# Patient Record
Sex: FEMALE | Race: WHITE | NOT HISPANIC OR LATINO | Employment: OTHER | URBAN - METROPOLITAN AREA
[De-identification: names, ages, dates, MRNs, and addresses within clinical notes are randomized per-mention and may not be internally consistent; named-entity substitution may affect disease eponyms.]

---

## 2017-02-15 ENCOUNTER — OFFICE VISIT (OUTPATIENT)
Dept: FAMILY MEDICINE | Facility: CLINIC | Age: 62
End: 2017-02-15
Payer: MEDICARE

## 2017-02-15 ENCOUNTER — DOCUMENTATION ONLY (OUTPATIENT)
Dept: FAMILY MEDICINE | Facility: CLINIC | Age: 62
End: 2017-02-15

## 2017-02-15 VITALS
HEIGHT: 68 IN | OXYGEN SATURATION: 98 % | SYSTOLIC BLOOD PRESSURE: 122 MMHG | HEART RATE: 89 BPM | RESPIRATION RATE: 16 BRPM | BODY MASS INDEX: 33.88 KG/M2 | WEIGHT: 223.56 LBS | DIASTOLIC BLOOD PRESSURE: 83 MMHG | TEMPERATURE: 98 F

## 2017-02-15 DIAGNOSIS — E78.5 HYPERLIPIDEMIA, UNSPECIFIED HYPERLIPIDEMIA TYPE: ICD-10-CM

## 2017-02-15 DIAGNOSIS — I63.9 CEREBROVASCULAR ACCIDENT (CVA), UNSPECIFIED MECHANISM: ICD-10-CM

## 2017-02-15 DIAGNOSIS — Z72.0 TOBACCO ABUSE: ICD-10-CM

## 2017-02-15 DIAGNOSIS — I10 ESSENTIAL HYPERTENSION: Primary | ICD-10-CM

## 2017-02-15 PROCEDURE — 3074F SYST BP LT 130 MM HG: CPT | Mod: S$GLB,,, | Performed by: INTERNAL MEDICINE

## 2017-02-15 PROCEDURE — 3079F DIAST BP 80-89 MM HG: CPT | Mod: S$GLB,,, | Performed by: INTERNAL MEDICINE

## 2017-02-15 PROCEDURE — 99203 OFFICE O/P NEW LOW 30 MIN: CPT | Mod: S$GLB,,, | Performed by: INTERNAL MEDICINE

## 2017-02-15 RX ORDER — ATORVASTATIN CALCIUM 40 MG/1
40 TABLET, FILM COATED ORAL DAILY
Qty: 90 TABLET | Refills: 3 | Status: SHIPPED | OUTPATIENT
Start: 2017-02-15 | End: 2017-02-17 | Stop reason: SDUPTHER

## 2017-02-15 RX ORDER — ATORVASTATIN CALCIUM 20 MG/1
20 TABLET, FILM COATED ORAL DAILY
COMMUNITY
End: 2017-02-15

## 2017-02-15 RX ORDER — LISINOPRIL 20 MG/1
20 TABLET ORAL DAILY
COMMUNITY
End: 2017-02-17 | Stop reason: DRUGHIGH

## 2017-02-15 RX ORDER — ASPIRIN 325 MG
325 TABLET ORAL DAILY
Status: ON HOLD | COMMUNITY
Start: 2017-02-15 | End: 2020-09-08 | Stop reason: HOSPADM

## 2017-02-15 RX ORDER — BACLOFEN 20 MG/1
20 TABLET ORAL 2 TIMES DAILY
COMMUNITY
End: 2017-04-07 | Stop reason: SDUPTHER

## 2017-02-15 NOTE — MR AVS SNAPSHOT
[facility]  [address] [address] [address]  Phone: [phone]  Fax: [phone]                  Yadi Mccall   2/15/2017 9:20 AM   Office Visit    Description:  Female : 1955   Provider:  Chetan Sweeney MD   Department:  [facility]           Reason for Visit     Establish Care           Diagnoses this Visit        Comments    Essential hypertension    -  Primary     Hyperlipidemia, unspecified hyperlipidemia type         Tobacco abuse         Cerebrovascular accident (CVA), unspecified mechanism                To Do List           Future Appointments        Provider Department Dept Phone    3/21/2017 8:20 AM LAB, [facility] [phone]    3/28/2017 4:00 PM Chetan Sweeney MD [facility] [phone]      Goals (5 Years of Data)     None      Follow-Up and Disposition     Return in about 6 weeks (around 3/29/2017).    Follow-up and Disposition History       These Medications        Disp Refills Start End    atorvastatin (LIPITOR) 40 MG tablet 90 tablet 3 2/15/2017 2/15/2018    Take 1 tablet (40 mg total) by mouth once daily. - Oral    Pharmacy: [facility] - [address] Ph #: [phone]         [facility] On Call     [facility] On Call Nurse Care Line -  Assistance  Registered nurses in the [facility] On Call Center provide clinical advisement, health education, appointment booking, and other advisory services.  Call for this free service at [phone].             Medications           Message regarding Medications     Verify the changes and/or additions to your medication regime listed below are the same as discussed with your clinician today.  If any of these changes or additions are incorrect, please notify your healthcare provider.        START taking these NEW medications        Refills    atorvastatin (LIPITOR) 40 MG tablet 3    Sig: Take 1  "tablet (40 mg total) by mouth once daily.    Class: Normal    Route: Oral           Verify that the below list of medications is an accurate representation of the medications you are currently taking.  If none reported, the list may be blank. If incorrect, please contact your healthcare provider. Carry this list with you in case of emergency.           Current Medications     aspirin 325 MG tablet Take 325 mg by mouth once daily.    baclofen (LIORESAL) 20 MG tablet Take 20 mg by mouth 2 (two) times daily.    lisinopril (PRINIVIL,ZESTRIL) 20 MG tablet Take 20 mg by mouth once daily.    TRAZODONE HCL (TRAZODONE ORAL) Take by mouth nightly.    atorvastatin (LIPITOR) 40 MG tablet Take 1 tablet (40 mg total) by mouth once daily.           Clinical Reference Information           Your Vitals Were     BP Pulse Temp Resp Height Weight    122/83 (BP Location: Left arm, Patient Position: Sitting, BP Method: Automatic) 89 98.3 °F (36.8 °C) (Oral) 16 5' 8" (1.727 m) 101.4 kg (223 lb 8.7 oz)    SpO2 BMI             98% 33.99 kg/m2         Blood Pressure          Most Recent Value    BP  122/83      Allergies as of 2/15/2017     Chlorthalidone    Codeine    Dyazide [Triamterene-hydrochlorothiazid]    Penicillins      Immunizations Administered on Date of Encounter - 2/15/2017     None      Orders Placed During Today's Visit     Future Labs/Procedures Expected by Expires    Comprehensive metabolic panel  2/15/2017 2/16/2018    Lipid panel  2/15/2017 2/16/2018      Instructions    Cut back on your smoking    Low-Salt Diet  This diet removes foods that are high in salt. It also limits the amount of salt you use when cooking. It is most often used for people with high blood pressure, edema (fluid retention), and kidney, liver, or heart disease.  Table salt contains the mineral sodium. Your body needs sodium to work normally. But too much sodium can make your health problems worse. Your healthcare provider is recommending a low-salt " (also called low-sodium) diet for you. Your total daily allowance of salt is 1,500 to 2,300 milligrams (mg). It is less than 1 teaspoon of table salt. This means you can have only about 500 to 700 mg of sodium at each meal. People with certain health problems should limit salt intake to the lower end of the recommended range.    When you cook, dont add much salt. If you can cook without using salt, even better. Dont add salt to your food at the table.  When shopping, read food labels. Salt is often called sodium on the label. Choose foods that are salt-free, low salt, or very low salt. Note that foods with reduced salt may not lower your salt intake enough.    Beans, potatoes, and pasta  Ok: Dry beans, split peas, lentils, potatoes, rice, macaroni, pasta, spaghetti without added salt  Avoid: Potato chips, tortilla chips, and similar products  Breads and cereals  Ok: Low-sodium breads, rolls, cereals, and cakes; low-salt crackers, matzo crackers  Avoid: Salted crackers, pretzels, popcorn, [place] toast, pancakes, muffins  Dairy  Ok: Milk, chocolate milk, hot chocolate mix, low-salt cheeses, and yogurt  Avoid: Processed cheese and cheese spreads; Roquefort, Camembert, and cottage cheese; buttermilk, instant breakfast drink  Desserts  Ok: Ice cream, frozen yogurt, juice bars, gelatin, cookies and pies, sugar, honey, jelly, hard candy  Avoid: Most pies, cakes and cookies prepared or processed with salt; instant pudding  Drinks  Ok: Tea, coffee, fizzy (carbonated) drinks, juices  Avoid: Flavored coffees, electrolyte replacement drinks, sports drinks  Meats  Ok: All fresh meat, fish, poultry, low-salt tuna, eggs, egg substitute  Avoid: Smoked, pickled, brine-cured, or salted meats and fish. This includes hughes, chipped beef, corned beef, hot dogs, deli meats, ham, kosher meats, salt pork, sausage, canned tuna, salted codfish, smoked salmon, herring, sardines, or anchovies.  Seasonings and spices  Ok: Most seasonings are  okay. Good substitutes for salt include: fresh herb blends, hot sauce, lemon, garlic, ayala, vinegar, dry mustard, parsley, cilantro, horseradish, tomato paste, regular margarine, mayonnaise, unsalted butter, cream cheese, vegetable oil, cream, low-salt salad dressing and gravy.  Avoid: Regular ketchup, relishes, pickles, soy sauce, teriyaki sauce, Worcestershire sauce, BBQ sauce, tartar sauce, meat tenderizer, chili sauce, regular gravy, regular salad dressing, salted butter  Soups  Ok: Low-salt soups and broths made with allowed foods  Avoid: Bouillon cubes, soups with smoked or salted meats, regular soup and broth  Vegetables  Ok: Most vegetables are okay; also low-salt tomato and vegetable juices  Avoid: Sauerkraut and other brine-soaked vegetables; pickles and other pickled vegetables; tomato juice, olives  © 8461-7632 [employer]. [address] [place] [address]. All rights reserved. This information is not intended as a substitute for professional medical care. Always follow your healthcare professional's instructions.       Smoking Cessation     If you would like to quit smoking:   You may be eligible for free services if you are a [place] resident and started smoking cigarettes before September 1, 1988.  Call the Smoking Cessation Trust ([facility]) toll free at (424) 455-1017 or (738) 468-7829.   Call 1-800-QUIT-NOW if you do not meet the above criteria.            Language Assistance Services     ATTENTION: Language assistance services are available, free of charge. Please call [phone].      ATENCIÓN: Si habla español, tiene a cooper disposición servicios gratuitos de asistencia lingüística. Llame al [phone].     CHÚ Ý: N?u b?n nói Ti?ng Vi?t, có các d?ch v? h? tr? ngôn ng? mi?n phí dành cho b?n. G?i s? [phone].         [facility] complies with applicable Federal civil rights laws and does not discriminate on the basis of race, color, national  origin, age, disability, or sex.

## 2017-02-15 NOTE — PROGRESS NOTES
Subjective:       Patient ID: Yadi Mccall is a 61 y.o. female.    Chief Complaint: Establish Care (refills)    HPI         CHIEF COMPLAINT: Hypertension  HPI:     ONSET:      QUALITY/COURSE:   Controlled:  yes     INTENSITY/SEVERITY:  Average blood pressure is unknown .     MODIFIERS/TREATMENTS:  Taking medications: yes. .High sodium intake: no. alcohol: no      The following symptoms are positive only if BOLDED, otherwise are negative.      SYMPTOMS/RELATED: Possible medication side effects include:   Depression..  . Cough. . Constipation.    REVIEW OF SYMPTOMS: . Weight_loss . Weight_gain . Leg_cramps ..    TARGET ORGAN DAMAGE:: angina/ prior myocardial infarction, chronic kidney disease, heart failure, left ventricular hypertrophy, peripheral artery disease, prior coronary revascularization, retinopathy, stroke. transient ischemic attack.        CHIEF COMPLAINT: Hyperlipidemia. cholesterol screening: no.   HPI:     ONSET:    MODIFIERS/TREATMENTS: . Taking medications: yes. . Non-compliance with following diet: no. .     SYMPTOMS/RELATED:Possible medication side effects include:   Myalgia: no.  .     REVIEW OF SYMPTOMS: past weights:   Wt Readings from Last 1 Encounters:   02/15/17 0958 101.4 kg (223 lb 8.7 oz)                                                     Last lipids: total No results found for: CHOL                                                                  HDL No results found for: HDL                                                                  LDL No results found for: LDLCALC                                                                  TRIG No results found for: TRIG                                                                      Review of Systems   Constitutional: Positive for unexpected weight change. Negative for fatigue and fever.   HENT: Positive for tinnitus. Negative for dental problem, hearing loss, nosebleeds, rhinorrhea, trouble swallowing and voice change.    Eyes:  "Negative for itching and visual disturbance.   Respiratory: Negative for cough, shortness of breath and wheezing.    Cardiovascular: Negative for chest pain and palpitations.   Gastrointestinal: Negative for abdominal pain, blood in stool, constipation, diarrhea, nausea and vomiting.   Endocrine: Negative for cold intolerance, heat intolerance, polydipsia and polyphagia.   Genitourinary: Negative for difficulty urinating and dysuria.   Musculoskeletal: Negative for arthralgias.   Allergic/Immunologic: Negative for environmental allergies and immunocompromised state.   Neurological: Positive for weakness. Negative for dizziness, seizures, numbness and headaches.   Hematological: Does not bruise/bleed easily.   Psychiatric/Behavioral: Positive for sleep disturbance. Negative for agitation, dysphoric mood and suicidal ideas. The patient is not nervous/anxious.        Objective:      Vitals:    02/15/17 0958   BP: 122/83   Pulse: 89   Resp: 16   Temp: 98.3 °F (36.8 °C)   TempSrc: Oral   SpO2: 98%   Weight: 101.4 kg (223 lb 8.7 oz)   Height: 5' 8" (1.727 m)   PainSc: 0-No pain     Physical Exam   Constitutional: She is oriented to person, place, and time. She appears well-nourished. No distress.   HENT:   Head: Normocephalic and atraumatic.   Right Ear: External ear normal.   Left Ear: External ear normal.   Mouth/Throat: Oropharynx is clear and moist. No oropharyngeal exudate.   Eyes: Conjunctivae and EOM are normal. Pupils are equal, round, and reactive to light. No scleral icterus.   Neck: Normal range of motion. Neck supple. No thyromegaly present.   Cardiovascular: Normal rate, regular rhythm, normal heart sounds and intact distal pulses.  Exam reveals no gallop and no friction rub.    No murmur heard.  Pulmonary/Chest: Effort normal and breath sounds normal. No respiratory distress. She has no wheezes. She has no rales. She exhibits no tenderness.   Abdominal: Soft. Bowel sounds are normal. She exhibits no " distension. There is no tenderness.   Musculoskeletal: Normal range of motion. She exhibits no edema or tenderness.   Weakness and foot drop of left leg   Lymphadenopathy:     She has no cervical adenopathy.   Neurological: She is alert and oriented to person, place, and time. She displays normal reflexes. No cranial nerve deficit. She exhibits normal muscle tone. Coordination normal.   Skin: Skin is warm and dry. No rash noted.   Psychiatric: She has a normal mood and affect. Her behavior is normal. Judgment and thought content normal.   Nursing note and vitals reviewed.        Assessment:       1. Essential hypertension    2. Hyperlipidemia, unspecified hyperlipidemia type    3. Tobacco abuse    4. Cerebrovascular accident (CVA), unspecified mechanism          Plan:   The patient was very devoted to her smoking so will only ask her to cut back.  Essential hypertension  -     Comprehensive metabolic panel; Future; Expected date: 2/15/17  -     Lipid panel; Future; Expected date: 2/15/17    Hyperlipidemia, unspecified hyperlipidemia type  -     atorvastatin (LIPITOR) 40 MG tablet; Take 1 tablet (40 mg total) by mouth once daily.  Dispense: 90 tablet; Refill: 3    Tobacco abuse    Cerebrovascular accident (CVA), unspecified mechanism      Return in about 6 weeks (around 3/29/2017).

## 2017-02-15 NOTE — PATIENT INSTRUCTIONS
Cut back on your smoking    Low-Salt Diet  This diet removes foods that are high in salt. It also limits the amount of salt you use when cooking. It is most often used for people with high blood pressure, edema (fluid retention), and kidney, liver, or heart disease.  Table salt contains the mineral sodium. Your body needs sodium to work normally. But too much sodium can make your health problems worse. Your healthcare provider is recommending a low-salt (also called low-sodium) diet for you. Your total daily allowance of salt is 1,500 to 2,300 milligrams (mg). It is less than 1 teaspoon of table salt. This means you can have only about 500 to 700 mg of sodium at each meal. People with certain health problems should limit salt intake to the lower end of the recommended range.    When you cook, dont add much salt. If you can cook without using salt, even better. Dont add salt to your food at the table.  When shopping, read food labels. Salt is often called sodium on the label. Choose foods that are salt-free, low salt, or very low salt. Note that foods with reduced salt may not lower your salt intake enough.    Beans, potatoes, and pasta  Ok: Dry beans, split peas, lentils, potatoes, rice, macaroni, pasta, spaghetti without added salt  Avoid: Potato chips, tortilla chips, and similar products  Breads and cereals  Ok: Low-sodium breads, rolls, cereals, and cakes; low-salt crackers, matzo crackers  Avoid: Salted crackers, pretzels, popcorn, [place]ian toast, pancakes, muffins  Dairy  Ok: Milk, chocolate milk, hot chocolate mix, low-salt cheeses, and yogurt  Avoid: Processed cheese and cheese spreads; Roquefort, Camembert, and cottage cheese; buttermilk, instant breakfast drink  Desserts  Ok: Ice cream, frozen yogurt, juice bars, gelatin, cookies and pies, sugar, honey, jelly, hard candy  Avoid: Most pies, cakes and cookies prepared or processed with salt; instant pudding  Drinks  Ok: Tea, coffee, fizzy (carbonated)  drinks, juices  Avoid: Flavored coffees, electrolyte replacement drinks, sports drinks  Meats  Ok: All fresh meat, fish, poultry, low-salt tuna, eggs, egg substitute  Avoid: Smoked, pickled, brine-cured, or salted meats and fish. This includes hughes, chipped beef, corned beef, hot dogs, deli meats, ham, kosher meats, salt pork, sausage, canned tuna, salted codfish, smoked salmon, herring, sardines, or anchovies.  Seasonings and spices  Ok: Most seasonings are okay. Good substitutes for salt include: fresh herb blends, hot sauce, lemon, garlic, ayala, vinegar, dry mustard, parsley, cilantro, horseradish, tomato paste, regular margarine, mayonnaise, unsalted butter, cream cheese, vegetable oil, cream, low-salt salad dressing and gravy.  Avoid: Regular ketchup, relishes, pickles, soy sauce, teriyaki sauce, Worcestershire sauce, BBQ sauce, tartar sauce, meat tenderizer, chili sauce, regular gravy, regular salad dressing, salted butter  Soups  Ok: Low-salt soups and broths made with allowed foods  Avoid: Bouillon cubes, soups with smoked or salted meats, regular soup and broth  Vegetables  Ok: Most vegetables are okay; also low-salt tomato and vegetable juices  Avoid: Sauerkraut and other brine-soaked vegetables; pickles and other pickled vegetables; tomato juice, olives  © 7153-9355 [employer]. [address] [place] [address]. All rights reserved. This information is not intended as a substitute for professional medical care. Always follow your healthcare professional's instructions.

## 2017-02-15 NOTE — PROGRESS NOTES
Health Maintenance Due   Topic Date Due    Hepatitis C Screening  1955    Lipid Panel  1955    TETANUS VACCINE  07/26/1975    Pap Smear  07/26/1978    Mammogram  07/26/1997    Colonoscopy  07/26/2007    Influenza Vaccine  08/01/2016

## 2017-02-17 ENCOUNTER — TELEPHONE (OUTPATIENT)
Dept: FAMILY MEDICINE | Facility: CLINIC | Age: 62
End: 2017-02-17

## 2017-02-17 DIAGNOSIS — Z11.59 NEED FOR HEPATITIS C SCREENING TEST: ICD-10-CM

## 2017-02-17 DIAGNOSIS — Z12.31 OTHER SCREENING MAMMOGRAM: ICD-10-CM

## 2017-02-17 RX ORDER — ATORVASTATIN CALCIUM 80 MG/1
80 TABLET, FILM COATED ORAL DAILY
COMMUNITY
End: 2017-10-02

## 2017-02-17 RX ORDER — TRAZODONE HYDROCHLORIDE 50 MG/1
50 TABLET ORAL NIGHTLY
COMMUNITY
End: 2017-04-07 | Stop reason: SDUPTHER

## 2017-02-17 RX ORDER — LISINOPRIL 40 MG/1
40 TABLET ORAL DAILY
COMMUNITY
End: 2017-04-07 | Stop reason: SDUPTHER

## 2017-02-17 NOTE — TELEPHONE ENCOUNTER
----- Message from Rhonda Wei sent at 2/16/2017  2:52 PM CST -----  Contact: self: [phone]  Patient wanted to tell office the mg of her medication (sleeping pills). It is 50 mg. Atorvastatin-80 mg, Baclofen - 20 mg and Synaprill - 40 mg. Patient states that office asked her to call back with this information.  
Thank the patient for the information  
6

## 2017-03-07 ENCOUNTER — TELEPHONE (OUTPATIENT)
Dept: FAMILY MEDICINE | Facility: CLINIC | Age: 62
End: 2017-03-07

## 2017-03-07 NOTE — TELEPHONE ENCOUNTER
----- Message from Gunjan Rasheed sent at 3/7/2017 10:57 AM CST -----  Contact: self  Patient needs to reschedule lab appointment to either 03/13/17 or 03/20/17 due to [employer] can not bring her on 03/21/17.  Patient also needs to know if the appointment with Dr Sweeney will need to be rescheduled. Please call patient at [phone]. Thanks!

## 2017-03-20 ENCOUNTER — CLINICAL SUPPORT (OUTPATIENT)
Dept: FAMILY MEDICINE | Facility: CLINIC | Age: 62
End: 2017-03-20
Payer: MEDICARE

## 2017-03-20 DIAGNOSIS — I10 ESSENTIAL HYPERTENSION: ICD-10-CM

## 2017-03-20 LAB
ALBUMIN SERPL BCP-MCNC: 3.8 G/DL
ALP SERPL-CCNC: 112 U/L
ALT SERPL W/O P-5'-P-CCNC: 27 U/L
ANION GAP SERPL CALC-SCNC: 12 MMOL/L
AST SERPL-CCNC: 19 U/L
BILIRUB SERPL-MCNC: 0.4 MG/DL
BUN SERPL-MCNC: 12 MG/DL
CALCIUM SERPL-MCNC: 9.4 MG/DL
CHLORIDE SERPL-SCNC: 103 MMOL/L
CHOLEST/HDLC SERPL: 4.3 {RATIO}
CO2 SERPL-SCNC: 25 MMOL/L
CREAT SERPL-MCNC: 0.9 MG/DL
EST. GFR  (AFRICAN AMERICAN): >60 ML/MIN/1.73 M^2
EST. GFR  (NON AFRICAN AMERICAN): >60 ML/MIN/1.73 M^2
GLUCOSE SERPL-MCNC: 115 MG/DL
HDL/CHOLESTEROL RATIO: 23.3 %
HDLC SERPL-MCNC: 150 MG/DL
HDLC SERPL-MCNC: 35 MG/DL
LDLC SERPL CALC-MCNC: 70.4 MG/DL
NONHDLC SERPL-MCNC: 115 MG/DL
POTASSIUM SERPL-SCNC: 4.6 MMOL/L
PROT SERPL-MCNC: 7.1 G/DL
SODIUM SERPL-SCNC: 140 MMOL/L
TRIGL SERPL-MCNC: 223 MG/DL

## 2017-03-20 PROCEDURE — 80061 LIPID PANEL: CPT

## 2017-03-20 PROCEDURE — 80053 COMPREHEN METABOLIC PANEL: CPT

## 2017-03-27 ENCOUNTER — DOCUMENTATION ONLY (OUTPATIENT)
Dept: FAMILY MEDICINE | Facility: CLINIC | Age: 62
End: 2017-03-27

## 2017-03-27 NOTE — PROGRESS NOTES
Health Maintenance Due   Topic Date Due    Hepatitis C Screening  1955    Pneumococcal PPSV23 (Medium Risk) (1) 07/26/1975    Pap Smear  07/26/1978    Mammogram  07/26/1997    Colonoscopy  07/26/2007    Influenza Vaccine  08/01/2016

## 2017-04-07 ENCOUNTER — DOCUMENTATION ONLY (OUTPATIENT)
Dept: FAMILY MEDICINE | Facility: CLINIC | Age: 62
End: 2017-04-07

## 2017-04-07 ENCOUNTER — OFFICE VISIT (OUTPATIENT)
Dept: FAMILY MEDICINE | Facility: CLINIC | Age: 62
End: 2017-04-07
Payer: MEDICARE

## 2017-04-07 VITALS
OXYGEN SATURATION: 96 % | SYSTOLIC BLOOD PRESSURE: 128 MMHG | DIASTOLIC BLOOD PRESSURE: 80 MMHG | WEIGHT: 227.06 LBS | BODY MASS INDEX: 34.41 KG/M2 | HEART RATE: 100 BPM | HEIGHT: 68 IN | TEMPERATURE: 98 F

## 2017-04-07 DIAGNOSIS — J31.0 RHINITIS MEDICAMENTOSA: ICD-10-CM

## 2017-04-07 DIAGNOSIS — F51.01 PRIMARY INSOMNIA: ICD-10-CM

## 2017-04-07 DIAGNOSIS — R25.2 MUSCLE CRAMPS: ICD-10-CM

## 2017-04-07 DIAGNOSIS — T48.5X5A RHINITIS MEDICAMENTOSA: ICD-10-CM

## 2017-04-07 DIAGNOSIS — I10 ESSENTIAL HYPERTENSION: Primary | ICD-10-CM

## 2017-04-07 DIAGNOSIS — E78.5 HYPERLIPIDEMIA, UNSPECIFIED HYPERLIPIDEMIA TYPE: ICD-10-CM

## 2017-04-07 PROCEDURE — 3074F SYST BP LT 130 MM HG: CPT | Mod: S$GLB,,, | Performed by: INTERNAL MEDICINE

## 2017-04-07 PROCEDURE — 99213 OFFICE O/P EST LOW 20 MIN: CPT | Mod: S$GLB,,, | Performed by: INTERNAL MEDICINE

## 2017-04-07 PROCEDURE — 1160F RVW MEDS BY RX/DR IN RCRD: CPT | Mod: S$GLB,,, | Performed by: INTERNAL MEDICINE

## 2017-04-07 PROCEDURE — 3079F DIAST BP 80-89 MM HG: CPT | Mod: S$GLB,,, | Performed by: INTERNAL MEDICINE

## 2017-04-07 RX ORDER — BACLOFEN 20 MG/1
20 TABLET ORAL 2 TIMES DAILY
Qty: 180 TABLET | Refills: 3 | Status: SHIPPED | OUTPATIENT
Start: 2017-04-07 | End: 2017-07-19 | Stop reason: SDUPTHER

## 2017-04-07 RX ORDER — LISINOPRIL 40 MG/1
40 TABLET ORAL DAILY
Qty: 90 TABLET | Refills: 3 | Status: SHIPPED | OUTPATIENT
Start: 2017-04-07 | End: 2017-10-10

## 2017-04-07 RX ORDER — TRAZODONE HYDROCHLORIDE 50 MG/1
50 TABLET ORAL NIGHTLY
Qty: 90 TABLET | Refills: 3 | Status: SHIPPED | OUTPATIENT
Start: 2017-04-07 | End: 2018-01-09

## 2017-04-07 RX ORDER — PREDNISONE 20 MG/1
20 TABLET ORAL DAILY
Qty: 7 TABLET | Refills: 0 | Status: SHIPPED | OUTPATIENT
Start: 2017-04-07 | End: 2017-04-17

## 2017-04-07 NOTE — PATIENT INSTRUCTIONS
No more over-the-counter nasal spray such as Afrin or decongestants.  Use saline nasal sprays often as you need to special in the first 2 weeks.  Prednisone will help you get off of it for the first week..    Lose 5 pounds.  Suggest [place] diet    Low-Salt Diet  This diet removes foods that are high in salt. It also limits the amount of salt you use when cooking. It is most often used for people with high blood pressure, edema (fluid retention), and kidney, liver, or heart disease.  Table salt contains the mineral sodium. Your body needs sodium to work normally. But too much sodium can make your health problems worse. Your healthcare provider is recommending a low-salt (also called low-sodium) diet for you. Your total daily allowance of salt is 1,500 to 2,300 milligrams (mg). It is less than 1 teaspoon of table salt. This means you can have only about 500 to 700 mg of sodium at each meal. People with certain health problems should limit salt intake to the lower end of the recommended range.    When you cook, dont add much salt. If you can cook without using salt, even better. Dont add salt to your food at the table.  When shopping, read food labels. Salt is often called sodium on the label. Choose foods that are salt-free, low salt, or very low salt. Note that foods with reduced salt may not lower your salt intake enough.    Beans, potatoes, and pasta  Ok: Dry beans, split peas, lentils, potatoes, rice, macaroni, pasta, spaghetti without added salt  Avoid: Potato chips, tortilla chips, and similar products  Breads and cereals  Ok: Low-sodium breads, rolls, cereals, and cakes; low-salt crackers, matzo crackers  Avoid: Salted crackers, pretzels, popcorn, [place]er toast, pancakes, muffins  Dairy  Ok: Milk, chocolate milk, hot chocolate mix, low-salt cheeses, and yogurt  Avoid: Processed cheese and cheese spreads; Roquefort, Camembert, and cottage cheese; buttermilk, instant breakfast drink  Desserts  Ok: Ice  cream, frozen yogurt, juice bars, gelatin, cookies and pies, sugar, honey, jelly, hard candy  Avoid: Most pies, cakes and cookies prepared or processed with salt; instant pudding  Drinks  Ok: Tea, coffee, fizzy (carbonated) drinks, juices  Avoid: Flavored coffees, electrolyte replacement drinks, sports drinks  Meats  Ok: All fresh meat, fish, poultry, low-salt tuna, eggs, egg substitute  Avoid: Smoked, pickled, brine-cured, or salted meats and fish. This includes hughes, chipped beef, corned beef, hot dogs, deli meats, ham, kosher meats, salt pork, sausage, canned tuna, salted codfish, smoked salmon, herring, sardines, or anchovies.  Seasonings and spices  Ok: Most seasonings are okay. Good substitutes for salt include: fresh herb blends, hot sauce, lemon, garlic, ayala, vinegar, dry mustard, parsley, cilantro, horseradish, tomato paste, regular margarine, mayonnaise, unsalted butter, cream cheese, vegetable oil, cream, low-salt salad dressing and gravy.  Avoid: Regular ketchup, relishes, pickles, soy sauce, teriyaki sauce, Worcestershire sauce, BBQ sauce, tartar sauce, meat tenderizer, chili sauce, regular gravy, regular salad dressing, salted butter  Soups  Ok: Low-salt soups and broths made with allowed foods  Avoid: Bouillon cubes, soups with smoked or salted meats, regular soup and broth  Vegetables  Ok: Most vegetables are okay; also low-salt tomato and vegetable juices  Avoid: Sauerkraut and other brine-soaked vegetables; pickles and other pickled vegetables; tomato juice, olives  © 2897-9899 [employer]. [address] [place] [address], [place] [address]. All rights reserved. This information is not intended as a substitute for professional medical care. Always follow your healthcare professional's instructions.

## 2017-04-07 NOTE — PROGRESS NOTES
Pre-Visit Chart Review  For Appointment Scheduled on 4/7/17    Health Maintenance Due   Topic Date Due    Hepatitis C Screening  1955    Pneumococcal PPSV23 (Medium Risk) (1) 07/26/1975    Pap Smear  07/26/1978    Mammogram  07/26/1997    Colonoscopy  07/26/2007    Influenza Vaccine  08/01/2016

## 2017-04-07 NOTE — MR AVS SNAPSHOT
[facility]  [address] [address] [address]  Phone: [phone]  Fax: [phone]                  Yadi Mccall   2017 4:00 PM   Office Visit    Description:  Female : 1955   Provider:  Chetan Sweeney MD   Department:  [facility]           Reason for Visit     Follow-up           Diagnoses this Visit        Comments    Essential hypertension    -  Primary     Hyperlipidemia, unspecified hyperlipidemia type         Rhinitis medicamentosa         Primary insomnia         Muscle cramps                To Do List           Future Appointments        Provider Department Dept Phone    2017 8:40 AM LAB, [facility] [phone]    2017 4:00 PM Chetan Sweeney MD [facility] [phone]      Goals (5 Years of Data)     None      Follow-Up and Disposition     Return in about 3 months (around 2017) for if you are not better return in one month.    Follow-up and Disposition History       These Medications        Disp Refills Start End    baclofen (LIORESAL) 20 MG tablet 180 tablet 3 2017     Take 1 tablet (20 mg total) by mouth 2 (two) times daily. - Oral    Pharmacy: [facility] - [address] Ph #: [phone]       lisinopril (PRINIVIL,ZESTRIL) 40 MG tablet 90 tablet 3 2017     Take 1 tablet (40 mg total) by mouth once daily. - Oral    Pharmacy: [facility] - [facility] [address] [facility] Ph #: [phone]       trazodone (DESYREL) 50 MG tablet 90 tablet 3 2017     Take 1 tablet (50 mg total) by mouth every evening. - Oral    Pharmacy: [facility] - [facility] [address] [facility] Ph #: [phone]       predniSONE (DELTASONE) 20 MG tablet 7 tablet 0 2017    Take 1 tablet (20 mg total) by mouth once daily. - Oral    Pharmacy: [facility] - [place], [place]  - [address] [facility] #: [phone]         [facility]     [facility] On Call Nurse Care Line - 24/7 Assistance  Unless otherwise directed by your provider, please contact [facility], our nurse care line that is available for 24/7 assistance.     Registered nurses in the [facility] On Call Center provide: appointment scheduling, clinical advisement, health education, and other advisory services.  Call: [phone] (toll free)               Medications           Message regarding Medications     Verify the changes and/or additions to your medication regime listed below are the same as discussed with your clinician today.  If any of these changes or additions are incorrect, please notify your healthcare provider.        START taking these NEW medications        Refills    predniSONE (DELTASONE) 20 MG tablet 0    Sig: Take 1 tablet (20 mg total) by mouth once daily.    Class: Normal    Route: Oral      CHANGE how you are taking these medications     Start Taking Instead of    baclofen (LIORESAL) 20 MG tablet baclofen (LIORESAL) 20 MG tablet    Dosage:  Take 1 tablet (20 mg total) by mouth 2 (two) times daily. Dosage:  Take 20 mg by mouth 2 (two) times daily.    Reason for Change:  Reorder     lisinopril (PRINIVIL,ZESTRIL) 40 MG tablet lisinopril (PRINIVIL,ZESTRIL) 40 MG tablet    Dosage:  Take 1 tablet (40 mg total) by mouth once daily. Dosage:  Take 40 mg by mouth once daily.    Reason for Change:  Reorder     trazodone (DESYREL) 50 MG tablet trazodone (DESYREL) 50 MG tablet    Dosage:  Take 1 tablet (50 mg total) by mouth every evening. Dosage:  Take 50 mg by mouth every evening.    Reason for Change:  Reorder            Verify that the below list of medications is an accurate representation of the medications you are currently taking.  If none reported, the list may be blank. If incorrect, please contact your healthcare provider. Carry this list with you in case of emergency.           Current Medications   "   aspirin 325 MG tablet Take 325 mg by mouth once daily.    atorvastatin (LIPITOR) 80 MG tablet Take 80 mg by mouth once daily.    baclofen (LIORESAL) 20 MG tablet Take 1 tablet (20 mg total) by mouth 2 (two) times daily.    lisinopril (PRINIVIL,ZESTRIL) 40 MG tablet Take 1 tablet (40 mg total) by mouth once daily.    trazodone (DESYREL) 50 MG tablet Take 1 tablet (50 mg total) by mouth every evening.    predniSONE (DELTASONE) 20 MG tablet Take 1 tablet (20 mg total) by mouth once daily.           Clinical Reference Information           Your Vitals Were     BP Pulse Temp Height Weight SpO2    128/80 (BP Location: Left arm, Patient Position: Sitting, BP Method: Automatic) 100 97.7 °F (36.5 °C) (Oral) 5' 8" (1.727 m) 103 kg (227 lb 1.2 oz) 96%    BMI                34.53 kg/m2          Blood Pressure          Most Recent Value    BP  128/80      Allergies as of 4/7/2017     Chlorthalidone    Codeine    Dyazide [Triamterene-hydrochlorothiazid]    Penicillins      Immunizations Administered on Date of Encounter - 4/7/2017     None      Orders Placed During Today's Visit     Future Labs/Procedures Expected by Expires    Comprehensive metabolic panel  4/7/2017 4/8/2018    Lipid panel  4/7/2017 4/8/2018      Instructions    No more over-the-counter nasal spray such as Afrin or decongestants.  Use saline nasal sprays often as you need to special in the first 2 weeks.  Prednisone will help you get off of it for the first week..    Lose 5 pounds.  Suggest [facility]    Low-Salt Diet  This diet removes foods that are high in salt. It also limits the amount of salt you use when cooking. It is most often used for people with high blood pressure, edema (fluid retention), and kidney, liver, or heart disease.  Table salt contains the mineral sodium. Your body needs sodium to work normally. But too much sodium can make your health problems worse. Your healthcare provider is recommending a low-salt (also called low-sodium) " diet for you. Your total daily allowance of salt is 1,500 to 2,300 milligrams (mg). It is less than 1 teaspoon of table salt. This means you can have only about 500 to 700 mg of sodium at each meal. People with certain health problems should limit salt intake to the lower end of the recommended range.    When you cook, dont add much salt. If you can cook without using salt, even better. Dont add salt to your food at the table.  When shopping, read food labels. Salt is often called sodium on the label. Choose foods that are salt-free, low salt, or very low salt. Note that foods with reduced salt may not lower your salt intake enough.    Beans, potatoes, and pasta  Ok: Dry beans, split peas, lentils, potatoes, rice, macaroni, pasta, spaghetti without added salt  Avoid: Potato chips, tortilla chips, and similar products  Breads and cereals  Ok: Low-sodium breads, rolls, cereals, and cakes; low-salt crackers, matzo crackers  Avoid: Salted crackers, pretzels, popcorn, Divehi toast, pancakes, muffins  Dairy  Ok: Milk, chocolate milk, hot chocolate mix, low-salt cheeses, and yogurt  Avoid: Processed cheese and cheese spreads; Roquefort, Camembert, and cottage cheese; buttermilk, instant breakfast drink  Desserts  Ok: Ice cream, frozen yogurt, juice bars, gelatin, cookies and pies, sugar, honey, jelly, hard candy  Avoid: Most pies, cakes and cookies prepared or processed with salt; instant pudding  Drinks  Ok: Tea, coffee, fizzy (carbonated) drinks, juices  Avoid: Flavored coffees, electrolyte replacement drinks, sports drinks  Meats  Ok: All fresh meat, fish, poultry, low-salt tuna, eggs, egg substitute  Avoid: Smoked, pickled, brine-cured, or salted meats and fish. This includes hughes, chipped beef, corned beef, hot dogs, deli meats, ham, kosher meats, salt pork, sausage, canned tuna, salted codfish, smoked salmon, herring, sardines, or anchovies.  Seasonings and spices  Ok: Most seasonings are okay. Good substitutes  for salt include: fresh herb blends, hot sauce, lemon, garlic, ayala, vinegar, dry mustard, parsley, cilantro, horseradish, tomato paste, regular margarine, mayonnaise, unsalted butter, cream cheese, vegetable oil, cream, low-salt salad dressing and gravy.  Avoid: Regular ketchup, relishes, pickles, soy sauce, teriyaki sauce, Worcestershire sauce, BBQ sauce, tartar sauce, meat tenderizer, chili sauce, regular gravy, regular salad dressing, salted butter  Soups  Ok: Low-salt soups and broths made with allowed foods  Avoid: Bouillon cubes, soups with smoked or salted meats, regular soup and broth  Vegetables  Ok: Most vegetables are okay; also low-salt tomato and vegetable juices  Avoid: Sauerkraut and other brine-soaked vegetables; pickles and other pickled vegetables; tomato juice, olives  © 9442-5591 [employer]. [address] [place] [address]. All rights reserved. This information is not intended as a substitute for professional medical care. Always follow your healthcare professional's instructions.       Smoking Cessation     If you would like to quit smoking:   You may be eligible for free services if you are a [place] resident and started smoking cigarettes before September 1, 1988.  Call the Smoking Cessation [employer] ([facility]) toll free at [phone]) [phone] or ([phone].   Call 1-800-QUIT-NOW if you do not meet the above criteria.   Contact us via email: [email or web address]   View our website for more information: [email or web address]        Language Assistance Services     ATTENTION: Language assistance services are available, free of charge. Please call [phone].      ATENCIÓN: Si christoferla gabriella, tiene a cooper disposición servicios gratuitos de asistencia lingüística. Llame al [phone].     CHÚ Ý: N?u b?n nói Ti?ng Vi?t, có các d?ch v? h? tr? ngôn ng? mi?n phí dành cho b?n. G?i s? [phone].         [facility] complies with applicable  Federal civil rights laws and does not discriminate on the basis of race, color, national origin, age, disability, or sex.

## 2017-04-07 NOTE — PROGRESS NOTES
Subjective:       Patient ID: Yadi Mccall is a 61 y.o. female.    Chief Complaint: Follow-up    .  HPI         CHIEF COMPLAINT: Hyperlipidemia. cholesterol screening: no.   HPI:     ONSET:    MODIFIERS/TREATMENTS: . Taking medications: yes. . Non-compliance with following diet: no. .     SYMPTOMS/RELATED:Possible medication side effects include:   Myalgia: no.  .     REVIEW OF SYMPTOMS: past weights:   Wt Readings from Last 1 Encounters:   04/07/17 1611 103 kg (227 lb 1.2 oz)                                                     Last lipids: total   Lab Results   Component Value Date    CHOL 150 03/20/2017                                                                     HDL   Lab Results   Component Value Date    HDL 35 (L) 03/20/2017                                                                     LDL   Lab Results   Component Value Date    LDLCALC 70.4 03/20/2017                                                                     TRIG   Lab Results   Component Value Date    TRIG 223 (H) 03/20/2017                                                                             CHIEF COMPLAINT: Hypertension  HPI:     ONSET:      QUALITY/COURSE:   Controlled:  yes     INTENSITY/SEVERITY:  Average blood pressure is 120/80 .     MODIFIERS/TREATMENTS:  Taking medications: yes. .High sodium intake: no. alcohol: no      The following symptoms are positive only if BOLDED, otherwise are negative.      SYMPTOMS/RELATED: Possible medication side effects include:   Depression..  . Cough. . Constipation.    REVIEW OF SYMPTOMS: . Weight_loss . Weight_gain . Leg_cramps ..    TARGET ORGAN DAMAGE:: angina/ prior myocardial infarction, chronic kidney disease, heart failure, left ventricular hypertrophy, peripheral artery disease, prior coronary revascularization, retinopathy, stroke. transient ischemic attack.    The patient has severe nasal congestion for which she's been taking Afrin for 2 years.  She is having to take it 2 or 3  "times a day.    Review of Systems   Constitutional: Negative for diaphoresis.   Eyes: Negative for visual disturbance.   Respiratory: Negative for chest tightness and shortness of breath.    Cardiovascular: Negative for chest pain.   Neurological: Negative for dizziness, syncope, weakness and headaches.   Psychiatric/Behavioral: Negative for dysphoric mood. The patient is not nervous/anxious.        Objective:      Vitals:    04/07/17 1611   BP: 128/80   Pulse: 100   Temp: 97.7 °F (36.5 °C)   TempSrc: Oral   SpO2: 96%   Weight: 103 kg (227 lb 1.2 oz)   Height: 5' 8" (1.727 m)   PainSc:   5   PainLoc: Knee     Physical Exam   Constitutional: She appears well-developed and well-nourished.   Eyes: Pupils are equal, round, and reactive to light.   Cardiovascular: Normal rate, regular rhythm and normal heart sounds.    Pulmonary/Chest: Effort normal and breath sounds normal.   Abdominal: Soft. There is no tenderness.   Neurological: She is alert.   Psychiatric: She has a normal mood and affect. Her behavior is normal. Thought content normal.   Nursing note and vitals reviewed.        Assessment:        1. Essential hypertension    2. Hyperlipidemia, unspecified hyperlipidemia type    3. Rhinitis medicamentosa    4. Primary insomnia    5. Muscle cramps          Plan:     Essential hypertension  -     lisinopril (PRINIVIL,ZESTRIL) 40 MG tablet; Take 1 tablet (40 mg total) by mouth once daily.  Dispense: 90 tablet; Refill: 3  -     Comprehensive metabolic panel; Future; Expected date: 4/7/17    Hyperlipidemia, unspecified hyperlipidemia type  -     Lipid panel; Future; Expected date: 4/7/17    Rhinitis medicamentosa  -     predniSONE (DELTASONE) 20 MG tablet; Take 1 tablet (20 mg total) by mouth once daily.  Dispense: 7 tablet; Refill: 0    Primary insomnia  -     trazodone (DESYREL) 50 MG tablet; Take 1 tablet (50 mg total) by mouth every evening.  Dispense: 90 tablet; Refill: 3    Muscle cramps  -     baclofen (LIORESAL) " 20 MG tablet; Take 1 tablet (20 mg total) by mouth 2 (two) times daily.  Dispense: 180 tablet; Refill: 3    Return in about 3 months (around 7/7/2017) for if you are not better return in one month.

## 2017-07-01 ENCOUNTER — LAB VISIT (OUTPATIENT)
Dept: LAB | Facility: HOSPITAL | Age: 62
End: 2017-07-01
Attending: INTERNAL MEDICINE
Payer: MEDICARE

## 2017-07-01 DIAGNOSIS — E78.5 HYPERLIPIDEMIA, UNSPECIFIED HYPERLIPIDEMIA TYPE: ICD-10-CM

## 2017-07-01 DIAGNOSIS — I10 ESSENTIAL HYPERTENSION: ICD-10-CM

## 2017-07-01 LAB
ALBUMIN SERPL BCP-MCNC: 3.7 G/DL
ALP SERPL-CCNC: 137 U/L
ALT SERPL W/O P-5'-P-CCNC: 23 U/L
ANION GAP SERPL CALC-SCNC: 10 MMOL/L
AST SERPL-CCNC: 17 U/L
BILIRUB SERPL-MCNC: 0.4 MG/DL
BUN SERPL-MCNC: 14 MG/DL
CALCIUM SERPL-MCNC: 9.3 MG/DL
CHLORIDE SERPL-SCNC: 105 MMOL/L
CHOLEST/HDLC SERPL: 4 {RATIO}
CO2 SERPL-SCNC: 24 MMOL/L
CREAT SERPL-MCNC: 0.9 MG/DL
EST. GFR  (AFRICAN AMERICAN): >60 ML/MIN/1.73 M^2
EST. GFR  (NON AFRICAN AMERICAN): >60 ML/MIN/1.73 M^2
GLUCOSE SERPL-MCNC: 105 MG/DL
HDL/CHOLESTEROL RATIO: 25 %
HDLC SERPL-MCNC: 136 MG/DL
HDLC SERPL-MCNC: 34 MG/DL
LDLC SERPL CALC-MCNC: 66.4 MG/DL
NONHDLC SERPL-MCNC: 102 MG/DL
POTASSIUM SERPL-SCNC: 4.5 MMOL/L
PROT SERPL-MCNC: 6.8 G/DL
SODIUM SERPL-SCNC: 139 MMOL/L
TRIGL SERPL-MCNC: 178 MG/DL

## 2017-07-01 PROCEDURE — 80053 COMPREHEN METABOLIC PANEL: CPT

## 2017-07-01 PROCEDURE — 80061 LIPID PANEL: CPT

## 2017-07-01 PROCEDURE — 36415 COLL VENOUS BLD VENIPUNCTURE: CPT | Mod: PO

## 2017-07-10 ENCOUNTER — OFFICE VISIT (OUTPATIENT)
Dept: FAMILY MEDICINE | Facility: CLINIC | Age: 62
End: 2017-07-10
Payer: MEDICARE

## 2017-07-10 VITALS
DIASTOLIC BLOOD PRESSURE: 78 MMHG | OXYGEN SATURATION: 98 % | WEIGHT: 230.63 LBS | HEART RATE: 95 BPM | RESPIRATION RATE: 16 BRPM | BODY MASS INDEX: 34.95 KG/M2 | SYSTOLIC BLOOD PRESSURE: 127 MMHG | HEIGHT: 68 IN | TEMPERATURE: 98 F

## 2017-07-10 DIAGNOSIS — R26.9 ABNORMALITY OF GAIT AS LATE EFFECT OF CEREBROVASCULAR ACCIDENT (CVA): Primary | ICD-10-CM

## 2017-07-10 DIAGNOSIS — H69.91 EUSTACHIAN TUBE DYSFUNCTION, RIGHT: ICD-10-CM

## 2017-07-10 DIAGNOSIS — D22.9 NEVUS: ICD-10-CM

## 2017-07-10 DIAGNOSIS — M25.551 RIGHT HIP PAIN: ICD-10-CM

## 2017-07-10 DIAGNOSIS — I69.398 ABNORMALITY OF GAIT AS LATE EFFECT OF CEREBROVASCULAR ACCIDENT (CVA): Primary | ICD-10-CM

## 2017-07-10 PROCEDURE — 99214 OFFICE O/P EST MOD 30 MIN: CPT | Mod: S$GLB,,, | Performed by: INTERNAL MEDICINE

## 2017-07-10 RX ORDER — ATORVASTATIN CALCIUM 40 MG/1
1 TABLET, FILM COATED ORAL DAILY
COMMUNITY
Start: 2017-05-03 | End: 2018-01-09 | Stop reason: SDUPTHER

## 2017-07-10 RX ORDER — LANOLIN ALCOHOL/MO/W.PET/CERES
400 CREAM (GRAM) TOPICAL DAILY
Refills: 0 | COMMUNITY
Start: 2017-07-10 | End: 2017-10-02

## 2017-07-10 NOTE — PROGRESS NOTES
"Subjective:       Patient ID: Yadi Mccall is a 61 y.o. female.    Chief Complaint: Hyperlipidemia (labs,discuss med zyrtec); ear clogged; growth on scalp; and Hip Pain    HPI       CHIEF COMPLAINT: Growths on scalp  HPI: The gross on her scalp got enlarged and inflamed.  She popped it and it bled and now there is smaller.    ONSET/TIMING: Onset      10 years     ago. Sudden: no.. Work related: no. Similar_problems_in_the_past: no.    DURATION:  Continuous..    QUALITY/COURSE:   unchanged  .     LOCATION:   Vertex of scalp  .     INTENSITY/SEVERITY:  Severity is #   4   (10 point scale).    CONTEXT/WHEN: .--Similar problems: no . .  Past treatments: none  . Exposure_to_others_with_similar_symptoms: no . . Exposure_to_poison _ivy: no. .   New exposures (soaps, lotions, laundry detergents, foods, medications, plants, insects or animals).    SYMPTOMS/RELATED: .--Possible medication side effect:    The following symptoms are positive if BOLD, negative otherwise.     REVIEW OF SYMPTOMS:  Itching.  Pain. Sharp_pain. Dull_pain. Burning_pain.  Erythema-Skin. Hypopigmentation.  hyperpigmentation . Inflammation. Herald_Patch.. fixed . evanescent.  Blisters. Purulence. Fever. Fatigue. Tick_Bites.     If the patient walks quarter-mile she gets pain in the right hip.  She is status post a stroke affecting the left side.  She is using a cane on the left hand.    Patient complains of the right ear feeling clogged up.  Feels like she is under water.    The patient reports that at night her stroked leg will begin to cramping and tremoring.  She's taken 20 mg of baclofen without relief some nights.          Review of Systems    Objective:      Vitals:    07/10/17 1553   BP: 127/78   Pulse: 95   Resp: 16   Temp: 98.1 °F (36.7 °C)   TempSrc: Oral   SpO2: 98%   Weight: 104.6 kg (230 lb 9.6 oz)   Height: 5' 8" (1.727 m)   PainSc: 10-Worst pain ever   PainLoc: Hip     Physical Exam   Constitutional: She appears well-developed and " well-nourished.   HENT:   Ear canals are clean and the tympanic membrane's are normal   Eyes: Pupils are equal, round, and reactive to light.   Cardiovascular: Normal rate, regular rhythm and normal heart sounds.    Pulmonary/Chest: Effort normal and breath sounds normal.   Abdominal: Soft. There is no tenderness.   Musculoskeletal:   Full range of motion of the right hip including internal and external rotation.  Minimal tenderness over the greater trochanter.   Neurological: She is alert.   Psychiatric: She has a normal mood and affect. Her behavior is normal. Thought content normal.   Nursing note and vitals reviewed.   patient is using the cane with the handle way too high and with her stroked out left hand.  She's also not moving it forward readily with steps.      Assessment:       1. 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 Right hip pain    3. Nevus    4. Eustachian tube dysfunction, right          Plan:   Showed the patient how to properly use the cane and adjusted.  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

## 2017-07-10 NOTE — PATIENT INSTRUCTIONS
Use the cane with your right hand.  He need to bring it forward with each step of your left foot.    The spasticity in her legs gets bad it anesthesiologist and get baclofen into your spine.  The meantime have someone move your foot if you can't through the range of motion.    Wash her nose out with saline.  Then use Afrin nasal spray for maximum 3 days.    See if the hip pain goes away if you use the cane properly.  If not let us know.

## 2017-07-18 ENCOUNTER — OFFICE VISIT (OUTPATIENT)
Dept: FAMILY MEDICINE | Facility: CLINIC | Age: 62
End: 2017-07-18
Payer: MEDICARE

## 2017-07-18 ENCOUNTER — DOCUMENTATION ONLY (OUTPATIENT)
Dept: FAMILY MEDICINE | Facility: CLINIC | Age: 62
End: 2017-07-18

## 2017-07-18 VITALS
BODY MASS INDEX: 34.89 KG/M2 | RESPIRATION RATE: 16 BRPM | DIASTOLIC BLOOD PRESSURE: 72 MMHG | HEART RATE: 97 BPM | SYSTOLIC BLOOD PRESSURE: 107 MMHG | WEIGHT: 230.19 LBS | TEMPERATURE: 98 F | OXYGEN SATURATION: 97 % | HEIGHT: 68 IN

## 2017-07-18 DIAGNOSIS — D22.9 NEVUS: Primary | ICD-10-CM

## 2017-07-18 DIAGNOSIS — D49.2 NEOPLASM OF UNSPECIFIED BEHAVIOR OF BONE, SOFT TISSUE, AND SKIN: ICD-10-CM

## 2017-07-18 DIAGNOSIS — I10 ESSENTIAL HYPERTENSION: ICD-10-CM

## 2017-07-18 PROCEDURE — 11426 EXC H-F-NK-SP B9+MARG >4 CM: CPT | Mod: S$GLB,,, | Performed by: INTERNAL MEDICINE

## 2017-07-18 PROCEDURE — 99499 UNLISTED E&M SERVICE: CPT | Mod: S$GLB,,, | Performed by: INTERNAL MEDICINE

## 2017-07-18 PROCEDURE — 88305 TISSUE EXAM BY PATHOLOGIST: CPT | Performed by: PATHOLOGY

## 2017-07-18 PROCEDURE — 99212 OFFICE O/P EST SF 10 MIN: CPT | Mod: 25,S$GLB,, | Performed by: INTERNAL MEDICINE

## 2017-07-18 NOTE — PROGRESS NOTES
"Subjective:       Patient ID: Yadi Mccall is a 61 y.o. female.    Chief Complaint: growth on head    HPI   The patient has had this several black growths on her anterior scalp for 15 years.  They get bigger and smaller but never cause her pain.  She doesn't have any bleeding from them.  Right now she says they are smaller than usual.    Patient reports that her blood pressures up in excellent and she's had no dizziness.  Said she has had no cough or shortness of breath.  Review of Systems    Objective:      Vitals:    07/18/17 1449   BP: 107/72   Pulse: 97   Resp: 16   Temp: 97.9 °F (36.6 °C)   TempSrc: Oral   SpO2: 97%   Weight: 104.4 kg (230 lb 2.6 oz)   Height: 5' 8" (1.727 m)   PainSc: 0-No pain     Physical Exam   Constitutional: She appears well-developed and well-nourished.   Eyes: Pupils are equal, round, and reactive to light.   Cardiovascular: Normal rate, regular rhythm and normal heart sounds.    Pulmonary/Chest: Effort normal and breath sounds normal.   Abdominal: Soft. There is no tenderness.   Neurological: She is alert.   Skin:   4 nevi by on the anterior midline scalp.  When they removed it felt very hard and gritty.   Psychiatric: She has a normal mood and affect. Her behavior is normal. Thought content normal.   Nursing note and vitals reviewed.        Assessment:       1. Nevus          Plan:     Nevus  -     Excision of Benign Lesion      No Follow-up on file.      "

## 2017-07-18 NOTE — PROCEDURES
"Excision of Benign Lesion  Date/Time: 7/18/2017 3:41 PM  Performed by: ANGIE COLIN.  Authorized by: ANGIE COILN     Consent Done?:  Yes (Written)  Time out: Immediately prior to procedure a "time out" was called to verify the correct patient, procedure, equipment, support staff and site/side marked as required.    LOCATION:  Body area:  Scalpbilateral    PREP:Position:  Supine    ANESTHESIA:  Anesthesia:  Local infiltration  Local anesthetic:  Lidocaine 1% with epinephrine    PROCEDURE DETAILS:  Excision type:  Skin  Malignancy:  Benign  Excision size (cm):  6  Incision type:  Elliptical  Specimens?: Yes    Specimens submitted to pathology.  Hemostasis was obtained.  Estimated blood loss (cc):  0  Wound closure:  Simple  Sutures: 3-0 nylon.  Sterile dressings:  Gauze  Patient tolerated the procedure well with no immediate complications.  Post-operative instructions were provided for the patient.  Patient was discharged and will follow up for wound check and pathology results.      "

## 2017-07-18 NOTE — PROGRESS NOTES
Patient, Yadi Mccall (MRN #[id number]), presented with a recorded BMI of 35 kg/m^2 and a documented comorbidity(s):  - Hypertension  to which the severe obesity is a contributing factor. This is consistent with the definition of severe obesity (BMI 35.0-35.9) with comorbidity (ICD-10 E66.01, Z68.35). The patient's severe obesity was monitored, evaluated, addressed and/or treated. This addendum to the medical record is made on 07/18/2017.

## 2017-07-18 NOTE — PROGRESS NOTES
Health Maintenance Due   Topic Date Due    Hepatitis C Screening  1955    Pneumococcal PPSV23 (Medium Risk) (1) 07/26/1975    Mammogram  07/26/1997    Colonoscopy  07/26/2007

## 2017-07-19 DIAGNOSIS — R25.2 MUSCLE CRAMPS: ICD-10-CM

## 2017-07-19 RX ORDER — BACLOFEN 20 MG/1
20 TABLET ORAL 2 TIMES DAILY
Qty: 180 TABLET | Refills: 3 | Status: SHIPPED | OUTPATIENT
Start: 2017-07-19 | End: 2017-10-02

## 2017-07-19 NOTE — TELEPHONE ENCOUNTER
----- Message from Kayla Peace sent at 7/19/2017 12:24 PM CDT -----  Contact: call  //[phone]    Calling   For a  Refill on baclofen  20  mg // please call   [facility] - [place], [place] - [address] [facility]  [address] [facility] [address]  Phone: [phone] Fax: [phone]

## 2017-07-21 ENCOUNTER — TELEPHONE (OUTPATIENT)
Dept: FAMILY MEDICINE | Facility: CLINIC | Age: 62
End: 2017-07-21

## 2017-07-21 DIAGNOSIS — D23.9: Primary | ICD-10-CM

## 2017-07-25 ENCOUNTER — DOCUMENTATION ONLY (OUTPATIENT)
Dept: FAMILY MEDICINE | Facility: CLINIC | Age: 62
End: 2017-07-25

## 2017-07-25 ENCOUNTER — OFFICE VISIT (OUTPATIENT)
Dept: FAMILY MEDICINE | Facility: CLINIC | Age: 62
End: 2017-07-25
Payer: MEDICARE

## 2017-07-25 VITALS
SYSTOLIC BLOOD PRESSURE: 125 MMHG | BODY MASS INDEX: 34.95 KG/M2 | HEIGHT: 68 IN | TEMPERATURE: 98 F | RESPIRATION RATE: 16 BRPM | HEART RATE: 93 BPM | OXYGEN SATURATION: 97 % | DIASTOLIC BLOOD PRESSURE: 80 MMHG | WEIGHT: 230.63 LBS

## 2017-07-25 DIAGNOSIS — D23.4: Primary | ICD-10-CM

## 2017-07-25 PROCEDURE — 99024 POSTOP FOLLOW-UP VISIT: CPT | Mod: S$GLB,,, | Performed by: INTERNAL MEDICINE

## 2017-07-25 NOTE — PROGRESS NOTES
"Subjective:       Patient ID: Yadi Mccall is a 61 y.o. female.    Chief Complaint: Suture / Staple Removal    HPI   One week ago the patient had a punch biopsy of one of several black nodules on her scalp.  She is here for suture removal  Review of Systems    Objective:      Vitals:    07/25/17 1548   BP: 125/80   Pulse: 93   Resp: 16   Temp: 97.9 °F (36.6 °C)   TempSrc: Oral   SpO2: 97%   Weight: 104.6 kg (230 lb 9.6 oz)   Height: 5' 8" (1.727 m)   PainSc: 0-No pain     Physical Exam   Constitutional: She appears well-developed and well-nourished.   Eyes: Pupils are equal, round, and reactive to light.   Cardiovascular: Normal rate, regular rhythm and normal heart sounds.    Pulmonary/Chest: Effort normal and breath sounds normal.   Abdominal: Soft. There is no tenderness.   Neurological: She is alert.   Skin:   Black 3-4 mm nodules, 4 on the anterior vertex of her scalp.   Psychiatric: She has a normal mood and affect. Her behavior is normal. Thought content normal.   Nursing note and vitals reviewed.       Procedure: Verbal consent given.  Area cleaned with alcohol.  One stitch removed without dehiscence.    path shows a tubular apocrine adenoma  Assessment:       1. 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      "

## 2017-08-24 DIAGNOSIS — Z12.11 COLON CANCER SCREENING: ICD-10-CM

## 2017-10-02 ENCOUNTER — INITIAL CONSULT (OUTPATIENT)
Dept: DERMATOLOGY | Facility: CLINIC | Age: 62
End: 2017-10-02
Payer: MEDICARE

## 2017-10-02 VITALS — WEIGHT: 230 LBS | BODY MASS INDEX: 34.86 KG/M2 | HEIGHT: 68 IN

## 2017-10-02 DIAGNOSIS — D36.9 TUBULAR ADENOMA: Primary | ICD-10-CM

## 2017-10-02 PROCEDURE — 99201 PR OFFICE/OUTPT VISIT,NEW,LEVL I: CPT | Mod: S$GLB,,, | Performed by: DERMATOLOGY

## 2017-10-02 PROCEDURE — 99999 PR PBB SHADOW E&M-EST. PATIENT-LVL III: CPT | Mod: PBBFAC,,, | Performed by: DERMATOLOGY

## 2017-10-02 NOTE — PROGRESS NOTES
"  Subjective:       Patient ID:  Yadi Mccall is a 62 y.o. female who presents for   Chief Complaint   Patient presents with    Lesion     scalp (3)     Initial visit  No h/o skin cancer  Dr Sweeney says "I have rocks in my head"  FINAL PATHOLOGIC DIAGNOSIS  1. Skin, middle scalp, punch biopsy:  - TUBULAR APOCRINE ADENOMA, INCOMPLETELY EXCISED.  MICROSCOPIC DESCRIPTION: SECTIONS SHOW FAIRLY WELL CIRCUMSCRIBED LOBULES OF WELL  DIFFERENTIATED TUBULAR STRUCTURES WITHIN THE DERMIS. THE TUBULES HAVE APOCRINE  FEATURES WITH AN INNER LAYER OF CYLINDRICAL CELLS, SOME SHOWING DECAPITATION  SECRETION, AND AN OUTER LAYER OF FLATTENED CUBOIDAL CELLS. THE SURROUNDING STROMA  CONSISTS OF FIBROUS TISSUE WITH RARE INFLAMMATORY CELLS. THERE IS NO SIGNIFICANT  CYTOLOGIC ATYPIA.  Diagnosed by: Patricia Starkey M.D.  (Electronically Signed: 2017-07-21 15:53:26)      Lesion  - Initial  Affected locations: scalp  Duration: 20 years  Signs / symptoms: pain and itching  Severity: mild to moderate  Timing: constant  Aggravated by: pressure and scratching  Relieving factors/Treatments tried: nothing        Review of Systems   Constitutional: Negative for fever, chills, weight loss, weight gain and night sweats.   Skin: Positive for itching (scalp lesions). Negative for daily sunscreen use, activity-related sunscreen use and wears hat.   Hematologic/Lymphatic: Does not bruise/bleed easily.        Objective:    Physical Exam   Constitutional: She appears well-developed and well-nourished. No distress.   Neurological: She is alert and oriented to person, place, and time. She is not disoriented.   Psychiatric: She has a normal mood and affect.   Skin:   Areas Examined (abnormalities noted in diagram):   Scalp / Hair Palpated and Inspected              Diagram Legend     Erythematous scaling macule/papule c/w actinic keratosis       Vascular papule c/w angioma      Pigmented verrucoid papule/plaque c/w seborrheic keratosis      Yellow umbilicated " papule c/w sebaceous hyperplasia      Irregularly shaped tan macule c/w lentigo     1-2 mm smooth white papules consistent with Milia      Movable subcutaneous cyst with punctum c/w epidermal inclusion cyst      Subcutaneous movable cyst c/w pilar cyst      Firm pink to brown papule c/w dermatofibroma      Pedunculated fleshy papule(s) c/w skin tag(s)      Evenly pigmented macule c/w junctional nevus     Mildly variegated pigmented, slightly irregular-bordered macule c/w mildly atypical nevus      Flesh colored to evenly pigmented papule c/w intradermal nevus       Pink pearly papule/plaque c/w basal cell carcinoma      Erythematous hyperkeratotic cursted plaque c/w SCC      Surgical scar with no sign of skin cancer recurrence      Open and closed comedones      Inflammatory papules and pustules      Verrucoid papule consistent consistent with wart     Erythematous eczematous patches and plaques     Dystrophic onycholytic nail with subungual debris c/w onychomycosis     Umbilicated papule    Erythematous-base heme-crusted tan verrucoid plaque consistent with inflamed seborrheic keratosis     Erythematous Silvery Scaling Plaque c/w Psoriasis     See annotation          Assessment / Plan:        Tubular adenoma    bothersome to patient, desires full excision, tender with trauma comb  Referral to Dr Duvall for E&S         No Follow-up on file.

## 2017-10-02 NOTE — LETTER
October 5, 2017      Chetan Sweeney MD  [address]  [place] [address]           [place] [facility]  [address]  [place] [address]  Phone: [phone]          Patient: Yadi Mccall   MR Number: [id number]   YOB: 1955   Date of Visit: 10/2/2017       Dear Dr. Chetan Sweeney:    Thank you for referring Yadi Mccall to me for evaluation. Attached you will find relevant portions of my assessment and plan of care.    If you have questions, please do not hesitate to call me. I look forward to following Yadi Mccall along with you.    Sincerely,    Adele Mccormick MD    Enclosure  CC:  No Recipients    If you would like to receive this communication electronically, please contact [email or web address] or [phone] to request more information on [employer] Link access.    For providers and/or their staff who would like to refer a patient to [facility], please contact us through our one-stop-shop provider referral line, [facility] , at [phone].    If you feel you have received this communication in error or would no longer like to receive these types of communications, please e-mail [email or web address]

## 2017-10-10 ENCOUNTER — DOCUMENTATION ONLY (OUTPATIENT)
Dept: FAMILY MEDICINE | Facility: CLINIC | Age: 62
End: 2017-10-10

## 2017-10-10 ENCOUNTER — OFFICE VISIT (OUTPATIENT)
Dept: FAMILY MEDICINE | Facility: CLINIC | Age: 62
End: 2017-10-10
Payer: MEDICARE

## 2017-10-10 VITALS
BODY MASS INDEX: 34.68 KG/M2 | OXYGEN SATURATION: 97 % | SYSTOLIC BLOOD PRESSURE: 128 MMHG | RESPIRATION RATE: 16 BRPM | TEMPERATURE: 98 F | WEIGHT: 228.81 LBS | DIASTOLIC BLOOD PRESSURE: 84 MMHG | HEART RATE: 88 BPM | HEIGHT: 68 IN

## 2017-10-10 DIAGNOSIS — J31.0 RHINITIS MEDICAMENTOSA: ICD-10-CM

## 2017-10-10 DIAGNOSIS — I63.9 CEREBROVASCULAR ACCIDENT (CVA), UNSPECIFIED MECHANISM: ICD-10-CM

## 2017-10-10 DIAGNOSIS — T46.4X5A COUGH DUE TO ACE INHIBITOR: ICD-10-CM

## 2017-10-10 DIAGNOSIS — R05.8 COUGH DUE TO ACE INHIBITOR: ICD-10-CM

## 2017-10-10 DIAGNOSIS — T48.5X5A RHINITIS MEDICAMENTOSA: ICD-10-CM

## 2017-10-10 DIAGNOSIS — I10 ESSENTIAL HYPERTENSION: Primary | ICD-10-CM

## 2017-10-10 DIAGNOSIS — E78.5 HYPERLIPIDEMIA, UNSPECIFIED HYPERLIPIDEMIA TYPE: ICD-10-CM

## 2017-10-10 PROCEDURE — 99213 OFFICE O/P EST LOW 20 MIN: CPT | Mod: S$GLB,,, | Performed by: INTERNAL MEDICINE

## 2017-10-10 PROCEDURE — 99499 UNLISTED E&M SERVICE: CPT | Mod: S$GLB,,, | Performed by: INTERNAL MEDICINE

## 2017-10-10 RX ORDER — LOSARTAN POTASSIUM 100 MG/1
100 TABLET ORAL DAILY
Qty: 90 TABLET | Refills: 3 | Status: SHIPPED | OUTPATIENT
Start: 2017-10-10 | End: 2018-01-09

## 2017-10-10 NOTE — PATIENT INSTRUCTIONS
Lose 5 pounds.  Suggest [facility]    Low-Salt Diet  This diet removes foods that are high in salt. It also limits the amount of salt you use when cooking. It is most often used for people with high blood pressure, edema (fluid retention), and kidney, liver, or heart disease.  Table salt contains the mineral sodium. Your body needs sodium to work normally. But too much sodium can make your health problems worse. Your healthcare provider is recommending a low-salt (also called low-sodium) diet for you. Your total daily allowance of salt is 1,500 to 2,300 milligrams (mg). It is less than 1 teaspoon of table salt. This means you can have only about 500 to 700 mg of sodium at each meal. People with certain health problems should limit salt intake to the lower end of the recommended range.    When you cook, dont add much salt. If you can cook without using salt, even better. Dont add salt to your food at the table.  When shopping, read food labels. Salt is often called sodium on the label. Choose foods that are salt-free, low salt, or very low salt. Note that foods with reduced salt may not lower your salt intake enough.    Beans, potatoes, and pasta  Ok: Dry beans, split peas, lentils, potatoes, rice, macaroni, pasta, spaghetti without added salt  Avoid: Potato chips, tortilla chips, and similar products  Breads and cereals  Ok: Low-sodium breads, rolls, cereals, and cakes; low-salt crackers, matzo crackers  Avoid: Salted crackers, pretzels, popcorn, [place] toast, pancakes, muffins  Dairy  Ok: Milk, chocolate milk, hot chocolate mix, low-salt cheeses, and yogurt  Avoid: Processed cheese and cheese spreads; Roquefort, Camembert, and cottage cheese; buttermilk, instant breakfast drink  Desserts  Ok: Ice cream, frozen yogurt, juice bars, gelatin, cookies and pies, sugar, honey, jelly, hard candy  Avoid: Most pies, cakes and cookies prepared or processed with salt; instant pudding  Drinks  Ok: Tea, coffee, fizzy  (carbonated) drinks, juices  Avoid: Flavored coffees, electrolyte replacement drinks, sports drinks  Meats  Ok: All fresh meat, fish, poultry, low-salt tuna, eggs, egg substitute  Avoid: Smoked, pickled, brine-cured, or salted meats and fish. This includes hughes, chipped beef, corned beef, hot dogs, deli meats, ham, kosher meats, salt pork, sausage, canned tuna, salted codfish, smoked salmon, herring, sardines, or anchovies.  Seasonings and spices  Ok: Most seasonings are okay. Good substitutes for salt include: fresh herb blends, hot sauce, lemon, garlic, ayala, vinegar, dry mustard, parsley, cilantro, horseradish, tomato paste, regular margarine, mayonnaise, unsalted butter, cream cheese, vegetable oil, cream, low-salt salad dressing and gravy.  Avoid: Regular ketchup, relishes, pickles, soy sauce, teriyaki sauce, Worcestershire sauce, BBQ sauce, tartar sauce, meat tenderizer, chili sauce, regular gravy, regular salad dressing, salted butter  Soups  Ok: Low-salt soups and broths made with allowed foods  Avoid: Bouillon cubes, soups with smoked or salted meats, regular soup and broth  Vegetables  Ok: Most vegetables are okay; also low-salt tomato and vegetable juices  Avoid: Sauerkraut and other brine-soaked vegetables; pickles and other pickled vegetables; tomato juice, olives  © 3780-8412 [employer]. [address] [place] [address]. All rights reserved. This information is not intended as a substitute for professional medical care. Always follow your healthcare professional's instructions.      Suggest that you stop using Afrin nasal spray.  Instead use Flonase over-the-counter after washing her nose out with saline.  Can't hear try to use steam treatments to open you up.  You will get worse for the first month and then you get better

## 2017-10-10 NOTE — PROGRESS NOTES
Health Maintenance Due   Topic Date Due    Hepatitis C Screening  1955    Pneumococcal PPSV23 (Medium Risk) (1) 07/26/1975    Mammogram  07/26/1997    Colonoscopy  07/26/2007    Zoster Vaccine  07/26/2017    Influenza Vaccine  08/01/2017

## 2017-10-10 NOTE — PROGRESS NOTES
"Subjective:       Patient ID: Yadi Mccall is a 62 y.o. female.    Chief Complaint: Cough (discuss medications,request order for rolator walker)    HPI     Patient has had a stroke.  She cannot go for long distances and needs a walker.  She is febrile sit down when she tires.      CHIEF COMPLAINT: Hypertension  HPI:     ONSET:      QUALITY/COURSE:   Unchanged.     INTENSITY/SEVERITY:  Average blood pressure is 130/70 .     MODIFIERS/TREATMENTS:  Taking medications: yes. .High sodium intake: no. alcohol: no      The following symptoms are positive only if BOLDED, otherwise are negative.      SYMPTOMS/RELATED: Possible medication side effects include:   Depression..  . Cough. . Constipation.    REVIEW OF SYMPTOMS: . Weight_loss . Weight_gain . Leg_cramps .Potency_problems .    TARGET ORGAN DAMAGE:: angina/ prior myocardial infarction, chronic kidney disease, heart failure, left ventricular hypertrophy, peripheral artery disease, prior coronary revascularization, retinopathy, stroke. transient ischemic attack.      The patient used Afrin for years.  3 or 4 times a day.  She went off of it for a month and says that she can hear out of the right ear so she restarted it.    Review of Systems   Constitutional: Negative for diaphoresis.   Eyes: Negative for visual disturbance.   Respiratory: Negative for chest tightness and shortness of breath.    Cardiovascular: Negative for chest pain.   Neurological: Negative for dizziness, syncope, weakness and headaches.   Psychiatric/Behavioral: Negative for dysphoric mood. The patient is not nervous/anxious.        Objective:      Vitals:    10/10/17 1640   BP: 128/84   Pulse: 88   Resp: 16   Temp: 97.9 °F (36.6 °C)   TempSrc: Oral   SpO2: 97%   Weight: 103.8 kg (228 lb 13.4 oz)   Height: 5' 8" (1.727 m)   PainSc: 0-No pain     Physical Exam   Constitutional: She appears well-developed and well-nourished.   Eyes: Pupils are equal, round, and reactive to light.   Cardiovascular: " Normal rate, regular rhythm and normal heart sounds.    Pulmonary/Chest: Effort normal and breath sounds normal.   Abdominal: Soft. There is no tenderness.   Neurological: She is alert.   Psychiatric: She has a normal mood and affect. Her behavior is normal. Thought content normal.   Nursing note and vitals reviewed.        Assessment:       1. Essential hypertension    2. Cough due to ACE inhibitor    3. Cerebrovascular accident (CVA), unspecified mechanism    4. Hyperlipidemia, unspecified hyperlipidemia type    5. Rhinitis medicamentosa          Plan:   Prescription for a Rollator walker given.  Essential hypertension  -     losartan (COZAAR) 100 MG tablet; Take 1 tablet (100 mg total) by mouth once daily.  Dispense: 90 tablet; Refill: 3  -     Comprehensive metabolic panel; Future; Expected date: 10/10/2017    Cough due to ACE inhibitor  -     losartan (COZAAR) 100 MG tablet; Take 1 tablet (100 mg total) by mouth once daily.  Dispense: 90 tablet; Refill: 3    Cerebrovascular accident (CVA), unspecified mechanism    Hyperlipidemia, unspecified hyperlipidemia type  -     Lipid panel; Future; Expected date: 10/10/2017    Rhinitis medicamentosa      Return in about 3 months (around 1/10/2018).

## 2017-10-12 ENCOUNTER — TELEPHONE (OUTPATIENT)
Dept: DERMATOLOGY | Facility: CLINIC | Age: 62
End: 2017-10-12

## 2017-10-12 NOTE — TELEPHONE ENCOUNTER
Spoke to pt. Offered to schedule consultation appt with Dr Stinson. Pt states appt needs to be scheduled with her sister (bri). Says her sister is her transportation. Informed pt I have attempted to schedule with sister several times with no answers and voicemail is not set up. Advised pt to call back when she is ready to schedule this appt. Pt verbalized understanding.

## 2017-10-13 ENCOUNTER — TELEPHONE (OUTPATIENT)
Dept: FAMILY MEDICINE | Facility: CLINIC | Age: 62
End: 2017-10-13

## 2017-10-13 NOTE — TELEPHONE ENCOUNTER
----- Message from Gillian Ch sent at 10/12/2017  2:06 PM CDT -----  Contact: [phone]  Patient is requesting a call back from the nurse stated she need dr barksdale to call [facility]#[phone] in regards to the rollator walker.   Please call the patient upon request at phone number [phone].

## 2017-10-13 NOTE — TELEPHONE ENCOUNTER
----- Message from Dion Vicente sent at 10/12/2017  4:25 PM CDT -----  Contact: [facility], Yoselyn Topete need progress notes signed and fax to [phone], any questions please call back at [phone]

## 2017-10-13 NOTE — TELEPHONE ENCOUNTER
----- Message from Francesca Baeza sent at 10/13/2017 11:26 AM CDT -----  Contact: Self  Patient states that the form needed by Jhonny Resp sent back over the form for her sit down walker, needing additional information.  Detailed written order and office notes for the walker.  Patient is requesting to have this completed and faxed over today to [phone].  Please call [phone]. Thanks

## 2017-10-17 ENCOUNTER — TELEPHONE (OUTPATIENT)
Dept: DERMATOLOGY | Facility: CLINIC | Age: 62
End: 2017-10-17

## 2017-10-17 NOTE — TELEPHONE ENCOUNTER
----- Message from Marlena Rivas sent at 10/17/2017 10:39 AM CDT -----  Contact: Patient  Yadi, patient [phone], Returning the nurse's call. Call to POD. Please advise. Thanks.

## 2017-10-23 ENCOUNTER — HOSPITAL ENCOUNTER (OUTPATIENT)
Dept: PREADMISSION TESTING | Facility: HOSPITAL | Age: 62
Discharge: HOME | End: 2017-10-23
Attending: SURGERY
Payer: MEDICARE

## 2017-10-23 ENCOUNTER — OFFICE VISIT (OUTPATIENT)
Dept: SURGERY | Facility: CLINIC | Age: 62
End: 2017-10-23
Payer: MEDICARE

## 2017-10-23 VITALS — WEIGHT: 228.81 LBS | HEIGHT: 68 IN | BODY MASS INDEX: 34.68 KG/M2

## 2017-10-23 VITALS — WEIGHT: 230 LBS | HEIGHT: 68 IN | BODY MASS INDEX: 34.86 KG/M2

## 2017-10-23 DIAGNOSIS — L98.9 SKIN LESION OF SCALP: Primary | ICD-10-CM

## 2017-10-23 DIAGNOSIS — Z01.818 PRE-OP EXAM: Primary | ICD-10-CM

## 2017-10-23 LAB
ALBUMIN SERPL BCP-MCNC: 3.8 G/DL
ALP SERPL-CCNC: 152 U/L
ALT SERPL W/O P-5'-P-CCNC: 22 U/L
ANION GAP SERPL CALC-SCNC: 12 MMOL/L
AST SERPL-CCNC: 17 U/L
BASOPHILS # BLD AUTO: 0 K/UL
BASOPHILS NFR BLD: 0.4 %
BILIRUB SERPL-MCNC: 0.3 MG/DL
BUN SERPL-MCNC: 11 MG/DL
CALCIUM SERPL-MCNC: 9.4 MG/DL
CHLORIDE SERPL-SCNC: 104 MMOL/L
CO2 SERPL-SCNC: 25 MMOL/L
CREAT SERPL-MCNC: 0.9 MG/DL
DIFFERENTIAL METHOD: ABNORMAL
EOSINOPHIL # BLD AUTO: 0.1 K/UL
EOSINOPHIL NFR BLD: 1.1 %
ERYTHROCYTE [DISTWIDTH] IN BLOOD BY AUTOMATED COUNT: 14.4 %
EST. GFR  (AFRICAN AMERICAN): >60 ML/MIN/1.73 M^2
EST. GFR  (NON AFRICAN AMERICAN): >60 ML/MIN/1.73 M^2
GLUCOSE SERPL-MCNC: 94 MG/DL
HCT VFR BLD AUTO: 49.3 %
HGB BLD-MCNC: 16.7 G/DL
LYMPHOCYTES # BLD AUTO: 2.9 K/UL
LYMPHOCYTES NFR BLD: 23 %
MCH RBC QN AUTO: 30.6 PG
MCHC RBC AUTO-ENTMCNC: 33.9 G/DL
MCV RBC AUTO: 90 FL
MONOCYTES # BLD AUTO: 1 K/UL
MONOCYTES NFR BLD: 7.9 %
NEUTROPHILS # BLD AUTO: 8.5 K/UL
NEUTROPHILS NFR BLD: 67.6 %
PLATELET # BLD AUTO: 283 K/UL
PMV BLD AUTO: 7.3 FL
POTASSIUM SERPL-SCNC: 4.3 MMOL/L
PROT SERPL-MCNC: 7.3 G/DL
RBC # BLD AUTO: 5.46 M/UL
SODIUM SERPL-SCNC: 141 MMOL/L
WBC # BLD AUTO: 12.6 K/UL

## 2017-10-23 PROCEDURE — 99204 OFFICE O/P NEW MOD 45 MIN: CPT | Mod: S$GLB,,, | Performed by: SURGERY

## 2017-10-23 PROCEDURE — 93005 ELECTROCARDIOGRAM TRACING: CPT

## 2017-10-23 PROCEDURE — 99900103 DSU ONLY-NO CHARGE-INITIAL HR (STAT)

## 2017-10-23 PROCEDURE — 93010 ELECTROCARDIOGRAM REPORT: CPT | Mod: ,,, | Performed by: INTERNAL MEDICINE

## 2017-10-23 PROCEDURE — 80053 COMPREHEN METABOLIC PANEL: CPT

## 2017-10-23 PROCEDURE — 85025 COMPLETE CBC W/AUTO DIFF WBC: CPT

## 2017-10-23 PROCEDURE — 99999 PR PBB SHADOW E&M-EST. PATIENT-LVL III: CPT | Mod: PBBFAC,,, | Performed by: SURGERY

## 2017-10-23 PROCEDURE — 36415 COLL VENOUS BLD VENIPUNCTURE: CPT

## 2017-10-23 RX ORDER — BACLOFEN 20 MG/1
20 TABLET ORAL 3 TIMES DAILY PRN
COMMUNITY
End: 2018-01-09 | Stop reason: SDUPTHER

## 2017-10-23 RX ORDER — SODIUM CHLORIDE 9 MG/ML
INJECTION, SOLUTION INTRAVENOUS CONTINUOUS
Status: CANCELLED | OUTPATIENT
Start: 2017-10-31

## 2017-10-23 NOTE — PROGRESS NOTES
Subjective:       Patient ID: Yadi Mccall is a 62 y.o. female.    Chief Complaint: Consult (bumps on head, needs removal)    Referred by Dr. Mccormick for lesions of scalp    The patient has had this several black growths on her anterior scalp for 15 years.  They get bigger and smaller but never cause her pain.  She doesn't have any bleeding from them.  Right now she says they are smaller than usual.     She saw Dr. Sweeney in July and had them biopsied.  She was then sent to Dr. Mccormick who referred her here for excision.    FINAL PATHOLOGIC DIAGNOSIS  1. Skin, middle scalp, punch biopsy:  - TUBULAR APOCRINE ADENOMA, INCOMPLETELY EXCISED.    Past Medical History:  No date: Hyperlipidemia  No date: Hypertension  No date: Stroke  Past Surgical History:  No date: APPENDECTOMY  No date: FOOT SURGERY  No date: HERNIA REPAIR  No date: HYSTERECTOMY  No date: TONSILLECTOMY      Current Outpatient Prescriptions:   aspirin 325 MG tablet, Take 325 mg by mouth once daily., Disp: , Rfl:   atorvastatin (LIPITOR) 40 MG tablet, Take 1 tablet by mouth once daily., Disp: , Rfl:   losartan (COZAAR) 100 MG tablet, Take 1 tablet (100 mg total) by mouth once daily., Disp: 90 tablet, Rfl: 3  trazodone (DESYREL) 50 MG tablet, Take 1 tablet (50 mg total) by mouth every evening., Disp: 90 tablet, Rfl: 3  OXYMETAZOLINE HCL (DECONGESTANT NASAL SPRAY NASL), , Disp: , Rfl:     Review of patient's allergies indicates:   -- Chlorthalidone    -- Codeine    -- Dyazide (triamterene-hydrochlorothiazid)    -- Penicillins     Review of patient's family history indicates:    Melanoma                       Neg Hx                    Psoriasis                      Neg Hx                    Lupus                          Neg Hx                    Eczema                         Neg Hx                    Social History    Marital status: Single              Spouse name:                       Years of education:                 Number of children:                Occupational History    None on file    Social History Main Topics    Smoking status: Current Every Day Smoker                                                     Packs/day: 0.00      Years: 0.00        Smokeless tobacco: Never Used                        Alcohol use: No              Drug use: No              Sexual activity: Not on file          Other Topics            Concern    None on file    Social History Narrative    None on file          Review of Systems   Constitutional: Negative for appetite change, chills, diaphoresis, fatigue, fever and unexpected weight change.   HENT: Negative for hearing loss, sore throat, trouble swallowing and voice change.    Eyes: Negative for visual disturbance.   Respiratory: Negative for cough, shortness of breath and wheezing.    Cardiovascular: Negative for chest pain, palpitations and leg swelling.   Gastrointestinal: Negative for abdominal distention, abdominal pain, anal bleeding, blood in stool, constipation, diarrhea, nausea, rectal pain and vomiting.   Genitourinary: Negative for difficulty urinating, dysuria, flank pain, frequency, hematuria, menstrual problem and urgency.   Musculoskeletal: Negative for arthralgias, back pain, joint swelling, myalgias and neck pain.   Skin: Positive for rash. Negative for pallor.   Neurological: Negative for dizziness, syncope, weakness and headaches.   Hematological: Negative for adenopathy. Does not bruise/bleed easily.   Psychiatric/Behavioral: Negative for suicidal ideas. The patient is not nervous/anxious.        Objective:      Physical Exam   Constitutional: She is oriented to person, place, and time. She appears well-developed and well-nourished. No distress.   HENT:   Head: Normocephalic and atraumatic.   Right Ear: External ear normal.   Left Ear: External ear normal.   Eyes: Conjunctivae are normal. Pupils are equal, round, and reactive to light. Right eye exhibits no discharge. Left eye exhibits no discharge.   Neck:  No tracheal deviation present. No thyromegaly present.   Cardiovascular: Normal rate and regular rhythm.    Pulmonary/Chest: Effort normal. No respiratory distress.   Abdominal: Soft. She exhibits no distension. There is no guarding.   Musculoskeletal: She exhibits no edema or tenderness.   Lymphadenopathy:     She has no cervical adenopathy.   Neurological: She is alert and oriented to person, place, and time. No cranial nerve deficit.   Skin: Skin is warm and dry. No rash noted. She is not diaphoretic. No pallor.        Psychiatric: She has a normal mood and affect. Her behavior is normal. Judgment and thought content normal.   Nursing note and vitals reviewed.          Assessment:       1. Skin lesion of scalp        Plan:       For OP excision on 10/31/17

## 2017-10-23 NOTE — LETTER
October 23, 2017      Adele Mccormick MD  [address]  [facility] [address]           [facility]  [address]  [facility] [address]  Phone: [phone]          Patient: Yadi Mccall   MR Number: [id number]   YOB: 1955   Date of Visit: 10/23/2017       Dear Dr. Adele Mccormick:    Thank you for referring Yadi Mccall to me for evaluation. Attached you will find relevant portions of my assessment and plan of care.    If you have questions, please do not hesitate to call me. I look forward to following Yadi Mccall along with you.    Sincerely,    Roque Stinson MD    Enclosure  CC:  No Recipients    If you would like to receive this communication electronically, please contact [email or web address] or [phone] to request more information on [employer] Link access.    For providers and/or their staff who would like to refer a patient to [facility], please contact us through our one-stop-shop provider referral line, [facility], at [phone].    If you feel you have received this communication in error or would no longer like to receive these types of communications, please e-mail [email or web address]

## 2017-10-30 ENCOUNTER — ANESTHESIA EVENT (OUTPATIENT)
Dept: SURGERY | Facility: HOSPITAL | Age: 62
End: 2017-10-30
Payer: MEDICARE

## 2017-10-31 ENCOUNTER — ANESTHESIA (OUTPATIENT)
Dept: SURGERY | Facility: HOSPITAL | Age: 62
End: 2017-10-31
Payer: MEDICARE

## 2017-10-31 ENCOUNTER — HOSPITAL ENCOUNTER (OUTPATIENT)
Facility: HOSPITAL | Age: 62
Discharge: HOME | End: 2017-10-31
Attending: SURGERY | Admitting: SURGERY
Payer: MEDICARE

## 2017-10-31 VITALS
RESPIRATION RATE: 18 BRPM | DIASTOLIC BLOOD PRESSURE: 75 MMHG | SYSTOLIC BLOOD PRESSURE: 133 MMHG | TEMPERATURE: 98 F | HEART RATE: 88 BPM | OXYGEN SATURATION: 97 %

## 2017-10-31 DIAGNOSIS — L98.9 SKIN LESION OF SCALP: Primary | ICD-10-CM

## 2017-10-31 PROCEDURE — 37000008 HC ANESTHESIA 1ST 15 MINUTES: Performed by: SURGERY

## 2017-10-31 PROCEDURE — 25000003 PHARM REV CODE 250: Performed by: ANESTHESIOLOGY

## 2017-10-31 PROCEDURE — 25000003 PHARM REV CODE 250: Performed by: SURGERY

## 2017-10-31 PROCEDURE — 88305 TISSUE EXAM BY PATHOLOGIST: CPT | Performed by: PATHOLOGY

## 2017-10-31 PROCEDURE — 99900103 DSU ONLY-NO CHARGE-INITIAL HR (STAT): Performed by: SURGERY

## 2017-10-31 PROCEDURE — D9220A PRA ANESTHESIA: Mod: ANES,,, | Performed by: ANESTHESIOLOGY

## 2017-10-31 PROCEDURE — 71000033 HC RECOVERY, INTIAL HOUR: Performed by: SURGERY

## 2017-10-31 PROCEDURE — 36000706: Performed by: SURGERY

## 2017-10-31 PROCEDURE — 71000015 HC POSTOP RECOV 1ST HR: Performed by: SURGERY

## 2017-10-31 PROCEDURE — 71000039 HC RECOVERY, EACH ADD'L HOUR: Performed by: SURGERY

## 2017-10-31 PROCEDURE — S0077 INJECTION, CLINDAMYCIN PHOSP: HCPCS | Performed by: SURGERY

## 2017-10-31 PROCEDURE — 37000009 HC ANESTHESIA EA ADD 15 MINS: Performed by: SURGERY

## 2017-10-31 PROCEDURE — 36000707: Performed by: SURGERY

## 2017-10-31 PROCEDURE — 63600175 PHARM REV CODE 636 W HCPCS: Performed by: NURSE ANESTHETIST, CERTIFIED REGISTERED

## 2017-10-31 PROCEDURE — 99900104 DSU ONLY-NO CHARGE-EA ADD'L HR (STAT): Performed by: SURGERY

## 2017-10-31 PROCEDURE — 25000003 PHARM REV CODE 250: Performed by: NURSE ANESTHETIST, CERTIFIED REGISTERED

## 2017-10-31 PROCEDURE — 11626 EXC S/N/H/F/G MAL+MRG >4 CM: CPT | Mod: ,,, | Performed by: SURGERY

## 2017-10-31 PROCEDURE — D9220A PRA ANESTHESIA: Mod: CRNA,,, | Performed by: NURSE ANESTHETIST, CERTIFIED REGISTERED

## 2017-10-31 RX ORDER — OXYCODONE HYDROCHLORIDE 5 MG/1
5 TABLET ORAL
Status: DISCONTINUED | OUTPATIENT
Start: 2017-10-31 | End: 2017-10-31 | Stop reason: HOSPADM

## 2017-10-31 RX ORDER — ONDANSETRON HYDROCHLORIDE 2 MG/ML
INJECTION, SOLUTION INTRAMUSCULAR; INTRAVENOUS
Status: DISCONTINUED | OUTPATIENT
Start: 2017-10-31 | End: 2017-10-31

## 2017-10-31 RX ORDER — LIDOCAINE HCL/PF 100 MG/5ML
SYRINGE (ML) INTRAVENOUS
Status: DISCONTINUED | OUTPATIENT
Start: 2017-10-31 | End: 2017-10-31

## 2017-10-31 RX ORDER — MEPERIDINE HYDROCHLORIDE 25 MG/ML
12.5 INJECTION INTRAMUSCULAR; INTRAVENOUS; SUBCUTANEOUS ONCE AS NEEDED
Status: DISCONTINUED | OUTPATIENT
Start: 2017-10-31 | End: 2017-10-31 | Stop reason: HOSPADM

## 2017-10-31 RX ORDER — SODIUM CHLORIDE 9 MG/ML
INJECTION, SOLUTION INTRAVENOUS CONTINUOUS
Status: DISCONTINUED | OUTPATIENT
Start: 2017-10-31 | End: 2017-10-31 | Stop reason: HOSPADM

## 2017-10-31 RX ORDER — MIDAZOLAM HYDROCHLORIDE 1 MG/ML
INJECTION INTRAMUSCULAR; INTRAVENOUS
Status: DISCONTINUED | OUTPATIENT
Start: 2017-10-31 | End: 2017-10-31

## 2017-10-31 RX ORDER — SODIUM CHLORIDE 0.9 % (FLUSH) 0.9 %
3 SYRINGE (ML) INJECTION EVERY 8 HOURS
Status: DISCONTINUED | OUTPATIENT
Start: 2017-10-31 | End: 2017-10-31 | Stop reason: HOSPADM

## 2017-10-31 RX ORDER — ONDANSETRON 2 MG/ML
4 INJECTION INTRAMUSCULAR; INTRAVENOUS DAILY PRN
Status: DISCONTINUED | OUTPATIENT
Start: 2017-10-31 | End: 2017-10-31 | Stop reason: HOSPADM

## 2017-10-31 RX ORDER — HYDROCODONE BITARTRATE AND ACETAMINOPHEN 5; 325 MG/1; MG/1
1 TABLET ORAL EVERY 4 HOURS PRN
Qty: 12 TABLET | Refills: 0 | Status: SHIPPED | OUTPATIENT
Start: 2017-10-31 | End: 2018-01-09

## 2017-10-31 RX ORDER — PROPOFOL 10 MG/ML
VIAL (ML) INTRAVENOUS
Status: DISCONTINUED | OUTPATIENT
Start: 2017-10-31 | End: 2017-10-31

## 2017-10-31 RX ORDER — CLINDAMYCIN PHOSPHATE 900 MG/50ML
900 INJECTION, SOLUTION INTRAVENOUS
Status: COMPLETED | OUTPATIENT
Start: 2017-10-31 | End: 2017-10-31

## 2017-10-31 RX ORDER — LIDOCAINE HYDROCHLORIDE 10 MG/ML
1 INJECTION, SOLUTION EPIDURAL; INFILTRATION; INTRACAUDAL; PERINEURAL ONCE
Status: COMPLETED | OUTPATIENT
Start: 2017-10-31 | End: 2017-10-31

## 2017-10-31 RX ORDER — FENTANYL CITRATE 50 UG/ML
25 INJECTION, SOLUTION INTRAMUSCULAR; INTRAVENOUS EVERY 5 MIN PRN
Status: DISCONTINUED | OUTPATIENT
Start: 2017-10-31 | End: 2017-10-31 | Stop reason: HOSPADM

## 2017-10-31 RX ORDER — FENTANYL CITRATE 50 UG/ML
INJECTION, SOLUTION INTRAMUSCULAR; INTRAVENOUS
Status: DISCONTINUED | OUTPATIENT
Start: 2017-10-31 | End: 2017-10-31

## 2017-10-31 RX ORDER — SODIUM CHLORIDE 0.9 % (FLUSH) 0.9 %
3 SYRINGE (ML) INJECTION
Status: DISCONTINUED | OUTPATIENT
Start: 2017-10-31 | End: 2017-10-31 | Stop reason: HOSPADM

## 2017-10-31 RX ORDER — HYDROMORPHONE HYDROCHLORIDE 2 MG/ML
1 INJECTION, SOLUTION INTRAMUSCULAR; INTRAVENOUS; SUBCUTANEOUS EVERY 4 HOURS PRN
Status: DISCONTINUED | OUTPATIENT
Start: 2017-10-31 | End: 2017-10-31 | Stop reason: HOSPADM

## 2017-10-31 RX ORDER — SODIUM CHLORIDE, SODIUM LACTATE, POTASSIUM CHLORIDE, CALCIUM CHLORIDE 600; 310; 30; 20 MG/100ML; MG/100ML; MG/100ML; MG/100ML
INJECTION, SOLUTION INTRAVENOUS CONTINUOUS
Status: DISCONTINUED | OUTPATIENT
Start: 2017-10-31 | End: 2017-10-31 | Stop reason: HOSPADM

## 2017-10-31 RX ORDER — DIPHENHYDRAMINE HYDROCHLORIDE 50 MG/ML
25 INJECTION INTRAMUSCULAR; INTRAVENOUS EVERY 6 HOURS PRN
Status: DISCONTINUED | OUTPATIENT
Start: 2017-10-31 | End: 2017-10-31 | Stop reason: HOSPADM

## 2017-10-31 RX ORDER — DEXAMETHASONE SODIUM PHOSPHATE 4 MG/ML
INJECTION, SOLUTION INTRA-ARTICULAR; INTRALESIONAL; INTRAMUSCULAR; INTRAVENOUS; SOFT TISSUE
Status: DISCONTINUED | OUTPATIENT
Start: 2017-10-31 | End: 2017-10-31

## 2017-10-31 RX ORDER — BUPIVACAINE HYDROCHLORIDE AND EPINEPHRINE 5; 5 MG/ML; UG/ML
INJECTION, SOLUTION EPIDURAL; INTRACAUDAL; PERINEURAL
Status: DISCONTINUED | OUTPATIENT
Start: 2017-10-31 | End: 2017-10-31 | Stop reason: HOSPADM

## 2017-10-31 RX ORDER — HYDROMORPHONE HYDROCHLORIDE 2 MG/ML
0.2 INJECTION, SOLUTION INTRAMUSCULAR; INTRAVENOUS; SUBCUTANEOUS EVERY 5 MIN PRN
Status: DISCONTINUED | OUTPATIENT
Start: 2017-10-31 | End: 2017-10-31 | Stop reason: HOSPADM

## 2017-10-31 RX ADMIN — ONDANSETRON 4 MG: 2 INJECTION, SOLUTION INTRAMUSCULAR; INTRAVENOUS at 07:10

## 2017-10-31 RX ADMIN — EPHEDRINE SULFATE 10 MG: 50 INJECTION, SOLUTION INTRAMUSCULAR; INTRAVENOUS; SUBCUTANEOUS at 07:10

## 2017-10-31 RX ADMIN — CLINDAMYCIN PHOSPHATE 900 MG: 18 INJECTION, SOLUTION INTRAVENOUS at 07:10

## 2017-10-31 RX ADMIN — OXYCODONE HYDROCHLORIDE 5 MG: 5 TABLET ORAL at 09:10

## 2017-10-31 RX ADMIN — PROPOFOL 150 MG: 10 INJECTION, EMULSION INTRAVENOUS at 07:10

## 2017-10-31 RX ADMIN — SODIUM CHLORIDE, SODIUM LACTATE, POTASSIUM CHLORIDE, AND CALCIUM CHLORIDE: .6; .31; .03; .02 INJECTION, SOLUTION INTRAVENOUS at 07:10

## 2017-10-31 RX ADMIN — LIDOCAINE HYDROCHLORIDE 100 MG: 20 INJECTION, SOLUTION INTRAVENOUS at 07:10

## 2017-10-31 RX ADMIN — DEXAMETHASONE SODIUM PHOSPHATE 8 MG: 4 INJECTION, SOLUTION INTRAMUSCULAR; INTRAVENOUS at 07:10

## 2017-10-31 RX ADMIN — MIDAZOLAM HYDROCHLORIDE 2 MG: 1 INJECTION, SOLUTION INTRAMUSCULAR; INTRAVENOUS at 07:10

## 2017-10-31 RX ADMIN — FENTANYL CITRATE 100 MCG: 50 INJECTION, SOLUTION INTRAMUSCULAR; INTRAVENOUS at 07:10

## 2017-10-31 RX ADMIN — LIDOCAINE HYDROCHLORIDE 10 MG: 10 INJECTION, SOLUTION EPIDURAL; INFILTRATION; INTRACAUDAL; PERINEURAL at 06:10

## 2017-10-31 NOTE — ANESTHESIA POSTPROCEDURE EVALUATION
Anesthesia Post Evaluation    Patient: Yadi Mccall    Procedure(s) Performed: Procedure(s) (LRB):  EXCISION-LESION (N/A)    Final Anesthesia Type: general  Patient location during evaluation: PACU  Patient participation: Yes- Able to Participate  Level of consciousness: awake and alert and oriented  Post-procedure vital signs: reviewed and stable  Pain management: adequate  Airway patency: patent  PONV status at discharge: No PONV  Anesthetic complications: no      Cardiovascular status: blood pressure returned to baseline and stable  Respiratory status: unassisted and spontaneous ventilation  Hydration status: euvolemic  Follow-up not needed.        Visit Vitals  /75   Pulse 88   Temp 36.8 °C (98.2 °F)   Resp 18   SpO2 97%   Breastfeeding? No       Pain/Brianne Score: Pain Assessment Performed: Yes (10/31/2017  9:19 AM)  Presence of Pain: denies (10/31/2017  9:19 AM)  Pain Rating Prior to Med Admin: 4 (10/31/2017  9:13 AM)  Pain Rating Post Med Admin: 2 (10/31/2017  6:43 AM)  Brianne Score: 10 (10/31/2017  9:19 AM)

## 2017-10-31 NOTE — DISCHARGE SUMMARY
Discharge Summary  General Surgery      Admit Date: 10/31/2017    Discharge Date :10/31/2017    Attending Physician: Roque Stinson     Discharge Physician: Roque Stinson    Discharged Condition: good    Discharge Diagnosis: Skin lesion of scalp [L98.9]    Treatments/Procedures: Procedure(s) (LRB):  EXCISION-LESION (N/A)    Hospital Course: Uneventful; Discharged home from [facility]    Significant Diagnostic Studies: none    Disposition: Home or Self Care    Diet: Regular    Follow up: Office 10-14 days    Activity: No heavy lifting till seen in office.    Patient Instructions:   Current Discharge Medication List      START taking these medications    Details   hydrocodone-acetaminophen 5-325mg (NORCO) 5-325 mg per tablet Take 1 tablet by mouth every 4 (four) hours as needed for Pain.  Qty: 12 tablet, Refills: 0         CONTINUE these medications which have NOT CHANGED    Details   aspirin 325 MG tablet Take 325 mg by mouth once daily.      atorvastatin (LIPITOR) 40 MG tablet Take 1 tablet by mouth once daily.      baclofen (LIORESAL) 20 MG tablet Take 20 mg by mouth 3 (three) times daily as needed.      losartan (COZAAR) 100 MG tablet Take 1 tablet (100 mg total) by mouth once daily.  Qty: 90 tablet, Refills: 3    Associated Diagnoses: Essential hypertension; Cough due to ACE inhibitor      OXYMETAZOLINE HCL (DECONGESTANT NASAL SPRAY NASL)       trazodone (DESYREL) 50 MG tablet Take 1 tablet (50 mg total) by mouth every evening.  Qty: 90 tablet, Refills: 3    Associated Diagnoses: Primary insomnia               Discharge Procedure Orders  Diet general     Activity as tolerated     Shower on day dressing removed (No bath)     Lifting restrictions     Call MD for:  temperature >100.4     Call MD for:  persistent nausea and vomiting     Call MD for:  severe uncontrolled pain     Call MD for:  difficulty breathing, headache or visual disturbances     Call MD for:  redness, tenderness, or signs of infection (pain, swelling,  redness, odor or green/yellow discharge around incision site)     Call MD for:  hives     Call MD for:  persistent dizziness or light-headedness     Call MD for:  extreme fatigue

## 2017-10-31 NOTE — OP NOTE
DATE OF PROCEDURE:  10/31/2017.    PREOPERATIVE DIAGNOSIS:  Apocrine skin lesions anterior scalp.    POSTOPERATIVE DIAGNOSIS:  Apocrine skin lesions anterior scalp.    OPERATION:  Excision of multiple apocrine lesions of the anterior scalp.  Total   area of 4.3 cm x 1.1 cm.    SURGEON:  Roque Stinson M.D.    ANESTHESIA:  General LMA.    PROCEDURE AND FINDINGS:  With the patient in supine position under satisfactory   general LMA anesthesia, the anterior scalp was prepped and draped in the usual   manner for surgery.  An elliptical skin incision was plotted encompassing all   the skin lesions.  This was 4.3 x 1.1 cm in size.  The skin and subcutaneous   tissue was infiltrated with local anesthetic solution consisting of 0.25%   Marcaine with epinephrine.  An elliptical skin incision was made and carried   down through the skin and the subcutaneous fat.  This was then elevated off of   the loose areolar tissue and specimen was sent for pathology.  Hemostasis was   maintained with electrocautery.  The skin was then mobilized on both sides in   order to facilitate closure of the defect, which was closed primarily with a   single layer of 3-0 Prolene simple sutures.  The wound was washed and dried.    Sterile dressing was applied.  The patient tolerated the procedure well and was   sent to Recovery in stable condition.    ESTIMATED BLOOD LOSS:  10 mL.    COMPLICATIONS:  None.    DRAINS:  None.    SPECIMEN:  Consisting of skin lesions to Pathology.      ELAINA  dd: 10/31/2017 08:15:51 (CDT)  td: 10/31/2017 09:14:11 (CDT)  Doc ID   #[id number]  Job ID #[id number]    CC:

## 2017-10-31 NOTE — BRIEF OP NOTE
[facility]  Brief Operative Note    SUMMARY     Surgery Date: 10/31/2017     Surgeon(s) and Role:     * Roque Stinson MD - Primary    Assisting Surgeon: None    Pre-op Diagnosis:  Skin lesion of scalp [L98.9]    Post-op Diagnosis:  Post-Op Diagnosis Codes:     * Skin lesion of scalp [L98.9]    Procedure(s) (LRB):  EXCISION-LESION (N/A) 4.3 x 1.1 cm    Anesthesia: General    Description of the findings of the procedure: Multiple small skin lesions    Estimated Blood Loss: * 10 cc *         Specimens:   Specimen (12h ago through future)    Start     Ordered    10/31/17 0752  Specimen to Pathology - Surgery  Once     Comments:  Pre-op Diagnosis: Skin lesion of scalp [L98.9]Post-op Diagnosis: sameProcedure(s):EXCISION-LESION Number of specimens: 1Name of specimens:lesion from scalp      10/31/17 0752

## 2017-10-31 NOTE — INTERVAL H&P NOTE
The patient has been examined and the H&P has been reviewed:    I concur with the findings and no changes have occurred since H&P was written.    Anesthesia/Surgery risks, benefits and alternative options discussed and understood by patient/family.          Active Hospital Problems    Diagnosis  POA    Skin lesion of scalp [L98.9]  Yes      Resolved Hospital Problems    Diagnosis Date Resolved POA   No resolved problems to display.

## 2017-10-31 NOTE — ANESTHESIA PREPROCEDURE EVALUATION
10/31/2017  Yadi Mccall is a 62 y.o., female.    Anesthesia Evaluation    I have reviewed the Patient Summary Reports.    I have reviewed the Nursing Notes.   I have reviewed the Medications.     Review of Systems  Anesthesia Hx:  No problems with previous Anesthesia    Social:  Non-Smoker    Cardiovascular:   Hypertension, well controlled    Pulmonary:  Pulmonary Normal    Renal/:  Renal/ Normal     Neurological:   CVA, residual symptoms    Endocrine:  Endocrine Normal        Physical Exam  General:  Well nourished    Airway/Jaw/Neck:  Airway Findings: Mouth Opening: Normal Tongue: Normal  General Airway Assessment: Adult  Oropharynx Findings:  Mallampati: II  Jaw/Neck Findings:  Neck ROM: Normal ROM     Eyes/Ears/Nose:  Eyes/Ears/Nose Findings:    Dental:  Dental Findings:   Chest/Lungs:  Chest/Lungs Findings: Clear to auscultation, Normal Respiratory Rate     Heart/Vascular:  Heart Findings: Rate: Normal  Rhythm: Regular Rhythm        Mental Status:  Mental Status Findings:  Cooperative, Alert and Oriented         Anesthesia Plan  Type of Anesthesia, risks & benefits discussed:  Anesthesia Type:  general  Patient's Preference:   Intra-op Monitoring Plan:   Intra-op Monitoring Plan Comments:   Post Op Pain Control Plan: multimodal analgesia  Post Op Pain Control Plan Comments:   Induction:   IV  Beta Blocker:  Patient is not currently on a Beta-Blocker (No further documentation required).       Informed Consent: Patient understands risks and agrees with Anesthesia plan.  Questions answered. Anesthesia consent signed with patient.  ASA Score: 3     Day of Surgery Review of History & Physical:  There are no significant changes.   H&P completed by Anesthesiologist.       Ready For Surgery From Anesthesia Perspective.

## 2017-10-31 NOTE — DISCHARGE INSTRUCTIONS
WOUND CARE    After Surgery    DOS:   May shower in 24 hours   May remove outer dressing in 48 hours. Leave steri-strips in place. If steri-strips get wet, pat dry. If they fall off, do not worry.   Minimal activity for 48 hours.   Advance diet as tolerated.   Resume home medications as prescribed    DONT:   Do not soak in the tub   No driving for 24 hours or while taking narcotic pain medication   DO NOT TAKE ADDITIONAL TYLENOL/ACETAMINOPHEN WHILE TAKING NARCOTIC PAIN MEDICATION THAT CONTAINS TYLENOL/ACETAMINOPHEN.    CALL PHYSICIAN FOR:   Redness, swelling or bleeding.   Fever greater then 101.   Drainage from the incision site that appears infected.   Persistent pain not relieved by pain medication.    RETURN APPOINTMENT: Call to schedule appointment in 10-14 days    FOR EMERGENCIES: Contact your doctor at [phone]      We hope your stay was comfortable as you heal now, mend and rest.    For we have enjoyed taking care of you by giving your our best.    And as you get better, by regaining your health and strength;   We count it as a privilege to have served you and hope your time at [facility] was well spent.      Thank  You!!!

## 2017-10-31 NOTE — TRANSFER OF CARE
Anesthesia Transfer of Care Note    Patient: Yadi Mccall    Procedure(s) Performed: Procedure(s) (LRB):  EXCISION-LESION (N/A)    Patient location: PACU    Anesthesia Type: general    Transport from OR: Transported from OR on 2-3 L/min O2 by NC with adequate spontaneous ventilation    Post pain: adequate analgesia    Post assessment: no apparent anesthetic complications and tolerated procedure well    Post vital signs: stable    Level of consciousness: sedated    Nausea/Vomiting: no nausea/vomiting    Complications: none    Transfer of care protocol was followed      Last vitals:   Visit Vitals  /78 (BP Location: Left arm, Patient Position: Lying)   Pulse 92   Temp 36.7 °C (98 °F) (Oral)   Resp 16   SpO2 95%   Breastfeeding? No

## 2017-11-13 ENCOUNTER — OFFICE VISIT (OUTPATIENT)
Dept: SURGERY | Facility: CLINIC | Age: 62
End: 2017-11-13
Payer: MEDICARE

## 2017-11-13 VITALS — HEIGHT: 68 IN | WEIGHT: 229.94 LBS | BODY MASS INDEX: 34.85 KG/M2 | RESPIRATION RATE: 16 BRPM | TEMPERATURE: 97 F

## 2017-11-13 DIAGNOSIS — Z98.890 POST-OPERATIVE STATE: Primary | ICD-10-CM

## 2017-11-13 PROCEDURE — 99024 POSTOP FOLLOW-UP VISIT: CPT | Mod: S$GLB,,, | Performed by: SURGERY

## 2017-11-13 PROCEDURE — 99999 PR PBB SHADOW E&M-EST. PATIENT-LVL III: CPT | Mod: PBBFAC,,, | Performed by: SURGERY

## 2017-11-13 NOTE — PROGRESS NOTES
S/P excision lesions of scalp.  Doing well.  No complaints.  Incision clean.  Sutures removed. Instructed and discharged.

## 2018-01-09 ENCOUNTER — OFFICE VISIT (OUTPATIENT)
Dept: FAMILY MEDICINE | Facility: CLINIC | Age: 63
End: 2018-01-09
Payer: MEDICARE

## 2018-01-09 VITALS
RESPIRATION RATE: 16 BRPM | DIASTOLIC BLOOD PRESSURE: 98 MMHG | SYSTOLIC BLOOD PRESSURE: 148 MMHG | HEIGHT: 68 IN | BODY MASS INDEX: 36.1 KG/M2 | WEIGHT: 238.19 LBS | HEART RATE: 94 BPM | TEMPERATURE: 98 F | OXYGEN SATURATION: 96 %

## 2018-01-09 DIAGNOSIS — E78.5 HYPERLIPIDEMIA, UNSPECIFIED HYPERLIPIDEMIA TYPE: ICD-10-CM

## 2018-01-09 DIAGNOSIS — G25.81 RESTLESS LEGS: Primary | ICD-10-CM

## 2018-01-09 DIAGNOSIS — L30.9 HAND DERMATITIS: ICD-10-CM

## 2018-01-09 DIAGNOSIS — Z23 NEED FOR 23-POLYVALENT PNEUMOCOCCAL POLYSACCHARIDE VACCINE: ICD-10-CM

## 2018-01-09 DIAGNOSIS — I10 ESSENTIAL HYPERTENSION: ICD-10-CM

## 2018-01-09 DIAGNOSIS — L21.9 SEBORRHEIC DERMATITIS: ICD-10-CM

## 2018-01-09 DIAGNOSIS — G47.00 INSOMNIA, UNSPECIFIED TYPE: ICD-10-CM

## 2018-01-09 PROCEDURE — G0009 ADMIN PNEUMOCOCCAL VACCINE: HCPCS | Mod: S$GLB,,, | Performed by: INTERNAL MEDICINE

## 2018-01-09 PROCEDURE — 90732 PPSV23 VACC 2 YRS+ SUBQ/IM: CPT | Mod: S$GLB,,, | Performed by: INTERNAL MEDICINE

## 2018-01-09 PROCEDURE — 99499 UNLISTED E&M SERVICE: CPT | Mod: S$GLB,,, | Performed by: INTERNAL MEDICINE

## 2018-01-09 PROCEDURE — 99214 OFFICE O/P EST MOD 30 MIN: CPT | Mod: S$GLB,,, | Performed by: INTERNAL MEDICINE

## 2018-01-09 RX ORDER — TRAZODONE HYDROCHLORIDE 100 MG/1
100 TABLET ORAL NIGHTLY PRN
Qty: 90 TABLET | Refills: 3 | Status: SHIPPED | OUTPATIENT
Start: 2018-01-09 | End: 2018-11-19 | Stop reason: SDUPTHER

## 2018-01-09 RX ORDER — ROPINIROLE 1 MG/1
1 TABLET, FILM COATED ORAL NIGHTLY
Qty: 90 TABLET | Refills: 3 | Status: SHIPPED | OUTPATIENT
Start: 2018-01-09 | End: 2018-05-25 | Stop reason: SDUPTHER

## 2018-01-09 RX ORDER — BACLOFEN 20 MG/1
20 TABLET ORAL 3 TIMES DAILY PRN
Qty: 270 TABLET | Refills: 3 | Status: SHIPPED | OUTPATIENT
Start: 2018-01-09 | End: 2018-11-19 | Stop reason: SDUPTHER

## 2018-01-09 RX ORDER — ATORVASTATIN CALCIUM 40 MG/1
40 TABLET, FILM COATED ORAL DAILY
Qty: 90 TABLET | Refills: 3 | Status: SHIPPED | OUTPATIENT
Start: 2018-01-09 | End: 2018-12-10 | Stop reason: SDUPTHER

## 2018-01-09 RX ORDER — LISINOPRIL AND HYDROCHLOROTHIAZIDE 10; 12.5 MG/1; MG/1
1 TABLET ORAL DAILY
Qty: 90 TABLET | Refills: 3 | Status: SHIPPED | OUTPATIENT
Start: 2018-01-09 | End: 2018-01-09

## 2018-01-09 RX ORDER — FLUTICASONE PROPIONATE 0.5 MG/G
CREAM TOPICAL 2 TIMES DAILY
Qty: 60 G | Refills: 1 | Status: SHIPPED | OUTPATIENT
Start: 2018-01-09 | End: 2019-04-09

## 2018-01-09 RX ORDER — AMLODIPINE AND VALSARTAN 5; 160 MG/1; MG/1
1 TABLET ORAL DAILY
Qty: 90 TABLET | Refills: 3 | Status: SHIPPED | OUTPATIENT
Start: 2018-01-09 | End: 2018-02-20

## 2018-01-09 NOTE — PROGRESS NOTES
Patient, Yadi Mccall (MRN #[id number]), presented with a recorded BMI of 36.22 kg/m^2 and a documented comorbidity(s):  - Hypertension  - Hyperlipidemia  to which the severe obesity is a contributing factor. This is consistent with the definition of severe obesity (BMI 35.0-35.9) with comorbidity (ICD-10 E66.01, Z68.35). The patient's severe obesity was monitored, evaluated, addressed and/or treated. This addendum to the medical record is made on 01/09/2018.

## 2018-01-09 NOTE — PATIENT INSTRUCTIONS
[employer] has a website that has a course that teaches you how to sleep.  I highly recommend lit.       Lamisil Cream to your hands and ears, over-the-counter    Low-Salt Diet  This diet removes foods that are high in salt. It also limits the amount of salt you use when cooking. It is most often used for people with high blood pressure, edema (fluid retention), and kidney, liver, or heart disease.  Table salt contains the mineral sodium. Your body needs sodium to work normally. But too much sodium can make your health problems worse. Your healthcare provider is recommending a low-salt (also called low-sodium) diet for you. Your total daily allowance of salt is 1,500 to 2,300 milligrams (mg). It is less than 1 teaspoon of table salt. This means you can have only about 500 to 700 mg of sodium at each meal. People with certain health problems should limit salt intake to the lower end of the recommended range.    When you cook, dont add much salt. If you can cook without using salt, even better. Dont add salt to your food at the table.  When shopping, read food labels. Salt is often called sodium on the label. Choose foods that are salt-free, low salt, or very low salt. Note that foods with reduced salt may not lower your salt intake enough.    Beans, potatoes, and pasta  Ok: Dry beans, split peas, lentils, potatoes, rice, macaroni, pasta, spaghetti without added salt  Avoid: Potato chips, tortilla chips, and similar products  Breads and cereals  Ok: Low-sodium breads, rolls, cereals, and cakes; low-salt crackers, matzo crackers  Avoid: Salted crackers, pretzels, popcorn, Frisian toast, pancakes, muffins  Dairy  Ok: Milk, chocolate milk, hot chocolate mix, low-salt cheeses, and yogurt  Avoid: Processed cheese and cheese spreads; Roquefort, Camembert, and cottage cheese; buttermilk, instant breakfast drink  Desserts  Ok: Ice cream, frozen yogurt, juice bars, gelatin, cookies and pies, sugar, honey, jelly, hard  candy  Avoid: Most pies, cakes and cookies prepared or processed with salt; instant pudding  Drinks  Ok: Tea, coffee, fizzy (carbonated) drinks, juices  Avoid: Flavored coffees, electrolyte replacement drinks, sports drinks  Meats  Ok: All fresh meat, fish, poultry, low-salt tuna, eggs, egg substitute  Avoid: Smoked, pickled, brine-cured, or salted meats and fish. This includes hughes, chipped beef, corned beef, hot dogs, deli meats, ham, kosher meats, salt pork, sausage, canned tuna, salted codfish, smoked salmon, herring, sardines, or anchovies.  Seasonings and spices  Ok: Most seasonings are okay. Good substitutes for salt include: fresh herb blends, hot sauce, lemon, garlic, ayala, vinegar, dry mustard, parsley, cilantro, horseradish, tomato paste, regular margarine, mayonnaise, unsalted butter, cream cheese, vegetable oil, cream, low-salt salad dressing and gravy.  Avoid: Regular ketchup, relishes, pickles, soy sauce, teriyaki sauce, Worcestershire sauce, BBQ sauce, tartar sauce, meat tenderizer, chili sauce, regular gravy, regular salad dressing, salted butter  Soups  Ok: Low-salt soups and broths made with allowed foods  Avoid: Bouillon cubes, soups with smoked or salted meats, regular soup and broth  Vegetables  Ok: Most vegetables are okay; also low-salt tomato and vegetable juices  Avoid: Sauerkraut and other brine-soaked vegetables; pickles and other pickled vegetables; tomato juice, olives  © 1756-8049 [employer]. [address] [place] [address], [place] [address]. All rights reserved. This information is not intended as a substitute for professional medical care. Always follow your healthcare professional's instructions.

## 2018-01-09 NOTE — PROGRESS NOTES
Subjective:       Patient ID: Yadi Mccall is a 62 y.o. female.    Chief Complaint: itchy hands; ear burns when scratching; and Insomnia (discuss meds)    HPI           CHIEF COMPLAINT: Rash  HPI:     ONSET/TIMING: Onset      1 month     ago. Sudden: no.. Work related: no. Similar_problems_in_the_past: no.    DURATION:  Continuous..    QUALITY/COURSE:   Slowly worsening.     LOCATION:   Earlobes and hands  .     INTENSITY/SEVERITY:  Severity is #   5    (10 point scale).    CONTEXT/WHEN: .--Similar problems: no . .  Past treatments: none  . Exposure_to_others_with_similar_symptoms: no . . Exposure_to_poison _ivy: no. .   New exposures (soaps, lotions, laundry detergents, foods, medications, plants, insects or animals). no    SYMPTOMS/RELATED: .--Possible medication side effect:    The following symptoms are positive if BOLD, negative otherwise.     REVIEW OF SYMPTOMS:  Itching.  Pain. Sharp_pain. Dull_pain. Burning_pain.  Erythema-Skin. Hypopigmentation.  hyperpigmentation . Inflammation. Herald_Patch.. fixed . evanescent.  Blisters. Purulence. Fever. Fatigue. Tick_Bites.         Left leg gets tremulous all she's watching television or going to bed.  Sometimes goes away when she walks.  She can control it enough to walk.  Is going on for year.  Has been on baclofen in the past for it but doesn't work well        CHIEF COMPLAINT: Hypertension  HPI:     ONSET:      QUALITY/COURSE:   Unchanged.     INTENSITY/SEVERITY:  Average blood pressure is ? .     MODIFIERS/TREATMENTS:  Taking medications: yes. .High sodium intake: no. alcohol: no      The following symptoms are positive only if BOLDED, otherwise are negative.      SYMPTOMS/RELATED: Possible medication side effects include:   Depression..  . Cough. . Constipation.    REVIEW OF SYMPTOMS: . Weight_loss . Weight_gain . Leg_cramps ..    TARGET ORGAN DAMAGE:: angina/ prior myocardial infarction, chronic kidney disease, heart failure, left ventricular hypertrophy,  "peripheral artery disease, prior coronary revascularization, retinopathy, stroke. transient ischemic attack.    CHIEF COMPLAINT: insomnia .  HPI:     ONSET/TIMING: Onset    years     ago. Sudden: no .  Similar problems in past: no. ..    DURATION:  Intermittent    QUALITY/COURSE: .Worsening.     Can't fall asleep: yes. . Wakes up early: yes.      INTENSITY/SEVERITY:  Severity 4    (on a 1-10 scale).      MODIFIERS/TREATMENTS: . Taking medications: yes.  . Effective: Less so.  SYMPTOMS/RELATED: . Possible medication side effects include:     .     The following symptoms/statements  are positive if BOLD, negative otherwise.      CONTEXT/WHEN:  .Nocturnal. . Napping.  shift work . Time_zone_changes.     REVIEW OF SYSTEMS :  .   nocturia . Restless_legs . pain . Obstructive_sleep_apnea  . depression . anxiety . Substance_abuse . Copd . Grief .       Review of Systems   Constitutional: Negative for diaphoresis.   Eyes: Negative for visual disturbance.   Respiratory: Negative for chest tightness and shortness of breath.    Cardiovascular: Negative for chest pain.   Skin: Positive for rash.   Neurological: Negative for dizziness, syncope, weakness and headaches.   Psychiatric/Behavioral: Negative for dysphoric mood. The patient is not nervous/anxious.        Objective:      Vitals:    01/09/18 1603   BP: (!) 148/98   Pulse: 94   Resp: 16   Temp: 98 °F (36.7 °C)   TempSrc: Oral   SpO2: 96%   Weight: 108 kg (238 lb 3.2 oz)   Height: 5' 8" (1.727 m)   PainSc: 0-No pain     Physical Exam   Constitutional: She appears well-developed and well-nourished.   Eyes: Pupils are equal, round, and reactive to light.   Cardiovascular: Normal rate, regular rhythm and normal heart sounds.    Pulmonary/Chest: Effort normal and breath sounds normal.   Abdominal: Soft. There is no tenderness.   Neurological: She is alert.   Skin:   Scaly rash on both earlobes   Psychiatric: She has a normal mood and affect. Her behavior is normal. Thought " content normal.   Nursing note and vitals reviewed.        Assessment:       1. Restless legs    2. Essential hypertension    3. Hyperlipidemia, unspecified hyperlipidemia type    4. Insomnia, unspecified type    5. Hand dermatitis    6. Seborrheic dermatitis    7. Need for 23-polyvalent pneumococcal polysaccharide vaccine          Plan:     Restless legs  -     baclofen (LIORESAL) 20 MG tablet; Take 1 tablet (20 mg total) by mouth 3 (three) times daily as needed.  Dispense: 270 tablet; Refill: 3  -     rOPINIRole (REQUIP) 1 MG tablet; Take 1 tablet (1 mg total) by mouth every evening.  Dispense: 90 tablet; Refill: 3    Essential hypertension  -     amlodipine-valsartan (EXFORGE) 5-160 mg per tablet; Take 1 tablet by mouth once daily.  Dispense: 90 tablet; Refill: 3    Hyperlipidemia, unspecified hyperlipidemia type  -     atorvastatin (LIPITOR) 40 MG tablet; Take 1 tablet (40 mg total) by mouth once daily.  Dispense: 90 tablet; Refill: 3    Insomnia, unspecified type  -     traZODone (DESYREL) 100 MG tablet; Take 1 tablet (100 mg total) by mouth nightly as needed for Insomnia.  Dispense: 90 tablet; Refill: 3    Hand dermatitis  -     fluticasone (CUTIVATE) 0.05 % cream; Apply topically 2 (two) times daily. 2 hands and earlobes  Dispense: 60 g; Refill: 1    Seborrheic dermatitis  -     fluticasone (CUTIVATE) 0.05 % cream; Apply topically 2 (two) times daily. 2 hands and earlobes  Dispense: 60 g; Refill: 1    Need for 23-polyvalent pneumococcal polysaccharide vaccine  -     (In Office Administered) Pneumococcal Polysaccharide Vaccine (23 Valent) (SQ/IM)    Other orders  -     Discontinue: lisinopril-hydrochlorothiazide (PRINZIDE,ZESTORETIC) 10-12.5 mg per tablet; Take 1 tablet by mouth once daily.  Dispense: 90 tablet; Refill: 3      Return in about 6 weeks (around 2/20/2018).

## 2018-01-13 ENCOUNTER — LAB VISIT (OUTPATIENT)
Dept: LAB | Facility: HOSPITAL | Age: 63
End: 2018-01-13
Attending: INTERNAL MEDICINE
Payer: MEDICARE

## 2018-01-13 DIAGNOSIS — E78.5 HYPERLIPIDEMIA, UNSPECIFIED HYPERLIPIDEMIA TYPE: ICD-10-CM

## 2018-01-13 DIAGNOSIS — I10 ESSENTIAL HYPERTENSION: ICD-10-CM

## 2018-01-13 LAB
ALBUMIN SERPL BCP-MCNC: 3.6 G/DL
ALP SERPL-CCNC: 137 U/L
ALT SERPL W/O P-5'-P-CCNC: 20 U/L
ANION GAP SERPL CALC-SCNC: 9 MMOL/L
AST SERPL-CCNC: 16 U/L
BILIRUB SERPL-MCNC: 0.4 MG/DL
BUN SERPL-MCNC: 12 MG/DL
CALCIUM SERPL-MCNC: 9 MG/DL
CHLORIDE SERPL-SCNC: 106 MMOL/L
CHOLEST SERPL-MCNC: 143 MG/DL
CHOLEST/HDLC SERPL: 4.6 {RATIO}
CO2 SERPL-SCNC: 26 MMOL/L
CREAT SERPL-MCNC: 0.9 MG/DL
EST. GFR  (AFRICAN AMERICAN): >60 ML/MIN/1.73 M^2
EST. GFR  (NON AFRICAN AMERICAN): >60 ML/MIN/1.73 M^2
GLUCOSE SERPL-MCNC: 112 MG/DL
HDLC SERPL-MCNC: 31 MG/DL
HDLC SERPL: 21.7 %
LDLC SERPL CALC-MCNC: 67.8 MG/DL
NONHDLC SERPL-MCNC: 112 MG/DL
POTASSIUM SERPL-SCNC: 4.3 MMOL/L
PROT SERPL-MCNC: 6.8 G/DL
SODIUM SERPL-SCNC: 141 MMOL/L
TRIGL SERPL-MCNC: 221 MG/DL

## 2018-01-13 PROCEDURE — 36415 COLL VENOUS BLD VENIPUNCTURE: CPT | Mod: PO

## 2018-01-13 PROCEDURE — 80061 LIPID PANEL: CPT

## 2018-01-13 PROCEDURE — 80053 COMPREHEN METABOLIC PANEL: CPT

## 2018-01-18 ENCOUNTER — TELEPHONE (OUTPATIENT)
Dept: FAMILY MEDICINE | Facility: CLINIC | Age: 63
End: 2018-01-18

## 2018-01-18 DIAGNOSIS — I10 ESSENTIAL HYPERTENSION: Primary | ICD-10-CM

## 2018-01-18 RX ORDER — CLONIDINE HYDROCHLORIDE 0.1 MG/1
0.1 TABLET ORAL 3 TIMES DAILY PRN
Qty: 30 TABLET | Refills: 0 | Status: SHIPPED | OUTPATIENT
Start: 2018-01-18 | End: 2019-04-09

## 2018-01-18 NOTE — PROGRESS NOTES
Patient called to say her b/p is very high. She is asymptomatic, denies chest pain, dyspnea, dizziness and headache. States she has been drinking caffeinated coffee all day and smoking. Suggested she stop drinking coffee with caffeine and quit smoking, if any symptoms, go to ER, cannot take more than 100 mg. Losartan in one day and has not received the exforge yet. Ordered her clonidine to take up to 3 times a day if b/p over 160/90. The patient verbalized understanding and is agreeable. Strongly suggested she come in tomorrow for a nursing b/p check.

## 2018-01-18 NOTE — TELEPHONE ENCOUNTER
----- Message from Merylruiz Durant sent at 1/18/2018 11:45 AM CST -----  Patient states that she is taking Rx Lorsatin 100 mg, and blood pressure is 184/98.  Patient states that she need to know if she can take 2 of the pills.  Please call patient at [phone].

## 2018-01-18 NOTE — TELEPHONE ENCOUNTER
Patient upset.  Provider not in office at this time.  Told her I would check with Antonietta Longo and call her back.

## 2018-01-19 ENCOUNTER — CLINICAL SUPPORT (OUTPATIENT)
Dept: FAMILY MEDICINE | Facility: CLINIC | Age: 63
End: 2018-01-19
Payer: MEDICARE

## 2018-01-19 ENCOUNTER — TELEPHONE (OUTPATIENT)
Dept: FAMILY MEDICINE | Facility: CLINIC | Age: 63
End: 2018-01-19

## 2018-01-19 DIAGNOSIS — Z01.30 BP CHECK: Primary | ICD-10-CM

## 2018-01-19 NOTE — TELEPHONE ENCOUNTER
I spoke with patient.  Recommended that she come in for b/p check at advise of Antonietta Longo and Dr Sweeney.  She declined.  Stated she would recheck her b/p at home and call if it is still up.  Explained importance of having it rechecked today.  Verbalized understanding.

## 2018-01-23 ENCOUNTER — CLINICAL SUPPORT (OUTPATIENT)
Dept: FAMILY MEDICINE | Facility: CLINIC | Age: 63
End: 2018-01-23
Payer: MEDICARE

## 2018-01-23 VITALS — SYSTOLIC BLOOD PRESSURE: 154 MMHG | HEART RATE: 84 BPM | DIASTOLIC BLOOD PRESSURE: 98 MMHG

## 2018-01-23 VITALS — DIASTOLIC BLOOD PRESSURE: 82 MMHG | SYSTOLIC BLOOD PRESSURE: 122 MMHG | HEART RATE: 80 BPM

## 2018-01-23 DIAGNOSIS — Z01.30 BP CHECK: Primary | ICD-10-CM

## 2018-01-23 NOTE — PROGRESS NOTES
Patient her to get her BP re-checked 122/82 was her BP for today she did state that she has cut down her coffee and smoking only 9pks a day on cigarettes instead of 10pks and having 2 cups with caffeine in the AM and one in the PM all the rest of the day she is drinking decaf. She has an appointment to follow up she stated she cannot take the Clonidine 0.1mg TID prn it is not agreeing with her, but she would discuss it with the doctor on her visit. Explained to continue checking her BP routinely let us know if getting to high, and make sure to come to her next appt. As scheduled

## 2018-01-23 NOTE — PROGRESS NOTES
Patient in for BP check was put on Clonidine 0.1mg TID PRN per Antonietta Longo NP patient is taking meds as prescribed BP today is 154/98 pulse 84. However, she disclosed she is smoking and drinking a whole pot of coffee daily. Dr Sweeney counseled her on the need to stop smoking and decreasing her intake of caffeine patient has verbal understanding of this and stated she would try to do this. She is to continue to check BP at home and follow up if BP continues to be high.

## 2018-01-24 ENCOUNTER — TELEPHONE (OUTPATIENT)
Dept: FAMILY MEDICINE | Facility: CLINIC | Age: 63
End: 2018-01-24

## 2018-01-24 NOTE — TELEPHONE ENCOUNTER
----- Message from Kayla Peace sent at 1/23/2018  3:08 PM CST -----    Calling to  Speak  To the  Nurse ,  Pt  Will  Be  Running  Late and   Wants to  Still  Come // please call  For  Details //[phone]

## 2018-02-02 ENCOUNTER — TELEPHONE (OUTPATIENT)
Dept: FAMILY MEDICINE | Facility: CLINIC | Age: 63
End: 2018-02-02

## 2018-02-02 NOTE — TELEPHONE ENCOUNTER
----- Message from Marlena Rivas sent at 2/1/2018 11:29 AM CST -----  Contact: Patient  Yadi, patient [phone], Calling to get a Rx of Linsinopril 40 mg.   Patient can keep her BP under control with the current medication. Please advise. Thanks.   [facility] - [address]  [facility] [address]  Phone: [phone] Fax: [phone]

## 2018-02-20 ENCOUNTER — DOCUMENTATION ONLY (OUTPATIENT)
Dept: FAMILY MEDICINE | Facility: CLINIC | Age: 63
End: 2018-02-20

## 2018-02-20 ENCOUNTER — OFFICE VISIT (OUTPATIENT)
Dept: FAMILY MEDICINE | Facility: CLINIC | Age: 63
End: 2018-02-20
Payer: MEDICARE

## 2018-02-20 VITALS
DIASTOLIC BLOOD PRESSURE: 84 MMHG | HEIGHT: 68 IN | RESPIRATION RATE: 16 BRPM | HEART RATE: 92 BPM | SYSTOLIC BLOOD PRESSURE: 138 MMHG | OXYGEN SATURATION: 97 % | WEIGHT: 235 LBS | BODY MASS INDEX: 35.61 KG/M2 | TEMPERATURE: 98 F

## 2018-02-20 DIAGNOSIS — R23.2 HOT FLASHES: ICD-10-CM

## 2018-02-20 DIAGNOSIS — M70.61 TROCHANTERIC BURSITIS OF RIGHT HIP: ICD-10-CM

## 2018-02-20 DIAGNOSIS — I10 ESSENTIAL HYPERTENSION: Primary | ICD-10-CM

## 2018-02-20 DIAGNOSIS — I63.9 CEREBROVASCULAR ACCIDENT (CVA), UNSPECIFIED MECHANISM: ICD-10-CM

## 2018-02-20 PROCEDURE — 99214 OFFICE O/P EST MOD 30 MIN: CPT | Mod: S$GLB,,, | Performed by: INTERNAL MEDICINE

## 2018-02-20 PROCEDURE — 99499 UNLISTED E&M SERVICE: CPT | Mod: S$GLB,,, | Performed by: INTERNAL MEDICINE

## 2018-02-20 PROCEDURE — 3008F BODY MASS INDEX DOCD: CPT | Mod: S$GLB,,, | Performed by: INTERNAL MEDICINE

## 2018-02-20 RX ORDER — SERTRALINE HYDROCHLORIDE 50 MG/1
50 TABLET, FILM COATED ORAL DAILY
Qty: 30 TABLET | Refills: 11 | Status: SHIPPED | OUTPATIENT
Start: 2018-02-20 | End: 2018-11-19 | Stop reason: SDUPTHER

## 2018-02-20 RX ORDER — LISINOPRIL 40 MG/1
40 TABLET ORAL NIGHTLY
Qty: 90 TABLET | Refills: 1 | Status: SHIPPED | OUTPATIENT
Start: 2018-02-20 | End: 2018-07-10 | Stop reason: SDUPTHER

## 2018-02-20 RX ORDER — LOSARTAN POTASSIUM 100 MG/1
100 TABLET ORAL DAILY
COMMUNITY
End: 2018-02-20

## 2018-02-20 NOTE — PROGRESS NOTES
Subjective:       Patient ID: Yadi Mccall is a 62 y.o. female.    Chief Complaint: Hypertension (discuss medications,jury duty excuse) and hotflashes    HPI         CHIEF COMPLAINT: Hypertension  HPI:     ONSET:      QUALITY/COURSE:   Unchanged.     INTENSITY/SEVERITY:  Average blood pressure is 148/90 .     MODIFIERS/TREATMENTS:  Taking medications: yes. .High sodium intake: no. alcohol: no      The following symptoms are positive only if BOLDED, otherwise are negative.      SYMPTOMS/RELATED: Possible medication side effects include:   Depression..  . Cough. No better on losartan.  . Constipation.    REVIEW OF SYMPTOMS: . Weight_loss . Weight_gain . Leg_cramps .Potency_problems .    TARGET ORGAN DAMAGE:: angina/ prior myocardial infarction, chronic kidney disease, heart failure, left ventricular hypertrophy, peripheral artery disease, prior coronary revascularization, retinopathy, stroke. transient ischemic attack.        Lower_Extremity_Problems(+). Pain: yes. . Injury: no.   HPI:     ONSET/TIMING: . Onset  10 years    ago.     DURATION:   Intermittent         QUALITY/COURSE:    unchanged ,. .     LOCATION:   Right hip     . Radiation: no.      INTENSITY/SEVERITY:. Severity is #  7 when walking  (10 point scale).        MODIFIERS/TREATMENTS: Current_Limitations_are:  none..   Taking medications: no    Physical_Therapy: no.  Chiropractor: no.     SYMPTOMS/RELATED: . --Possible medication side effects include:.     The following symptoms/statements  are positive if BOLD, negative otherwise.      CONTEXT/WHEN: . Activity . weight bearing. Inactivity.  Sudden  Work related.  Similar_problems_in past.   . Litigation_pending . X-rays. CT . MRI      REVIEW OF SYMPTOMS:   Numbness. Weakness. Muscle_Problems. Fever. Joint_Problems. Swelling. Erythema. Weight_loss. Instability.      .   Patient been having hot flashes for 20 years.  Still getting about 3 times a day and bothering her.  She has a history of a  "CVA.            Review of Systems   Constitutional: Negative for diaphoresis.   Eyes: Negative for visual disturbance.   Respiratory: Negative for chest tightness and shortness of breath.    Cardiovascular: Negative for chest pain.   Neurological: Negative for dizziness, syncope, weakness and headaches.   Psychiatric/Behavioral: Negative for dysphoric mood. The patient is not nervous/anxious.        Objective:      Vitals:    02/20/18 1607   BP: 138/84   Pulse: 92   Resp: 16   Temp: 98.2 °F (36.8 °C)   TempSrc: Oral   SpO2: 97%   Weight: 106.6 kg (235 lb 0.2 oz)   Height: 5' 8" (1.727 m)   PainSc: 0-No pain     Physical Exam   Constitutional: She appears well-developed and well-nourished.   Eyes: Pupils are equal, round, and reactive to light.   Cardiovascular: Normal rate, regular rhythm and normal heart sounds.    Pulmonary/Chest: Effort normal and breath sounds normal.   Abdominal: Soft. There is no tenderness.   Musculoskeletal: She exhibits tenderness (enderness over the right greater trochanter).   Full range of motion with flexion extension and internal and external rotation of the right hip   Neurological: She is alert.   Psychiatric: She has a normal mood and affect. Her behavior is normal. Thought content normal.   Nursing note and vitals reviewed.        Assessment:       1. Essential hypertension    2. Cerebrovascular accident (CVA), unspecified mechanism    3. Hot flashes    4. Trochanteric bursitis of right hip          Plan:     Essential hypertension  -     lisinopril (PRINIVIL,ZESTRIL) 40 MG tablet; Take 1 tablet (40 mg total) by mouth every evening.  Dispense: 90 tablet; Refill: 1  -     Comprehensive metabolic panel; Future; Expected date: 02/20/2018  -     CBC auto differential; Future; Expected date: 02/20/2018    Cerebrovascular accident (CVA), unspecified mechanism  -     CBC auto differential; Future; Expected date: 02/20/2018  -     Lipid panel; Future; Expected date: 02/20/2018    Hot " flashes  -     sertraline (ZOLOFT) 50 MG tablet; Take 1 tablet (50 mg total) by mouth once daily.  Dispense: 30 tablet; Refill: 11    Trochanteric bursitis of right hip  -     Ambulatory referral to Physical Medicine Rehab      Follow-up in about 3 months (around 5/20/2018).

## 2018-02-20 NOTE — PROGRESS NOTES
Health Maintenance Due   Topic Date Due    Hepatitis C Screening  1955    Mammogram  07/26/1997    Colonoscopy  07/26/2007    Influenza Vaccine  08/01/2017

## 2018-02-22 ENCOUNTER — TELEPHONE (OUTPATIENT)
Dept: FAMILY MEDICINE | Facility: CLINIC | Age: 63
End: 2018-02-22

## 2018-02-22 NOTE — TELEPHONE ENCOUNTER
----- Message from Dorinda Miranda sent at 2/21/2018  2:56 PM CST -----  Patient states doctor gave her a name medication to help with her hunger she does not remember the name contact her at [phone] to advise.     Thank you

## 2018-02-22 NOTE — TELEPHONE ENCOUNTER
Wants to know the name of medication/OTC that was discussed at visit about hunger. Fwd to provider

## 2018-05-19 ENCOUNTER — LAB VISIT (OUTPATIENT)
Dept: LAB | Facility: HOSPITAL | Age: 63
End: 2018-05-19
Attending: INTERNAL MEDICINE
Payer: MEDICARE

## 2018-05-19 DIAGNOSIS — I10 ESSENTIAL HYPERTENSION: ICD-10-CM

## 2018-05-19 DIAGNOSIS — I63.9 CEREBROVASCULAR ACCIDENT (CVA), UNSPECIFIED MECHANISM: ICD-10-CM

## 2018-05-19 LAB
ALBUMIN SERPL BCP-MCNC: 3.8 G/DL
ALP SERPL-CCNC: 116 U/L
ALT SERPL W/O P-5'-P-CCNC: 23 U/L
ANION GAP SERPL CALC-SCNC: 11 MMOL/L
AST SERPL-CCNC: 19 U/L
BASOPHILS # BLD AUTO: 0.07 K/UL
BASOPHILS NFR BLD: 0.6 %
BILIRUB SERPL-MCNC: 0.3 MG/DL
BUN SERPL-MCNC: 12 MG/DL
CALCIUM SERPL-MCNC: 9.2 MG/DL
CHLORIDE SERPL-SCNC: 103 MMOL/L
CHOLEST SERPL-MCNC: 142 MG/DL
CHOLEST/HDLC SERPL: 4.4 {RATIO}
CO2 SERPL-SCNC: 22 MMOL/L
CREAT SERPL-MCNC: 0.8 MG/DL
DIFFERENTIAL METHOD: ABNORMAL
EOSINOPHIL # BLD AUTO: 0.2 K/UL
EOSINOPHIL NFR BLD: 1.4 %
ERYTHROCYTE [DISTWIDTH] IN BLOOD BY AUTOMATED COUNT: 14.4 %
EST. GFR  (AFRICAN AMERICAN): >60 ML/MIN/1.73 M^2
EST. GFR  (NON AFRICAN AMERICAN): >60 ML/MIN/1.73 M^2
GLUCOSE SERPL-MCNC: 108 MG/DL
HCT VFR BLD AUTO: 49.6 %
HDLC SERPL-MCNC: 32 MG/DL
HDLC SERPL: 22.5 %
HGB BLD-MCNC: 15.7 G/DL
IMM GRANULOCYTES # BLD AUTO: 0.04 K/UL
IMM GRANULOCYTES NFR BLD AUTO: 0.4 %
LDLC SERPL CALC-MCNC: 57 MG/DL
LYMPHOCYTES # BLD AUTO: 2.5 K/UL
LYMPHOCYTES NFR BLD: 22.6 %
MCH RBC QN AUTO: 31 PG
MCHC RBC AUTO-ENTMCNC: 31.7 G/DL
MCV RBC AUTO: 98 FL
MONOCYTES # BLD AUTO: 0.8 K/UL
MONOCYTES NFR BLD: 7.1 %
NEUTROPHILS # BLD AUTO: 7.5 K/UL
NEUTROPHILS NFR BLD: 67.9 %
NONHDLC SERPL-MCNC: 110 MG/DL
NRBC BLD-RTO: 0 /100 WBC
PLATELET # BLD AUTO: 319 K/UL
PMV BLD AUTO: 8.9 FL
POTASSIUM SERPL-SCNC: 4.1 MMOL/L
PROT SERPL-MCNC: 6.9 G/DL
RBC # BLD AUTO: 5.06 M/UL
SODIUM SERPL-SCNC: 136 MMOL/L
TRIGL SERPL-MCNC: 265 MG/DL
WBC # BLD AUTO: 11.09 K/UL

## 2018-05-19 PROCEDURE — 85025 COMPLETE CBC W/AUTO DIFF WBC: CPT

## 2018-05-19 PROCEDURE — 36415 COLL VENOUS BLD VENIPUNCTURE: CPT | Mod: PO

## 2018-05-19 PROCEDURE — 80061 LIPID PANEL: CPT

## 2018-05-19 PROCEDURE — 80053 COMPREHEN METABOLIC PANEL: CPT

## 2018-05-24 DIAGNOSIS — Z11.59 NEED FOR HEPATITIS C SCREENING TEST: Primary | ICD-10-CM

## 2018-05-24 DIAGNOSIS — Z12.39 BREAST CANCER SCREENING: ICD-10-CM

## 2018-05-25 ENCOUNTER — OFFICE VISIT (OUTPATIENT)
Dept: FAMILY MEDICINE | Facility: CLINIC | Age: 63
End: 2018-05-25
Payer: MEDICARE

## 2018-05-25 VITALS
WEIGHT: 229.06 LBS | OXYGEN SATURATION: 96 % | DIASTOLIC BLOOD PRESSURE: 76 MMHG | TEMPERATURE: 98 F | SYSTOLIC BLOOD PRESSURE: 127 MMHG | HEART RATE: 98 BPM | BODY MASS INDEX: 34.83 KG/M2

## 2018-05-25 DIAGNOSIS — E78.5 HYPERLIPIDEMIA, UNSPECIFIED HYPERLIPIDEMIA TYPE: ICD-10-CM

## 2018-05-25 DIAGNOSIS — Z11.59 ENCOUNTER FOR HEPATITIS C SCREENING TEST FOR LOW RISK PATIENT: ICD-10-CM

## 2018-05-25 DIAGNOSIS — I10 ESSENTIAL HYPERTENSION: Primary | ICD-10-CM

## 2018-05-25 DIAGNOSIS — Z12.11 COLON CANCER SCREENING: ICD-10-CM

## 2018-05-25 DIAGNOSIS — Z12.39 BREAST CANCER SCREENING: ICD-10-CM

## 2018-05-25 DIAGNOSIS — M79.662 PAIN OF LEFT CALF: ICD-10-CM

## 2018-05-25 DIAGNOSIS — G25.81 RESTLESS LEGS: ICD-10-CM

## 2018-05-25 DIAGNOSIS — Z72.0 TOBACCO ABUSE: ICD-10-CM

## 2018-05-25 PROCEDURE — 99214 OFFICE O/P EST MOD 30 MIN: CPT | Mod: S$GLB,,, | Performed by: INTERNAL MEDICINE

## 2018-05-25 PROCEDURE — 99499 UNLISTED E&M SERVICE: CPT | Mod: S$GLB,,, | Performed by: INTERNAL MEDICINE

## 2018-05-25 PROCEDURE — 3074F SYST BP LT 130 MM HG: CPT | Mod: CPTII,S$GLB,, | Performed by: INTERNAL MEDICINE

## 2018-05-25 PROCEDURE — 3008F BODY MASS INDEX DOCD: CPT | Mod: CPTII,S$GLB,, | Performed by: INTERNAL MEDICINE

## 2018-05-25 PROCEDURE — 3078F DIAST BP <80 MM HG: CPT | Mod: CPTII,S$GLB,, | Performed by: INTERNAL MEDICINE

## 2018-05-25 RX ORDER — ROPINIROLE 1 MG/1
1 TABLET, FILM COATED ORAL NIGHTLY
Qty: 100 TABLET | Refills: 3 | Status: SHIPPED | OUTPATIENT
Start: 2018-05-25 | End: 2018-08-15 | Stop reason: SDUPTHER

## 2018-05-25 NOTE — PROGRESS NOTES
Subjective:       Patient ID: Yadi Mccall is a 62 y.o. female.    Chief Complaint: Follow-up (also needs to talk with you about her medication )    HPI         CHIEF COMPLAINT: Hyperlipidemia. cholesterol screening: no.   HPI:     ONSET:    MODIFIERS/TREATMENTS: . Taking medications: yes. . Non-compliance with following diet: no. .     SYMPTOMS/RELATED:Possible medication side effects include:   Myalgia: no.  .     REVIEW OF SYMPTOMS: past weights:   Wt Readings from Last 1 Encounters:   05/25/18 1543 103.9 kg (229 lb 0.9 oz)                                                     Last lipids: total   Lab Results   Component Value Date    CHOL 142 05/19/2018    CHOL 143 01/13/2018    CHOL 136 07/01/2017                                                                     HDL   Lab Results   Component Value Date    HDL 32 (L) 05/19/2018    HDL 31 (L) 01/13/2018    HDL 34 (L) 07/01/2017                                                                     LDL   Lab Results   Component Value Date    LDLCALC 57.0 (L) 05/19/2018    LDLCALC 67.8 01/13/2018    LDLCALC 66.4 07/01/2017                                                                     TRIG   Lab Results   Component Value Date    TRIG 265 (H) 05/19/2018    TRIG 221 (H) 01/13/2018    TRIG 178 (H) 07/01/2017                                                                               CHIEF COMPLAINT: Hypertension  HPI:     ONSET:      QUALITY/COURSE:   Unchanged.     INTENSITY/SEVERITY:  Average blood pressure is .     MODIFIERS/TREATMENTS:  Taking medications: yes. .High sodium intake: no. alcohol: no      The following symptoms are positive only if BOLDED, otherwise are negative.      SYMPTOMS/RELATED: Possible medication side effects include:   Depression..  . Cough. . Constipation.    REVIEW OF SYMPTOMS: . Weight_loss . Weight_gain . Leg_cramps .Potency_problems .    TARGET ORGAN DAMAGE:: angina/ prior myocardial infarction, chronic kidney disease, heart  failure, left ventricular hypertrophy, peripheral artery disease, prior coronary revascularization, retinopathy, stroke. transient ischemic attack.          Lower_Extremity_Problems(+). Pain: yes. . Injury: no.   HPI: Following her stroke she wears a race on the left foot.  Has foot drop.    ONSET/TIMING: . Onset  2 days    ago.     DURATION:   Intermittent         QUALITY/COURSE:    Resolved,. .     LOCATION:   Left calf     . Radiation: no.      INTENSITY/SEVERITY:. Severity is #  7   (10 point scale).        MODIFIERS/TREATMENTS: Current_Limitations_are:  none..   Taking medications: no    Physical_Therapy: no.  Chiropractor: no.     SYMPTOMS/RELATED: . --Possible medication side effects include:.     The following symptoms/statements  are positive if BOLD, negative otherwise.      CONTEXT/WHEN: . Activity . weight bearing. Inactivity.  Sudden  Work related.  Similar_problems_in past.   . Litigation_pending . X-rays. CT . MRI      REVIEW OF SYMPTOMS:   Numbness. Weakness. Muscle_Problems. Fever. Joint_Problems. Swelling. Erythema. Weight_loss. Instability.  Shortness of breath    .   The patient is adamant against quitting smoking.  She said she did quit for 10 months and it drove her crazy.  She's not willing to try nicotine replacement She's tried in the past and she doesn't like it.          Review of Systems    Objective:      Vitals:    05/25/18 1543   BP: 127/76   Pulse: 98   Temp: 97.8 °F (36.6 °C)   TempSrc: Oral   SpO2: 96%   Weight: 103.9 kg (229 lb 0.9 oz)   PainSc: 0-No pain     Physical Exam   Constitutional: She appears well-developed and well-nourished.   Cardiovascular: Normal rate, regular rhythm and normal heart sounds.    Pulmonary/Chest: Effort normal and breath sounds normal.   Abdominal: Soft. There is no tenderness.   Musculoskeletal: She exhibits no tenderness.   Hard indurated area on the distal third of calf just below the gastrocnemius     Neurological: She is alert.   Psychiatric: She  has a normal mood and affect. Her behavior is normal. Thought content normal.   Nursing note and vitals reviewed.        Assessment:       1. Essential hypertension    2. Restless legs    3. Hyperlipidemia, unspecified hyperlipidemia type    4. Pain of left calf    5. Tobacco abuse    6. Colon cancer screening    7. Encounter for hepatitis C screening test for low risk patient    8. Breast cancer screening          Plan:     Essential hypertension  -     Comprehensive metabolic panel; Future; Expected date: 05/25/2018    Restless legs  -     rOPINIRole (REQUIP) 1 MG tablet; Take 1 tablet (1 mg total) by mouth every evening. Or 2  Dispense: 100 tablet; Refill: 3    Hyperlipidemia, unspecified hyperlipidemia type  -     Lipid panel; Future; Expected date: 05/25/2018    Pain of left calf  -     US Lower Extremity Veins Left; Future; Expected date: 05/25/2018    Tobacco abuse    Colon cancer screening  -     Fecal Immunochemical Test (iFOBT); Future; Expected date: 05/25/2018    Encounter for hepatitis C screening test for low risk patient  -     Hepatitis C antibody; Future; Expected date: 05/25/2018    Breast cancer screening  -     Mammo Digital Screening Bilat with CAD; Future; Expected date: 05/25/2018      Follow-up in about 3 months (around 8/25/2018).

## 2018-06-11 ENCOUNTER — PES CALL (OUTPATIENT)
Dept: ADMINISTRATIVE | Facility: CLINIC | Age: 63
End: 2018-06-11

## 2018-06-16 ENCOUNTER — HOSPITAL ENCOUNTER (OUTPATIENT)
Dept: RADIOLOGY | Facility: HOSPITAL | Age: 63
Discharge: HOME | End: 2018-06-16
Attending: INTERNAL MEDICINE
Payer: MEDICARE

## 2018-06-16 DIAGNOSIS — M79.662 PAIN OF LEFT CALF: ICD-10-CM

## 2018-06-16 PROCEDURE — 93971 EXTREMITY STUDY: CPT | Mod: TC

## 2018-06-16 PROCEDURE — 93971 EXTREMITY STUDY: CPT | Mod: 26,,, | Performed by: RADIOLOGY

## 2018-07-10 DIAGNOSIS — I10 ESSENTIAL HYPERTENSION: ICD-10-CM

## 2018-07-10 RX ORDER — LISINOPRIL 40 MG/1
40 TABLET ORAL NIGHTLY
Qty: 90 TABLET | Refills: 1 | Status: SHIPPED | OUTPATIENT
Start: 2018-07-10 | End: 2018-11-19 | Stop reason: SDUPTHER

## 2018-08-15 DIAGNOSIS — G25.81 RESTLESS LEGS: ICD-10-CM

## 2018-08-16 RX ORDER — ROPINIROLE 1 MG/1
TABLET, FILM COATED ORAL
Qty: 100 TABLET | Refills: 1 | Status: SHIPPED | OUTPATIENT
Start: 2018-08-16 | End: 2018-10-24 | Stop reason: SDUPTHER

## 2018-09-14 ENCOUNTER — OFFICE VISIT (OUTPATIENT)
Dept: FAMILY MEDICINE | Facility: CLINIC | Age: 63
End: 2018-09-14
Payer: MEDICARE

## 2018-09-14 ENCOUNTER — DOCUMENTATION ONLY (OUTPATIENT)
Dept: FAMILY MEDICINE | Facility: CLINIC | Age: 63
End: 2018-09-14

## 2018-09-14 VITALS
HEIGHT: 68 IN | BODY MASS INDEX: 34.11 KG/M2 | RESPIRATION RATE: 16 BRPM | SYSTOLIC BLOOD PRESSURE: 128 MMHG | OXYGEN SATURATION: 95 % | WEIGHT: 225.06 LBS | DIASTOLIC BLOOD PRESSURE: 64 MMHG | HEART RATE: 92 BPM | TEMPERATURE: 98 F

## 2018-09-14 DIAGNOSIS — M67.441 GANGLION CYST OF FINGER OF RIGHT HAND: ICD-10-CM

## 2018-09-14 DIAGNOSIS — M70.61 TROCHANTERIC BURSITIS OF RIGHT HIP: Primary | ICD-10-CM

## 2018-09-14 PROCEDURE — 99213 OFFICE O/P EST LOW 20 MIN: CPT | Mod: S$GLB,,, | Performed by: INTERNAL MEDICINE

## 2018-09-14 PROCEDURE — 3078F DIAST BP <80 MM HG: CPT | Mod: CPTII,S$GLB,, | Performed by: INTERNAL MEDICINE

## 2018-09-14 PROCEDURE — 3074F SYST BP LT 130 MM HG: CPT | Mod: CPTII,S$GLB,, | Performed by: INTERNAL MEDICINE

## 2018-09-14 PROCEDURE — 3008F BODY MASS INDEX DOCD: CPT | Mod: CPTII,S$GLB,, | Performed by: INTERNAL MEDICINE

## 2018-09-14 NOTE — PROGRESS NOTES
Health Maintenance Due   Topic Date Due    Hepatitis C Screening  1955    Mammogram  07/26/1997    Colonoscopy  07/26/2007    Influenza Vaccine  08/01/2018

## 2018-09-14 NOTE — PROGRESS NOTES
Subjective:       Patient ID: Yadi Mccall is a 63 y.o. female.    Chief Complaint: Hip Pain; Hand Pain; and Fatigue (x 1 week)    HPI       Lower_Extremity_Problems(+). Pain: yes. . Injury: yes.   HPI:     ONSET/TIMING: . Onset    20 y  ago.     DURATION:   Intermittent         QUALITY/COURSE:    worse,. .     LOCATION:      r hip  . Radiation: no.      INTENSITY/SEVERITY:. Severity is #   10 (10 point scale).        MODIFIERS/TREATMENTS: Current_Limitations_are:  none..   Taking medications: no    Physical_Therapy: no.  Chiropractor: no.     SYMPTOMS/RELATED: . --Possible medication side effects include:.     The following symptoms/statements  are positive if BOLD, negative otherwise.      CONTEXT/WHEN: . Activity . weight bearing. Inactivity.  Sudden  Work related.  Similar_problems_in past.   . Litigation_pending . X-rays. CT . MRI      REVIEW OF SYMPTOMS:   Numbness. Weakness. Muscle_Problems. Fever. Joint_Problems. Swelling. Erythema. Weight_loss. Instability.      .               CHIEF COMPLAINT: Upper_Extremity_Problems. Pain: yes.. Weakness: no . Injury: no .  HPI:  Only hurts if she touches the    ONSET/TIMING: . Onset  1 mo ago.  Gradual. Work related: no.  Similar_problems_in past: no.     DURATION: .          Paroxysmal: no .    QUALITY/COURSE:  unchanged     LOCATION:    .rrf pip  . Radiation: no.    INTENSITY/SEVERITY:. Severity is # 1  (10 point scale).    CONTEXT/WHEN: . Date_Expected_Work_Return is . Activity: yes. . Abduction greater than 90 degrees: no.. Inactivity: no      MODIFIERS/TREATMENTS:  Current_Limitations_are.        Taking medications: no.  Physical_Therapy: no .  Chiropractor: no . . Litigation_pending: no. . X-rays: no.. CT: no.. MRI: no.    SYMPTOMS/RELATED: . --Possible medication side effects include:.     The following symptoms are positive if BOLD, negative otherwise.       REVIEW OF SYMPTOMS:   Numbness. Weakness. Muscle_Problems. Fever. Joint_Problems. Swelling. Erythema.  "Weight_loss.    Review of Systems      Objective:      Vitals:    09/14/18 1547   BP: 128/64   Pulse: 92   Resp: 16   Temp: 97.8 °F (36.6 °C)   TempSrc: Oral   SpO2: 95%   Weight: 102.1 kg (225 lb 1.4 oz)   Height: 5' 8" (1.727 m)   PainSc: 10-Worst pain ever   PainLoc: Hip     Physical Exam   Constitutional: She appears well-developed and well-nourished.   Cardiovascular: Normal rate, regular rhythm and normal heart sounds.   Pulmonary/Chest: Effort normal and breath sounds normal.   Abdominal: Soft. There is no tenderness.   Musculoskeletal: She exhibits tenderness (Minimal tenderness over the right greater trochanter).   Slightly tender to mm cyst on the dorsum the right ring finger PIP joint   Neurological: She is alert.   Psychiatric: She has a normal mood and affect. Her behavior is normal. Thought content normal.   Nursing note and vitals reviewed.        Assessment:       1. Trochanteric bursitis of right hip    2. Ganglion cyst of finger of right hand          Plan:       Trochanteric bursitis of right hip  -     Ambulatory Referral to Physical Medicine Rehab  -     X-Ray Hip 2 or 3 views Right; Future; Expected date: 09/14/2018    Ganglion cyst of finger of right hand      Follow-up for if you are not better return in one month.      "

## 2018-09-15 ENCOUNTER — HOSPITAL ENCOUNTER (OUTPATIENT)
Dept: RADIOLOGY | Facility: HOSPITAL | Age: 63
Discharge: HOME | DRG: 558 | End: 2018-09-15
Attending: INTERNAL MEDICINE | Admitting: INTERNAL MEDICINE
Payer: MEDICARE

## 2018-09-15 DIAGNOSIS — M70.61 TROCHANTERIC BURSITIS OF RIGHT HIP: ICD-10-CM

## 2018-09-15 PROCEDURE — 73502 X-RAY EXAM HIP UNI 2-3 VIEWS: CPT | Mod: TC,FY,RT

## 2018-09-15 PROCEDURE — 73502 X-RAY EXAM HIP UNI 2-3 VIEWS: CPT | Mod: 26,RT,, | Performed by: RADIOLOGY

## 2018-10-03 ENCOUNTER — INITIAL CONSULT (OUTPATIENT)
Dept: PHYSICAL MEDICINE AND REHAB | Facility: CLINIC | Age: 63
End: 2018-10-03
Payer: MEDICARE

## 2018-10-03 VITALS
DIASTOLIC BLOOD PRESSURE: 72 MMHG | BODY MASS INDEX: 34.1 KG/M2 | WEIGHT: 225 LBS | HEART RATE: 93 BPM | HEIGHT: 68 IN | SYSTOLIC BLOOD PRESSURE: 117 MMHG

## 2018-10-03 DIAGNOSIS — M67.951 TENDINOPATHY OF RIGHT GLUTEAL REGION: ICD-10-CM

## 2018-10-03 DIAGNOSIS — M25.551 RIGHT HIP PAIN: Primary | ICD-10-CM

## 2018-10-03 PROCEDURE — 99203 OFFICE O/P NEW LOW 30 MIN: CPT | Mod: 25,S$PBB,, | Performed by: PHYSICAL MEDICINE & REHABILITATION

## 2018-10-03 PROCEDURE — 99999 PR PBB SHADOW E&M-EST. PATIENT-LVL III: CPT | Mod: PBBFAC,,, | Performed by: PHYSICAL MEDICINE & REHABILITATION

## 2018-10-03 PROCEDURE — 20611 DRAIN/INJ JOINT/BURSA W/US: CPT | Mod: PBBFAC,PN | Performed by: PHYSICAL MEDICINE & REHABILITATION

## 2018-10-03 PROCEDURE — 3078F DIAST BP <80 MM HG: CPT | Mod: CPTII,,, | Performed by: PHYSICAL MEDICINE & REHABILITATION

## 2018-10-03 PROCEDURE — 99213 OFFICE O/P EST LOW 20 MIN: CPT | Mod: PBBFAC,PN,25 | Performed by: PHYSICAL MEDICINE & REHABILITATION

## 2018-10-03 PROCEDURE — 3074F SYST BP LT 130 MM HG: CPT | Mod: CPTII,,, | Performed by: PHYSICAL MEDICINE & REHABILITATION

## 2018-10-03 PROCEDURE — 3008F BODY MASS INDEX DOCD: CPT | Mod: CPTII,,, | Performed by: PHYSICAL MEDICINE & REHABILITATION

## 2018-10-03 RX ORDER — BETAMETHASONE SODIUM PHOSPHATE AND BETAMETHASONE ACETATE 3; 3 MG/ML; MG/ML
6 INJECTION, SUSPENSION INTRA-ARTICULAR; INTRALESIONAL; INTRAMUSCULAR; SOFT TISSUE
Status: DISCONTINUED | OUTPATIENT
Start: 2018-10-03 | End: 2018-10-03 | Stop reason: HOSPADM

## 2018-10-03 RX ADMIN — Medication 6 MG: at 04:10

## 2018-10-03 NOTE — PROCEDURES
Right Gluteal Tendinopathy Injection: R greater trochanteric bursa  Date/Time: 10/3/2018 4:39 PM  Performed by: Marquez Murillo MD  Authorized by: Marquez Murillo MD     Consent Done?:  Yes (Verbal)  Indications:  Pain  Procedure site marked: Yes    Timeout: Prior to procedure the correct patient, procedure, and site was verified      Location:  Hip  Site:  R greater trochanteric bursa  Prep: Patient was prepped and draped in usual sterile fashion    Ultrasonic Guidance for needle placement: Yes (needle in-plane, lateral to medial)  Images are saved and documented.  Needle size:  25 G  Medications:  6 mg betamethasone acetate-betamethasone sodium phosphate 6 mg/mL  Patient tolerance:  Patient tolerated the procedure well with no immediate complications    Additional Comments: Ultrasound guidance was used for correct needle placement, the images were saved will be uploaded to EMR.

## 2018-10-03 NOTE — LETTER
October 3, 2018      Chetan Sweeney MD  [address]  [place] [address]           [facility]  [address] [place] [address]  [address]  [place] [address]  Phone: [phone]  Fax: [phone]          Patient: Yadi Mccall   MR Number: [id number]   YOB: 1955   Date of Visit: 10/3/2018       Dear Dr. Chetan Sweeney:    Thank you for referring Yadi Mccall to me for evaluation. Attached you will find relevant portions of my assessment and plan of care.    If you have questions, please do not hesitate to call me. I look forward to following Yadi Mccall along with you.    Sincerely,    Marquez Murillo MD    Enclosure  CC:  No Recipients    If you would like to receive this communication electronically, please contact [email or web address] or [phone] to request more information on [employer] access.    For providers and/or their staff who would like to refer a patient to [facility], please contact us through our one-stop-shop provider referral line, [facility] [place], at [phone].    If you feel you have received this communication in error or would no longer like to receive these types of communications, please e-mail [email or web address]

## 2018-10-03 NOTE — PROGRESS NOTES
[facility]    Consulting Provider: Dr. Chetan Sweeney    CHIEF COMPLAINT:   Chief Complaint   Patient presents with    Hip Pain     right hip pain     HISTORY OF PRESENT ILLNESS: Yadi Mccall is a 63 y.o. female who presents to me in consultation for evaluation of right hip pain.    She reports pain started 15-20 years after walking a marathon. Pain has progressively gotten worse and hurts more with less and less distance. She describes more lateral hip pain, but overall pain is vague. No pain with prolonged sitting or standing, mostly on with walking. She did have a stroke affecting her left side 3-4 years ago, she has residual foot drop and loss of sensation of the left foot. Denies any other weakness or numbness and tingling. She has not had PT for the hip. Denies any groin pain. She does have a history of chronic LBP which occasionally radiates to the heels. She is taking tylenol and NSAIDs without relief. She is also taking baclofen and requip for spasms in the LLE.     Review of Systems   Constitutional: Negative for fever.   HENT: Negative for drooling.    Eyes: Negative for discharge.   Respiratory: Negative for choking.    Cardiovascular: Negative for chest pain.   Genitourinary: Negative for flank pain.   Skin: Negative for wound.   Allergic/Immunologic: Negative for immunocompromised state.   Neurological: Negative for tremors and syncope.   Psychiatric/Behavioral: Negative for behavioral problems.     Past Medical History:   Past Medical History:   Diagnosis Date    Hyperlipidemia     Hypertension     Stroke        Past Surgical History:   Past Surgical History:   Procedure Laterality Date    APPENDECTOMY      EXCISION-LESION N/A 10/31/2017    Performed by Roque Stinson MD at [facility] OR    FOOT SURGERY      HERNIA REPAIR      HYSTERECTOMY      TONSILLECTOMY         Family History:   Family History   Problem Relation Age of Onset    Melanoma Neg Hx     Psoriasis Neg Hx      Lupus Neg Hx     Eczema Neg Hx        Medications:   Current Outpatient Medications on File Prior to Visit   Medication Sig Dispense Refill    aspirin 325 MG tablet Take 325 mg by mouth once daily.      atorvastatin (LIPITOR) 40 MG tablet Take 1 tablet (40 mg total) by mouth once daily. 90 tablet 3    baclofen (LIORESAL) 20 MG tablet Take 1 tablet (20 mg total) by mouth 3 (three) times daily as needed. 270 tablet 3    cloNIDine (CATAPRES) 0.1 MG tablet Take 1 tablet (0.1 mg total) by mouth 3 (three) times daily as needed. 30 tablet 0    fluticasone (CUTIVATE) 0.05 % cream Apply topically 2 (two) times daily. 2 hands and earlobes 60 g 1    lisinopril (PRINIVIL,ZESTRIL) 40 MG tablet TAKE 1 TABLET (40 MG TOTAL) BY MOUTH EVERY EVENING. 90 tablet 1    rOPINIRole (REQUIP) 1 MG tablet TAKE 1 TO 2 TABLETS AT BEDTIME AS NEEDED 100 tablet 1    sertraline (ZOLOFT) 50 MG tablet Take 1 tablet (50 mg total) by mouth once daily. 30 tablet 11    traZODone (DESYREL) 100 MG tablet Take 1 tablet (100 mg total) by mouth nightly as needed for Insomnia. 90 tablet 3     No current facility-administered medications on file prior to visit.        Allergies:   Review of patient's allergies indicates:   Allergen Reactions    Chlorthalidone     Codeine     Dyazide [triamterene-hydrochlorothiazid]     Penicillins        Social History:   Social History     Socioeconomic History    Marital status: Single     Spouse name: None    Number of children: None    Years of education: None    Highest education level: None   Social Needs    Financial resource strain: None    Food insecurity - worry: None    Food insecurity - inability: None    Transportation needs - medical: None    Transportation needs - non-medical: None   Occupational History    None   Tobacco Use    Smoking status: Current Every Day Smoker    Smokeless tobacco: Never Used   Substance and Sexual Activity    Alcohol use: No    Drug use: No    Sexual  "activity: None   Other Topics Concern    Are you pregnant or think you may be? Not Asked    Breast-feeding Not Asked   Social History Narrative    None     PHYSICAL EXAMINATION:   General    Vitals:    10/03/18 1541   BP: 117/72   Pulse: 93   Weight: 102.1 kg (225 lb)   Height: 5' 8" (1.727 m)     Constitutional: Oriented to person, place, and time. No apparent distress. Appears well-developed and well-nourished. Pleasant.  HENT:   Head: Normocephalic and atraumatic.   Eyes: Right eye exhibits no discharge. Left eye exhibits no discharge. No scleral icterus.   Pulmonary/Chest: Effort normal. No respiratory distress.   Abdominal: There is no guarding.   Neurological: Alert and oriented to person, place, and time.   Psychiatric: Behavior is normal.   Right Hip Exam     Tenderness   The patient is experiencing tenderness in the greater trochanter.    Range of Motion   Flexion: 120   External rotation: 60   Internal rotation: 25     Muscle Strength   Abduction: 5/5   Adduction: 5/5   Flexion: 5/5     Tests   ELISA: negative    Other   Erythema: absent  Scars: absent  Sensation: normal  Pulse: present      Left Hip Exam   Left hip exam is normal.    Tenderness   The patient is experiencing no tenderness.     Range of Motion   Flexion: 130   External rotation: 60   Internal rotation: 25     Muscle Strength   Left hip normal muscle strength: 0/5 left ADF and 3/5 left APF.   Abduction: 5/5   Adduction: 5/5   Flexion: 5/5     Tests   ELISA: negative    Other   Erythema: absent  Scars: absent  Sensation: normal  Pulse: present        INSPECTION: There is no swelling, ecchymoses, erythema or gross deformity.  LIGAMENTOUS LAXITY AND STABILITY: Negative ELISA test. Negative Gaenslen's. No pain with SI joint compression. Negative log roll.  GAIT/DYNAMIC: steppage gait on left    Imaging  Xrays of the hips from 9/14/18: 1. Mild degenerative osteoarthrosis of the right hip. 2. Mild bone demineralization. 3. Mild sclerosis of the " "right SI joint.    Data Reviewed: X-ray    Supportive Actions: Independent visualization of images or test specimens    ASSESSMENT:   1. Right hip pain      PLAN:     1. Time was spent reviewing the above diagnosis in depth with Yadi today, including acute management and rehabilitation.     2. Right gluteal bursae injection today under ultrasound guidance. See procedure note.    3. PT ordered 2-3x/week for 4-6 weeks with emphasis on stretching, ROM and HEP.     4. HEP also given today if unable to afford copays for PT.    5. RTC in 6-8 weeks if no improvement.    This is a consult from Dr. Chetan Sweeney. Please see the "Communications" section of Epic to see how the consulting physician received the report of today's findings and recommendations. If it's an [facility] physician, it will be forwarded to his/her "in basket".    The above note was completed, in part, with the aid of Dragon dictation software/hardware. Translation errors may be present.    "

## 2018-10-24 DIAGNOSIS — G25.81 RESTLESS LEGS: ICD-10-CM

## 2018-10-25 RX ORDER — ROPINIROLE 1 MG/1
TABLET, FILM COATED ORAL
Qty: 100 TABLET | Refills: 1 | Status: SHIPPED | OUTPATIENT
Start: 2018-10-25 | End: 2019-01-16 | Stop reason: SDUPTHER

## 2018-11-19 DIAGNOSIS — R23.2 HOT FLASHES: ICD-10-CM

## 2018-11-19 DIAGNOSIS — G47.00 INSOMNIA, UNSPECIFIED TYPE: ICD-10-CM

## 2018-11-19 DIAGNOSIS — G25.81 RESTLESS LEGS: ICD-10-CM

## 2018-11-19 DIAGNOSIS — I10 ESSENTIAL HYPERTENSION: ICD-10-CM

## 2018-11-20 RX ORDER — SERTRALINE HYDROCHLORIDE 50 MG/1
50 TABLET, FILM COATED ORAL DAILY
Qty: 90 TABLET | Refills: 11 | Status: SHIPPED | OUTPATIENT
Start: 2018-11-20 | End: 2019-12-19

## 2018-11-20 RX ORDER — BACLOFEN 20 MG/1
20 TABLET ORAL 3 TIMES DAILY PRN
Qty: 270 TABLET | Refills: 3 | Status: SHIPPED | OUTPATIENT
Start: 2018-11-20 | End: 2019-09-10 | Stop reason: SDUPTHER

## 2018-11-20 RX ORDER — TRAZODONE HYDROCHLORIDE 100 MG/1
100 TABLET ORAL NIGHTLY PRN
Qty: 90 TABLET | Refills: 3 | Status: SHIPPED | OUTPATIENT
Start: 2018-11-20 | End: 2019-09-10 | Stop reason: SDUPTHER

## 2018-11-20 RX ORDER — LISINOPRIL 40 MG/1
TABLET ORAL
Qty: 90 TABLET | Refills: 1 | Status: SHIPPED | OUTPATIENT
Start: 2018-11-20 | End: 2019-04-08 | Stop reason: SDUPTHER

## 2018-12-10 DIAGNOSIS — E78.5 HYPERLIPIDEMIA, UNSPECIFIED HYPERLIPIDEMIA TYPE: ICD-10-CM

## 2018-12-11 RX ORDER — ATORVASTATIN CALCIUM 40 MG/1
40 TABLET, FILM COATED ORAL DAILY
Qty: 90 TABLET | Refills: 3 | Status: SHIPPED | OUTPATIENT
Start: 2018-12-11 | End: 2019-10-01 | Stop reason: SDUPTHER

## 2019-01-16 DIAGNOSIS — G25.81 RESTLESS LEGS: ICD-10-CM

## 2019-01-17 RX ORDER — ROPINIROLE 1 MG/1
TABLET, FILM COATED ORAL
Qty: 100 TABLET | Refills: 1 | Status: SHIPPED | OUTPATIENT
Start: 2019-01-17 | End: 2019-04-05 | Stop reason: SDUPTHER

## 2019-03-25 ENCOUNTER — PATIENT OUTREACH (OUTPATIENT)
Dept: ADMINISTRATIVE | Facility: HOSPITAL | Age: 64
End: 2019-03-25

## 2019-03-25 NOTE — LETTER
"March 25, 2019    Yadi Mccall  [address]  [place] [address]             [facility]  [address]  [place] [address]  Phone: [phone] Dear Cindy [facility] is committed to your overall health and would like to ensure that you are up to date on your recommended test and/or procedures.   Chetan Sweeney MD  has found that your chart shows you may be due for the following:    ANNUAL LABS  MAMMOGRAM  COLORECTAL CANCER SCREENING    If you have already had your screening, or you have made an appointment for your screening, congratulations!  You're on the road to good health. If you haven't signed up for a colorectal screening please accept this invitation to be screened.      According to the American Cancer Society, colon cancer is the third most common cancer for people in the [place].  A simple screening test "Fit Kit" - done in your own home - can help find colon cancer at an early stage when it can be treated, even before any signs or symptoms develop. THIS IS A YEARLY TEST.    Testing for blood in your stool (feces or bowel movement) is the first step. If you have an upcoming visit with your Primary Care Physician you can  a Fit Kit during your visit or you can  a Fit Kit at your Primary Care Clinic prior to your visit.    The Fit Kit includes:    · Instructions on how to perform the test  · (1) Sheet of tissue paper  · (1) Small Absorption pad  · (1) Bottle to hold the sample and a small probe to help you take the sample  · (1) Small plastic bio-hazard bag  · (1) Postage-paid return envelope    Please do your test (the instructions will tell you how) and then return your sample in the postage-paid return envelope within 24 hours of collection.     If your test results are negative, you won't need testing again for another year.  If results show you need more testing, we will call you with next steps.    Regular colorectal cancer screening is one of the most " "powerful weapons for preventing colon cancer.  The website https://www.cancer.org/cancer/colon-rectal-cancer.html can answer many of your questions about this cancer and its prevention.  Just search for "colorectal cancer".  If you have any questions please call [facility] TOUCH [phone] for assistance.  If you have had any of the above done at another facility, please let us know so that we may obtain copies from that facility.  If you have a copy of these records, please provide a copy for us to scan into your chart.  You are welcome to request that the report be faxed to us at  ([phone]).     Otherwise, please schedule these appointments at your earliest convenience by calling [phone] or going to [email or web address].    If you have an upcoming scheduled appointment for the item above, please disregard this letter.    Sincerely,  [facility]  MD Catrina Bridges LPN Clinical Care Coordinator  [facility] [address] [place] [address]  Phone [phone]  Fax [phone]         "

## 2019-04-05 DIAGNOSIS — G25.81 RESTLESS LEGS: ICD-10-CM

## 2019-04-08 DIAGNOSIS — I10 ESSENTIAL HYPERTENSION: ICD-10-CM

## 2019-04-08 RX ORDER — ROPINIROLE 1 MG/1
TABLET, FILM COATED ORAL
Qty: 100 TABLET | Refills: 1 | Status: SHIPPED | OUTPATIENT
Start: 2019-04-08 | End: 2019-04-09 | Stop reason: SDUPTHER

## 2019-04-09 ENCOUNTER — DOCUMENTATION ONLY (OUTPATIENT)
Dept: FAMILY MEDICINE | Facility: CLINIC | Age: 64
End: 2019-04-09

## 2019-04-09 ENCOUNTER — OFFICE VISIT (OUTPATIENT)
Dept: FAMILY MEDICINE | Facility: CLINIC | Age: 64
End: 2019-04-09
Payer: MEDICARE

## 2019-04-09 VITALS
DIASTOLIC BLOOD PRESSURE: 66 MMHG | BODY MASS INDEX: 35.02 KG/M2 | OXYGEN SATURATION: 94 % | SYSTOLIC BLOOD PRESSURE: 118 MMHG | HEART RATE: 86 BPM | RESPIRATION RATE: 16 BRPM | HEIGHT: 68 IN | WEIGHT: 231.06 LBS

## 2019-04-09 DIAGNOSIS — G25.81 RESTLESS LEGS: ICD-10-CM

## 2019-04-09 DIAGNOSIS — I10 ESSENTIAL HYPERTENSION: ICD-10-CM

## 2019-04-09 DIAGNOSIS — I63.9 CEREBROVASCULAR ACCIDENT (CVA), UNSPECIFIED MECHANISM: ICD-10-CM

## 2019-04-09 DIAGNOSIS — M76.31 ILIOTIBIAL BAND SYNDROME OF RIGHT SIDE: Primary | ICD-10-CM

## 2019-04-09 PROCEDURE — 3008F BODY MASS INDEX DOCD: CPT | Mod: CPTII,S$GLB,, | Performed by: INTERNAL MEDICINE

## 2019-04-09 PROCEDURE — 3074F PR MOST RECENT SYSTOLIC BLOOD PRESSURE < 130 MM HG: ICD-10-PCS | Mod: CPTII,S$GLB,, | Performed by: INTERNAL MEDICINE

## 2019-04-09 PROCEDURE — 99213 OFFICE O/P EST LOW 20 MIN: CPT | Mod: S$GLB,,, | Performed by: INTERNAL MEDICINE

## 2019-04-09 PROCEDURE — 3074F SYST BP LT 130 MM HG: CPT | Mod: CPTII,S$GLB,, | Performed by: INTERNAL MEDICINE

## 2019-04-09 PROCEDURE — 3078F PR MOST RECENT DIASTOLIC BLOOD PRESSURE < 80 MM HG: ICD-10-PCS | Mod: CPTII,S$GLB,, | Performed by: INTERNAL MEDICINE

## 2019-04-09 PROCEDURE — 99499 RISK ADDL DX/OHS AUDIT: ICD-10-PCS | Mod: S$GLB,,, | Performed by: INTERNAL MEDICINE

## 2019-04-09 PROCEDURE — 3078F DIAST BP <80 MM HG: CPT | Mod: CPTII,S$GLB,, | Performed by: INTERNAL MEDICINE

## 2019-04-09 PROCEDURE — 99213 PR OFFICE/OUTPT VISIT, EST, LEVL III, 20-29 MIN: ICD-10-PCS | Mod: S$GLB,,, | Performed by: INTERNAL MEDICINE

## 2019-04-09 PROCEDURE — 3008F PR BODY MASS INDEX (BMI) DOCUMENTED: ICD-10-PCS | Mod: CPTII,S$GLB,, | Performed by: INTERNAL MEDICINE

## 2019-04-09 PROCEDURE — 99499 UNLISTED E&M SERVICE: CPT | Mod: S$GLB,,, | Performed by: INTERNAL MEDICINE

## 2019-04-09 RX ORDER — NAPROXEN 500 MG/1
500 TABLET ORAL 2 TIMES DAILY
Qty: 60 TABLET | Refills: 1 | Status: SHIPPED | OUTPATIENT
Start: 2019-04-09 | End: 2019-05-24 | Stop reason: SDUPTHER

## 2019-04-09 RX ORDER — LISINOPRIL 40 MG/1
TABLET ORAL
Qty: 90 TABLET | Refills: 1 | Status: SHIPPED | OUTPATIENT
Start: 2019-04-09 | End: 2019-04-09 | Stop reason: SDUPTHER

## 2019-04-09 RX ORDER — ROPINIROLE 1 MG/1
TABLET, FILM COATED ORAL
Qty: 180 TABLET | Refills: 1 | Status: SHIPPED | OUTPATIENT
Start: 2019-04-09 | End: 2019-08-12 | Stop reason: SDUPTHER

## 2019-04-09 RX ORDER — LISINOPRIL 40 MG/1
40 TABLET ORAL NIGHTLY
Qty: 90 TABLET | Refills: 1 | Status: SHIPPED | OUTPATIENT
Start: 2019-04-09 | End: 2019-08-26 | Stop reason: SDUPTHER

## 2019-04-09 NOTE — PROGRESS NOTES
"Subjective:       Patient ID: Yadi Mccall is a 63 y.o. female.    Chief Complaint: Hip Pain (refills)    HPI     The patient has had a stroke affecting her left lower extremity and is now having pain in the right hip.  She says that the pain in the right hip started before she had her stroke upper.    Lower_Extremity_Problems(+). Pain: yes. . Injury: no.   HPI:     ONSET/TIMING: . Onset   6 y   ago.     DURATION:   Intermittent         QUALITY/COURSE:    worse,. .     LOCATION:        . Radiation: no.      INTENSITY/SEVERITY:. Severity is #   5   (10 point scale).  Only when she is walking        MODIFIERS/TREATMENTS: Current_Limitations_are:  Trouble walking.more than 50 ft.   ..   Taking medications: no    Physical_Therapy: no.  Chiropractor: no.     SYMPTOMS/RELATED: . --Possible medication side effects include:.     The following symptoms/statements  are positive if BOLD, negative otherwise.      CONTEXT/WHEN: . Activity . weight bearing. Inactivity.  Sudden  Work related.  Similar_problems_in past.   . Litigation_pending . X-rays. CT . MRI      REVIEW OF SYMPTOMS:   Numbness. Weakness. Muscle_Problems. Fever. Joint_Problems. Swelling. Erythema. Weight_loss. Instability.      .             Review of Systems      Objective:      Vitals:    04/09/19 1640   BP: 118/66   Pulse: 86   Resp: 16   SpO2: (!) 94%   Weight: 104.8 kg (231 lb 0.7 oz)   Height: 5' 8" (1.727 m)   PainSc: 10-Worst pain ever   PainLoc: Hip     Physical Exam   Constitutional: She appears well-developed and well-nourished.   Cardiovascular: Normal rate, regular rhythm and normal heart sounds.   Pulmonary/Chest: Effort normal and breath sounds normal.   Abdominal: Soft. There is no tenderness.   Musculoskeletal: She exhibits no tenderness.   Pelvic instability with walking due to weakness on the left side.   Neurological: She is alert.   Psychiatric: She has a normal mood and affect. Her behavior is normal. Thought content normal.   Nursing note " and vitals reviewed.        Assessment:       1. Iliotibial band syndrome of right side    2. Cerebrovascular accident (CVA), unspecified mechanism    3. Restless legs    4. Essential hypertension          Plan:       Iliotibial band syndrome of right side  -     naproxen (NAPROSYN) 500 MG tablet; Take 1 tablet (500 mg total) by mouth 2 (two) times daily.  Dispense: 60 tablet; Refill: 1    Cerebrovascular accident (CVA), unspecified mechanism    Restless legs  -     rOPINIRole (REQUIP) 1 MG tablet; TAKE 1 TO 2 TABLETS AT BEDTIME AS NEEDED  Dispense: 180 tablet; Refill: 1    Essential hypertension  -     lisinopril (PRINIVIL,ZESTRIL) 40 MG tablet; Take 1 tablet (40 mg total) by mouth every evening.  Dispense: 90 tablet; Refill: 1      Follow up for if you are not better return in one month.

## 2019-04-09 NOTE — PATIENT INSTRUCTIONS
KT tape for iliotibial band syndrome, get instructions on youtube     Do the exercises I showed you which could also fine by looking them up on the Internet under physical therapy for iliotibial band syndrome    Use a walker if you're going to be going distances that no cause pain.

## 2019-04-09 NOTE — PROGRESS NOTES
Patient, Yadi Mccall (MRN #[id number]), presented with a recorded BMI of 35.13 kg/m^2 and a documented comorbidity(s):  - Hypertension  to which the severe obesity is a contributing factor. This is consistent with the definition of severe obesity (BMI 35.0-39.9) with comorbidity (ICD-10 E66.01, Z68.35). The patient's severe obesity was monitored, evaluated, addressed and/or treated. This addendum to the medical record is made on 04/09/2019.

## 2019-05-24 DIAGNOSIS — M76.31 ILIOTIBIAL BAND SYNDROME OF RIGHT SIDE: ICD-10-CM

## 2019-06-04 RX ORDER — NAPROXEN 500 MG/1
TABLET ORAL
Qty: 120 TABLET | Refills: 1 | Status: SHIPPED | OUTPATIENT
Start: 2019-06-04 | End: 2019-09-26 | Stop reason: CLARIF

## 2019-07-17 ENCOUNTER — TELEPHONE (OUTPATIENT)
Dept: FAMILY MEDICINE | Facility: CLINIC | Age: 64
End: 2019-07-17

## 2019-07-29 DIAGNOSIS — Z12.11 COLON CANCER SCREENING: ICD-10-CM

## 2019-07-29 DIAGNOSIS — Z12.39 BREAST CANCER SCREENING: ICD-10-CM

## 2019-08-01 ENCOUNTER — PATIENT OUTREACH (OUTPATIENT)
Dept: ADMINISTRATIVE | Facility: HOSPITAL | Age: 64
End: 2019-08-01

## 2019-08-01 NOTE — LETTER
August 1, 2019    Yadi Mccall  [address]  [place] [address]             [facility]  [address]  [facility] [address]  Phone: [phone] Yadi MOODY [place]  [address]  [facility] [address]        Dear, Yadi Mccall      [facility].  To help you get the most out of each of your visits, we will review your information to make sure you are up to date on all of your recommended tests and/or procedures.      Dr. Chetan Sweeney MD  has found that your chart shows you may be due for     MAMMOGRAM    If you have had any of the above done at another facility, please bring the records or information with you so that your record at [facility] will be complete.  If you would like to schedule any of these, please contact the clinic at [phone].    If you are currently taking medication, please bring it with you to your appointment for review.    Also, if you have any type of Advanced Directives, please bring them with you to your office visit so we may scan them into your chart.    Thank You,    [facility],  MD Lulu Mims,  Panel Care Coordinator  [facility] [address] [place] [address]  Phone [phone]  Fax [phone]

## 2019-08-12 DIAGNOSIS — G25.81 RESTLESS LEGS: ICD-10-CM

## 2019-08-13 RX ORDER — ROPINIROLE 1 MG/1
TABLET, FILM COATED ORAL
Qty: 180 TABLET | Refills: 0 | Status: SHIPPED | OUTPATIENT
Start: 2019-08-13 | End: 2019-10-04 | Stop reason: SDUPTHER

## 2019-08-26 DIAGNOSIS — I10 ESSENTIAL HYPERTENSION: ICD-10-CM

## 2019-08-27 RX ORDER — LISINOPRIL 40 MG/1
TABLET ORAL
Qty: 90 TABLET | Refills: 1 | Status: SHIPPED | OUTPATIENT
Start: 2019-08-27 | End: 2020-01-14

## 2019-09-10 ENCOUNTER — TELEPHONE (OUTPATIENT)
Dept: FAMILY MEDICINE | Facility: CLINIC | Age: 64
End: 2019-09-10

## 2019-09-10 DIAGNOSIS — G25.81 RESTLESS LEGS: ICD-10-CM

## 2019-09-10 DIAGNOSIS — G47.00 INSOMNIA, UNSPECIFIED TYPE: ICD-10-CM

## 2019-09-10 NOTE — TELEPHONE ENCOUNTER
----- Message from Gunjan Rasheed sent at 9/10/2019  1:07 PM CDT -----  Contact: self  Type: Needs Medical Advice    Who Called:  self  Symptoms (please be specific):    How long has patient had these symptoms:    Pharmacy name and phone #:  [facility]  Best Call Back Number: [phone] (home)   Additional Information: Patient needs a order sent to [facility] requesting a walking bath tub. Please call patient if any questions. Thanks!

## 2019-09-11 RX ORDER — BACLOFEN 20 MG/1
TABLET ORAL
Qty: 270 TABLET | Refills: 3 | Status: SHIPPED | OUTPATIENT
Start: 2019-09-11 | End: 2020-06-22

## 2019-09-11 RX ORDER — TRAZODONE HYDROCHLORIDE 100 MG/1
TABLET ORAL
Qty: 90 TABLET | Refills: 3 | Status: SHIPPED | OUTPATIENT
Start: 2019-09-11 | End: 2020-06-22

## 2019-09-11 NOTE — TELEPHONE ENCOUNTER
"Pt states she called her insurance company and they said to "send it in" I asked her what kind and who to send it to? She said [employer]. I told her I would forward the message to Dr. Sweeney.     "

## 2019-09-12 ENCOUNTER — TELEPHONE (OUTPATIENT)
Dept: FAMILY MEDICINE | Facility: CLINIC | Age: 64
End: 2019-09-12

## 2019-09-12 NOTE — TELEPHONE ENCOUNTER
Spoke with patient she was going to contact [facility] to see who we are suppose to send the RX to, she will call me back with a fax number.

## 2019-09-12 NOTE — TELEPHONE ENCOUNTER
I spoke with [facility] they told me It was not covered by patients insurance, I sent the RX to them anyway so they can review it. I spoke with patient also, she is aware.   Fax number [phone].

## 2019-09-12 NOTE — TELEPHONE ENCOUNTER
----- Message from Rhonda Wei sent at 9/12/2019  8:55 AM CDT -----  Type: Needs Medical Advice    Who Called:  Patient  Best Call Back Number: [phone] (home)     Additional Information: Wants to talk to office about ordering a walk in tub. She has some information on this. Please call for details.

## 2019-09-23 ENCOUNTER — TELEPHONE (OUTPATIENT)
Dept: FAMILY MEDICINE | Facility: CLINIC | Age: 64
End: 2019-09-23

## 2019-09-23 NOTE — TELEPHONE ENCOUNTER
----- Message from Ochoa Lopes sent at 9/23/2019  3:08 PM CDT -----  Contact: patient  Type:  Sooner Apoointment Request    Caller is requesting a sooner appointment.  Caller declined first available appointment listed below.  Caller will not accept being placed on the waitlist and is requesting a message be sent to doctor.    Name of Caller:  patient  When is the first available appointment?  9/30  Symptoms:  Body aches, cough  Best Call Back Number:  [phone]  Additional Information:  Please call if anything avail this week

## 2019-09-23 NOTE — TELEPHONE ENCOUNTER
----- Message from Gillian Ch sent at 9/23/2019 10:21 AM CDT -----  Contact: [phone]  Patient is requesting a call back from the nurse stated she has a cold and she want to be seen this week after 4pm, that time is not available call to assist further.   Please call the patient upon request at phone number [phone].

## 2019-09-26 ENCOUNTER — HOSPITAL ENCOUNTER (INPATIENT)
Facility: HOSPITAL | Age: 64
LOS: 1 days | Discharge: HOME | DRG: 189 | End: 2019-09-27
Attending: EMERGENCY MEDICINE | Admitting: INTERNAL MEDICINE
Payer: MEDICARE

## 2019-09-26 ENCOUNTER — DOCUMENTATION ONLY (OUTPATIENT)
Dept: FAMILY MEDICINE | Facility: CLINIC | Age: 64
End: 2019-09-26

## 2019-09-26 ENCOUNTER — OFFICE VISIT (OUTPATIENT)
Dept: FAMILY MEDICINE | Facility: CLINIC | Age: 64
End: 2019-09-26
Payer: MEDICARE

## 2019-09-26 VITALS
OXYGEN SATURATION: 88 % | HEART RATE: 95 BPM | SYSTOLIC BLOOD PRESSURE: 138 MMHG | WEIGHT: 219.81 LBS | DIASTOLIC BLOOD PRESSURE: 70 MMHG | BODY MASS INDEX: 33.31 KG/M2 | RESPIRATION RATE: 24 BRPM | HEIGHT: 68 IN | TEMPERATURE: 98 F

## 2019-09-26 DIAGNOSIS — R06.02 SOB (SHORTNESS OF BREATH): ICD-10-CM

## 2019-09-26 DIAGNOSIS — Z11.59 ENCOUNTER FOR HEPATITIS C SCREENING TEST FOR LOW RISK PATIENT: ICD-10-CM

## 2019-09-26 DIAGNOSIS — Z13.6 ENCOUNTER FOR LIPID SCREENING FOR CARDIOVASCULAR DISEASE: ICD-10-CM

## 2019-09-26 DIAGNOSIS — Z13.220 ENCOUNTER FOR LIPID SCREENING FOR CARDIOVASCULAR DISEASE: ICD-10-CM

## 2019-09-26 DIAGNOSIS — R05.9 COUGH: ICD-10-CM

## 2019-09-26 DIAGNOSIS — J18.9 COMMUNITY ACQUIRED PNEUMONIA OF RIGHT LOWER LOBE OF LUNG: ICD-10-CM

## 2019-09-26 DIAGNOSIS — E87.6 ACUTE HYPOKALEMIA: ICD-10-CM

## 2019-09-26 DIAGNOSIS — R09.02 HYPOXIA: Primary | ICD-10-CM

## 2019-09-26 DIAGNOSIS — J44.1 COPD WITH ACUTE EXACERBATION: ICD-10-CM

## 2019-09-26 DIAGNOSIS — J96.01 ACUTE RESPIRATORY FAILURE WITH HYPOXIA: ICD-10-CM

## 2019-09-26 DIAGNOSIS — R06.00 DYSPNEA, UNSPECIFIED TYPE: ICD-10-CM

## 2019-09-26 DIAGNOSIS — J44.1 COPD WITH ACUTE EXACERBATION: Primary | ICD-10-CM

## 2019-09-26 PROBLEM — F17.200 NICOTINE DEPENDENCE: Status: ACTIVE | Noted: 2017-02-15

## 2019-09-26 LAB
ALBUMIN SERPL BCP-MCNC: 3.2 G/DL (ref 3.5–5.2)
ALP SERPL-CCNC: 111 U/L (ref 55–135)
ALT SERPL W/O P-5'-P-CCNC: 49 U/L (ref 10–44)
ANION GAP SERPL CALC-SCNC: 12 MMOL/L (ref 8–16)
AST SERPL-CCNC: 18 U/L (ref 10–40)
BASOPHILS # BLD AUTO: 0.06 K/UL (ref 0–0.2)
BASOPHILS NFR BLD: 0.5 % (ref 0–1.9)
BILIRUB SERPL-MCNC: 0.2 MG/DL (ref 0.1–1)
BNP SERPL-MCNC: 117 PG/ML (ref 0–99)
BUN SERPL-MCNC: 10 MG/DL (ref 8–23)
CALCIUM SERPL-MCNC: 9.1 MG/DL (ref 8.7–10.5)
CHLORIDE SERPL-SCNC: 101 MMOL/L (ref 95–110)
CO2 SERPL-SCNC: 30 MMOL/L (ref 23–29)
CREAT SERPL-MCNC: 0.8 MG/DL (ref 0.5–1.4)
D DIMER PPP IA.FEU-MCNC: 0.76 MG/L FEU
DIFFERENTIAL METHOD: ABNORMAL
EOSINOPHIL # BLD AUTO: 0.1 K/UL (ref 0–0.5)
EOSINOPHIL NFR BLD: 0.5 % (ref 0–8)
ERYTHROCYTE [DISTWIDTH] IN BLOOD BY AUTOMATED COUNT: 13.7 % (ref 11.5–14.5)
EST. GFR  (AFRICAN AMERICAN): >60 ML/MIN/1.73 M^2
EST. GFR  (NON AFRICAN AMERICAN): >60 ML/MIN/1.73 M^2
GLUCOSE SERPL-MCNC: 166 MG/DL (ref 70–110)
HCT VFR BLD AUTO: 44.3 % (ref 37–48.5)
HGB BLD-MCNC: 14.3 G/DL (ref 12–16)
IMM GRANULOCYTES # BLD AUTO: 0.3 K/UL (ref 0–0.04)
LACTATE SERPL-SCNC: 1.7 MMOL/L (ref 0.5–2.2)
LYMPHOCYTES # BLD AUTO: 2.1 K/UL (ref 1–4.8)
LYMPHOCYTES NFR BLD: 17.9 % (ref 18–48)
MCH RBC QN AUTO: 30.4 PG (ref 27–31)
MCHC RBC AUTO-ENTMCNC: 32.3 G/DL (ref 32–36)
MCV RBC AUTO: 94 FL (ref 82–98)
MONOCYTES # BLD AUTO: 0.5 K/UL (ref 0.3–1)
MONOCYTES NFR BLD: 4.1 % (ref 4–15)
NEUTROPHILS # BLD AUTO: 8.8 K/UL (ref 1.8–7.7)
NEUTROPHILS NFR BLD: 74.4 % (ref 38–73)
NRBC BLD-RTO: 0 /100 WBC
PLATELET # BLD AUTO: 338 K/UL (ref 150–350)
PMV BLD AUTO: 8.6 FL (ref 9.2–12.9)
POTASSIUM SERPL-SCNC: 3.2 MMOL/L (ref 3.5–5.1)
PROCALCITONIN SERPL IA-MCNC: 0.03 NG/ML
PROT SERPL-MCNC: 6.6 G/DL (ref 6–8.4)
RBC # BLD AUTO: 4.71 M/UL (ref 4–5.4)
SODIUM SERPL-SCNC: 143 MMOL/L (ref 136–145)
TROPONIN I SERPL DL<=0.01 NG/ML-MCNC: 0.03 NG/ML (ref 0–0.03)
WBC # BLD AUTO: 11.76 K/UL (ref 3.9–12.7)

## 2019-09-26 PROCEDURE — 3075F PR MOST RECENT SYSTOLIC BLOOD PRESS GE 130-139MM HG: ICD-10-PCS | Mod: CPTII,S$GLB,, | Performed by: NURSE PRACTITIONER

## 2019-09-26 PROCEDURE — 3075F SYST BP GE 130 - 139MM HG: CPT | Mod: CPTII,S$GLB,, | Performed by: NURSE PRACTITIONER

## 2019-09-26 PROCEDURE — 25000003 PHARM REV CODE 250: Mod: HCNC | Performed by: NURSE PRACTITIONER

## 2019-09-26 PROCEDURE — 25000003 PHARM REV CODE 250: Mod: HCNC | Performed by: EMERGENCY MEDICINE

## 2019-09-26 PROCEDURE — 93005 ELECTROCARDIOGRAM TRACING: CPT | Mod: HCNC

## 2019-09-26 PROCEDURE — 3078F PR MOST RECENT DIASTOLIC BLOOD PRESSURE < 80 MM HG: ICD-10-PCS | Mod: CPTII,S$GLB,, | Performed by: NURSE PRACTITIONER

## 2019-09-26 PROCEDURE — 3008F BODY MASS INDEX DOCD: CPT | Mod: CPTII,S$GLB,, | Performed by: NURSE PRACTITIONER

## 2019-09-26 PROCEDURE — 63600175 PHARM REV CODE 636 W HCPCS: Mod: HCNC | Performed by: NURSE PRACTITIONER

## 2019-09-26 PROCEDURE — 3008F PR BODY MASS INDEX (BMI) DOCUMENTED: ICD-10-PCS | Mod: CPTII,S$GLB,, | Performed by: NURSE PRACTITIONER

## 2019-09-26 PROCEDURE — 85379 FIBRIN DEGRADATION QUANT: CPT | Mod: HCNC

## 2019-09-26 PROCEDURE — 84484 ASSAY OF TROPONIN QUANT: CPT | Mod: HCNC

## 2019-09-26 PROCEDURE — 11000001 HC ACUTE MED/SURG PRIVATE ROOM: Mod: HCNC

## 2019-09-26 PROCEDURE — 99215 OFFICE O/P EST HI 40 MIN: CPT | Mod: 25,S$GLB,, | Performed by: NURSE PRACTITIONER

## 2019-09-26 PROCEDURE — 3078F DIAST BP <80 MM HG: CPT | Mod: CPTII,S$GLB,, | Performed by: NURSE PRACTITIONER

## 2019-09-26 PROCEDURE — 25500020 PHARM REV CODE 255: Mod: HCNC | Performed by: INTERNAL MEDICINE

## 2019-09-26 PROCEDURE — 83880 ASSAY OF NATRIURETIC PEPTIDE: CPT | Mod: HCNC

## 2019-09-26 PROCEDURE — 99285 EMERGENCY DEPT VISIT HI MDM: CPT | Mod: 25,HCNC

## 2019-09-26 PROCEDURE — S4991 NICOTINE PATCH NONLEGEND: HCPCS | Mod: HCNC | Performed by: EMERGENCY MEDICINE

## 2019-09-26 PROCEDURE — 83605 ASSAY OF LACTIC ACID: CPT | Mod: HCNC

## 2019-09-26 PROCEDURE — 25000242 PHARM REV CODE 250 ALT 637 W/ HCPCS: Mod: HCNC | Performed by: EMERGENCY MEDICINE

## 2019-09-26 PROCEDURE — 36415 COLL VENOUS BLD VENIPUNCTURE: CPT | Mod: HCNC

## 2019-09-26 PROCEDURE — 84145 PROCALCITONIN (PCT): CPT | Mod: HCNC

## 2019-09-26 PROCEDURE — 80053 COMPREHEN METABOLIC PANEL: CPT | Mod: HCNC

## 2019-09-26 PROCEDURE — 96374 THER/PROPH/DIAG INJ IV PUSH: CPT | Mod: HCNC

## 2019-09-26 PROCEDURE — 85025 COMPLETE CBC W/AUTO DIFF WBC: CPT | Mod: HCNC

## 2019-09-26 PROCEDURE — 63600175 PHARM REV CODE 636 W HCPCS: Mod: HCNC | Performed by: EMERGENCY MEDICINE

## 2019-09-26 PROCEDURE — 94642 PR AEROSOL INHALATION TREATMENT: ICD-10-PCS | Mod: S$GLB,,, | Performed by: NURSE PRACTITIONER

## 2019-09-26 PROCEDURE — 99215 PR OFFICE/OUTPT VISIT, EST, LEVL V, 40-54 MIN: ICD-10-PCS | Mod: 25,S$GLB,, | Performed by: NURSE PRACTITIONER

## 2019-09-26 PROCEDURE — 94642 AEROSOL INHALATION TREATMENT: CPT | Mod: S$GLB,,, | Performed by: NURSE PRACTITIONER

## 2019-09-26 PROCEDURE — 27000221 HC OXYGEN, UP TO 24 HOURS: Mod: HCNC

## 2019-09-26 PROCEDURE — 94640 AIRWAY INHALATION TREATMENT: CPT | Mod: HCNC

## 2019-09-26 RX ORDER — POTASSIUM CHLORIDE 20 MEQ/15ML
60 SOLUTION ORAL
Status: DISCONTINUED | OUTPATIENT
Start: 2019-09-26 | End: 2019-09-27 | Stop reason: HOSPADM

## 2019-09-26 RX ORDER — IPRATROPIUM BROMIDE AND ALBUTEROL SULFATE 2.5; .5 MG/3ML; MG/3ML
3 SOLUTION RESPIRATORY (INHALATION) EVERY 4 HOURS
Status: DISCONTINUED | OUTPATIENT
Start: 2019-09-27 | End: 2019-09-27 | Stop reason: HOSPADM

## 2019-09-26 RX ORDER — SODIUM,POTASSIUM PHOSPHATES 280-250MG
2 POWDER IN PACKET (EA) ORAL
Status: DISCONTINUED | OUTPATIENT
Start: 2019-09-26 | End: 2019-09-27 | Stop reason: HOSPADM

## 2019-09-26 RX ORDER — AMOXICILLIN 250 MG
1 CAPSULE ORAL 2 TIMES DAILY
Status: DISCONTINUED | OUTPATIENT
Start: 2019-09-26 | End: 2019-09-27 | Stop reason: HOSPADM

## 2019-09-26 RX ORDER — ALBUTEROL SULFATE 0.83 MG/ML
2.5 SOLUTION RESPIRATORY (INHALATION)
Status: DISCONTINUED | OUTPATIENT
Start: 2019-09-26 | End: 2019-09-27 | Stop reason: HOSPADM

## 2019-09-26 RX ORDER — IPRATROPIUM BROMIDE AND ALBUTEROL SULFATE 2.5; .5 MG/3ML; MG/3ML
3 SOLUTION RESPIRATORY (INHALATION)
Status: COMPLETED | OUTPATIENT
Start: 2019-09-26 | End: 2019-09-26

## 2019-09-26 RX ORDER — SODIUM CHLORIDE 0.9 % (FLUSH) 0.9 %
10 SYRINGE (ML) INJECTION
Status: DISCONTINUED | OUTPATIENT
Start: 2019-09-26 | End: 2019-09-27 | Stop reason: HOSPADM

## 2019-09-26 RX ORDER — POTASSIUM CHLORIDE 20 MEQ/1
40 TABLET, EXTENDED RELEASE ORAL ONCE
Status: COMPLETED | OUTPATIENT
Start: 2019-09-26 | End: 2019-09-26

## 2019-09-26 RX ORDER — SERTRALINE HYDROCHLORIDE 50 MG/1
50 TABLET, FILM COATED ORAL DAILY
Status: DISCONTINUED | OUTPATIENT
Start: 2019-09-27 | End: 2019-09-27 | Stop reason: HOSPADM

## 2019-09-26 RX ORDER — ACETAMINOPHEN 325 MG/1
650 TABLET ORAL EVERY 4 HOURS PRN
Status: DISCONTINUED | OUTPATIENT
Start: 2019-09-26 | End: 2019-09-27 | Stop reason: HOSPADM

## 2019-09-26 RX ORDER — ONDANSETRON 2 MG/ML
4 INJECTION INTRAMUSCULAR; INTRAVENOUS EVERY 6 HOURS PRN
Status: DISCONTINUED | OUTPATIENT
Start: 2019-09-26 | End: 2019-09-27 | Stop reason: HOSPADM

## 2019-09-26 RX ORDER — ATORVASTATIN CALCIUM 40 MG/1
40 TABLET, FILM COATED ORAL DAILY
Status: DISCONTINUED | OUTPATIENT
Start: 2019-09-27 | End: 2019-09-27 | Stop reason: HOSPADM

## 2019-09-26 RX ORDER — METHYLPREDNISOLONE SOD SUCC 125 MG
125 VIAL (EA) INJECTION
Status: COMPLETED | OUTPATIENT
Start: 2019-09-26 | End: 2019-09-26

## 2019-09-26 RX ORDER — LANOLIN ALCOHOL/MO/W.PET/CERES
800 CREAM (GRAM) TOPICAL
Status: DISCONTINUED | OUTPATIENT
Start: 2019-09-26 | End: 2019-09-27 | Stop reason: HOSPADM

## 2019-09-26 RX ORDER — ROPINIROLE 1 MG/1
1 TABLET, FILM COATED ORAL 3 TIMES DAILY
Status: DISCONTINUED | OUTPATIENT
Start: 2019-09-26 | End: 2019-09-27 | Stop reason: HOSPADM

## 2019-09-26 RX ORDER — ROPINIROLE 1 MG/1
1 TABLET, FILM COATED ORAL NIGHTLY
Status: DISCONTINUED | OUTPATIENT
Start: 2019-09-26 | End: 2019-09-27 | Stop reason: HOSPADM

## 2019-09-26 RX ORDER — LEVOFLOXACIN 5 MG/ML
750 INJECTION, SOLUTION INTRAVENOUS
Status: DISCONTINUED | OUTPATIENT
Start: 2019-09-26 | End: 2019-09-27

## 2019-09-26 RX ORDER — GLUCAGON 1 MG
1 KIT INJECTION
Status: DISCONTINUED | OUTPATIENT
Start: 2019-09-26 | End: 2019-09-27 | Stop reason: HOSPADM

## 2019-09-26 RX ORDER — POTASSIUM CHLORIDE 20 MEQ/15ML
40 SOLUTION ORAL
Status: DISCONTINUED | OUTPATIENT
Start: 2019-09-26 | End: 2019-09-27 | Stop reason: HOSPADM

## 2019-09-26 RX ORDER — ASPIRIN 325 MG
325 TABLET ORAL DAILY
Status: DISCONTINUED | OUTPATIENT
Start: 2019-09-27 | End: 2019-09-27 | Stop reason: HOSPADM

## 2019-09-26 RX ORDER — MOXIFLOXACIN HYDROCHLORIDE 400 MG/250ML
400 INJECTION, SOLUTION INTRAVENOUS
Status: DISCONTINUED | OUTPATIENT
Start: 2019-09-26 | End: 2019-09-26

## 2019-09-26 RX ORDER — ENOXAPARIN SODIUM 100 MG/ML
1 INJECTION SUBCUTANEOUS ONCE
Status: COMPLETED | OUTPATIENT
Start: 2019-09-26 | End: 2019-09-26

## 2019-09-26 RX ORDER — IBUPROFEN 200 MG
1 TABLET ORAL
Status: DISCONTINUED | OUTPATIENT
Start: 2019-09-26 | End: 2019-09-27 | Stop reason: HOSPADM

## 2019-09-26 RX ORDER — IBUPROFEN 200 MG
16 TABLET ORAL
Status: DISCONTINUED | OUTPATIENT
Start: 2019-09-26 | End: 2019-09-27 | Stop reason: HOSPADM

## 2019-09-26 RX ORDER — ENOXAPARIN SODIUM 100 MG/ML
40 INJECTION SUBCUTANEOUS EVERY 24 HOURS
Status: DISCONTINUED | OUTPATIENT
Start: 2019-09-26 | End: 2019-09-26

## 2019-09-26 RX ORDER — IBUPROFEN 200 MG
24 TABLET ORAL
Status: DISCONTINUED | OUTPATIENT
Start: 2019-09-26 | End: 2019-09-27 | Stop reason: HOSPADM

## 2019-09-26 RX ORDER — BACLOFEN 10 MG/1
20 TABLET ORAL 3 TIMES DAILY PRN
Status: DISCONTINUED | OUTPATIENT
Start: 2019-09-26 | End: 2019-09-27 | Stop reason: HOSPADM

## 2019-09-26 RX ORDER — LISINOPRIL 40 MG/1
40 TABLET ORAL NIGHTLY
Status: DISCONTINUED | OUTPATIENT
Start: 2019-09-26 | End: 2019-09-27 | Stop reason: HOSPADM

## 2019-09-26 RX ORDER — TRAZODONE HYDROCHLORIDE 50 MG/1
100 TABLET ORAL NIGHTLY
Status: DISCONTINUED | OUTPATIENT
Start: 2019-09-26 | End: 2019-09-27 | Stop reason: HOSPADM

## 2019-09-26 RX ADMIN — IPRATROPIUM BROMIDE AND ALBUTEROL SULFATE 3 ML: .5; 3 SOLUTION RESPIRATORY (INHALATION) at 07:09

## 2019-09-26 RX ADMIN — LISINOPRIL 40 MG: 40 TABLET ORAL at 11:09

## 2019-09-26 RX ADMIN — ROPINIROLE HYDROCHLORIDE 1 MG: 1 TABLET, FILM COATED ORAL at 10:09

## 2019-09-26 RX ADMIN — METHYLPREDNISOLONE SODIUM SUCCINATE 125 MG: 125 INJECTION, POWDER, FOR SOLUTION INTRAMUSCULAR; INTRAVENOUS at 07:09

## 2019-09-26 RX ADMIN — LEVOFLOXACIN 750 MG: 750 INJECTION, SOLUTION INTRAVENOUS at 08:09

## 2019-09-26 RX ADMIN — POTASSIUM CHLORIDE 40 MEQ: 20 TABLET, EXTENDED RELEASE ORAL at 08:09

## 2019-09-26 RX ADMIN — ROPINIROLE HYDROCHLORIDE 1 MG: 1 TABLET, FILM COATED ORAL at 08:09

## 2019-09-26 RX ADMIN — ENOXAPARIN SODIUM 100 MG: 100 INJECTION SUBCUTANEOUS at 11:09

## 2019-09-26 RX ADMIN — IOHEXOL 100 ML: 350 INJECTION, SOLUTION INTRAVENOUS at 11:09

## 2019-09-26 RX ADMIN — IPRATROPIUM BROMIDE AND ALBUTEROL SULFATE 3 ML: .5; 3 SOLUTION RESPIRATORY (INHALATION) at 08:09

## 2019-09-26 RX ADMIN — NICOTINE 1 PATCH: 21 PATCH TRANSDERMAL at 07:09

## 2019-09-26 RX ADMIN — TRAZODONE HYDROCHLORIDE 100 MG: 50 TABLET ORAL at 10:09

## 2019-09-26 NOTE — PROGRESS NOTES
Subjective:       Patient ID: Yadi Mccall is a 64 y.o. female.    Chief Complaint: Cough and Shortness of Breath    Cough   This is a new (started in July, 2019) problem. The current episode started more than 1 month ago. The problem has been gradually worsening. The problem occurs every few hours. The cough is productive of brown sputum. Associated symptoms include chills, shortness of breath, sweats and weight loss. Pertinent negatives include no chest pain, fever or wheezing. Nothing aggravates the symptoms. Risk factors for lung disease include smoking/tobacco exposure. She has tried nothing for the symptoms.   Shortness of Breath   This is a new problem. The current episode started more than 1 month ago. The problem occurs constantly. Associated symptoms include leg swelling. Pertinent negatives include no chest pain, fever or wheezing. The symptoms are aggravated by any activity. Risk factors include smoking and prolonged immobilization. She has tried rest for the symptoms. The treatment provided no relief.     Review of Systems   Constitutional: Positive for chills, diaphoresis, fatigue, unexpected weight change and weight loss. Negative for fever.   Respiratory: Positive for cough and shortness of breath. Negative for wheezing.    Cardiovascular: Positive for leg swelling. Negative for chest pain.   Neurological: Positive for weakness and light-headedness. Negative for dizziness.       Objective:      Physical Exam   Constitutional: She is oriented to person, place, and time. She appears well-developed and well-nourished. No distress.   HENT:   Head: Normocephalic and atraumatic.   Eyes: Conjunctivae are normal. Right eye exhibits no discharge. Left eye exhibits no discharge. No scleral icterus.   Cardiovascular: Normal rate, regular rhythm and normal heart sounds. Exam reveals no gallop and no friction rub.   No murmur heard.  Pulmonary/Chest: No stridor. She is in respiratory distress. She has no  wheezes. She has no rales.   No breath sounds right and left lower bases. Left upper lobe diminished compared to right upper lobe.  Given respiratory treatment and mild improvement in right lower lobe, able to hear rhonchi, left lower lobe continues to have no air movement.    Musculoskeletal: She exhibits edema.   Neurological: She is alert and oriented to person, place, and time.   Skin: Skin is warm and dry. No rash noted. She is not diaphoretic. No pallor.   Psychiatric: She has a normal mood and affect. Her behavior is normal.   Nursing note and vitals reviewed.      Assessment:     This provider spent  45 minutes face to face with patient, more than half the time for counseling and coordination of care as noted.    1. Hypoxia    2. Cough    3. COPD with acute exacerbation    4. Encounter for lipid screening for cardiovascular disease    5. Encounter for hepatitis C screening test for low risk patient    6. Dyspnea, unspecified type        Plan:       Hypoxia  -     Refer to Emergency Dept.    Cough  -     X-Ray Chest PA And Lateral; Future; Expected date: 09/26/2019  -     Refer to Emergency Dept.    COPD with acute exacerbation  -     CBC auto differential; Future; Expected date: 09/26/2019  -     Comprehensive metabolic panel; Future; Expected date: 09/26/2019    Encounter for lipid screening for cardiovascular disease  -     Lipid panel; Future; Expected date: 09/26/2019    Encounter for hepatitis C screening test for low risk patient  -     Hepatitis C antibody; Future; Expected date: 09/26/2019    Dyspnea, unspecified type  -     D dimer, quantitative; Future; Expected date: 09/26/2019  -     Brain natriuretic peptide; Future; Expected date: 09/26/2019  -     Inhalation Treatment; Future; Expected date: 09/26/2019  -     albuterol nebulizer solution 2.5 mg     Patient is adverse to going to the ER, discussed out patient care if we can get her pulse ox to stay over 90 on room air.   Given respiratory  treatment. Unable to keep pulse ox up. Without oxygen pulse ox drops to 86-88 percent, 90-94 percent with O2 on. Explained to patient that I cannot send her out without her being able to breath as she could die. Patient's niece stated she would drive patient to the ER as patient refused ambulance even though she understands that she will not have O2 until she gets to the hospital. Report called to [facility] per patient's request.

## 2019-09-26 NOTE — PROGRESS NOTES
Health Maintenance Due   Topic Date Due    Hepatitis C Screening  1955    Mammogram  07/26/1997    Colonoscopy  07/26/2007    Lipid Panel  05/19/2019

## 2019-09-26 NOTE — ED PROVIDER NOTES
"Encounter Date: 9/26/2019    SCRIBE #1 NOTE: I, Karyn Gallardo, am scribing for, and in the presence of, Cody Allred MD.       History     Chief Complaint   Patient presents with    Cough     productive cough with "off while phlegm"/current smoker - sent by PMD who was concerned for pneumonia     Time seen by provider: 6:59 PM on 09/26/2019    Yadi Mccall is a 64 y.o. female with a PMHx of HTN and HLD who presents to the ED with an onset of worsening productive cough, which began 6 weeks ago. Pt reports the color of her phlegm is beige/off-white. Pt was seen by her PCP, Dr. Sweeney, and was given a breathing treatment with no improvements. Pt states her doctor also said she might have pneumonia. She states she doesn't use breathing treatments at home. Pt mentions she has been taking OTC cough medicine and Benadryl with no improvements. She states she has wheezing which has also been going on for 6 weeks. Pt reports she has been fatigue and SOB. The patient denies fever or any other symptoms at this time. No pertinent PSHx. Chorthalidone, Codeine, Dyazide, and Pcn drug allergies. She denies a hx of blood clots or recent steroid use. Pt mentions she normally smokes 0.5-1 pack of cigarettes a day in which she has lessened the amount since she has been sick.    The history is provided by the patient.     Review of patient's allergies indicates:   Allergen Reactions    Chlorthalidone     Codeine     Dyazide [triamterene-hydrochlorothiazid]     Penicillins      Past Medical History:   Diagnosis Date    Hyperlipidemia     Hypertension     Stroke      Past Surgical History:   Procedure Laterality Date    APPENDECTOMY      FOOT SURGERY      HERNIA REPAIR      HYSTERECTOMY      TONSILLECTOMY       Family History   Problem Relation Age of Onset    Melanoma Neg Hx     Psoriasis Neg Hx     Lupus Neg Hx     Eczema Neg Hx      Social History     Tobacco Use    Smoking status: Current Every Day Smoker    " Smokeless tobacco: Never Used   Substance Use Topics    Alcohol use: No    Drug use: No     Review of Systems   Constitutional: Positive for fatigue. Negative for fever.   HENT: Negative for congestion.    Eyes: Negative for visual disturbance.   Respiratory: Positive for cough (producitve), shortness of breath and wheezing.    Cardiovascular: Negative for chest pain.   Gastrointestinal: Negative for abdominal pain.   Genitourinary: Negative for dysuria.   Musculoskeletal: Negative for joint swelling.   Skin: Negative for rash.   Neurological: Negative for syncope.   Hematological: Does not bruise/bleed easily.   Psychiatric/Behavioral: Negative for confusion.       Physical Exam     Initial Vitals [09/26/19 1823]   BP Pulse Resp Temp SpO2   (!) 151/65 82 20 98.1 °F (36.7 °C) (!) 92 %      MAP       --         Physical Exam    Nursing note and vitals reviewed.  Constitutional: She appears well-nourished.   HENT:   Head: Normocephalic and atraumatic.   Edentulous.   Eyes: Conjunctivae and EOM are normal.   Neck: Normal range of motion. Neck supple. No thyroid mass present.   Cardiovascular: Normal rate, regular rhythm and normal heart sounds. Exam reveals no gallop and no friction rub.    No murmur heard.  Pulmonary/Chest: She has wheezes (inspiratory). She has no rhonchi. She has no rales.   Poor inspiratory effort   Abdominal: Soft. Normal appearance and bowel sounds are normal. There is no tenderness.   Neurological: She is alert and oriented to person, place, and time. She has normal strength. No cranial nerve deficit or sensory deficit.   Skin: Skin is warm and dry. No rash noted. No erythema.   Psychiatric: She has a normal mood and affect. Her speech is normal. Cognition and memory are normal.         ED Course   Procedures  Labs Reviewed   CBC W/ AUTO DIFFERENTIAL - Abnormal; Notable for the following components:       Result Value    MPV 8.6 (*)     Gran # (ANC) 8.8 (*)     Immature Grans (Abs) 0.30 (*)      Gran% 74.4 (*)     Lymph% 17.9 (*)     All other components within normal limits   B-TYPE NATRIURETIC PEPTIDE - Abnormal; Notable for the following components:     (*)     All other components within normal limits   COMPREHENSIVE METABOLIC PANEL - Abnormal; Notable for the following components:    Potassium 3.2 (*)     CO2 30 (*)     Glucose 166 (*)     Albumin 3.2 (*)     ALT 49 (*)     All other components within normal limits   TROPONIN I - Abnormal; Notable for the following components:    Troponin I 0.032 (*)     All other components within normal limits   LACTIC ACID, PLASMA   PROCALCITONIN     EKG Readings: (Independently Interpreted)   Initial Reading: No STEMI. Rhythm: Normal Sinus Rhythm. Heart Rate: 85.   T wave abnormality, consider inferior ischemia old deflections noted on October 23, 2017. Normal axis. Normal intervals.       Imaging Results          X-Ray Chest PA And Lateral (In process)                  Medical Decision Making:   History:   Old Medical Records: I decided to obtain old medical records.  Independently Interpreted Test(s):   I have ordered and independently interpreted EKG Reading(s) - see prior notes  Clinical Tests:   Lab Tests: Ordered and Reviewed  Radiological Study: Ordered and Reviewed  Medical Tests: Ordered and Reviewed  ED Management:  This patient was interviewed and assessed emergently.  Initial vital signs are significant for hypoxia requiring supplemental oxygen.  He was provided respiratory care protocol with nebulizers and IV Solu-Medrol.  Workup significant for mild BNP elevation.  Chest x-ray reveals no anuj infiltrate but I do think the patient should be covered empirically for underlying pneumonia considering ongoing sputum production. The patient does warrant admission for stabilization of her breathing course considering hypoxia noted off oxygen in the ER multiple times.  Case discussed with and accepted by the on-call nurse practitioner.  Patient  updated on the plan of care and in agreement with this disposition and she is transported to a telemetry bed in guarded condition.            Scribe Attestation:   Scribe #1: I performed the above scribed service and the documentation accurately describes the services I performed. I attest to the accuracy of the note.    I, Dr. Cody Allred, personally performed the services described in this documentation. All medical record entries made by the scribe were at my direction and in my presence.  I have reviewed the chart and agree that the record reflects my personal performance and is accurate and complete. Cody Allred MD.  5:40 AM 09/27/2019             Clinical Impression:       ICD-10-CM ICD-9-CM   1. COPD with acute exacerbation J44.1 491.21   2. SOB (shortness of breath) R06.02 786.05   3. Cough R05 786.2   4. Acute respiratory failure with hypoxia J96.01 518.81   5. Community acquired pneumonia of right lower lobe of lung J18.1 481         Disposition:   Disposition: Admitted  Condition: Fair                        Cody Allred MD  09/27/19 0541

## 2019-09-27 ENCOUNTER — TELEPHONE (OUTPATIENT)
Dept: MEDSURG UNIT | Facility: HOSPITAL | Age: 64
End: 2019-09-27

## 2019-09-27 VITALS
TEMPERATURE: 97 F | OXYGEN SATURATION: 96 % | DIASTOLIC BLOOD PRESSURE: 72 MMHG | HEART RATE: 88 BPM | RESPIRATION RATE: 18 BRPM | HEIGHT: 68 IN | SYSTOLIC BLOOD PRESSURE: 140 MMHG | WEIGHT: 220.69 LBS | BODY MASS INDEX: 33.45 KG/M2

## 2019-09-27 LAB
ANION GAP SERPL CALC-SCNC: 12 MMOL/L (ref 8–16)
BASOPHILS # BLD AUTO: 0.03 K/UL (ref 0–0.2)
BASOPHILS NFR BLD: 0.4 % (ref 0–1.9)
BUN SERPL-MCNC: 11 MG/DL (ref 8–23)
CALCIUM SERPL-MCNC: 9.1 MG/DL (ref 8.7–10.5)
CHLORIDE SERPL-SCNC: 103 MMOL/L (ref 95–110)
CO2 SERPL-SCNC: 29 MMOL/L (ref 23–29)
CREAT SERPL-MCNC: 0.8 MG/DL (ref 0.5–1.4)
DIFFERENTIAL METHOD: ABNORMAL
EOSINOPHIL # BLD AUTO: 0 K/UL (ref 0–0.5)
EOSINOPHIL NFR BLD: 0 % (ref 0–8)
ERYTHROCYTE [DISTWIDTH] IN BLOOD BY AUTOMATED COUNT: 13.9 % (ref 11.5–14.5)
EST. GFR  (AFRICAN AMERICAN): >60 ML/MIN/1.73 M^2
EST. GFR  (NON AFRICAN AMERICAN): >60 ML/MIN/1.73 M^2
ESTIMATED AVG GLUCOSE: 120 MG/DL (ref 68–131)
GLUCOSE SERPL-MCNC: 190 MG/DL (ref 70–110)
HBA1C MFR BLD HPLC: 5.8 % (ref 4–5.6)
HCT VFR BLD AUTO: 42.5 % (ref 37–48.5)
HGB BLD-MCNC: 13.2 G/DL (ref 12–16)
IMM GRANULOCYTES # BLD AUTO: 0.34 K/UL (ref 0–0.04)
LYMPHOCYTES # BLD AUTO: 1 K/UL (ref 1–4.8)
LYMPHOCYTES NFR BLD: 11.6 % (ref 18–48)
MAGNESIUM SERPL-MCNC: 2.2 MG/DL (ref 1.6–2.6)
MCH RBC QN AUTO: 29.9 PG (ref 27–31)
MCHC RBC AUTO-ENTMCNC: 31.1 G/DL (ref 32–36)
MCV RBC AUTO: 96 FL (ref 82–98)
MONOCYTES # BLD AUTO: 0.2 K/UL (ref 0.3–1)
MONOCYTES NFR BLD: 2.6 % (ref 4–15)
NEUTROPHILS # BLD AUTO: 7 K/UL (ref 1.8–7.7)
NEUTROPHILS NFR BLD: 81.4 % (ref 38–73)
NRBC BLD-RTO: 0 /100 WBC
PHOSPHATE SERPL-MCNC: 3.3 MG/DL (ref 2.7–4.5)
PLATELET # BLD AUTO: 335 K/UL (ref 150–350)
PMV BLD AUTO: 8.6 FL (ref 9.2–12.9)
POTASSIUM SERPL-SCNC: 4.1 MMOL/L (ref 3.5–5.1)
PROCALCITONIN SERPL IA-MCNC: 0.02 NG/ML
RBC # BLD AUTO: 4.42 M/UL (ref 4–5.4)
SODIUM SERPL-SCNC: 144 MMOL/L (ref 136–145)
TROPONIN I SERPL DL<=0.01 NG/ML-MCNC: 0.02 NG/ML (ref 0–0.03)
TROPONIN I SERPL DL<=0.01 NG/ML-MCNC: 0.04 NG/ML (ref 0–0.03)
WBC # BLD AUTO: 8.55 K/UL (ref 3.9–12.7)

## 2019-09-27 PROCEDURE — 94761 N-INVAS EAR/PLS OXIMETRY MLT: CPT | Mod: HCNC

## 2019-09-27 PROCEDURE — 83036 HEMOGLOBIN GLYCOSYLATED A1C: CPT | Mod: HCNC

## 2019-09-27 PROCEDURE — 90471 IMMUNIZATION ADMIN: CPT | Mod: HCNC | Performed by: INTERNAL MEDICINE

## 2019-09-27 PROCEDURE — 84484 ASSAY OF TROPONIN QUANT: CPT | Mod: HCNC

## 2019-09-27 PROCEDURE — 63600175 PHARM REV CODE 636 W HCPCS: Mod: HCNC | Performed by: INTERNAL MEDICINE

## 2019-09-27 PROCEDURE — 84100 ASSAY OF PHOSPHORUS: CPT | Mod: HCNC

## 2019-09-27 PROCEDURE — 83735 ASSAY OF MAGNESIUM: CPT | Mod: HCNC

## 2019-09-27 PROCEDURE — 36415 COLL VENOUS BLD VENIPUNCTURE: CPT | Mod: HCNC

## 2019-09-27 PROCEDURE — 84484 ASSAY OF TROPONIN QUANT: CPT | Mod: 91,HCNC

## 2019-09-27 PROCEDURE — 80048 BASIC METABOLIC PNL TOTAL CA: CPT | Mod: HCNC

## 2019-09-27 PROCEDURE — 90686 IIV4 VACC NO PRSV 0.5 ML IM: CPT | Mod: HCNC | Performed by: INTERNAL MEDICINE

## 2019-09-27 PROCEDURE — 27000221 HC OXYGEN, UP TO 24 HOURS: Mod: HCNC

## 2019-09-27 PROCEDURE — 25000003 PHARM REV CODE 250: Mod: HCNC | Performed by: NURSE PRACTITIONER

## 2019-09-27 PROCEDURE — G0008 ADMIN INFLUENZA VIRUS VAC: HCPCS | Mod: HCNC | Performed by: INTERNAL MEDICINE

## 2019-09-27 PROCEDURE — 94640 AIRWAY INHALATION TREATMENT: CPT | Mod: HCNC

## 2019-09-27 PROCEDURE — 25000242 PHARM REV CODE 250 ALT 637 W/ HCPCS: Mod: HCNC | Performed by: NURSE PRACTITIONER

## 2019-09-27 PROCEDURE — 85025 COMPLETE CBC W/AUTO DIFF WBC: CPT | Mod: HCNC

## 2019-09-27 PROCEDURE — 25000003 PHARM REV CODE 250: Mod: HCNC | Performed by: EMERGENCY MEDICINE

## 2019-09-27 PROCEDURE — 63600175 PHARM REV CODE 636 W HCPCS: Mod: HCNC | Performed by: NURSE PRACTITIONER

## 2019-09-27 PROCEDURE — 84145 PROCALCITONIN (PCT): CPT | Mod: HCNC

## 2019-09-27 RX ORDER — PREDNISONE 20 MG/1
40 TABLET ORAL DAILY
Qty: 14 TABLET | Refills: 0 | Status: SHIPPED | OUTPATIENT
Start: 2019-09-27 | End: 2019-10-04

## 2019-09-27 RX ORDER — BENZONATATE 200 MG/1
200 CAPSULE ORAL 3 TIMES DAILY PRN
Qty: 30 CAPSULE | Refills: 0 | Status: SHIPPED | OUTPATIENT
Start: 2019-09-27 | End: 2019-10-07

## 2019-09-27 RX ORDER — IPRATROPIUM BROMIDE AND ALBUTEROL SULFATE 2.5; .5 MG/3ML; MG/3ML
3 SOLUTION RESPIRATORY (INHALATION) EVERY 4 HOURS
Qty: 1 BOX | Refills: 0 | Status: SHIPPED | OUTPATIENT
Start: 2019-09-27 | End: 2019-10-08 | Stop reason: SDUPTHER

## 2019-09-27 RX ADMIN — IPRATROPIUM BROMIDE AND ALBUTEROL SULFATE 3 ML: .5; 3 SOLUTION RESPIRATORY (INHALATION) at 12:09

## 2019-09-27 RX ADMIN — INFLUENZA VIRUS VACCINE 0.5 ML: 15; 15; 15; 15 SUSPENSION INTRAMUSCULAR at 02:09

## 2019-09-27 RX ADMIN — IPRATROPIUM BROMIDE AND ALBUTEROL SULFATE 3 ML: .5; 3 SOLUTION RESPIRATORY (INHALATION) at 03:09

## 2019-09-27 RX ADMIN — ROPINIROLE HYDROCHLORIDE 1 MG: 1 TABLET, FILM COATED ORAL at 09:09

## 2019-09-27 RX ADMIN — SERTRALINE HYDROCHLORIDE 50 MG: 50 TABLET ORAL at 09:09

## 2019-09-27 RX ADMIN — IPRATROPIUM BROMIDE AND ALBUTEROL SULFATE 3 ML: .5; 3 SOLUTION RESPIRATORY (INHALATION) at 07:09

## 2019-09-27 RX ADMIN — ASPIRIN 325 MG ORAL TABLET 325 MG: 325 PILL ORAL at 09:09

## 2019-09-27 RX ADMIN — METHYLPREDNISOLONE SODIUM SUCCINATE 80 MG: 40 INJECTION, POWDER, FOR SOLUTION INTRAMUSCULAR; INTRAVENOUS at 02:09

## 2019-09-27 RX ADMIN — METHYLPREDNISOLONE SODIUM SUCCINATE 80 MG: 40 INJECTION, POWDER, FOR SOLUTION INTRAMUSCULAR; INTRAVENOUS at 05:09

## 2019-09-27 RX ADMIN — ATORVASTATIN CALCIUM 40 MG: 40 TABLET, FILM COATED ORAL at 09:09

## 2019-09-27 RX ADMIN — IPRATROPIUM BROMIDE AND ALBUTEROL SULFATE 3 ML: .5; 3 SOLUTION RESPIRATORY (INHALATION) at 11:09

## 2019-09-27 NOTE — CARE UPDATE
09/27/19 0727   Patient Assessment/Suction   Level of Consciousness (AVPU) alert   Respiratory Effort Normal;Unlabored   All Lung Fields Breath Sounds diminished   PRE-TX-O2   O2 Device (Oxygen Therapy) nasal cannula   $ Is the patient on Low Flow Oxygen? Yes   Flow (L/min) 3   Oxygen Concentration (%) 32   SpO2 (!) 94 %   Pulse Oximetry Type Intermittent   $ Pulse Oximetry - Multiple Charge Pulse Oximetry - Multiple   Pulse 84   Resp 18   Aerosol Therapy   $ Aerosol Therapy Charges Aerosol Treatment   Respiratory Treatment Status (SVN) given   Treatment Route (SVN) mouthpiece   Patient Position (SVN) Ahumada's   Post Treatment Assessment (SVN) breath sounds unchanged   Signs of Intolerance (SVN) none   Breath Sounds Post-Respiratory Treatment   Throughout All Fields Post-Treatment All Fields   Throughout All Fields Post-Treatment no change   Post-treatment Heart Rate (beats/min) 85   Post-treatment Resp Rate (breaths/min) 18   Duo Q4, tolerated tx well, vitals as charted.

## 2019-09-27 NOTE — DISCHARGE SUMMARY
[facility] Hospital Medicine  Discharge Summary      Patient Name: Yadi Mccall  MRN: [id number]  Admission Date: 9/26/2019  Hospital Length of Stay: 1 days  Discharge Date and Time:  09/27/2019 12:11 PM  Attending Physician: Roya Martini MD   Discharging Provider: Roya Martini MD  Primary Care Provider: Chetan Sweeney MD      HPI:   Yadi Mccall is a 63 y/o female with a past medical history significant for HTN, COPD and HLD who presented to the ED with c/o hypoxia.  Pt was seen today by her PCP with noted hypoxia which did not improve with nebulizer treatments.  Pt reports that her SOB began about 6 weeks ago accompanied by a productive cough of dark brown sputum.  States that she was told by her PCP that she might have pneumonia.  Denies recent fever, hemoptysis, chest pain, abdominal pain, N/V or diarrhea.  She does admit to recent chills and weight loss.  CXR is concerning for a LLL infiltrate and she is currently requiring supplemental oxygen.  Pt continues to smoke 1.5-2 ppd.  She is admitted to the service of hospital medicine for further treatment.        Hospital Course:   The patient is a 64-year-old woman with a 6 week history of upper respiratory tract infection, cough and shortness of breath.  She had not received any treatment for it and decided to come to the emergency room yesterday.  She was hypoxic in the emergency room therefore she was admitted.  A CT of the chest was obtained that was negative for PE.  It showed a 4 mm left apical lesion and changes of emphysema.    The patient was admitted with a COPD exacerbation and was treated with steroids, bronchodilators, oxygen and Levaquin.  She feels much better today and her lung sounds are clear but she's still requiring 2 L of oxygen.  She will be discharged home on oxygen.  I spoke to the patient at length regarding her tobacco use.  She told me that she doubts she is going to be able to quit smoking but is down from 2  "to 1 pack of cigarettes a day.  I will give her prescriptions for prednisone, duo nebs and set up home oxygen.    She will need follow-up of the lung nodule.    She has very mild diabetes mellitus.  Her hemoglobin A1c was 5.8.  I instructed her to start eating a diabetic diet.  I do not think she needs to be started on oral medications right now.     Consults:   Consults (From admission, onward)        Status Ordering Provider     Case Management/  Once     Provider:  (Not yet assigned)    LOLI Shepard     Inpatient consult to Diabetes educator  Once     Provider:  (Not yet assigned)    Acknowledged JOSE DE JESUS JADE            Final Active Diagnoses:    Diagnosis Date Noted POA    PRINCIPAL PROBLEM:  Acute respiratory failure with hypoxia [J96.01] 09/26/2019 Yes    COPD exacerbation [J44.1] 09/26/2019 Yes    Hypokalemia [E87.6] 09/26/2019 Yes    Essential hypertension [I10] 02/15/2017 Yes    Hyperlipidemia [E78.5] 02/15/2017 Yes    Nicotine dependence [F17.200] 02/15/2017 Yes      Problems Resolved During this Admission:       Discharged Condition: stable    Disposition: Home or Self Care    Follow Up:  Follow-up Information     Chetan Sweeney MD In 1 week.    Specialties:  Family Medicine, Internal Medicine  Contact information:  [address] [facility] [id number] [phone]                 Patient Instructions:      OXYGEN FOR HOME USE     Order Specific Question Answer Comments   Liter Flow 2    Duration Continuous    Qualifying SpO2: 86    Testing done at: Rest    Device home concentrator with portable unit    Length of need (in months): 3 mos    Patient condition with qualifying saturation COPD    Height: 5' 8" (1.727 m)    Weight: 100.1 kg (220 lb 10.9 oz)    Does patient have medical equipment at home? cane, quad    Does patient have medical equipment at home? walker, rolling    Alternative treatment measures have been tried or considered and deemed clinically " ineffective. Yes      Diet Cardiac     Activity as tolerated       Significant Diagnostic Studies: Labs:   BMP:   Recent Labs   Lab 09/26/19 1911 09/27/19  0442   * 190*    144   K 3.2* 4.1    103   CO2 30* 29   BUN 10 11   CREATININE 0.8 0.8   CALCIUM 9.1 9.1   MG  --  2.2    and CBC   Recent Labs   Lab 09/26/19 1911 09/27/19  0442   WBC 11.76 8.55   HGB 14.3 13.2   HCT 44.3 42.5    335       Pending Diagnostic Studies:     None         Medications:  Reconciled Home Medications:      Medication List      START taking these medications    albuterol-ipratropium 2.5 mg-0.5 mg/3 mL nebulizer solution  Commonly known as:  DUO-NEB  Take 3 mLs by nebulization every 4 (four) hours. Rescue     benzonatate 200 MG capsule  Commonly known as:  TESSALON  Take 1 capsule (200 mg total) by mouth 3 (three) times daily as needed for Cough.     predniSONE 20 MG tablet  Commonly known as:  DELTASONE  Take 2 tablets (40 mg total) by mouth once daily. for 7 days        CHANGE how you take these medications    baclofen 20 MG tablet  Commonly known as:  LIORESAL  TAKE 1 TABLET THREE TIMES DAILY AS NEEDED  What changed:  See the new instructions.        CONTINUE taking these medications    aspirin 325 MG tablet  Take 325 mg by mouth once daily.     atorvastatin 40 MG tablet  Commonly known as:  LIPITOR  TAKE 1 TABLET (40 MG TOTAL) BY MOUTH ONCE DAILY.     lisinopril 40 MG tablet  Commonly known as:  PRINIVIL,ZESTRIL  TAKE 1 TABLET EVERY EVENING     rOPINIRole 1 MG tablet  Commonly known as:  REQUIP  TAKE 1 TO 2 TABLETS AT BEDTIME AS NEEDED     sertraline 50 MG tablet  Commonly known as:  ZOLOFT  TAKE 1 TABLET (50 MG TOTAL) BY MOUTH ONCE DAILY.     traZODone 100 MG tablet  Commonly known as:  DESYREL  TAKE 1 TABLET NIGHTLY AS NEEDED FOR INSOMNIA            Indwelling Lines/Drains at time of discharge:   Lines/Drains/Airways     None                 Time spent on the discharge of patient: 36 minutes  Patient was  seen and examined on the date of discharge and determined to be suitable for discharge.         Roya Martini MD  Department of Hospital Medicine  [facility]

## 2019-09-27 NOTE — HOSPITAL COURSE
The patient is a 64-year-old woman with a 6 week history of upper respiratory tract infection, cough and shortness of breath.  She had not received any treatment for it and decided to come to the emergency room yesterday.  She was hypoxic in the emergency room therefore she was admitted.  A CT of the chest was obtained that was negative for PE.  It showed a 4 mm left apical lesion and changes of emphysema.    The patient was admitted with a COPD exacerbation and was treated with steroids, bronchodilators, oxygen and Levaquin.  She feels much better today and her lung sounds are clear but she's still requiring 2 L of oxygen.  She will be discharged home on oxygen.  I spoke to the patient at length regarding her tobacco use.  She told me that she doubts she is going to be able to quit smoking but is down from 2 to 1 pack of cigarettes a day.  I will give her prescriptions for prednisone, duo nebs and set up home oxygen.    She will need follow-up of the lung nodule.    She has very mild diabetes mellitus.  Her hemoglobin A1c was 5.8.  I instructed her to start eating a diabetic diet.  I do not think she needs to be started on oral medications right now.

## 2019-09-27 NOTE — ASSESSMENT & PLAN NOTE
Onset 6 weeks ago.  Possible PNA-IV abx initiated.  Check procalcitonin.  Check d-dimer, if elevated, administer full dose lovenox and CTA chest.  Supplemental oxygen as needed.  Smoking cessation encouraged.  May need home oxygen-case management consulted.

## 2019-09-27 NOTE — ASSESSMENT & PLAN NOTE
Supplemental O2 via nasal canula; titrate O2 saturation to >92%.   Start Solumedrol 80mg IV q 8 hrs.   Consider Pulmonary consultation.   Continue beta 2 agonist bronchodilator treatments.   Continue IV antibiotics.  Check sputum GS and Cx.   Continue routine medications as before.

## 2019-09-27 NOTE — HPI
Yadi Mccall is a 65 y/o female with a past medical history significant for HTN, COPD and HLD who presented to the ED with c/o hypoxia.  Pt was seen today by her PCP with noted hypoxia which did not improve with nebulizer treatments.  Pt reports that her SOB began about 6 weeks ago accompanied by a productive cough of dark brown sputum.  States that she was told by her PCP that she might have pneumonia.  Denies recent fever, hemoptysis, chest pain, abdominal pain, N/V or diarrhea.  She does admit to recent chills and weight loss.  CXR is concerning for a LLL infiltrate and she is currently requiring supplemental oxygen.  Pt continues to smoke 1.5-2 ppd.  She is admitted to the service of hospital medicine for further treatment.

## 2019-09-27 NOTE — CARE UPDATE
09/27/19 1100   Patient Assessment/Suction   Level of Consciousness (AVPU) alert   Home Oxygen Qualification   Room Air SpO2 At Rest (!) 86 %   Room Air SpO2 on Exertion (!) 87 %   SpO2 During Exertion on O2 95 %   Heart Rate on O2 92 bpm   Exertion O2 LPM 4 LPM   SpO2 on Recovery 94 %   Recovery Heart Rate 82 bpm   Recovery O2 LPM 2 LPM

## 2019-09-27 NOTE — H&P
"[facility] Hospital Medicine  History & Physical    Patient Name: Yadi Mccall  MRN: [id number]  Admission Date: 9/26/2019  Attending Physician: Roya Martini MD   Primary Care Provider: Chetan Sweeney MD         Patient information was obtained from patient, relative(s) and ER records.     Subjective:     Principal Problem:Acute respiratory failure with hypoxia    Chief Complaint:   Chief Complaint   Patient presents with    Cough     productive cough with "off while phlegm"/current smoker - sent by PMD who was concerned for pneumonia        HPI: Yadi Mccall is a 65 y/o female with a past medical history significant for HTN, COPD and HLD who presented to the ED with c/o hypoxia.  Pt was seen today by her PCP with noted hypoxia which did not improve with nebulizer treatments.  Pt reports that her SOB began about 6 weeks ago accompanied by a productive cough of dark brown sputum.  States that she was told by her PCP that she might have pneumonia.  Denies recent fever, hemoptysis, chest pain, abdominal pain, N/V or diarrhea.  She does admit to recent chills and weight loss.  CXR is concerning for a LLL infiltrate and she is currently requiring supplemental oxygen.  Pt continues to smoke 1.5-2 ppd.  She is admitted to the service of hospital medicine for further treatment.    Past Medical History:   Diagnosis Date    Hyperlipidemia     Hypertension     Stroke        Past Surgical History:   Procedure Laterality Date    APPENDECTOMY      FOOT SURGERY      HERNIA REPAIR      HYSTERECTOMY      TONSILLECTOMY         Review of patient's allergies indicates:   Allergen Reactions    Chlorthalidone     Codeine     Dyazide [triamterene-hydrochlorothiazid]     Penicillins        No current facility-administered medications on file prior to encounter.      Current Outpatient Medications on File Prior to Encounter   Medication Sig    aspirin 325 MG tablet Take 325 mg by mouth once daily.    " atorvastatin (LIPITOR) 40 MG tablet TAKE 1 TABLET (40 MG TOTAL) BY MOUTH ONCE DAILY.    baclofen (LIORESAL) 20 MG tablet TAKE 1 TABLET THREE TIMES DAILY AS NEEDED (Patient taking differently: muscle spams.)    lisinopril (PRINIVIL,ZESTRIL) 40 MG tablet TAKE 1 TABLET EVERY EVENING    rOPINIRole (REQUIP) 1 MG tablet TAKE 1 TO 2 TABLETS AT BEDTIME AS NEEDED    sertraline (ZOLOFT) 50 MG tablet TAKE 1 TABLET (50 MG TOTAL) BY MOUTH ONCE DAILY.    traZODone (DESYREL) 100 MG tablet TAKE 1 TABLET NIGHTLY AS NEEDED FOR INSOMNIA    [DISCONTINUED] naproxen (NAPROSYN) 500 MG tablet TAKE 1 TABLET TWICE DAILY (Patient not taking: Reported on 9/26/2019)     Family History     Problem Relation (Age of Onset)    Hypertension Mother        Tobacco Use    Smoking status: Current Every Day Smoker    Smokeless tobacco: Never Used   Substance and Sexual Activity    Alcohol use: No    Drug use: No    Sexual activity: Not on file     Review of Systems   Constitutional: Positive for appetite change and chills. Negative for fever.   HENT: Negative for rhinorrhea and sinus pressure.    Eyes: Negative for photophobia and visual disturbance.   Respiratory: Positive for cough (productive) and shortness of breath.    Cardiovascular: Negative for chest pain and palpitations.   Gastrointestinal: Negative for abdominal pain, diarrhea, nausea and vomiting.   Endocrine: Negative for polydipsia and polyuria.   Genitourinary: Negative for difficulty urinating and dysuria.   Musculoskeletal: Negative for arthralgias and gait problem.   Skin: Negative for rash and wound.   Neurological: Positive for light-headedness. Negative for speech difficulty.   Hematological: Negative for adenopathy.   Psychiatric/Behavioral: Negative for agitation and confusion.   All other systems reviewed and are negative.    Objective:     Vital Signs (Most Recent):  Temp: 97 °F (36.1 °C) (09/26/19 2151)  Pulse: 97 (09/26/19 2151)  Resp: 17 (09/26/19 2151)  BP: (!)  133/92 (09/26/19 2151)  SpO2: 99 % (09/26/19 2054) Vital Signs (24h Range):  Temp:  [97 °F (36.1 °C)-98.1 °F (36.7 °C)] 97 °F (36.1 °C)  Pulse:  [82-97] 97  Resp:  [17-30] 17  SpO2:  [88 %-99 %] 99 %  BP: (132-151)/(63-92) 133/92     Weight: 99.3 kg (219 lb)  Body mass index is 33.3 kg/m².    Physical Exam   Constitutional: She is oriented to person, place, and time. She appears well-developed and well-nourished.   Eyes: Pupils are equal, round, and reactive to light. Conjunctivae and EOM are normal.   Neck: Normal range of motion. Neck supple. No JVD present.   Cardiovascular: Normal rate and regular rhythm.   Pulmonary/Chest: Effort normal. No respiratory distress. She has decreased breath sounds. She has wheezes.   Abdominal: Soft. Bowel sounds are normal. She exhibits no distension. There is no tenderness.   Genitourinary:   Genitourinary Comments: deferred   Musculoskeletal: Normal range of motion.   Neurological: She is alert and oriented to person, place, and time.   Skin: Skin is warm and dry. Capillary refill takes 2 to 3 seconds. No rash noted.   Psychiatric: She has a normal mood and affect. Her behavior is normal. Judgment and thought content normal.   Nursing note and vitals reviewed.        CRANIAL NERVES     CN III, IV, VI   Pupils are equal, round, and reactive to light.  Extraocular motions are normal.        Significant Labs:   CBC:   Recent Labs   Lab 09/26/19 1911   WBC 11.76   HGB 14.3   HCT 44.3        CMP:   Recent Labs   Lab 09/26/19 1911      K 3.2*      CO2 30*   *   BUN 10   CREATININE 0.8   CALCIUM 9.1   PROT 6.6   ALBUMIN 3.2*   BILITOT 0.2   ALKPHOS 111   AST 18   ALT 49*   ANIONGAP 12   EGFRNONAA >60       Significant Imaging: CXR: possilble LLL infiltrate; changes consistent with COPD.    Assessment/Plan:     * Acute respiratory failure with hypoxia  Onset 6 weeks ago.  Possible PNA-IV abx initiated.  Check procalcitonin.  Check d-dimer, if elevated,  administer full dose lovenox and CTA chest.  Supplemental oxygen as needed.  Smoking cessation encouraged.  May need home oxygen-case management consulted.      COPD exacerbation  Supplemental O2 via nasal canula; titrate O2 saturation to >92%.   Start Solumedrol 80mg IV q 8 hrs.   Consider Pulmonary consultation.   Continue beta 2 agonist bronchodilator treatments.   Continue IV antibiotics.  Check sputum GS and Cx.   Continue routine medications as before.             Hypokalemia  K 3.2 on admission.  Repleted.  Monitor and replete as needed.      Essential hypertension  Chronic; stable.  Continue chronic antihypertensives.  Monitor BP closely and treat as indicated for sustained control.      Hyperlipidemia  Chronic; continue statin.      Nicotine dependence  Assistance with smoking cessation was offered, including:  [x]  Medications  [x]  Counseling  []  Printed Information on Smoking Cessation  []  Referral to a Smoking Cessation Program    Patient was counseled regarding smoking for 3-10 minutes.          VTE Risk Mitigation (From admission, onward)         Ordered     enoxaparin injection 100 mg  Once      09/26/19 2233     IP VTE HIGH RISK PATIENT  Once      09/26/19 2207     Reason for no Mechanical VTE Prophylaxis  Once      09/26/19 2207            Time spent seeing patient( greater than 1/2 spent in direct contact) : 45 minutes       MARIBEL Womack  Department of Hospital Medicine   [facility]

## 2019-09-27 NOTE — PLAN OF CARE
CM met with pt bedside to complete discharge assessment. Pt verified information on facesheet as correct. Pt states that her POA is her sister Gunjan Mccall ([phone]). Pt reports living at listed address with her sister, gunjan. PCP is Dr. Sweeney. Pharm is [facility] in [place]. Denies hh/hd. DME- walker, cane. Pt reports being modified independent with ADLs, but no longer drives. Transportation is provided by her sister. Insurance verified as [employer]. DC plan is home with no needs at this time. Will need home o2 eval before DC. CM following to assist in any DC needs.        09/27/19 1000   Discharge Assessment   Assessment Type Discharge Planning Assessment   Confirmed/corrected address and phone number on facesheet? Yes   Assessment information obtained from? Patient   Communicated expected length of stay with patient/caregiver yes   Prior to hospitilization cognitive status: Alert/Oriented   Prior to hospitalization functional status: Independent;Assistive Equipment   Current cognitive status: Alert/Oriented   Current Functional Status: Independent;Assistive Equipment   Lives With sibling(s)   Able to Return to Prior Arrangements yes   Is patient able to care for self after discharge? Yes   Patient's perception of discharge disposition home or selfcare   Readmission Within the Last 30 Days no previous admission in last 30 days   Patient currently being followed by outpatient case management? No   Patient currently receives any other outside agency services? No   Equipment Currently Used at Home cane, quad;walker, rolling   Do you have any problems affording any of your prescribed medications? No   Is the patient taking medications as prescribed? yes   Does the patient have transportation home? Yes   Transportation Anticipated family or friend will provide   Does the patient receive services at the [facility]? No   Discharge Plan A Home with family   Discharge Plan B Home   DME Needed Upon  Discharge  none   Patient/Family in Agreement with Plan yes

## 2019-09-27 NOTE — PLAN OF CARE
Per Brenda Smith with [facility]- she has the pts home O2 order and she is giving it to Dawna for processing. Myrna Hannah, LCSW     09/27/19 1216   Post-Acute Status   Post-Acute Authorization E   [facility] Status Referrals Sent

## 2019-09-27 NOTE — ED NOTES
Pt presents to the ED by referral of her primary care dr for concerns of pneumonia for cough x 2 weeks. Family at the bedside. Bed rails are up and call light is within patient reach. Will continue to monitor.

## 2019-09-27 NOTE — SUBJECTIVE & OBJECTIVE
Past Medical History:   Diagnosis Date    Hyperlipidemia     Hypertension     Stroke        Past Surgical History:   Procedure Laterality Date    APPENDECTOMY      FOOT SURGERY      HERNIA REPAIR      HYSTERECTOMY      TONSILLECTOMY         Review of patient's allergies indicates:   Allergen Reactions    Chlorthalidone     Codeine     Dyazide [triamterene-hydrochlorothiazid]     Penicillins        No current facility-administered medications on file prior to encounter.      Current Outpatient Medications on File Prior to Encounter   Medication Sig    aspirin 325 MG tablet Take 325 mg by mouth once daily.    atorvastatin (LIPITOR) 40 MG tablet TAKE 1 TABLET (40 MG TOTAL) BY MOUTH ONCE DAILY.    baclofen (LIORESAL) 20 MG tablet TAKE 1 TABLET THREE TIMES DAILY AS NEEDED (Patient taking differently: muscle spams.)    lisinopril (PRINIVIL,ZESTRIL) 40 MG tablet TAKE 1 TABLET EVERY EVENING    rOPINIRole (REQUIP) 1 MG tablet TAKE 1 TO 2 TABLETS AT BEDTIME AS NEEDED    sertraline (ZOLOFT) 50 MG tablet TAKE 1 TABLET (50 MG TOTAL) BY MOUTH ONCE DAILY.    traZODone (DESYREL) 100 MG tablet TAKE 1 TABLET NIGHTLY AS NEEDED FOR INSOMNIA    [DISCONTINUED] naproxen (NAPROSYN) 500 MG tablet TAKE 1 TABLET TWICE DAILY (Patient not taking: Reported on 9/26/2019)     Family History     Problem Relation (Age of Onset)    Hypertension Mother        Tobacco Use    Smoking status: Current Every Day Smoker    Smokeless tobacco: Never Used   Substance and Sexual Activity    Alcohol use: No    Drug use: No    Sexual activity: Not on file     Review of Systems   Constitutional: Positive for appetite change and chills. Negative for fever.   HENT: Negative for rhinorrhea and sinus pressure.    Eyes: Negative for photophobia and visual disturbance.   Respiratory: Positive for cough (productive) and shortness of breath.    Cardiovascular: Negative for chest pain and palpitations.   Gastrointestinal: Negative for abdominal  pain, diarrhea, nausea and vomiting.   Endocrine: Negative for polydipsia and polyuria.   Genitourinary: Negative for difficulty urinating and dysuria.   Musculoskeletal: Negative for arthralgias and gait problem.   Skin: Negative for rash and wound.   Neurological: Positive for light-headedness. Negative for speech difficulty.   Hematological: Negative for adenopathy.   Psychiatric/Behavioral: Negative for agitation and confusion.   All other systems reviewed and are negative.    Objective:     Vital Signs (Most Recent):  Temp: 97 °F (36.1 °C) (09/26/19 2151)  Pulse: 97 (09/26/19 2151)  Resp: 17 (09/26/19 2151)  BP: (!) 133/92 (09/26/19 2151)  SpO2: 99 % (09/26/19 2054) Vital Signs (24h Range):  Temp:  [97 °F (36.1 °C)-98.1 °F (36.7 °C)] 97 °F (36.1 °C)  Pulse:  [82-97] 97  Resp:  [17-30] 17  SpO2:  [88 %-99 %] 99 %  BP: (132-151)/(63-92) 133/92     Weight: 99.3 kg (219 lb)  Body mass index is 33.3 kg/m².    Physical Exam   Constitutional: She is oriented to person, place, and time. She appears well-developed and well-nourished.   Eyes: Pupils are equal, round, and reactive to light. Conjunctivae and EOM are normal.   Neck: Normal range of motion. Neck supple. No JVD present.   Cardiovascular: Normal rate and regular rhythm.   Pulmonary/Chest: Effort normal. No respiratory distress. She has decreased breath sounds. She has wheezes.   Abdominal: Soft. Bowel sounds are normal. She exhibits no distension. There is no tenderness.   Genitourinary:   Genitourinary Comments: deferred   Musculoskeletal: Normal range of motion.   Neurological: She is alert and oriented to person, place, and time.   Skin: Skin is warm and dry. Capillary refill takes 2 to 3 seconds. No rash noted.   Psychiatric: She has a normal mood and affect. Her behavior is normal. Judgment and thought content normal.   Nursing note and vitals reviewed.        CRANIAL NERVES     CN III, IV, VI   Pupils are equal, round, and reactive to light.  Extraocular  motions are normal.        Significant Labs:   CBC:   Recent Labs   Lab 09/26/19 1911   WBC 11.76   HGB 14.3   HCT 44.3        CMP:   Recent Labs   Lab 09/26/19 1911      K 3.2*      CO2 30*   *   BUN 10   CREATININE 0.8   CALCIUM 9.1   PROT 6.6   ALBUMIN 3.2*   BILITOT 0.2   ALKPHOS 111   AST 18   ALT 49*   ANIONGAP 12   EGFRNONAA >60       Significant Imaging: CXR: possilble LLL infiltrate; changes consistent with COPD.

## 2019-09-27 NOTE — ASSESSMENT & PLAN NOTE
Chronic; stable.  Continue chronic antihypertensives.  Monitor BP closely and treat as indicated for sustained control.

## 2019-09-27 NOTE — PLAN OF CARE
POC reviewed, Cardiac monitored SR, VSS, no complaints of pain, fall and safety precautions initiated and maintained throughout shift, bed low, bed alarm set, call light is within reach, SR up x 2, instructed to use call light for assistance, walker provided to maintain safety, IV ABX tolerated, patient is resting between care, will continue to monitor and round.

## 2019-09-27 NOTE — PLAN OF CARE
Approval received from Brenda Smith with [facility] to pull the pts home nebulizer. I pulled the equipment and delivered it to bedside. Completed paperwork returned to DME closet. Myrna Hannah, MARTINW     09/27/19 1426   Post-Acute Status   Post-Acute Authorization E   E Status Set-up Complete

## 2019-09-27 NOTE — PLAN OF CARE
I received approval from Dawna Diaz with [facility] to pull the pts home O2 tank. I delivered it to the pt at bedside. Delivery ticket signed and completed paperwork returned to the DME closet. Pauly, charge nurse is entering a communication for respiratory to educate the pt on home O2 use. Myrna Hannah, YOGESH     09/27/19 1249   Post-Acute Status   Post-Acute Authorization HME   HME Status Set-up Complete

## 2019-09-28 NOTE — PLAN OF CARE
09/28/19 0918   Final Note   Assessment Type Final Discharge Note   Anticipated Discharge Disposition Home

## 2019-09-30 ENCOUNTER — TELEPHONE (OUTPATIENT)
Dept: FAMILY MEDICINE | Facility: CLINIC | Age: 64
End: 2019-09-30

## 2019-10-01 ENCOUNTER — PATIENT OUTREACH (OUTPATIENT)
Dept: ADMINISTRATIVE | Facility: CLINIC | Age: 64
End: 2019-10-01

## 2019-10-01 DIAGNOSIS — E78.5 HYPERLIPIDEMIA, UNSPECIFIED HYPERLIPIDEMIA TYPE: ICD-10-CM

## 2019-10-01 NOTE — PATIENT INSTRUCTIONS
"Dyspnea (Shortness Of Breath)  Shortness of Breath (also known as "Dyspnea") is the sense that you can't catch your breath or can't get enough air.  Dyspnea can be caused by many different conditions such as:  Acute asthma attack  Worsening of emphysema (also called "COPD") -- a lung disease that is caused by smoking  A mucus plug blocks a large air passage in the lung -- this can occur with emphysema or chronic bronchitis  Congestive Heart Failure ("CHF") -- when a weak heart muscle allows excess fluid to collect in the lungs  Panic attacks, anxiety -- fear can cause rapid breathing ("hyperventilation")  Pneumonia -- infection in the lung tissue  Exposure to toxic fumes or smoke  Pulmonary embolus (blood clot to the lung)  Based on your visit today, the exact cause of your shortness of breath is not certain. Your tests do not show any of the serious causes of dyspnea. Sometimes, further testing is needed to find out if a serious problem exists. Therefore, it is important for you to watch for any new symptoms or worsening of your condition and follow up with your doctor as directed.  Home Care:  When your symptoms are better, resume your usual activities.  If you smoke, you need to stop. Join a stop-smoking program or ask your doctor for help.  Follow Up  with your doctor or as advised by our staff.  Get Prompt Medical Attention  if any of the following occur:  Increasing shortness of breath or wheezing  Redness, pain or swelling in one leg  Swelling in both legs or ankles  Unexpected weight gain  Chest, arm, shoulder, neck or upper back pain  Dizziness, weakness or fainting  Palpitations (the sense that your heart is fluttering, beating fast or hard)  Fever of 100.4ºF (38ºC) or higher, or as directed by your healthcare provider  Cough with dark colored or bloody sputum (mucus)  © 0038-8186 [employer], [address], [place] [address]. All rights reserved. This information is not intended as a " substitute for professional medical care. Always follow your healthcare professional's instructions.   +

## 2019-10-03 RX ORDER — ATORVASTATIN CALCIUM 40 MG/1
TABLET, FILM COATED ORAL
Qty: 90 TABLET | Refills: 3 | Status: SHIPPED | OUTPATIENT
Start: 2019-10-03 | End: 2020-07-13

## 2019-10-04 ENCOUNTER — DOCUMENTATION ONLY (OUTPATIENT)
Dept: FAMILY MEDICINE | Facility: CLINIC | Age: 64
End: 2019-10-04

## 2019-10-04 ENCOUNTER — OFFICE VISIT (OUTPATIENT)
Dept: FAMILY MEDICINE | Facility: CLINIC | Age: 64
End: 2019-10-04
Payer: MEDICARE

## 2019-10-04 VITALS
TEMPERATURE: 98 F | BODY MASS INDEX: 32.58 KG/M2 | RESPIRATION RATE: 20 BRPM | DIASTOLIC BLOOD PRESSURE: 84 MMHG | HEIGHT: 68 IN | OXYGEN SATURATION: 95 % | SYSTOLIC BLOOD PRESSURE: 128 MMHG | HEART RATE: 98 BPM | WEIGHT: 214.94 LBS

## 2019-10-04 DIAGNOSIS — M70.62 TROCHANTERIC BURSITIS OF LEFT HIP: ICD-10-CM

## 2019-10-04 DIAGNOSIS — J44.1 COPD EXACERBATION: Primary | ICD-10-CM

## 2019-10-04 DIAGNOSIS — G25.81 RESTLESS LEGS: ICD-10-CM

## 2019-10-04 DIAGNOSIS — E78.5 HYPERLIPIDEMIA, UNSPECIFIED HYPERLIPIDEMIA TYPE: ICD-10-CM

## 2019-10-04 DIAGNOSIS — H69.91 DYSFUNCTION OF RIGHT EUSTACHIAN TUBE: ICD-10-CM

## 2019-10-04 DIAGNOSIS — Z72.0 TOBACCO ABUSE: ICD-10-CM

## 2019-10-04 DIAGNOSIS — Z11.59 ENCOUNTER FOR HEPATITIS C SCREENING TEST FOR LOW RISK PATIENT: ICD-10-CM

## 2019-10-04 PROCEDURE — 99495 TCM SERVICES (MODERATE COMPLEXITY): ICD-10-PCS | Mod: 25,S$GLB,, | Performed by: INTERNAL MEDICINE

## 2019-10-04 PROCEDURE — 99499 UNLISTED E&M SERVICE: CPT | Mod: S$GLB,,, | Performed by: INTERNAL MEDICINE

## 2019-10-04 PROCEDURE — 99499 RISK ADDL DX/OHS AUDIT: ICD-10-PCS | Mod: S$GLB,,, | Performed by: INTERNAL MEDICINE

## 2019-10-04 PROCEDURE — 99495 TRANSJ CARE MGMT MOD F2F 14D: CPT | Mod: 25,S$GLB,, | Performed by: INTERNAL MEDICINE

## 2019-10-04 PROCEDURE — 20610 LARGE JOINT ASPIRATION/INJECTION: ICD-10-PCS | Mod: LT,S$GLB,, | Performed by: INTERNAL MEDICINE

## 2019-10-04 PROCEDURE — 20610 DRAIN/INJ JOINT/BURSA W/O US: CPT | Mod: LT,S$GLB,, | Performed by: INTERNAL MEDICINE

## 2019-10-04 RX ORDER — METHYLPREDNISOLONE ACETATE 40 MG/ML
40 INJECTION, SUSPENSION INTRA-ARTICULAR; INTRALESIONAL; INTRAMUSCULAR; SOFT TISSUE
Status: DISCONTINUED | OUTPATIENT
Start: 2019-10-04 | End: 2019-10-04 | Stop reason: HOSPADM

## 2019-10-04 RX ORDER — ROPINIROLE 1 MG/1
TABLET, FILM COATED ORAL
Qty: 180 TABLET | Refills: 1 | Status: SHIPPED | OUTPATIENT
Start: 2019-10-04 | End: 2019-10-14 | Stop reason: SDUPTHER

## 2019-10-04 RX ADMIN — METHYLPREDNISOLONE ACETATE 40 MG: 40 INJECTION, SUSPENSION INTRA-ARTICULAR; INTRALESIONAL; INTRAMUSCULAR; SOFT TISSUE at 04:10

## 2019-10-04 NOTE — PROCEDURES
Large Joint Aspiration/Injection  Date/Time: 10/4/2019 4:20 PM  Performed by: Chetan Sweeney MD  Authorized by: Chetan Sweeney MD     Consent Done?:  Yes (Written)  Indications:  Pain  Timeout: Prior to procedure the correct patient, procedure, and site was verified    Anesthesia    Anesthesia: local infiltration  Anesthetic: bupivacaine 0.5% without epinephrine  Anesthetic total: 1mL  Prep: Patient was prepped and draped in usual sterile fashion    Medications:  40 mg methylPREDNISolone acetate 40 mg/mL

## 2019-10-04 NOTE — PATIENT INSTRUCTIONS
Steam treatments.  Sterile [place] nasal washings  Afrin nasal spray for maximum of 3 days    Stop smoking as much as possible.    Thank you for choosing [facility].     Please fill out the patient experience survey.

## 2019-10-04 NOTE — PROGRESS NOTES
Subjective:      5:13 PM     Patient ID: Yadi Mccall is a 64 y.o. female.    Chief Complaint: Shortness of Breath and L hip pain    HPI         CHIEF COMPLAINT: Dyspnea    .   HPI:  Hospitalized for COPD exacerbation discharge 1 week ago.  Having trouble mobilizing sputum    ONSET/TIMIN days      ago.      DURATION: . intermittant    QUALITY/COURSE:   Better    INTENSITY/SEVERITY 2 (0 to 10)             BNP    @LABRCNTIP(BNP,BNPTRIAGEBLO)@  D-dimer    Lab Results   Component Value Date    DDIMER 0.76 (H) 2019         SYMPTOMS/RELATED: . --Possible medication side effects include:    The following symptoms/statements are positive if in BOLD, negative if not.          CONTEXT/WHEN:  Similar_problems . Tobacco_use. Seasonal_pattern.  Copd. CHF.   thomboembolic disease.  Allergies/Hayfever.. Sinusitis. . Asthma.  Exposure_to_others_with_similar_symptoms.      TREATMENTS:  OTC_Cough/Decongestants..  Rx_Cough/Decongestants. Bronchodilators.. Inhaled_Corticosteroids. Oral_Corticosteroids... .Antibiotics. Diuretics. Ace inhibitor or ARB. Beta-blocker.     REVIEW OF SYMPTOMS:    . Dyspnea on exertion. Paroxysmal_Nocturnal_Dyspnea.. Orthopnea...  Purulent_Sputum. . Hemoptysis.  Rhinorrhea/Purulent.   Stressed. Weight_loss. Weight_gain. Leg_Pain.     \          Lower_Extremity_Problems(+). Pain: yes. . Injury: no.   HPI:     ONSET/TIMING: . Onset    1 day  ago.     DURATION:   Intermittent         QUALITY/COURSE:    unchanged ,. .     LOCATION:   Left hip     . Radiation: no.      INTENSITY/SEVERITY:. Severity is #   10   (10 point scale).        MODIFIERS/TREATMENTS: Current_Limitations_are:  none..   Taking medications: no    Physical_Therapy: no.  Chiropractor: no.     SYMPTOMS/RELATED: . --Possible medication side effects include:.     The following symptoms/statements  are positive if BOLD, negative otherwise.      CONTEXT/WHEN: . Activity . weight bearing. Inactivity.  Sudden  Work related.   "Similar_problems_in past.   . Litigation_pending . X-rays. CT . MRI      REVIEW OF SYMPTOMS:   Numbness. Weakness. Muscle_Problems. Fever. Joint_Problems. Swelling. Erythema. Weight_loss. Instability.      .               CHIEF COMPLAINT: Ear problems:    Hearing loss:yes  HPI:     ONSET/TIMIN week   ago.     DURATION: intermittant    QUALITY/COURSE:   unchanged     LOCATION: -- right       Radiation:   .    INTENSITY/SEVERITY:     5 /10 (on 0 to 10 scale)       CONTEXT/WHEN: .--Similar problems in past: no  . Recent failed treatment for ear infection: no      The following symptoms are positive if BOLD, negative otherwise.    MODIFIERS:  Ear motion. Chewing.  Swallowing: ..  TREATMENTS: Analgesics:  Antibiotics:. Decongestants:     SYMPTOMS/RELATED: . Malaise.   Lymphadenitis . Weight_Loss  .     Review of Systems      Objective:      Vitals:    10/04/19 1706   BP: 128/84   Pulse: 98   Resp: 20   Temp: 97.9 °F (36.6 °C)   TempSrc: Oral   SpO2: 95%   Weight: 97.5 kg (214 lb 15.2 oz)   Height: 5' 8" (1.727 m)   PainSc: 10-Worst pain ever   PainLoc: Hip     Physical Exam   Constitutional: She appears well-developed and well-nourished.   HENT:   Right Ear: External ear normal. Tympanic membrane is retracted.   Left Ear: Tympanic membrane and external ear normal.   Mouth/Throat: Oropharynx is clear and moist. No oropharyngeal exudate.   Cardiovascular: Normal rate, regular rhythm and normal heart sounds. Exam reveals no friction rub.   No murmur heard.  Pulmonary/Chest: Effort normal and breath sounds normal. No respiratory distress. She has no wheezes. She has no rales. She exhibits no tenderness.   Abdominal: Soft. Bowel sounds are normal. There is no tenderness.   Musculoskeletal: She exhibits tenderness. She exhibits no edema.   Tender over the left greater trochanter.  Range of motion at the waist: Limited  Straight leg raising: Normal  Gait: Normal  Strength: Normal  Sensation: Normal   Lymphadenopathy:     " She has no cervical adenopathy.   Neurological: She is alert.   Psychiatric: She has a normal mood and affect. Her behavior is normal. Thought content normal.   Nursing note and vitals reviewed.        Assessment:       1. COPD exacerbation    2. Trochanteric bursitis of left hip    3. Dysfunction of right eustachian tube    4. Restless legs    5. Tobacco abuse    6. Encounter for hepatitis C screening test for low risk patient    7. Hyperlipidemia, unspecified hyperlipidemia type          Plan:     Transitional Care Note    Family and/or Caretaker present at visit?  Yes.  Diagnostic tests reviewed/disposition: No diagnosic tests pending after this hospitalization.  Disease/illness education: yes  Home health/community services discussion/referrals: Patient does not have home health established from hospital visit.  They do not need home health.  If needed, we will set up home health for the patient.   Establishment or re-establishment of referral orders for community resources: No other necessary community resources.   Discussion with other health care providers: No discussion with other health care providers necessary.             COPD exacerbation  -     Ambulatory referral to Smoking Cessation Program  -     CBC auto differential; Future; Expected date: 10/04/2019  -     Comprehensive metabolic panel; Future; Expected date: 10/04/2019    Trochanteric bursitis of left hip  -     Large Joint Aspiration/Injection  -     methylPREDNISolone acetate injection 40 mg    Dysfunction of right eustachian tube    Restless legs  -     rOPINIRole (REQUIP) 1 MG tablet; 2 p.o. t.i.d.  Dispense: 180 tablet; Refill: 1    Tobacco abuse  -     Ambulatory referral to Smoking Cessation Program    Encounter for hepatitis C screening test for low risk patient  -     Hepatitis C antibody; Future; Expected date: 10/04/2019    Hyperlipidemia, unspecified hyperlipidemia type  -     Lipid panel; Future; Expected date: 10/04/2019      Follow  up in about 3 months (around 1/4/2020) for if you are not better return in 2 weeks.

## 2019-10-08 ENCOUNTER — TELEPHONE (OUTPATIENT)
Dept: FAMILY MEDICINE | Facility: CLINIC | Age: 64
End: 2019-10-08

## 2019-10-08 RX ORDER — PREDNISONE 20 MG/1
TABLET ORAL
Qty: 14 TABLET | Refills: 0 | OUTPATIENT
Start: 2019-10-08

## 2019-10-08 RX ORDER — IPRATROPIUM BROMIDE AND ALBUTEROL SULFATE 2.5; .5 MG/3ML; MG/3ML
3 SOLUTION RESPIRATORY (INHALATION) EVERY 4 HOURS
Qty: 1 BOX | Refills: 5 | Status: SHIPPED | OUTPATIENT
Start: 2019-10-08 | End: 2020-11-20

## 2019-10-08 NOTE — TELEPHONE ENCOUNTER
----- Message from Gunjan Rasheed sent at 10/8/2019 12:43 PM CDT -----  Contact: self  Type: Needs Medical Advice    Who Called:  self  Symptoms (please be specific):    How long has patient had these symptoms:    Pharmacy name and phone #:    Best Call Back Number: [phone] (home)   Additional Information: Patient states her oxygen level has stated above 90 all weekend. She would like a order for [facility] to come  the equipment. Please call patient if any questions. thanks!

## 2019-10-08 NOTE — TELEPHONE ENCOUNTER
----- Message from Gunjan Rasheed sent at 10/8/2019 12:46 PM CDT -----  Contact: self  Type:  Sooner Apoointment Request    Caller is requesting a sooner appointment.  Caller declined first available appointment listed below.  Caller will not accept being placed on the waitlist and is requesting a message be sent to doctor.    Name of Caller:  self  When is the first available appointment?  10/22/19  Symptoms:  F/u hip pain  Best Call Back Number:  [phone] (home)   Additional Information:  Patient states the injection did not work. Patient needs to be seen. Thanks!

## 2019-10-08 NOTE — TELEPHONE ENCOUNTER
"Pt states she hasnt used the oxygen since over the weekend. She was doing some house cleaning and it dipped down to 88. Should she use the oxygen or not. Also she is out of the [place] rx from the hospital she uses in her nebulizer. She is not wheezing but still having coughing "fits" at times and coughs up mucus.     "

## 2019-10-14 ENCOUNTER — TELEPHONE (OUTPATIENT)
Dept: FAMILY MEDICINE | Facility: CLINIC | Age: 64
End: 2019-10-14

## 2019-10-14 ENCOUNTER — TELEPHONE (OUTPATIENT)
Dept: SMOKING CESSATION | Facility: CLINIC | Age: 64
End: 2019-10-14

## 2019-10-14 DIAGNOSIS — G25.81 RESTLESS LEGS: ICD-10-CM

## 2019-10-14 RX ORDER — ROPINIROLE 1 MG/1
TABLET, FILM COATED ORAL
Qty: 180 TABLET | Refills: 1 | Status: SHIPPED | OUTPATIENT
Start: 2019-10-14 | End: 2019-11-11 | Stop reason: SDUPTHER

## 2019-10-14 NOTE — TELEPHONE ENCOUNTER
Patient needs an order for [facility] to  oxygen tanks.She also wants to know if she still needs to do nebulizer treatments every 4 hours or just when needed.

## 2019-10-14 NOTE — TELEPHONE ENCOUNTER
Need to have her come in and walk up and down the halls and check her oxygen before we discontinue it.  She can switch to as needed albuterol

## 2019-10-14 NOTE — TELEPHONE ENCOUNTER
Spoke to patient and sent refill request on ropinerole and request for oxygen tanks to be picked up.

## 2019-10-14 NOTE — TELEPHONE ENCOUNTER
Patient missed her intake appointment on 10/9/2019 for tobacco cessation program. I called today to offer her an opportunity to reschedule, and she stated that she is not interested at this time and she doesn't want to quit at this time. I offered to call her in a month.

## 2019-10-14 NOTE — TELEPHONE ENCOUNTER
----- Message from Gillian Ch sent at 10/14/2019 11:54 AM CDT -----  Contact: [phone]  Patient is requesting a call back from the nurse concerning her oxygen equipment, and order for rOPINIRole (REQUIP) 1 MG.    Please call the patient upon request at phone number [phone].

## 2019-10-15 ENCOUNTER — DOCUMENTATION ONLY (OUTPATIENT)
Dept: FAMILY MEDICINE | Facility: CLINIC | Age: 64
End: 2019-10-15

## 2019-10-31 ENCOUNTER — PATIENT OUTREACH (OUTPATIENT)
Dept: ADMINISTRATIVE | Facility: HOSPITAL | Age: 64
End: 2019-10-31

## 2019-10-31 NOTE — LETTER
October 31, 2019    Yadi Mccall  [address]  [facility] [address]             [facility]  [address]  [facility] [address]  Phone: [phone] Dear Cindy [facility] is committed to your overall health and would like to ensure that you are up to date on all of your health maintenance testing.  Our records indicate that you are overdue for your colorectal cancer screening.  After reviewing your chart, it has been documented that a FIT KIT (colorectal cancer screening kit) was given at a clinic visit or  mailed to you,  but the lab has yet to receive the kit so that we may update your chart.   This is a friendly reminder to complete this test (the instructions will tell you how) and then return your sample in the postage-paid return envelope within 24 hours of collection.  Please remember to put the collection date on your sample!    If your test results are negative, you won't need testing again for another year.  If results show you need more testing, we will call you with next steps.    Sincerely,      Chetan Sweeney MD and your [facility]

## 2019-11-11 DIAGNOSIS — G25.81 RESTLESS LEGS: ICD-10-CM

## 2019-11-11 RX ORDER — ROPINIROLE 1 MG/1
TABLET, FILM COATED ORAL
Qty: 180 TABLET | Refills: 1 | Status: SHIPPED | OUTPATIENT
Start: 2019-11-11 | End: 2019-12-03 | Stop reason: SDUPTHER

## 2019-11-11 NOTE — TELEPHONE ENCOUNTER
----- Message from Mery Burrell sent at 11/11/2019  9:04 AM CST -----  Contact: Patient  Type:  RX Refill Request    Who Called: Yadi  Refill or New Rx:  Refill  RX Name and Strength:  rOPINIRole (REQUIP) 1 MG tablet  How is the patient currently taking it? (ex. 1XDay):  3xday  Is this a 30 day or 90 day RX:  90 day  Preferred Pharmacy with phone number:   [facility] - [place], [place] - [address]  [address] [address]  Phone: [phone] Fax: [phone]  Local or Mail Order:  Local  Ordering Provider:  Patel Barnard Call Back Number: [phone]

## 2019-11-29 DIAGNOSIS — Z12.11 COLON CANCER SCREENING: ICD-10-CM

## 2019-12-03 ENCOUNTER — TELEPHONE (OUTPATIENT)
Dept: FAMILY MEDICINE | Facility: CLINIC | Age: 64
End: 2019-12-03

## 2019-12-03 DIAGNOSIS — G25.81 RESTLESS LEGS: ICD-10-CM

## 2019-12-03 RX ORDER — ROPINIROLE 1 MG/1
TABLET, FILM COATED ORAL
Qty: 180 TABLET | Refills: 1 | Status: SHIPPED | OUTPATIENT
Start: 2019-12-03 | End: 2019-12-16

## 2019-12-03 NOTE — TELEPHONE ENCOUNTER
Dr Sweeney patient is upset and says she has mentioned it about 3 times at her visits that she wants the oxygen tanks picked up and you never said she had to walk up and down the [place].She has now been charged another 40 dollars this month.She said please order the  and she will come later and walk.She says her Oxygen when she checks it 90.

## 2019-12-03 NOTE — TELEPHONE ENCOUNTER
----- Message from Katerine Griffin sent at 12/3/2019 12:00 PM CST -----  Contact: patient  Type: Needs Medical Advice  Who Called:  patient  Best Call Back Number: [phone]  Additional Information: Patient states rOPINIRole (REQUIP) 1 MG tablet to please refill asked 3 times and oxygen equipment is costing patient money also has asked three times to have equipment removed from home.  Please call to advise today! Per patient.Thanks!

## 2019-12-05 ENCOUNTER — TELEPHONE (OUTPATIENT)
Dept: FAMILY MEDICINE | Facility: CLINIC | Age: 64
End: 2019-12-05

## 2019-12-05 NOTE — TELEPHONE ENCOUNTER
----- Message from Uri Toribio sent at 12/5/2019  2:20 PM CST -----  Contact: Ptnt   Type: Needs Medical Advice    Who Called:  Ptnt     Additional Information: Advised needs an order issued to remove the oxygen equipment from her home sent to   Fax [phone].  Advised please fax as soon as possible so the billing for the equipment will be stopped.

## 2019-12-11 ENCOUNTER — TELEPHONE (OUTPATIENT)
Dept: FAMILY MEDICINE | Facility: CLINIC | Age: 64
End: 2019-12-11

## 2019-12-12 NOTE — TELEPHONE ENCOUNTER
----- Message from Flo Barbosa sent at 12/11/2019  4:52 PM CST -----  Type: Needs Medical Advice    Who Called:  Lulu/Roxanna     Pharmacy name and phone #:    [facility] - [place], [place] - [address]  [address] [facility] [facility] [address]  Phone: [phone] Fax: [phone]        Best Call Back Number:  [phone]  Additional Information: Caller states that she would like a callback regarding the PA for the patient's rOPINIRole (REQUIP) 1 MG tablet     Please provide specific directions for use including number of tablets per dose and number of doses per day.  If directions call for multiple tablets per dose, can the patient take a higher strength and fewer tablets per dose.    Ref # [id number]

## 2019-12-16 ENCOUNTER — TELEPHONE (OUTPATIENT)
Dept: FAMILY MEDICINE | Facility: CLINIC | Age: 64
End: 2019-12-16

## 2019-12-16 DIAGNOSIS — G25.81 RESTLESS LEGS: ICD-10-CM

## 2019-12-16 RX ORDER — GABAPENTIN 100 MG/1
CAPSULE ORAL
Qty: 150 CAPSULE | Refills: 11 | Status: ON HOLD | OUTPATIENT
Start: 2019-12-16 | End: 2020-09-08 | Stop reason: HOSPADM

## 2019-12-16 NOTE — TELEPHONE ENCOUNTER
Notified the patient that we are unable to get the request so I am ordering her gabapentin to for her restless legs

## 2019-12-19 ENCOUNTER — OFFICE VISIT (OUTPATIENT)
Dept: FAMILY MEDICINE | Facility: CLINIC | Age: 64
End: 2019-12-19
Payer: MEDICARE

## 2019-12-19 VITALS
OXYGEN SATURATION: 95 % | RESPIRATION RATE: 16 BRPM | HEART RATE: 84 BPM | HEIGHT: 68 IN | DIASTOLIC BLOOD PRESSURE: 72 MMHG | BODY MASS INDEX: 32.61 KG/M2 | WEIGHT: 215.19 LBS | SYSTOLIC BLOOD PRESSURE: 124 MMHG | TEMPERATURE: 98 F

## 2019-12-19 DIAGNOSIS — F17.210 CIGARETTE NICOTINE DEPENDENCE WITHOUT COMPLICATION: ICD-10-CM

## 2019-12-19 DIAGNOSIS — E78.5 HYPERLIPIDEMIA, UNSPECIFIED HYPERLIPIDEMIA TYPE: ICD-10-CM

## 2019-12-19 DIAGNOSIS — I63.9 CEREBROVASCULAR ACCIDENT (CVA), UNSPECIFIED MECHANISM: Primary | ICD-10-CM

## 2019-12-19 DIAGNOSIS — J01.00 ACUTE MAXILLARY SINUSITIS, RECURRENCE NOT SPECIFIED: ICD-10-CM

## 2019-12-19 PROCEDURE — 99214 PR OFFICE/OUTPT VISIT, EST, LEVL IV, 30-39 MIN: ICD-10-PCS | Mod: S$GLB,,, | Performed by: INTERNAL MEDICINE

## 2019-12-19 PROCEDURE — 99214 OFFICE O/P EST MOD 30 MIN: CPT | Mod: S$GLB,,, | Performed by: INTERNAL MEDICINE

## 2019-12-19 PROCEDURE — 3078F PR MOST RECENT DIASTOLIC BLOOD PRESSURE < 80 MM HG: ICD-10-PCS | Mod: CPTII,S$GLB,, | Performed by: INTERNAL MEDICINE

## 2019-12-19 PROCEDURE — 3008F BODY MASS INDEX DOCD: CPT | Mod: CPTII,S$GLB,, | Performed by: INTERNAL MEDICINE

## 2019-12-19 PROCEDURE — 3008F PR BODY MASS INDEX (BMI) DOCUMENTED: ICD-10-PCS | Mod: CPTII,S$GLB,, | Performed by: INTERNAL MEDICINE

## 2019-12-19 PROCEDURE — 3074F PR MOST RECENT SYSTOLIC BLOOD PRESSURE < 130 MM HG: ICD-10-PCS | Mod: CPTII,S$GLB,, | Performed by: INTERNAL MEDICINE

## 2019-12-19 PROCEDURE — 3074F SYST BP LT 130 MM HG: CPT | Mod: CPTII,S$GLB,, | Performed by: INTERNAL MEDICINE

## 2019-12-19 PROCEDURE — 3078F DIAST BP <80 MM HG: CPT | Mod: CPTII,S$GLB,, | Performed by: INTERNAL MEDICINE

## 2019-12-19 RX ORDER — IBUPROFEN 200 MG
1 TABLET ORAL DAILY
Qty: 30 PATCH | Refills: 3 | Status: SHIPPED | OUTPATIENT
Start: 2019-12-19 | End: 2020-05-08 | Stop reason: SDUPTHER

## 2019-12-19 RX ORDER — FLUTICASONE PROPIONATE 50 MCG
2 SPRAY, SUSPENSION (ML) NASAL DAILY
Qty: 16 G | Refills: 11 | Status: SHIPPED | OUTPATIENT
Start: 2019-12-19 | End: 2022-02-04

## 2019-12-19 RX ORDER — GUAIFENESIN 1200 MG/1
1 TABLET, EXTENDED RELEASE ORAL 2 TIMES DAILY
Qty: 30 TABLET | Refills: 2 | Status: ON HOLD | OUTPATIENT
Start: 2019-12-19 | End: 2020-08-24 | Stop reason: CLARIF

## 2019-12-19 NOTE — PATIENT INSTRUCTIONS
Take half of the sertraline for 1 week in then half every other day for a week to get off.    Wool  Socks    Do the exercises that you did when you were in rehab have firm her stroke on a daily basis to prevent stiffness.    Use saline nasal spray or a Neti pot.  Use Afrin nasal spray for maximum 3 days at a time and then 3 days off    Thank you for choosing [facility].     Please fill out the patient experience survey.

## 2019-12-19 NOTE — PROGRESS NOTES
"Subjective:      8:41 AM     Patient ID: Yadi Mccall is a 64 y.o. female.    Chief Complaint: Sinus Problem (need nicoderm CQ,do pulse ox test to remove oxygen tanks from home) and left leg stiffness    HPI     CHIEF COMPLAINT: Sinusitis(+).  HPI:     ONSET/TIMING:  Onset      3 weeks  ago. Similar problems in past: no. .   DURATION:   continuous    QUALITY/COURSE:    unchanged    LOCATION:  Facial/Sinus pain: bilateral. .     INTENSITY/SEVERITY: # 5 / 10 (on 1 to 10 scale)     The following symptoms/statements  are positive if BOLD, negative otherwise.      CONTEXT/WHEN: . Similar_problems.. Seasonal_pattern. Allergies/Hayfever.  Tobacco_use. .  Irritant_Exposure(smoke/dust/fumes). . Exposure_to_others_with_similar_symptoms. .               MODIFIERS/TREATMENTS: Antihistamines: . Rx-Nasal_Spray: . OTC Nasal Spray Use. Decongestants. . Antibiotics. -.      SYMPTOMS/RELATED:  Sinus_pain. .Rhinorrhea/Purulent. .    Dentalgia. Lymphadenopathy.  Swelling-Neck. +      Quitting smoking this week.  She wants Nicoderm.    Has a stroke affecting her left foot.  It gets cold easily.  She has stiffness in it.    Review of Systems      Objective:      Vitals:    12/19/19 0823   BP: 124/72   Pulse: 84   Resp: 16   Temp: 97.8 °F (36.6 °C)   TempSrc: Oral   SpO2: 95%   Weight: 97.6 kg (215 lb 2.7 oz)   Height: 5' 8" (1.727 m)   PainSc: 0-No pain     Physical Exam   Constitutional: She appears well-developed and well-nourished.   HENT:   Nasal congestion. No facial tenderness.   Cardiovascular: Normal rate, regular rhythm and normal heart sounds.   Pulmonary/Chest: Effort normal and breath sounds normal.   Abdominal: Soft. There is no tenderness.   Musculoskeletal:   Left foot drop   Neurological: She is alert.   Psychiatric: She has a normal mood and affect. Her behavior is normal. Thought content normal.   Nursing note and vitals reviewed.        Assessment:       1. Cerebrovascular accident (CVA), unspecified mechanism    2. " Cigarette nicotine dependence without complication    3. Hyperlipidemia, unspecified hyperlipidemia type    4. Acute maxillary sinusitis, recurrence not specified          Plan:       Cerebrovascular accident (CVA), unspecified mechanism  -     CBC auto differential; Future; Expected date: 12/19/2019  -     Comprehensive metabolic panel; Future; Expected date: 12/19/2019  -     Lipid panel; Future; Expected date: 12/19/2019    Cigarette nicotine dependence without complication  -     nicotine (NICODERM CQ) 21 mg/24 hr; Place 1 patch onto the skin once daily.  Dispense: 30 patch; Refill: 3    Hyperlipidemia, unspecified hyperlipidemia type  -     Lipid panel; Future; Expected date: 12/19/2019    Acute maxillary sinusitis, recurrence not specified  -     fluticasone propionate (FLONASE) 50 mcg/actuation nasal spray; 2 sprays (100 mcg total) by Each Nostril route once daily.  Dispense: 16 g; Refill: 11  -     guaiFENesin (MUCINEX) 1,200 mg Ta12; Take 1 tablet by mouth 2 (two) times daily.  Dispense: 30 tablet; Refill: 2      Follow up in about 3 months (around 3/19/2020).

## 2020-01-02 ENCOUNTER — TELEPHONE (OUTPATIENT)
Dept: FAMILY MEDICINE | Facility: CLINIC | Age: 65
End: 2020-01-02

## 2020-01-02 DIAGNOSIS — G25.81 RESTLESS LEGS: Primary | ICD-10-CM

## 2020-01-02 RX ORDER — ROPINIROLE 2 MG/1
2 TABLET, FILM COATED ORAL 3 TIMES DAILY
Qty: 90 TABLET | Refills: 11 | Status: SHIPPED | OUTPATIENT
Start: 2020-01-02 | End: 2020-10-15

## 2020-01-02 NOTE — TELEPHONE ENCOUNTER
----- Message from Chago Contreras MA sent at 1/2/2020 12:19 PM CST -----  Contact: same      ----- Message -----  From: Hi JO-ANN Alfaro  Sent: 1/2/2020  12:15 PM CST  To: Patel Benton A Staff    Patient called in and stated that she has stopped taking her Rx for gabapentin (NEURONTIN) 100 MG capsule because it made her legs worse & wanted to see if Dr. Sweeney could call in a Rx for Ropinirole ACL 1 mg?      [facility] - [place], [place] - [address]  [address] [facility] [address]  Phone: [phone] Fax: [phone]    Patient call back number is [phone]

## 2020-01-13 DIAGNOSIS — I10 ESSENTIAL HYPERTENSION: ICD-10-CM

## 2020-01-14 RX ORDER — LISINOPRIL 40 MG/1
TABLET ORAL
Qty: 90 TABLET | Refills: 1 | Status: SHIPPED | OUTPATIENT
Start: 2020-01-14 | End: 2020-05-19

## 2020-03-06 ENCOUNTER — PATIENT OUTREACH (OUTPATIENT)
Dept: ADMINISTRATIVE | Facility: HOSPITAL | Age: 65
End: 2020-03-06

## 2020-03-06 NOTE — LETTER
March 6, 2020    Yadi HEIDY Cal  [address]  [place] [address]     [facility]  [address]  [facility] [address]  Phone: [phone] Yadi MOODY Cal  [address]  [facility] [address]    Dear, Yadi Mccall    [facility] is committed to your overall health.  To help you get the most out of each of your visits, we will review your information to make sure you are up to date on all of your recommended tests and/or procedures.  Chetan Sweeney MD  has found that your chart shows you may be due for the following:    Health Maintenance Due   Topic    Hepatitis C Screening     Mammogram     Colonoscopy     Lipid Panel      If you have had any of the above done at another facility, please bring the records with you or Fax them to [phone] so that your record at [facility] will be complete. If you have not had any of these tests or procedures done recently and would like to complete this testing ,  please call [phone] or send a message through your [facility] portal to your provider's office.     If you have an upcoming scheduled appointment for the above test and/or procedures, please disregard this letter.    If you are currently taking medication, please bring it with you to your appointment for review.    Thank you for letting us care for you,    Alexi Clark, Care Coordinator  [facility]  Phone: [phone]  Fax: [phone]

## 2020-03-06 NOTE — PROGRESS NOTES
Chart review completed 03/06/2020.  Care Everywhere updates requested and reviewed.  Immunizations reconciled. Media reviewed.     Letter mailed.

## 2020-03-07 ENCOUNTER — LAB VISIT (OUTPATIENT)
Dept: LAB | Facility: HOSPITAL | Age: 65
End: 2020-03-07
Attending: INTERNAL MEDICINE
Payer: MEDICARE

## 2020-03-07 DIAGNOSIS — E78.5 HYPERLIPIDEMIA, UNSPECIFIED HYPERLIPIDEMIA TYPE: ICD-10-CM

## 2020-03-07 DIAGNOSIS — I63.9 CEREBROVASCULAR ACCIDENT (CVA), UNSPECIFIED MECHANISM: ICD-10-CM

## 2020-03-07 DIAGNOSIS — Z11.59 ENCOUNTER FOR HEPATITIS C SCREENING TEST FOR LOW RISK PATIENT: ICD-10-CM

## 2020-03-07 LAB
ALBUMIN SERPL BCP-MCNC: 3.8 G/DL (ref 3.5–5.2)
ALP SERPL-CCNC: 120 U/L (ref 55–135)
ALT SERPL W/O P-5'-P-CCNC: 15 U/L (ref 10–44)
ANION GAP SERPL CALC-SCNC: 11 MMOL/L (ref 8–16)
AST SERPL-CCNC: 13 U/L (ref 10–40)
BASOPHILS # BLD AUTO: 0.07 K/UL (ref 0–0.2)
BASOPHILS NFR BLD: 0.7 % (ref 0–1.9)
BILIRUB SERPL-MCNC: 0.4 MG/DL (ref 0.1–1)
BUN SERPL-MCNC: 17 MG/DL (ref 8–23)
CALCIUM SERPL-MCNC: 9.2 MG/DL (ref 8.7–10.5)
CHLORIDE SERPL-SCNC: 105 MMOL/L (ref 95–110)
CHOLEST SERPL-MCNC: 119 MG/DL (ref 120–199)
CHOLEST/HDLC SERPL: 3.6 {RATIO} (ref 2–5)
CO2 SERPL-SCNC: 25 MMOL/L (ref 23–29)
CREAT SERPL-MCNC: 0.8 MG/DL (ref 0.5–1.4)
DIFFERENTIAL METHOD: ABNORMAL
EOSINOPHIL # BLD AUTO: 0.2 K/UL (ref 0–0.5)
EOSINOPHIL NFR BLD: 1.5 % (ref 0–8)
ERYTHROCYTE [DISTWIDTH] IN BLOOD BY AUTOMATED COUNT: 12.6 % (ref 11.5–14.5)
EST. GFR  (AFRICAN AMERICAN): >60 ML/MIN/1.73 M^2
EST. GFR  (NON AFRICAN AMERICAN): >60 ML/MIN/1.73 M^2
GLUCOSE SERPL-MCNC: 97 MG/DL (ref 70–110)
HCT VFR BLD AUTO: 49 % (ref 37–48.5)
HDLC SERPL-MCNC: 33 MG/DL (ref 40–75)
HDLC SERPL: 27.7 % (ref 20–50)
HGB BLD-MCNC: 14.8 G/DL (ref 12–16)
IMM GRANULOCYTES # BLD AUTO: 0.06 K/UL (ref 0–0.04)
IMM GRANULOCYTES NFR BLD AUTO: 0.6 % (ref 0–0.5)
LDLC SERPL CALC-MCNC: 63 MG/DL (ref 63–159)
LYMPHOCYTES # BLD AUTO: 1.9 K/UL (ref 1–4.8)
LYMPHOCYTES NFR BLD: 18.5 % (ref 18–48)
MCH RBC QN AUTO: 29.4 PG (ref 27–31)
MCHC RBC AUTO-ENTMCNC: 30.2 G/DL (ref 32–36)
MCV RBC AUTO: 97 FL (ref 82–98)
MONOCYTES # BLD AUTO: 0.8 K/UL (ref 0.3–1)
MONOCYTES NFR BLD: 7.9 % (ref 4–15)
NEUTROPHILS # BLD AUTO: 7.3 K/UL (ref 1.8–7.7)
NEUTROPHILS NFR BLD: 70.8 % (ref 38–73)
NONHDLC SERPL-MCNC: 86 MG/DL
NRBC BLD-RTO: 0 /100 WBC
PLATELET # BLD AUTO: 289 K/UL (ref 150–350)
PMV BLD AUTO: 9.4 FL (ref 9.2–12.9)
POTASSIUM SERPL-SCNC: 4.4 MMOL/L (ref 3.5–5.1)
PROT SERPL-MCNC: 6.6 G/DL (ref 6–8.4)
RBC # BLD AUTO: 5.04 M/UL (ref 4–5.4)
SODIUM SERPL-SCNC: 141 MMOL/L (ref 136–145)
TRIGL SERPL-MCNC: 115 MG/DL (ref 30–150)
WBC # BLD AUTO: 10.29 K/UL (ref 3.9–12.7)

## 2020-03-07 PROCEDURE — 80061 LIPID PANEL: CPT | Mod: HCNC

## 2020-03-07 PROCEDURE — 86803 HEPATITIS C AB TEST: CPT | Mod: HCNC

## 2020-03-07 PROCEDURE — 85025 COMPLETE CBC W/AUTO DIFF WBC: CPT | Mod: HCNC

## 2020-03-07 PROCEDURE — 80053 COMPREHEN METABOLIC PANEL: CPT | Mod: HCNC

## 2020-03-07 PROCEDURE — 36415 COLL VENOUS BLD VENIPUNCTURE: CPT | Mod: HCNC,PO

## 2020-03-09 LAB — HCV AB SERPL QL IA: NEGATIVE

## 2020-03-09 NOTE — PROGRESS NOTES
Normal labs, more to come.   Dr. Sweeney has reviewed your result, let us know if you have any problems

## 2020-03-20 ENCOUNTER — OFFICE VISIT (OUTPATIENT)
Dept: FAMILY MEDICINE | Facility: CLINIC | Age: 65
End: 2020-03-20
Payer: MEDICARE

## 2020-03-20 VITALS
SYSTOLIC BLOOD PRESSURE: 140 MMHG | RESPIRATION RATE: 20 BRPM | BODY MASS INDEX: 32.41 KG/M2 | OXYGEN SATURATION: 96 % | HEART RATE: 95 BPM | HEIGHT: 68 IN | WEIGHT: 213.88 LBS | DIASTOLIC BLOOD PRESSURE: 72 MMHG | TEMPERATURE: 98 F

## 2020-03-20 DIAGNOSIS — Z12.11 COLON CANCER SCREENING: ICD-10-CM

## 2020-03-20 DIAGNOSIS — I49.3 FREQUENT PVCS: ICD-10-CM

## 2020-03-20 DIAGNOSIS — R06.00 DYSPNEA, UNSPECIFIED TYPE: Primary | ICD-10-CM

## 2020-03-20 PROCEDURE — 99499 RISK ADDL DX/OHS AUDIT: ICD-10-PCS | Mod: S$GLB,,, | Performed by: INTERNAL MEDICINE

## 2020-03-20 PROCEDURE — 3078F DIAST BP <80 MM HG: CPT | Mod: CPTII,S$GLB,, | Performed by: INTERNAL MEDICINE

## 2020-03-20 PROCEDURE — 3077F PR MOST RECENT SYSTOLIC BLOOD PRESSURE >= 140 MM HG: ICD-10-PCS | Mod: CPTII,S$GLB,, | Performed by: INTERNAL MEDICINE

## 2020-03-20 PROCEDURE — 99499 UNLISTED E&M SERVICE: CPT | Mod: S$GLB,,, | Performed by: INTERNAL MEDICINE

## 2020-03-20 PROCEDURE — 93010 ELECTROCARDIOGRAM REPORT: CPT | Mod: S$GLB,,, | Performed by: INTERNAL MEDICINE

## 2020-03-20 PROCEDURE — 3008F BODY MASS INDEX DOCD: CPT | Mod: CPTII,S$GLB,, | Performed by: INTERNAL MEDICINE

## 2020-03-20 PROCEDURE — 93005 ELECTROCARDIOGRAM TRACING: CPT | Mod: S$GLB,,, | Performed by: INTERNAL MEDICINE

## 2020-03-20 PROCEDURE — 99214 OFFICE O/P EST MOD 30 MIN: CPT | Mod: S$GLB,,, | Performed by: INTERNAL MEDICINE

## 2020-03-20 PROCEDURE — 93010 EKG 12-LEAD: ICD-10-PCS | Mod: S$GLB,,, | Performed by: INTERNAL MEDICINE

## 2020-03-20 PROCEDURE — 99214 PR OFFICE/OUTPT VISIT, EST, LEVL IV, 30-39 MIN: ICD-10-PCS | Mod: S$GLB,,, | Performed by: INTERNAL MEDICINE

## 2020-03-20 PROCEDURE — 3008F PR BODY MASS INDEX (BMI) DOCUMENTED: ICD-10-PCS | Mod: CPTII,S$GLB,, | Performed by: INTERNAL MEDICINE

## 2020-03-20 PROCEDURE — 3077F SYST BP >= 140 MM HG: CPT | Mod: CPTII,S$GLB,, | Performed by: INTERNAL MEDICINE

## 2020-03-20 PROCEDURE — 93005 EKG 12-LEAD: ICD-10-PCS | Mod: S$GLB,,, | Performed by: INTERNAL MEDICINE

## 2020-03-20 PROCEDURE — 3078F PR MOST RECENT DIASTOLIC BLOOD PRESSURE < 80 MM HG: ICD-10-PCS | Mod: CPTII,S$GLB,, | Performed by: INTERNAL MEDICINE

## 2020-03-20 RX ORDER — METOPROLOL SUCCINATE 25 MG/1
25 TABLET, EXTENDED RELEASE ORAL DAILY
Qty: 30 TABLET | Refills: 11 | Status: ON HOLD | OUTPATIENT
Start: 2020-03-20 | End: 2020-08-24 | Stop reason: CLARIF

## 2020-03-20 RX ORDER — NITROGLYCERIN 0.4 MG/1
0.4 TABLET SUBLINGUAL EVERY 5 MIN PRN
Qty: 20 TABLET | Refills: 0 | Status: SHIPPED | OUTPATIENT
Start: 2020-03-20 | End: 2024-02-15

## 2020-03-20 NOTE — PROGRESS NOTES
Subjective:      4:28 PM     Patient ID: Yadi Mccall is a 64 y.o. female.    Chief Complaint: COPD (labs,refills) and Shortness of Breath    HPI       CHIEF COMPLAINT: Dyspnea    .   HPI:  Has all was occurred at rest.  She has been more active doing yd work lately and it has not occurred during that.  She has a pulse oximeter at home.  During these episodes her pulse ox remains good but her heart rate drops as low as 25    ONSET/TIMING:               10 days  ago.      DURATION: . intermittant lasting 5 min twice a day    QUALITY/COURSE:   unchanged      INTENSITY/SEVERITY:  8/10 only during the episodes (0 to 10)             BNP    @LABRCNTIP(BNP,BNPTRIAGEBLO)@  D-dimer    Lab Results   Component Value Date    DDIMER 0.76 (H) 09/26/2019         SYMPTOMS/RELATED: . --Possible medication side effects include:    The following symptoms/statements are positive if in BOLD, negative if not.          CONTEXT/WHEN:  Similar_problems . Tobacco_use. Seasonal_pattern.  Copd. CHF.   thomboembolic disease.  Allergies/Hayfever.. Sinusitis. . Asthma.  Exposure_to_others_with_similar_symptoms.      TREATMENTS:  OTC_Cough/Decongestants..  Rx_Cough/Decongestants. Bronchodilators.. Inhaled_Corticosteroids. Oral_Corticosteroids... .Antibiotics. Diuretics. Ace inhibitor or ARB. Beta-blocker.     REVIEW OF SYMPTOMS:    . Dyspnea on exertion. Paroxysmal_Nocturnal_Dyspnea.. Orthopnea...  Purulent_Sputum. . Hemoptysis.  Rhinorrhea/Purulent.   Stressed. Weight_loss. Weight_gain. Leg_Pain.     \            CHIEF COMPLAINT: Hypertension  HPI:     ONSET:      QUALITY/COURSE:   Unchanged.     INTENSITY/SEVERITY:  Average blood pressure is unknown.      MODIFIERS/TREATMENTS:  Taking medications: yes. .High sodium intake: no. alcohol: no      The following symptoms are positive only if BOLDED, otherwise are negative.      SYMPTOMS/RELATED: Possible medication side effects include:   Depression..  . Cough. . Constipation.    REVIEW OF SYMPTOMS:  . Weight_loss . Weight_gain . Leg_cramps .Potency_problems .    TARGET ORGAN DAMAGE:: angina/ prior myocardial infarction, chronic kidney disease, heart failure, left ventricular hypertrophy, peripheral artery disease, prior coronary revascularization, retinopathy, stroke. transient ischemic attack.        CHIEF COMPLAINT: Hyperlipidemia. cholesterol screening: no.   HPI:     ONSET:    MODIFIERS/TREATMENTS: . Taking medications: yes. . Non-compliance with following diet: no. .     SYMPTOMS/RELATED:Possible medication side effects include:   Myalgia: no.  .     REVIEW OF SYMPTOMS: past weights:   Wt Readings from Last 1 Encounters:   03/20/20 1611 97 kg (213 lb 13.5 oz)                                                     Last lipids: total   Lab Results   Component Value Date    CHOL 119 (L) 03/07/2020    CHOL 142 05/19/2018    CHOL 143 01/13/2018                                                                     HDL   Lab Results   Component Value Date    HDL 33 (L) 03/07/2020    HDL 32 (L) 05/19/2018    HDL 31 (L) 01/13/2018                                                                     LDL   Lab Results   Component Value Date    LDLCALC 63.0 03/07/2020    LDLCALC 57.0 (L) 05/19/2018    LDLCALC 67.8 01/13/2018                                                                     TRIG   Lab Results   Component Value Date    TRIG 115 03/07/2020    TRIG 265 (H) 05/19/2018    TRIG 221 (H) 01/13/2018                                                                         Review of Systems   Constitutional: Negative for chills, diaphoresis, fatigue and fever.   HENT: Negative for sore throat.    Respiratory: Positive for shortness of breath and wheezing. Negative for cough and chest tightness.    Cardiovascular: Negative for chest pain and palpitations.   Gastrointestinal: Negative for nausea and vomiting.   Neurological: Negative for dizziness and weakness.   Psychiatric/Behavioral: The patient is not  "nervous/anxious.          Objective:      Vitals:    03/20/20 1611   BP: (!) 140/72   Pulse: 95   Resp: 20   Temp: 97.7 °F (36.5 °C)   TempSrc: Oral   SpO2: 96%   Weight: 97 kg (213 lb 13.5 oz)   Height: 5' 8" (1.727 m)   PainSc: 0-No pain     Physical Exam   Constitutional: She appears well-developed and well-nourished.   Cardiovascular: Normal rate and normal heart sounds.   Irregular   Pulmonary/Chest: Effort normal and breath sounds normal.   Abdominal: Soft. There is no tenderness.   Neurological: She is alert.   Psychiatric: She has a normal mood and affect. Her behavior is normal. Thought content normal.   Nursing note and vitals reviewed.   EKG shows frequent PVCs      Assessment:       1. Dyspnea, unspecified type    2. Frequent PVCs    3. Colon cancer screening          Plan:       Dyspnea, unspecified type  -     Holter monitor - 24 hour; Future  -     Ambulatory referral/consult to Cardiology; Future; Expected date: 03/27/2020  -     nitroGLYCERIN (NITROSTAT) 0.4 MG SL tablet; Place 1 tablet (0.4 mg total) under the tongue every 5 (five) minutes as needed for Chest pain.  Dispense: 20 tablet; Refill: 0  -     EKG 12-lead; Future  -     CBC auto differential; Future; Expected date: 03/20/2020  -     Comprehensive metabolic panel; Future; Expected date: 03/20/2020    Frequent PVCs  -     metoprolol succinate (TOPROL-XL) 25 MG 24 hr tablet; Take 1 tablet (25 mg total) by mouth once daily.  Dispense: 30 tablet; Refill: 11  -     Holter monitor - 24 hour; Future  -     EKG 12-lead; Future  -     Comprehensive metabolic panel; Future; Expected date: 03/20/2020    Colon cancer screening  -     Fecal Immunochemical Test (iFOBT); Future; Expected date: 03/20/2020      Follow up in about 6 weeks (around 5/1/2020).      "

## 2020-03-20 NOTE — PATIENT INSTRUCTIONS
If you get shortness of breath that lasts more than 5 minutes take nitroglycerin.  If it lasts more than 20 minutes call 911    Avoid caffeine    Thank you for choosing [facility].     Please fill out the patient experience survey.

## 2020-03-21 ENCOUNTER — LAB VISIT (OUTPATIENT)
Dept: LAB | Facility: HOSPITAL | Age: 65
End: 2020-03-21
Attending: INTERNAL MEDICINE
Payer: MEDICARE

## 2020-03-21 DIAGNOSIS — I49.3 FREQUENT PVCS: ICD-10-CM

## 2020-03-21 DIAGNOSIS — R06.00 DYSPNEA, UNSPECIFIED TYPE: ICD-10-CM

## 2020-03-21 LAB
ALBUMIN SERPL BCP-MCNC: 4 G/DL (ref 3.5–5.2)
ALP SERPL-CCNC: 123 U/L (ref 55–135)
ALT SERPL W/O P-5'-P-CCNC: 19 U/L (ref 10–44)
ANION GAP SERPL CALC-SCNC: 10 MMOL/L (ref 8–16)
AST SERPL-CCNC: 13 U/L (ref 10–40)
BASOPHILS # BLD AUTO: 0.07 K/UL (ref 0–0.2)
BASOPHILS NFR BLD: 0.8 % (ref 0–1.9)
BILIRUB SERPL-MCNC: 0.5 MG/DL (ref 0.1–1)
BUN SERPL-MCNC: 18 MG/DL (ref 8–23)
CALCIUM SERPL-MCNC: 9.1 MG/DL (ref 8.7–10.5)
CHLORIDE SERPL-SCNC: 106 MMOL/L (ref 95–110)
CO2 SERPL-SCNC: 25 MMOL/L (ref 23–29)
CREAT SERPL-MCNC: 0.8 MG/DL (ref 0.5–1.4)
DIFFERENTIAL METHOD: ABNORMAL
EOSINOPHIL # BLD AUTO: 0.1 K/UL (ref 0–0.5)
EOSINOPHIL NFR BLD: 1.1 % (ref 0–8)
ERYTHROCYTE [DISTWIDTH] IN BLOOD BY AUTOMATED COUNT: 12.6 % (ref 11.5–14.5)
EST. GFR  (AFRICAN AMERICAN): >60 ML/MIN/1.73 M^2
EST. GFR  (NON AFRICAN AMERICAN): >60 ML/MIN/1.73 M^2
GLUCOSE SERPL-MCNC: 103 MG/DL (ref 70–110)
HCT VFR BLD AUTO: 49.5 % (ref 37–48.5)
HGB BLD-MCNC: 15.8 G/DL (ref 12–16)
IMM GRANULOCYTES # BLD AUTO: 0.03 K/UL (ref 0–0.04)
IMM GRANULOCYTES NFR BLD AUTO: 0.3 % (ref 0–0.5)
LYMPHOCYTES # BLD AUTO: 2.7 K/UL (ref 1–4.8)
LYMPHOCYTES NFR BLD: 29.4 % (ref 18–48)
MCH RBC QN AUTO: 30.6 PG (ref 27–31)
MCHC RBC AUTO-ENTMCNC: 31.9 G/DL (ref 32–36)
MCV RBC AUTO: 96 FL (ref 82–98)
MONOCYTES # BLD AUTO: 0.7 K/UL (ref 0.3–1)
MONOCYTES NFR BLD: 7 % (ref 4–15)
NEUTROPHILS # BLD AUTO: 5.7 K/UL (ref 1.8–7.7)
NEUTROPHILS NFR BLD: 61.4 % (ref 38–73)
NRBC BLD-RTO: 0 /100 WBC
PLATELET # BLD AUTO: 288 K/UL (ref 150–350)
PMV BLD AUTO: 8.5 FL (ref 9.2–12.9)
POTASSIUM SERPL-SCNC: 4.2 MMOL/L (ref 3.5–5.1)
PROT SERPL-MCNC: 6.9 G/DL (ref 6–8.4)
RBC # BLD AUTO: 5.17 M/UL (ref 4–5.4)
SODIUM SERPL-SCNC: 141 MMOL/L (ref 136–145)
WBC # BLD AUTO: 9.25 K/UL (ref 3.9–12.7)

## 2020-03-21 PROCEDURE — 80053 COMPREHEN METABOLIC PANEL: CPT | Mod: HCNC

## 2020-03-21 PROCEDURE — 36415 COLL VENOUS BLD VENIPUNCTURE: CPT | Mod: HCNC

## 2020-03-21 PROCEDURE — 85025 COMPLETE CBC W/AUTO DIFF WBC: CPT | Mod: HCNC

## 2020-03-23 ENCOUNTER — TELEPHONE (OUTPATIENT)
Dept: CARDIOLOGY | Facility: CLINIC | Age: 65
End: 2020-03-23

## 2020-03-23 NOTE — TELEPHONE ENCOUNTER
----- Message from García Wong sent at 3/23/2020 12:24 PM CDT -----  Contact: pt  Type:  Patient Returning Call    Who Called:  pt  Does the patient know what this is regarding?:  Pt needs to schedule with dr. Neves in chartWilson Mclean call to better assist.  Best Call Back Number:     Additional Information:  Thank you

## 2020-05-01 ENCOUNTER — PATIENT OUTREACH (OUTPATIENT)
Dept: ADMINISTRATIVE | Facility: HOSPITAL | Age: 65
End: 2020-05-01

## 2020-05-08 ENCOUNTER — TELEPHONE (OUTPATIENT)
Dept: FAMILY MEDICINE | Facility: CLINIC | Age: 65
End: 2020-05-08

## 2020-05-08 DIAGNOSIS — F17.210 CIGARETTE NICOTINE DEPENDENCE WITHOUT COMPLICATION: ICD-10-CM

## 2020-05-08 NOTE — TELEPHONE ENCOUNTER
----- Message from Dion Vicente sent at 5/8/2020  4:07 PM CDT -----  Contact: self   patient need a refill on nicotine (NICODERM CQ) 21 mg please send to [facility], any questions please call back at [phone] (home)     Case number [id number]  [facility] - [place], [place] - [address] [facility] [id number]  [place] [address]  Phone: [phone] Fax: [phone]

## 2020-05-11 RX ORDER — IBUPROFEN 200 MG
1 TABLET ORAL DAILY
Qty: 30 PATCH | Refills: 3 | Status: SHIPPED | OUTPATIENT
Start: 2020-05-11 | End: 2020-05-12 | Stop reason: SDUPTHER

## 2020-05-12 ENCOUNTER — PATIENT OUTREACH (OUTPATIENT)
Dept: ADMINISTRATIVE | Facility: OTHER | Age: 65
End: 2020-05-12

## 2020-05-12 ENCOUNTER — OFFICE VISIT (OUTPATIENT)
Dept: FAMILY MEDICINE | Facility: CLINIC | Age: 65
End: 2020-05-12
Payer: MEDICARE

## 2020-05-12 VITALS
RESPIRATION RATE: 16 BRPM | HEART RATE: 95 BPM | TEMPERATURE: 98 F | HEIGHT: 68 IN | BODY MASS INDEX: 32.21 KG/M2 | DIASTOLIC BLOOD PRESSURE: 82 MMHG | OXYGEN SATURATION: 95 % | SYSTOLIC BLOOD PRESSURE: 134 MMHG | WEIGHT: 212.5 LBS

## 2020-05-12 DIAGNOSIS — I10 ESSENTIAL HYPERTENSION: ICD-10-CM

## 2020-05-12 DIAGNOSIS — R60.0 LEG EDEMA, LEFT: ICD-10-CM

## 2020-05-12 DIAGNOSIS — L56.8 PHOTOSENSITIVITY DERMATITIS: Primary | ICD-10-CM

## 2020-05-12 DIAGNOSIS — F17.210 CIGARETTE NICOTINE DEPENDENCE WITHOUT COMPLICATION: ICD-10-CM

## 2020-05-12 PROCEDURE — 99499 RISK ADDL DX/OHS AUDIT: ICD-10-PCS | Mod: S$GLB,,, | Performed by: INTERNAL MEDICINE

## 2020-05-12 PROCEDURE — 3077F PR MOST RECENT SYSTOLIC BLOOD PRESSURE >= 140 MM HG: ICD-10-PCS | Mod: CPTII,S$GLB,, | Performed by: INTERNAL MEDICINE

## 2020-05-12 PROCEDURE — 99214 PR OFFICE/OUTPT VISIT, EST, LEVL IV, 30-39 MIN: ICD-10-PCS | Mod: S$GLB,,, | Performed by: INTERNAL MEDICINE

## 2020-05-12 PROCEDURE — 3008F PR BODY MASS INDEX (BMI) DOCUMENTED: ICD-10-PCS | Mod: CPTII,S$GLB,, | Performed by: INTERNAL MEDICINE

## 2020-05-12 PROCEDURE — 99499 UNLISTED E&M SERVICE: CPT | Mod: S$GLB,,, | Performed by: INTERNAL MEDICINE

## 2020-05-12 PROCEDURE — 3080F DIAST BP >= 90 MM HG: CPT | Mod: CPTII,S$GLB,, | Performed by: INTERNAL MEDICINE

## 2020-05-12 PROCEDURE — 3077F SYST BP >= 140 MM HG: CPT | Mod: CPTII,S$GLB,, | Performed by: INTERNAL MEDICINE

## 2020-05-12 PROCEDURE — 3008F BODY MASS INDEX DOCD: CPT | Mod: CPTII,S$GLB,, | Performed by: INTERNAL MEDICINE

## 2020-05-12 PROCEDURE — 99214 OFFICE O/P EST MOD 30 MIN: CPT | Mod: S$GLB,,, | Performed by: INTERNAL MEDICINE

## 2020-05-12 PROCEDURE — 3080F PR MOST RECENT DIASTOLIC BLOOD PRESSURE >= 90 MM HG: ICD-10-PCS | Mod: CPTII,S$GLB,, | Performed by: INTERNAL MEDICINE

## 2020-05-12 RX ORDER — IBUPROFEN 200 MG
1 TABLET ORAL DAILY
Qty: 30 PATCH | Refills: 3 | Status: SHIPPED | OUTPATIENT
Start: 2020-05-12 | End: 2021-01-29

## 2020-05-12 RX ORDER — TRIAMTERENE/HYDROCHLOROTHIAZID 37.5-25 MG
1 TABLET ORAL DAILY
Qty: 30 TABLET | Refills: 11 | Status: SHIPPED | OUTPATIENT
Start: 2020-05-12 | End: 2020-05-12

## 2020-05-12 RX ORDER — TRIAMCINOLONE ACETONIDE 1 MG/G
OINTMENT TOPICAL 2 TIMES DAILY
Qty: 30 G | Refills: 1 | Status: SHIPPED | OUTPATIENT
Start: 2020-05-12 | End: 2021-01-29

## 2020-05-12 NOTE — PATIENT INSTRUCTIONS
Use a towel to charlotte the left ankle.  Tight stockings will help the edema.    Wear gloves when in the sun    Thank you for choosing [facility].     Please fill out the patient experience survey.

## 2020-05-12 NOTE — PROGRESS NOTES
Subjective:      3:29 PM     Patient ID: Yadi Mccall is a 64 y.o. female.    Chief Complaint: Hypertension (labs); hands itch after being in the sun; spot on forehead; and ankle feels tight    HPI     Patient has had a stroke affecting her left leg mainly.  She has foot drop there.  She notes she is having slightly more swelling in the ankle and that it starting to turn in a little bit.  She has had ultrasounds of the left leg that showed no DVT in the past.    Patient has a tiny nodule about 1 mm in size on her forehead.  She wants to taken off before for a gets big like it abscess that she had on her scalp in the past.    When she goes fishing she gets itching on her hands.      CHIEF COMPLAINT: Hypertension  HPI:  No longer short of breath or having chest pain.    ONSET:      QUALITY/COURSE:   Unchanged.     INTENSITY/SEVERITY:  Average blood pressure is unknown.      MODIFIERS/TREATMENTS:  Taking medications: yes. .High sodium intake: no. alcohol: no      The following symptoms are positive only if BOLDED, otherwise are negative.      SYMPTOMS/RELATED: Possible medication side effects include:   Depression..  . Cough. . Constipation.    REVIEW OF SYMPTOMS: . Weight_loss . Weight_gain . Leg_cramps ..    TARGET ORGAN DAMAGE:: angina/ prior myocardial infarction, chronic kidney disease, heart failure, left ventricular hypertrophy, peripheral artery disease, prior coronary revascularization, retinopathy, stroke. transient ischemic attack.    ]  Review of Systems   Constitutional: Negative for diaphoresis.   Eyes: Negative for visual disturbance.   Respiratory: Negative for chest tightness and shortness of breath.    Cardiovascular: Negative for chest pain.   Neurological: Negative for dizziness, syncope, weakness and headaches.   Psychiatric/Behavioral: Negative for dysphoric mood. The patient is not nervous/anxious.          Objective:      Vitals:    05/12/20 1523   BP: (!) 150/90   Pulse: 95   Resp: 16   Temp:  "97.6 °F (36.4 °C)   TempSrc: Oral   SpO2: 95%   Weight: 96.4 kg (212 lb 8.4 oz)   Height: 5' 8" (1.727 m)   PainSc: 0-No pain     Physical Exam   Constitutional: She appears well-developed and well-nourished.   Cardiovascular: Normal rate, regular rhythm and normal heart sounds.   Pulmonary/Chest: Effort normal and breath sounds normal.   Abdominal: Soft. There is no tenderness.   Musculoskeletal: She exhibits edema (2+ edema left foot only.).   Neurological: She is alert.   Skin:   Mild excoriations on on the hands.  1 mm nodule in the mid forehead.   Psychiatric: She has a normal mood and affect. Her behavior is normal. Thought content normal.   Nursing note and vitals reviewed.   Early contracture medial old inversion of the left ankle.  No results found for this or any previous visit (from the past 1008 hour(s)).       Assessment:       1. Photosensitivity dermatitis    2. Cigarette nicotine dependence without complication    3. Leg edema, left    4. Essential hypertension          Plan:       Photosensitivity dermatitis  -     triamcinolone acetonide 0.1% (KENALOG) 0.1 % ointment; Apply topically 2 (two) times daily.  Dispense: 30 g; Refill: 1    Cigarette nicotine dependence without complication  -     nicotine (NICODERM CQ) 21 mg/24 hr; Place 1 patch onto the skin once daily.  Dispense: 30 patch; Refill: 3    Leg edema, left  -     Discontinue: triamterene-hydrochlorothiazide 37.5-25 mg (MAXZIDE-25) 37.5-25 mg per tablet; Take 1 tablet by mouth once daily.  Dispense: 30 tablet; Refill: 11    Essential hypertension  -     Comprehensive metabolic panel; Future; Expected date: 05/12/2020  -     Lipid Panel; Future; Expected date: 05/12/2020      Follow up in about 3 months (around 8/12/2020).      "

## 2020-05-13 NOTE — PROGRESS NOTES
Chart reviewed.   Immunizations: Triggered Imm Registry     Orders placed: n/a  Upcoming appts to satisfy SHERIF topics: n/a

## 2020-05-18 DIAGNOSIS — I10 ESSENTIAL HYPERTENSION: ICD-10-CM

## 2020-05-19 RX ORDER — LISINOPRIL 40 MG/1
TABLET ORAL
Qty: 90 TABLET | Refills: 1 | Status: ON HOLD | OUTPATIENT
Start: 2020-05-19 | End: 2020-09-02 | Stop reason: HOSPADM

## 2020-05-28 ENCOUNTER — PATIENT OUTREACH (OUTPATIENT)
Dept: ADMINISTRATIVE | Facility: OTHER | Age: 65
End: 2020-05-28

## 2020-05-28 NOTE — PROGRESS NOTES
Patient's chart was reviewed.   Requested updates within Care Everywhere.  Fit kit previously ordered

## 2020-06-02 ENCOUNTER — OFFICE VISIT (OUTPATIENT)
Dept: CARDIOLOGY | Facility: CLINIC | Age: 65
End: 2020-06-02
Payer: MEDICARE

## 2020-06-02 VITALS
HEIGHT: 68 IN | DIASTOLIC BLOOD PRESSURE: 75 MMHG | WEIGHT: 214.06 LBS | BODY MASS INDEX: 32.44 KG/M2 | SYSTOLIC BLOOD PRESSURE: 118 MMHG | HEART RATE: 96 BPM

## 2020-06-02 DIAGNOSIS — I63.9 CEREBROVASCULAR ACCIDENT (CVA), UNSPECIFIED MECHANISM: ICD-10-CM

## 2020-06-02 DIAGNOSIS — E78.2 MIXED HYPERLIPIDEMIA: ICD-10-CM

## 2020-06-02 DIAGNOSIS — J44.9 CHRONIC OBSTRUCTIVE PULMONARY DISEASE, UNSPECIFIED COPD TYPE: ICD-10-CM

## 2020-06-02 DIAGNOSIS — R09.89 BRUIT: ICD-10-CM

## 2020-06-02 DIAGNOSIS — I10 ESSENTIAL HYPERTENSION: ICD-10-CM

## 2020-06-02 DIAGNOSIS — R00.2 PALPITATIONS: ICD-10-CM

## 2020-06-02 DIAGNOSIS — I49.3 PVC'S (PREMATURE VENTRICULAR CONTRACTIONS): ICD-10-CM

## 2020-06-02 DIAGNOSIS — R06.02 SOB (SHORTNESS OF BREATH): ICD-10-CM

## 2020-06-02 PROCEDURE — 3074F PR MOST RECENT SYSTOLIC BLOOD PRESSURE < 130 MM HG: ICD-10-PCS | Mod: HCNC,CPTII,S$GLB, | Performed by: INTERNAL MEDICINE

## 2020-06-02 PROCEDURE — 3078F PR MOST RECENT DIASTOLIC BLOOD PRESSURE < 80 MM HG: ICD-10-PCS | Mod: HCNC,CPTII,S$GLB, | Performed by: INTERNAL MEDICINE

## 2020-06-02 PROCEDURE — 3074F SYST BP LT 130 MM HG: CPT | Mod: HCNC,CPTII,S$GLB, | Performed by: INTERNAL MEDICINE

## 2020-06-02 PROCEDURE — 3008F PR BODY MASS INDEX (BMI) DOCUMENTED: ICD-10-PCS | Mod: HCNC,CPTII,S$GLB, | Performed by: INTERNAL MEDICINE

## 2020-06-02 PROCEDURE — 3008F BODY MASS INDEX DOCD: CPT | Mod: HCNC,CPTII,S$GLB, | Performed by: INTERNAL MEDICINE

## 2020-06-02 PROCEDURE — 99204 PR OFFICE/OUTPT VISIT, NEW, LEVL IV, 45-59 MIN: ICD-10-PCS | Mod: HCNC,S$GLB,, | Performed by: INTERNAL MEDICINE

## 2020-06-02 PROCEDURE — 99999 PR PBB SHADOW E&M-EST. PATIENT-LVL III: CPT | Mod: PBBFAC,HCNC,, | Performed by: INTERNAL MEDICINE

## 2020-06-02 PROCEDURE — 3078F DIAST BP <80 MM HG: CPT | Mod: HCNC,CPTII,S$GLB, | Performed by: INTERNAL MEDICINE

## 2020-06-02 PROCEDURE — 99999 PR PBB SHADOW E&M-EST. PATIENT-LVL III: ICD-10-PCS | Mod: PBBFAC,HCNC,, | Performed by: INTERNAL MEDICINE

## 2020-06-02 PROCEDURE — 99204 OFFICE O/P NEW MOD 45 MIN: CPT | Mod: HCNC,S$GLB,, | Performed by: INTERNAL MEDICINE

## 2020-06-02 NOTE — PROGRESS NOTES
Subjective:    Patient ID:  Yadi Mccall is a 64 y.o. female who presents for evaluation of Shortness of Breath (cons) and pvc's      Pt here for cardiac evaluation. She has had episodic SOB and BARLOW and at one point her pulse ox showed HR of 33 with O2 sats in low 70's. She has had lifelong palpitations and PVC's. Reports having a stroke several years ago with blocked carotids.       Review of Systems   Constitution: Negative for weight gain and weight loss.   HENT: Negative.    Eyes: Negative.    Cardiovascular: Positive for dyspnea on exertion and palpitations. Negative for chest pain, claudication, cyanosis, irregular heartbeat, leg swelling, near-syncope, orthopnea (no PND) and syncope.   Respiratory: Positive for shortness of breath. Negative for cough, hemoptysis and snoring.    Endocrine: Negative.    Skin: Negative.    Musculoskeletal: Negative for joint pain, muscle cramps, muscle weakness and myalgias.   Gastrointestinal: Negative for diarrhea, hematemesis, nausea and vomiting.   Genitourinary: Negative.    Neurological: Positive for focal weakness (LLE weakness from CVA). Negative for dizziness, light-headedness, loss of balance, numbness, paresthesias and seizures.   Psychiatric/Behavioral: Negative.         Objective:    Physical Exam   Constitutional: She is oriented to person, place, and time. She appears well-developed and well-nourished.   Eyes: Pupils are equal, round, and reactive to light.   Neck: Normal range of motion. No thyromegaly present.   Cardiovascular: Normal rate, regular rhythm, S1 normal, S2 normal, normal heart sounds, intact distal pulses and normal pulses.  No extrasystoles are present. PMI is not displaced. Exam reveals no friction rub.   No murmur heard.  Pulmonary/Chest: Effort normal and breath sounds normal. She has no wheezes. She has no rales. She exhibits no tenderness.   Abdominal: Soft. Bowel sounds are normal. She exhibits no distension and no mass. There is no  tenderness.   Musculoskeletal: Normal range of motion. She exhibits no edema.   Neurological: She is alert and oriented to person, place, and time.   Skin: Skin is warm and dry.   Vitals reviewed.      Test(s) Reviewed  I have reviewed the following in detail:  [] Stress test   [] Angiography   [] Echocardiogram   [x] Labs   [x] Other:  ECG       Assessment:       1. Palpitations    2. PVC's (premature ventricular contractions)    3. Cerebrovascular accident (CVA), unspecified mechanism    4. Chronic obstructive pulmonary disease, unspecified COPD type    5. Mixed hyperlipidemia    6. Essential hypertension    7. SOB (shortness of breath)    8. Bruit         Plan:       We discussed her various symptoms and issues and multiple risk factors  Echo  SPECT stress  Carotid US  Spent 20 min discussing benign nature of the palpitations and PVC's, as well as the causes of increased frequency including stress , caffine, etc.    We discussed how PVC's with a pulse Ox can cause an under count of the HR and O2 sat

## 2020-06-02 NOTE — LETTER
June 2, 2020      Chetan Sweeney MD  [address] [facility] [place] [address]           [facility]  [address] [place] [address]  Phone: [phone]          Patient: Yadi Mccall   MR Number: [id number]   YOB: 1955   Date of Visit: 6/2/2020       Dear Dr. Chetan Sweeney:    Thank you for referring Yadi Mccall to me for evaluation. Attached you will find relevant portions of my assessment and plan of care.    If you have questions, please do not hesitate to call me. I look forward to following Yadi Mccall along with you.    Sincerely,    Jamshid Woo MD    Enclosure  CC:  No Recipients    If you would like to receive this communication electronically, please contact [email or web address] or [phone] to request more information on [employer] Link access.    For providers and/or their staff who would like to refer a patient to [facility], please contact us through our one-stop-shop provider referral line, Pilar Naqvi, at [phone].    If you feel you have received this communication in error or would no longer like to receive these types of communications, please e-mail [email or web address]

## 2020-06-25 ENCOUNTER — HOSPITAL ENCOUNTER (OUTPATIENT)
Dept: RADIOLOGY | Facility: HOSPITAL | Age: 65
Discharge: HOME | End: 2020-06-25
Attending: INTERNAL MEDICINE
Payer: MEDICARE

## 2020-06-25 ENCOUNTER — CLINICAL SUPPORT (OUTPATIENT)
Dept: CARDIOLOGY | Facility: CLINIC | Age: 65
End: 2020-06-25
Attending: INTERNAL MEDICINE
Payer: MEDICARE

## 2020-06-25 VITALS — BODY MASS INDEX: 32.43 KG/M2 | HEIGHT: 68 IN | WEIGHT: 214 LBS

## 2020-06-25 VITALS — WEIGHT: 214 LBS | HEIGHT: 68 IN | BODY MASS INDEX: 32.43 KG/M2

## 2020-06-25 DIAGNOSIS — E78.2 MIXED HYPERLIPIDEMIA: ICD-10-CM

## 2020-06-25 DIAGNOSIS — J44.9 CHRONIC OBSTRUCTIVE PULMONARY DISEASE, UNSPECIFIED COPD TYPE: ICD-10-CM

## 2020-06-25 DIAGNOSIS — I10 ESSENTIAL HYPERTENSION: ICD-10-CM

## 2020-06-25 DIAGNOSIS — R09.89 BRUIT: ICD-10-CM

## 2020-06-25 DIAGNOSIS — I63.9 CEREBROVASCULAR ACCIDENT (CVA), UNSPECIFIED MECHANISM: ICD-10-CM

## 2020-06-25 DIAGNOSIS — I49.3 PVC'S (PREMATURE VENTRICULAR CONTRACTIONS): ICD-10-CM

## 2020-06-25 DIAGNOSIS — R06.02 SOB (SHORTNESS OF BREATH): ICD-10-CM

## 2020-06-25 DIAGNOSIS — R00.2 PALPITATIONS: ICD-10-CM

## 2020-06-25 LAB
ASCENDING AORTA: 3.08 CM
AV INDEX (PROSTH): 1.02
AV MEAN GRADIENT: 3 MMHG
AV PEAK GRADIENT: 6 MMHG
AV VALVE AREA: 3.88 CM2
AV VELOCITY RATIO: 0.86
BSA FOR ECHO PROCEDURE: 2.16 M2
CV ECHO LV RWT: 0.6 CM
DOP CALC AO PEAK VEL: 1.21 M/S
DOP CALC AO VTI: 22.42 CM
DOP CALC LVOT AREA: 3.8 CM2
DOP CALC LVOT DIAMETER: 2.2 CM
DOP CALC LVOT PEAK VEL: 1.04 M/S
DOP CALC LVOT STROKE VOLUME: 86.93 CM3
DOP CALCLVOT PEAK VEL VTI: 22.88 CM
E WAVE DECELERATION TIME: 129.46 MSEC
E/A RATIO: 0.62
E/E' RATIO: 10 M/S
ECHO LV POSTERIOR WALL: 1.3 CM (ref 0.6–1.1)
FRACTIONAL SHORTENING: 25 % (ref 28–44)
INTERVENTRICULAR SEPTUM: 1.55 CM (ref 0.6–1.1)
LA MAJOR: 4.04 CM
LA WIDTH: 3.41 CM
LEFT ATRIUM SIZE: 3.28 CM
LEFT INTERNAL DIMENSION IN SYSTOLE: 3.23 CM (ref 2.1–4)
LEFT VENTRICLE DIASTOLIC VOLUME INDEX: 39.86 ML/M2
LEFT VENTRICLE DIASTOLIC VOLUME: 83.85 ML
LEFT VENTRICLE MASS INDEX: 114 G/M2
LEFT VENTRICLE SYSTOLIC VOLUME INDEX: 19.9 ML/M2
LEFT VENTRICLE SYSTOLIC VOLUME: 41.93 ML
LEFT VENTRICULAR INTERNAL DIMENSION IN DIASTOLE: 4.32 CM (ref 3.5–6)
LEFT VENTRICULAR MASS: 240.2 G
LV LATERAL E/E' RATIO: 11.43 M/S
LV SEPTAL E/E' RATIO: 8.89 M/S
MV PEAK A VEL: 1.3 M/S
MV PEAK E VEL: 0.8 M/S
MV STENOSIS PRESSURE HALF TIME: 37.54 MS
MV VALVE AREA P 1/2 METHOD: 5.86 CM2
PULM VEIN S/D RATIO: 1.56
PV PEAK D VEL: 0.36 M/S
PV PEAK S VEL: 0.56 M/S
RA MAJOR: 3.82 CM
RA PRESSURE: 3 MMHG
RA WIDTH: 3.25 CM
RIGHT VENTRICULAR END-DIASTOLIC DIMENSION: 2.93 CM
SINUS: 3.34 CM
STJ: 2.92 CM
TDI LATERAL: 0.07 M/S
TDI SEPTAL: 0.09 M/S
TDI: 0.08 M/S

## 2020-06-25 PROCEDURE — 78452 HT MUSCLE IMAGE SPECT MULT: CPT | Mod: 26,HCNC,, | Performed by: INTERNAL MEDICINE

## 2020-06-25 PROCEDURE — 93015 CV STRESS TEST SUPVJ I&R: CPT | Mod: HCNC,S$GLB,, | Performed by: INTERNAL MEDICINE

## 2020-06-25 PROCEDURE — 93015 STRESS TEST WITH MYOCARDIAL PERFUSION (CUPID ONLY): ICD-10-PCS | Mod: HCNC,S$GLB,, | Performed by: INTERNAL MEDICINE

## 2020-06-25 PROCEDURE — 93880 CV US DOPPLER CAROTID (CUPID ONLY): ICD-10-PCS | Mod: HCNC,S$GLB,, | Performed by: INTERNAL MEDICINE

## 2020-06-25 PROCEDURE — 93306 TTE W/DOPPLER COMPLETE: CPT | Mod: HCNC,S$GLB,, | Performed by: INTERNAL MEDICINE

## 2020-06-25 PROCEDURE — 99999 PR PBB SHADOW E&M-EST. PATIENT-LVL I: CPT | Mod: PBBFAC,HCNC,,

## 2020-06-25 PROCEDURE — 93880 EXTRACRANIAL BILAT STUDY: CPT | Mod: HCNC,S$GLB,, | Performed by: INTERNAL MEDICINE

## 2020-06-25 PROCEDURE — 99999 PR PBB SHADOW E&M-EST. PATIENT-LVL I: ICD-10-PCS | Mod: PBBFAC,HCNC,,

## 2020-06-25 PROCEDURE — 93306 ECHO (CUPID ONLY): ICD-10-PCS | Mod: HCNC,S$GLB,, | Performed by: INTERNAL MEDICINE

## 2020-06-25 PROCEDURE — 78452 STRESS TEST WITH MYOCARDIAL PERFUSION (CUPID ONLY): ICD-10-PCS | Mod: 26,HCNC,, | Performed by: INTERNAL MEDICINE

## 2020-06-26 ENCOUNTER — TELEPHONE (OUTPATIENT)
Dept: CARDIOLOGY | Facility: CLINIC | Age: 65
End: 2020-06-26

## 2020-06-26 LAB
CV STRESS BASE HR: 75 BPM
DIASTOLIC BLOOD PRESSURE: 75 MMHG
LEFT ARM DIASTOLIC BLOOD PRESSURE: 75 MMHG
LEFT ARM SYSTOLIC BLOOD PRESSURE: 118 MMHG
LEFT CBA DIAS: 21 CM/S
LEFT CBA SYS: 84 CM/S
LEFT CCA DIST DIAS: 26 CM/S
LEFT CCA DIST SYS: 84 CM/S
LEFT CCA MID DIAS: 18 CM/S
LEFT CCA MID SYS: 82 CM/S
LEFT CCA PROX DIAS: 27 CM/S
LEFT CCA PROX SYS: 145 CM/S
LEFT ECA DIAS: 28 CM/S
LEFT ECA SYS: 231 CM/S
LEFT ICA DIST DIAS: 21 CM/S
LEFT ICA DIST SYS: 64 CM/S
LEFT ICA MID DIAS: 25 CM/S
LEFT ICA MID SYS: 125 CM/S
LEFT ICA PROX DIAS: 29 CM/S
LEFT ICA PROX SYS: 114 CM/S
LEFT VERTEBRAL DIAS: 12 CM/S
LEFT VERTEBRAL SYS: 25 CM/S
NUC REST DIASTOLIC VOLUME INDEX: 94
NUC REST EJECTION FRACTION: 55
NUC REST SYSTOLIC VOLUME INDEX: 33
OHS CV CAROTID ULTRASOUND LEFT ICA/CCA RATIO: 0.86
OHS CV CPX 1 MINUTE RECOVERY HEART RATE: 107 BPM
OHS CV CPX 85 PERCENT MAX PREDICTED HEART RATE MALE: 129
OHS CV CPX MAX PREDICTED HEART RATE: 151
OHS CV CPX PATIENT IS FEMALE: 1
OHS CV CPX PATIENT IS MALE: 0
OHS CV CPX PEAK DIASTOLIC BLOOD PRESSURE: 72 MMHG
OHS CV CPX PEAK HEAR RATE: 110 BPM
OHS CV CPX PEAK RATE PRESSURE PRODUCT: NORMAL
OHS CV CPX PEAK SYSTOLIC BLOOD PRESSURE: 149 MMHG
OHS CV CPX PERCENT MAX PREDICTED HEART RATE ACHIEVED: 73
OHS CV CPX RATE PRESSURE PRODUCT PRESENTING: NORMAL
OHS CV PV CAROTID LEFT HIGHEST CCA: 145
OHS CV PV CAROTID LEFT HIGHEST ICA: 125
OHS CV PV CAROTID RIGHT HIGHEST CCA: 73
OHS CV US CAROTID LEFT HIGHEST EDV: 29
RIGHT ARM DIASTOLIC BLOOD PRESSURE: 75 MMHG
RIGHT ARM SYSTOLIC BLOOD PRESSURE: 118 MMHG
RIGHT CBA DIAS: 11 CM/S
RIGHT CBA SYS: 60 CM/S
RIGHT CCA DIST DIAS: 9 CM/S
RIGHT CCA DIST SYS: 48 CM/S
RIGHT CCA MID DIAS: 6 CM/S
RIGHT CCA MID SYS: 49 CM/S
RIGHT CCA PROX DIAS: 6 CM/S
RIGHT CCA PROX SYS: 73 CM/S
RIGHT ECA DIAS: 16 CM/S
RIGHT ECA SYS: 75 CM/S
RIGHT VERTEBRAL DIAS: 30 CM/S
RIGHT VERTEBRAL SYS: 72 CM/S
STRESS ST DEPRESSION: 0.5 MM
SUMMED DIFFERENCE: 8
SUMMED REST SCORE: 10
SUMMED STRESS SCORE: 18
SYSTOLIC BLOOD PRESSURE: 143 MMHG

## 2020-06-26 NOTE — TELEPHONE ENCOUNTER
Test(s) Reviewed  I have reviewed the following in detail:  [x] Stress test   [] Angiography   [x] Echocardiogram   [] Labs   [x] Other:  Carotid US     Call Pt and tell her the stress test is abnormal and she needs angiogram.  Can arrange with anyone available  R carotid is occluded and goes along with her previous stroke; L is ok  Echo is ok

## 2020-07-14 ENCOUNTER — TELEPHONE (OUTPATIENT)
Dept: CARDIOLOGY | Facility: CLINIC | Age: 65
End: 2020-07-14

## 2020-07-14 DIAGNOSIS — Z03.818 ENCOUNTER FOR OBSERVATION FOR SUSPECTED EXPOSURE TO OTHER BIOLOGICAL AGENTS RULED OUT: ICD-10-CM

## 2020-07-14 DIAGNOSIS — R94.39 ABNORMAL NUCLEAR STRESS TEST: Primary | ICD-10-CM

## 2020-07-14 NOTE — TELEPHONE ENCOUNTER
Angiogram scheduled for 7/24/20 at 10am  Instructions given- covid test scheduled  Patient verbalized understanding

## 2020-07-14 NOTE — TELEPHONE ENCOUNTER
----- Message from Adele Haddad sent at 7/14/2020  3:20 PM CDT -----  Type:  Test Results    Who Called:  Yadi  Name of Test (Lab/Mammo/Etc):  Stress test  Date of Test:  6/25  Ordering Provider:  Dr Woo  Where the test was performed:  [facility]   Best Call Back Number:  [phone]  Additional Information:  thank you!

## 2020-07-21 ENCOUNTER — TELEPHONE (OUTPATIENT)
Dept: CARDIOLOGY | Facility: CLINIC | Age: 65
End: 2020-07-21

## 2020-07-21 ENCOUNTER — LAB VISIT (OUTPATIENT)
Dept: PRIMARY CARE CLINIC | Facility: CLINIC | Age: 65
End: 2020-07-21
Attending: INTERNAL MEDICINE
Payer: MEDICARE

## 2020-07-21 DIAGNOSIS — Z03.818 ENCOUNTER FOR OBSERVATION FOR SUSPECTED EXPOSURE TO OTHER BIOLOGICAL AGENTS RULED OUT: ICD-10-CM

## 2020-07-21 PROCEDURE — U0003 INFECTIOUS AGENT DETECTION BY NUCLEIC ACID (DNA OR RNA); SEVERE ACUTE RESPIRATORY SYNDROME CORONAVIRUS 2 (SARS-COV-2) (CORONAVIRUS DISEASE [COVID-19]), AMPLIFIED PROBE TECHNIQUE, MAKING USE OF HIGH THROUGHPUT TECHNOLOGIES AS DESCRIBED BY CMS-2020-01-R: HCPCS | Mod: HCNC

## 2020-07-21 NOTE — TELEPHONE ENCOUNTER
----- Message from Freddy Bradley sent at 7/21/2020 10:26 AM CDT -----  Contact: patient  Type: Needs Medical Advice  Who Called:  patient  Symptoms (please be specific):    How long has patient had these symptoms:    Pharmacy name and phone #:    Best Call Back Number: [phone]            Additional Information: patient called to advise that she took the pre op covid testing,call back to advise

## 2020-07-22 LAB — SARS-COV-2 RNA RESP QL NAA+PROBE: NOT DETECTED

## 2020-07-28 ENCOUNTER — PATIENT OUTREACH (OUTPATIENT)
Dept: ADMINISTRATIVE | Facility: OTHER | Age: 65
End: 2020-07-28

## 2020-07-29 NOTE — PROGRESS NOTES
Requested updates within Care Everywhere.  Patient's chart was reviewed for overdue SHERIF topics.  Immunizations reconciled.    Mammogram tasked to patient.

## 2020-07-30 ENCOUNTER — OFFICE VISIT (OUTPATIENT)
Dept: VASCULAR SURGERY | Facility: CLINIC | Age: 65
End: 2020-07-30
Payer: MEDICARE

## 2020-07-30 VITALS — WEIGHT: 207.25 LBS | BODY MASS INDEX: 31.41 KG/M2 | HEIGHT: 68 IN

## 2020-07-30 DIAGNOSIS — I25.118 CORONARY ARTERY DISEASE OF NATIVE ARTERY OF NATIVE HEART WITH STABLE ANGINA PECTORIS: Primary | ICD-10-CM

## 2020-07-30 PROCEDURE — 3008F PR BODY MASS INDEX (BMI) DOCUMENTED: ICD-10-PCS | Mod: HCNC,CPTII,S$GLB, | Performed by: THORACIC SURGERY (CARDIOTHORACIC VASCULAR SURGERY)

## 2020-07-30 PROCEDURE — 3008F BODY MASS INDEX DOCD: CPT | Mod: HCNC,CPTII,S$GLB, | Performed by: THORACIC SURGERY (CARDIOTHORACIC VASCULAR SURGERY)

## 2020-07-30 PROCEDURE — 99205 PR OFFICE/OUTPT VISIT, NEW, LEVL V, 60-74 MIN: ICD-10-PCS | Mod: HCNC,S$GLB,, | Performed by: THORACIC SURGERY (CARDIOTHORACIC VASCULAR SURGERY)

## 2020-07-30 PROCEDURE — 99999 PR PBB SHADOW E&M-EST. PATIENT-LVL III: CPT | Mod: PBBFAC,HCNC,, | Performed by: THORACIC SURGERY (CARDIOTHORACIC VASCULAR SURGERY)

## 2020-07-30 PROCEDURE — 99205 OFFICE O/P NEW HI 60 MIN: CPT | Mod: HCNC,S$GLB,, | Performed by: THORACIC SURGERY (CARDIOTHORACIC VASCULAR SURGERY)

## 2020-07-30 PROCEDURE — 99499 UNLISTED E&M SERVICE: CPT | Mod: HCNC,S$GLB,, | Performed by: THORACIC SURGERY (CARDIOTHORACIC VASCULAR SURGERY)

## 2020-07-30 PROCEDURE — 99499 RISK ADDL DX/OHS AUDIT: ICD-10-PCS | Mod: HCNC,S$GLB,, | Performed by: THORACIC SURGERY (CARDIOTHORACIC VASCULAR SURGERY)

## 2020-07-30 PROCEDURE — 99999 PR PBB SHADOW E&M-EST. PATIENT-LVL III: ICD-10-PCS | Mod: PBBFAC,HCNC,, | Performed by: THORACIC SURGERY (CARDIOTHORACIC VASCULAR SURGERY)

## 2020-07-30 NOTE — PROGRESS NOTES
OFFICE VISIT      This patient was referred to me by Dr. Norbert Hassan*    HISTORY OF PRESENT ILLNESS:  The patient is a 65-year-old female who has been experiencing shortness of breath, sweating, and episodes of indigestion.  She had a cardiac catheterization which showed severe coronary artery disease.  Presently she has no chest pain or indigestion.  About 6 years ago she had a right hemispheric stroke secondary to an occluded right carotid artery.  Over the years she has regained strength in her left upper extremity but the strength in her left lower extremity is significantly decreased and she has a left foot drop for which she wears a splint.  She walks with the help of walker.  There is a question of aneurysm of the distal infrarenal aorta arteries.    Past Medical History:   Diagnosis Date    AAA (abdominal aortic aneurysm)     Coronary artery disease     Hyperlipidemia     Hypertension     Stroke        Past Surgical History:   Procedure Laterality Date    APPENDECTOMY      CORONARY ANGIOGRAPHY N/A 7/24/2020    Procedure: ANGIOGRAM, CORONARY ARTERY;  Surgeon: Norbert Vallecillo MD;  Location: [facility] CATH;  Service: Cardiology;  Laterality: N/A;    FOOT SURGERY      HERNIA REPAIR      HYSTERECTOMY      LEFT HEART CATHETERIZATION N/A 7/24/2020    Procedure: Left heart cath;  Surgeon: Norbert Vallecillo MD;  Location: [facility] CATH;  Service: Cardiology;  Laterality: N/A;    TONSILLECTOMY         Review of patient's allergies indicates:   Allergen Reactions    Chlorthalidone     Codeine     Dyazide [triamterene-hydrochlorothiazid]     Penicillins        Current Outpatient Medications   Medication Sig Dispense Refill    albuterol-ipratropium (DUO-NEB) 2.5 mg-0.5 mg/3 mL nebulizer solution Take 3 mLs by nebulization every 4 (four) hours. Rescue 1 Box 5    aspirin 325 MG tablet Take 325 mg by mouth once daily.      atorvastatin (LIPITOR) 40 MG tablet TAKE 1 TABLET EVERY DAY 90  tablet 3    baclofen (LIORESAL) 20 MG tablet TAKE 1 TABLET THREE TIMES DAILY AS NEEDED 270 tablet 3    lisinopriL (PRINIVIL,ZESTRIL) 40 MG tablet TAKE 1 TABLET EVERY EVENING 90 tablet 1    nicotine (NICODERM CQ) 21 mg/24 hr Place 1 patch onto the skin once daily. 30 patch 3    rOPINIRole (REQUIP) 2 MG tablet Take 1 tablet (2 mg total) by mouth 3 (three) times daily. 90 tablet 11    traZODone (DESYREL) 100 MG tablet TAKE 1 TABLET NIGHTLY AS NEEDED FOR INSOMNIA 90 tablet 3    fluticasone propionate (FLONASE) 50 mcg/actuation nasal spray 2 sprays (100 mcg total) by Each Nostril route once daily. (Patient not taking: Reported on 7/30/2020) 16 g 11    gabapentin (NEURONTIN) 100 MG capsule 1 by mouth in the morning 1 by mouth in the afternoon and 3 by mouth at bedtime (Patient not taking: Reported on 7/30/2020) 150 capsule 11    guaiFENesin (MUCINEX) 1,200 mg Ta12 Take 1 tablet by mouth 2 (two) times daily. (Patient not taking: Reported on 6/2/2020) 30 tablet 2    metoprolol succinate (TOPROL-XL) 25 MG 24 hr tablet Take 1 tablet (25 mg total) by mouth once daily. (Patient not taking: Reported on 6/2/2020) 30 tablet 11    nitroGLYCERIN (NITROSTAT) 0.4 MG SL tablet Place 1 tablet (0.4 mg total) under the tongue every 5 (five) minutes as needed for Chest pain. (Patient not taking: Reported on 6/2/2020) 20 tablet 0    triamcinolone acetonide 0.1% (KENALOG) 0.1 % ointment Apply topically 2 (two) times daily. (Patient not taking: Reported on 7/30/2020) 30 g 1     No current facility-administered medications for this visit.        Family History   Problem Relation Age of Onset    Hypertension Mother     Melanoma Neg Hx     Psoriasis Neg Hx     Lupus Neg Hx     Eczema Neg Hx        Social History     Socioeconomic History    Marital status: Single     Spouse name: Not on file    Number of children: Not on file    Years of education: Not on file    Highest education level: Not on file   Occupational History     Not on file   Social Needs    Financial resource strain: Not on file    Food insecurity     Worry: Not on file     Inability: Not on file    Transportation needs     Medical: Not on file     Non-medical: Not on file   Tobacco Use    Smoking status: Former Smoker     Packs/day: 1.00     Years: 50.00     Pack years: 50.00     Types: Cigarettes     Quit date: 2019     Years since quittin.6    Smokeless tobacco: Never Used    Tobacco comment: down from 5 cartons/month to 2 cartons/month   Substance and Sexual Activity    Alcohol use: No    Drug use: No    Sexual activity: Not on file   Lifestyle    Physical activity     Days per week: Not on file     Minutes per session: Not on file    Stress: Not on file   Relationships    Social connections     Talks on phone: Not on file     Gets together: Not on file     Attends Denominational service: Not on file     Active member of club or organization: Not on file     Attends meetings of clubs or organizations: Not on file     Relationship status: Not on file   Other Topics Concern    Are you pregnant or think you may be? Not Asked    Breast-feeding Not Asked   Social History Narrative    Not on file       REVIEW OF SYSTEMS:  Indigestion, shortness of breath, and dyspnea on exertion.  She has significant weakness and foot drop of the left lower extremity.  This is secondary to a stroke 6 years ago secondary to an occluded right carotid artery.  She also has generalized weakness and easy fatigue.  All other systems are reviewed and are negative.        PHYSICAL EXAM:  Physical Exam  Vitals signs and nursing note reviewed.   Constitutional:       General: She is not in acute distress.     Appearance: Normal appearance. She is not ill-appearing, toxic-appearing or diaphoretic.   HENT:      Head: Normocephalic and atraumatic.   Eyes:      General: No scleral icterus.     Extraocular Movements: Extraocular movements intact.      Conjunctiva/sclera: Conjunctivae  normal.      Pupils: Pupils are equal, round, and reactive to light.   Neck:      Musculoskeletal: Normal range of motion and neck supple. No neck rigidity.   Cardiovascular:      Rate and Rhythm: Normal rate and regular rhythm.   Pulmonary:      Effort: Pulmonary effort is normal. No respiratory distress.      Breath sounds: No stridor.   Abdominal:      General: There is no distension.      Palpations: Abdomen is soft.      Tenderness: There is no abdominal tenderness.   Musculoskeletal:      Left lower leg: Edema present.   Skin:     General: Skin is warm.   Neurological:      Mental Status: She is alert and oriented to person, place, and time.      Motor: Weakness present.      Comments: Severe weakness of the left lower extremity with left foot drop.  She walks with the aid of a rolling walker.           Procedures    ANGIOGRAM, CORONARY ARTERY   Left heart cath   Conclusion       · Three vessel coronary artery disease as described in the text.  · The left ventricular systolic function is normal.  · LV end diastolic pressure is elevated.  · Will discuss with Cardiothoracic surgery the possibility of CABG on the need for surgical revascularization of the iliac artery and distal aorta     I certify that I was present for catheter insertion, catheter manipulation, angiography, and angiographic interpretation of this patient.     Procedure Log documented by Documenter: Eve Arora RN and verified by oNrbert Vallecillo MD.        Coronary Findings    Diagnostic  Dominance: Right  Left Anterior Descending   The vessel was visualized by angiography. Is a medium to large size vessel, ectatic, with EF 60-70% stenosis in the mid segment involving the origin of a medium-sized diagonal branch. This vessel gives collaterals to the RCA.   Left Circumflex   The vessel was visualized by angiography. This is a medium-sized nondominant vessel with subtotal occlusion in the proximal to mid segment that involve the origin  of medium-size obtuse marginal branch.   Right Coronary Artery   The vessel was visualized by angiography. This is a medium-sized vessel occluded in the proximal segment   Intervention    No interventions have been documented.  Left Heart Findings    Left Ventricle    The left ventricular size is normal. The left ventricular systolic function is normal. LV systolic pressure is normal. The ejection fraction is greater than 55% by visual estimate. LV end diastolic pressure is elevated.   LVEDP (Pre):16 mmHGThere are no wall motion abnormalities in the left ventricle         IMPRESSION:  1.  Coronary artery disease  2.  Angina pectoris  3.  Shortness of breath   4.  Dyspnea on exertion  5.  Peripheral arterial occlusive disease  7.  Occluded right carotid artery  8.  Right hemispheric stroke  9.  Severe weakness of the left lower extremity  10.  Left foot drop  11.  Hypertension  12.  Hyperlipidemia  13.  Abdominal aortic aneurysm      RECOMMENDATIONS:  I think the patient will benefit from coronary artery bypass graft.  Risks and benefits were discussed.  Risks include but are not limited to death, bleeding, infection, stroke, myocardial infarction with resulting cardiomyopathy and congestive heart failure, hepatic failure, renal failure requiring dialysis, pancreatitis, cholecystitis, mesenteric ischemia with bowel gangrene, limb loss, compression of the brachial plexus causing numbness, pain and weakness of the upper extremities postoperatively, quadriplegia, paraplegia, infection with dehiscence of the sternum, malunion, nonunion of the sternum requiring further operations, injury to the phrenic nerve causing problems with diaphragmatic function, postoperative and there arrhythmias, graft occlusion, etc.  She and her family  member voiced understanding and wish to proceed.  We will get carotid duplex scan, pulmonary function tests, ultrasound of the abdominal aorta, and noninvasive lower extremity arterial  studies.        Kike Gray MD

## 2020-07-30 NOTE — LETTER
July 30, 2020      Norbert Vallecillo MD  [address] [facility] [address] [place] [address]           [facility]  [address] [place] [address]  Phone: [phone]          Patient: Yadi Mccall   MR Number: [id number]   YOB: 1955   Date of Visit: 7/30/2020       Dear Dr. Norbert Vallecillo:    Thank you for referring Yadi Mccall to me for evaluation. Attached you will find relevant portions of my assessment and plan of care.    If you have questions, please do not hesitate to call me. I look forward to following Yadi Mccall along with you.    Sincerely,    Pascual Gray MD    Enclosure  CC:  No Recipients    If you would like to receive this communication electronically, please contact [email or web address] or [phone] to request more information on [facility] access.    For providers and/or their staff who would like to refer a patient to [facility], please contact us through our one-stop-shop provider referral line, [facility] , at [phone].    If you feel you have received this communication in error or would no longer like to receive these types of communications, please e-mail [email or web address]

## 2020-08-05 ENCOUNTER — TELEPHONE (OUTPATIENT)
Dept: VASCULAR SURGERY | Facility: CLINIC | Age: 65
End: 2020-08-05

## 2020-08-05 DIAGNOSIS — M21.379 FOOT-DROP, UNSPECIFIED LATERALITY: ICD-10-CM

## 2020-08-05 DIAGNOSIS — Z79.01 LONG TERM (CURRENT) USE OF ANTICOAGULANTS: ICD-10-CM

## 2020-08-05 DIAGNOSIS — I25.118 CORONARY ARTERY DISEASE OF NATIVE ARTERY OF NATIVE HEART WITH STABLE ANGINA PECTORIS: Primary | ICD-10-CM

## 2020-08-05 DIAGNOSIS — R29.898 WEAKNESS OF LEFT LOWER EXTREMITY: ICD-10-CM

## 2020-08-05 DIAGNOSIS — I71.40 ABDOMINAL AORTIC ANEURYSM (AAA) WITHOUT RUPTURE: ICD-10-CM

## 2020-08-05 DIAGNOSIS — R06.02 SOB (SHORTNESS OF BREATH): ICD-10-CM

## 2020-08-05 DIAGNOSIS — I25.118 CORONARY ARTERY DISEASE OF NATIVE ARTERY OF NATIVE HEART WITH STABLE ANGINA PECTORIS: ICD-10-CM

## 2020-08-05 DIAGNOSIS — M21.372 LEFT FOOT DROP: ICD-10-CM

## 2020-08-05 RX ORDER — LIDOCAINE HYDROCHLORIDE 10 MG/ML
1 INJECTION, SOLUTION EPIDURAL; INFILTRATION; INTRACAUDAL; PERINEURAL ONCE
Status: CANCELLED | OUTPATIENT
Start: 2020-08-05 | End: 2020-08-05

## 2020-08-05 NOTE — TELEPHONE ENCOUNTER
----- Message from Ryan Coughlin sent at 8/5/2020  3:58 PM CDT -----  Contact: pt  Type:  Patient Returning Call    Who Called:  pt  Who Left Message for Patient:  Humaira  Does the patient know what this is regarding?:    Best Call Back Number:  [phone]    Additional Information:

## 2020-08-05 NOTE — TELEPHONE ENCOUNTER
Patient request I speak to sister regarding surgery and appointments.  Left message on sister's voicemail requesting a call back.

## 2020-08-06 NOTE — TELEPHONE ENCOUNTER
----- Message from Princess SUMIT San sent at 8/6/2020 11:05 AM CDT -----  Contact: Gunjan Mccall (Sister)  Type:  Patient Returning Call    Who Called:  patient   Who Left Message for Patient:  Nurse Gerardo   Does the patient know what this is regarding?:  yes  Best Call Back Number:  [phone]    Additional Information:  Concerning  surgery

## 2020-08-06 NOTE — TELEPHONE ENCOUNTER
Spoke to sister Gunjan, informed surgery date, and pre-op appointments and studies ordered by Dr Gray, also informed of pre-test instructions.  Also appointment confirmations including pre-test instructions will be mailed to home address, voices understanding and is agreeable, states she also read this same info on patient's [employer] account.

## 2020-08-07 ENCOUNTER — TELEPHONE (OUTPATIENT)
Dept: FAMILY MEDICINE | Facility: CLINIC | Age: 65
End: 2020-08-07

## 2020-08-07 NOTE — TELEPHONE ENCOUNTER
Patient wanted to say thank you for sending her to cardiology.She is scheduled to have a triple bypass on the 21st.They found an aneurism and a blood clot in leg.She is thankful.

## 2020-08-08 ENCOUNTER — LAB VISIT (OUTPATIENT)
Dept: LAB | Facility: HOSPITAL | Age: 65
End: 2020-08-08
Attending: INTERNAL MEDICINE
Payer: MEDICARE

## 2020-08-08 DIAGNOSIS — I10 ESSENTIAL HYPERTENSION: ICD-10-CM

## 2020-08-08 LAB
ALBUMIN SERPL BCP-MCNC: 3.8 G/DL (ref 3.5–5.2)
ALP SERPL-CCNC: 120 U/L (ref 55–135)
ALT SERPL W/O P-5'-P-CCNC: 17 U/L (ref 10–44)
ANION GAP SERPL CALC-SCNC: 8 MMOL/L (ref 8–16)
AST SERPL-CCNC: 14 U/L (ref 10–40)
BILIRUB SERPL-MCNC: 0.3 MG/DL (ref 0.1–1)
BUN SERPL-MCNC: 15 MG/DL (ref 8–23)
CALCIUM SERPL-MCNC: 9 MG/DL (ref 8.7–10.5)
CHLORIDE SERPL-SCNC: 104 MMOL/L (ref 95–110)
CHOLEST SERPL-MCNC: 128 MG/DL (ref 120–199)
CHOLEST/HDLC SERPL: 3.6 {RATIO} (ref 2–5)
CO2 SERPL-SCNC: 26 MMOL/L (ref 23–29)
CREAT SERPL-MCNC: 0.9 MG/DL (ref 0.5–1.4)
EST. GFR  (AFRICAN AMERICAN): >60 ML/MIN/1.73 M^2
EST. GFR  (NON AFRICAN AMERICAN): >60 ML/MIN/1.73 M^2
GLUCOSE SERPL-MCNC: 99 MG/DL (ref 70–110)
HDLC SERPL-MCNC: 36 MG/DL (ref 40–75)
HDLC SERPL: 28.1 % (ref 20–50)
LDLC SERPL CALC-MCNC: 72.6 MG/DL (ref 63–159)
NONHDLC SERPL-MCNC: 92 MG/DL
POTASSIUM SERPL-SCNC: 4.5 MMOL/L (ref 3.5–5.1)
PROT SERPL-MCNC: 6.7 G/DL (ref 6–8.4)
SODIUM SERPL-SCNC: 138 MMOL/L (ref 136–145)
TRIGL SERPL-MCNC: 97 MG/DL (ref 30–150)

## 2020-08-08 PROCEDURE — 80053 COMPREHEN METABOLIC PANEL: CPT | Mod: HCNC

## 2020-08-08 PROCEDURE — 36415 COLL VENOUS BLD VENIPUNCTURE: CPT | Mod: HCNC,PO

## 2020-08-08 PROCEDURE — 80061 LIPID PANEL: CPT | Mod: HCNC

## 2020-08-12 ENCOUNTER — HOSPITAL ENCOUNTER (OUTPATIENT)
Dept: RADIOLOGY | Facility: HOSPITAL | Age: 65
Discharge: HOME | End: 2020-08-12
Attending: THORACIC SURGERY (CARDIOTHORACIC VASCULAR SURGERY)
Payer: MEDICARE

## 2020-08-12 DIAGNOSIS — I25.118 CORONARY ARTERY DISEASE OF NATIVE ARTERY OF NATIVE HEART WITH STABLE ANGINA PECTORIS: ICD-10-CM

## 2020-08-12 DIAGNOSIS — R29.898 WEAKNESS OF LEFT LOWER EXTREMITY: ICD-10-CM

## 2020-08-12 DIAGNOSIS — I71.40 ABDOMINAL AORTIC ANEURYSM (AAA) WITHOUT RUPTURE: ICD-10-CM

## 2020-08-12 DIAGNOSIS — M21.372 LEFT FOOT DROP: ICD-10-CM

## 2020-08-12 PROCEDURE — 93880 US CAROTID BILATERAL: ICD-10-PCS | Mod: 26,HCNC,, | Performed by: RADIOLOGY

## 2020-08-12 PROCEDURE — 76775 US EXAM ABDO BACK WALL LIM: CPT | Mod: TC,HCNC,PO

## 2020-08-12 PROCEDURE — 93922 US ARTERIAL LOWER EXTREMITY BILAT WITH ABI (XPD): ICD-10-PCS | Mod: 26,HCNC,, | Performed by: RADIOLOGY

## 2020-08-12 PROCEDURE — 93925 US ARTERIAL LOWER EXTREMITY BILAT WITH ABI (XPD): ICD-10-PCS | Mod: 26,HCNC,, | Performed by: RADIOLOGY

## 2020-08-12 PROCEDURE — 93880 EXTRACRANIAL BILAT STUDY: CPT | Mod: TC,HCNC,PO

## 2020-08-12 PROCEDURE — 93880 EXTRACRANIAL BILAT STUDY: CPT | Mod: 26,HCNC,, | Performed by: RADIOLOGY

## 2020-08-12 PROCEDURE — 76775 US EXAM ABDO BACK WALL LIM: CPT | Mod: 26,HCNC,, | Performed by: RADIOLOGY

## 2020-08-12 PROCEDURE — 93925 LOWER EXTREMITY STUDY: CPT | Mod: 26,HCNC,, | Performed by: RADIOLOGY

## 2020-08-12 PROCEDURE — 93922 UPR/L XTREMITY ART 2 LEVELS: CPT | Mod: 26,HCNC,, | Performed by: RADIOLOGY

## 2020-08-12 PROCEDURE — 93922 UPR/L XTREMITY ART 2 LEVELS: CPT | Mod: TC,HCNC,PO

## 2020-08-12 PROCEDURE — 76775 US ABDOMINAL AORTA: ICD-10-PCS | Mod: 26,HCNC,, | Performed by: RADIOLOGY

## 2020-08-18 ENCOUNTER — LAB VISIT (OUTPATIENT)
Dept: FAMILY MEDICINE | Facility: CLINIC | Age: 65
End: 2020-08-18
Payer: MEDICARE

## 2020-08-18 DIAGNOSIS — I25.118 CORONARY ARTERY DISEASE OF NATIVE ARTERY OF NATIVE HEART WITH STABLE ANGINA PECTORIS: ICD-10-CM

## 2020-08-18 PROCEDURE — U0003 INFECTIOUS AGENT DETECTION BY NUCLEIC ACID (DNA OR RNA); SEVERE ACUTE RESPIRATORY SYNDROME CORONAVIRUS 2 (SARS-COV-2) (CORONAVIRUS DISEASE [COVID-19]), AMPLIFIED PROBE TECHNIQUE, MAKING USE OF HIGH THROUGHPUT TECHNOLOGIES AS DESCRIBED BY CMS-2020-01-R: HCPCS | Mod: HCNC

## 2020-08-19 LAB — SARS-COV-2 RNA RESP QL NAA+PROBE: NOT DETECTED

## 2020-08-22 PROBLEM — Z95.1 S/P CABG X 4: Status: ACTIVE | Noted: 2020-08-22

## 2020-08-31 PROBLEM — I48.0 PAF (PAROXYSMAL ATRIAL FIBRILLATION): Status: ACTIVE | Noted: 2020-08-31

## 2020-09-03 ENCOUNTER — HOSPITAL ENCOUNTER (INPATIENT)
Facility: HOSPITAL | Age: 65
LOS: 5 days | Discharge: HOME HEALTH | DRG: 189 | End: 2020-09-08
Attending: EMERGENCY MEDICINE | Admitting: INTERNAL MEDICINE
Payer: MEDICARE

## 2020-09-03 DIAGNOSIS — J90 PLEURAL EFFUSION: ICD-10-CM

## 2020-09-03 DIAGNOSIS — J44.1 CHRONIC OBSTRUCTIVE PULMONARY DISEASE WITH ACUTE EXACERBATION: ICD-10-CM

## 2020-09-03 DIAGNOSIS — J90 BILATERAL PLEURAL EFFUSION: ICD-10-CM

## 2020-09-03 DIAGNOSIS — R06.03 ACUTE RESPIRATORY DISTRESS: ICD-10-CM

## 2020-09-03 DIAGNOSIS — J43.9 PULMONARY EMPHYSEMA, UNSPECIFIED EMPHYSEMA TYPE: ICD-10-CM

## 2020-09-03 DIAGNOSIS — Z95.1 S/P CABG X 4: ICD-10-CM

## 2020-09-03 DIAGNOSIS — J98.6 ELEVATED DIAPHRAGM: ICD-10-CM

## 2020-09-03 DIAGNOSIS — R41.82 ALTERED MENTAL STATUS: ICD-10-CM

## 2020-09-03 DIAGNOSIS — R93.89 ABNORMAL CXR: ICD-10-CM

## 2020-09-03 DIAGNOSIS — J96.01 ACUTE HYPOXEMIC RESPIRATORY FAILURE: Primary | ICD-10-CM

## 2020-09-03 PROBLEM — G93.41 ENCEPHALOPATHY, METABOLIC: Status: ACTIVE | Noted: 2020-09-03

## 2020-09-03 LAB
ALBUMIN SERPL BCP-MCNC: 3.3 G/DL (ref 3.5–5.2)
ALLENS TEST: ABNORMAL
ALP SERPL-CCNC: 76 U/L (ref 55–135)
ALT SERPL W/O P-5'-P-CCNC: 32 U/L (ref 10–44)
ANION GAP SERPL CALC-SCNC: 13 MMOL/L (ref 8–16)
APTT PPP: 30.1 SEC (ref 23.6–33.3)
AST SERPL-CCNC: 32 U/L (ref 10–40)
BASOPHILS # BLD AUTO: 0.05 K/UL (ref 0–0.2)
BASOPHILS NFR BLD: 0.3 % (ref 0–1.9)
BILIRUB SERPL-MCNC: 1 MG/DL (ref 0.1–1)
BILIRUB UR QL STRIP: NEGATIVE
BUN SERPL-MCNC: 20 MG/DL (ref 8–23)
CALCIUM SERPL-MCNC: 8.4 MG/DL (ref 8.7–10.5)
CHLORIDE SERPL-SCNC: 92 MMOL/L (ref 95–110)
CLARITY UR: ABNORMAL
CO2 SERPL-SCNC: 28 MMOL/L (ref 23–29)
COLOR UR: YELLOW
CREAT SERPL-MCNC: 1 MG/DL (ref 0.5–1.4)
DELSYS: ABNORMAL
DIFFERENTIAL METHOD: ABNORMAL
EOSINOPHIL # BLD AUTO: 0 K/UL (ref 0–0.5)
EOSINOPHIL NFR BLD: 0.2 % (ref 0–8)
ERYTHROCYTE [DISTWIDTH] IN BLOOD BY AUTOMATED COUNT: 14.6 % (ref 11.5–14.5)
EST. GFR  (AFRICAN AMERICAN): >60 ML/MIN/1.73 M^2
EST. GFR  (NON AFRICAN AMERICAN): >60 ML/MIN/1.73 M^2
FLOW: 5
GLUCOSE SERPL-MCNC: 108 MG/DL (ref 70–110)
GLUCOSE UR QL STRIP: NEGATIVE
HCO3 UR-SCNC: 29.6 MMOL/L (ref 24–28)
HCT VFR BLD AUTO: 30.5 % (ref 37–48.5)
HGB BLD-MCNC: 10 G/DL (ref 12–16)
HGB UR QL STRIP: NEGATIVE
IMM GRANULOCYTES # BLD AUTO: 0.15 K/UL (ref 0–0.04)
IMM GRANULOCYTES NFR BLD AUTO: 0.8 % (ref 0–0.5)
INR PPP: 1.2
KETONES UR QL STRIP: NEGATIVE
LACTATE SERPL-SCNC: 1.6 MMOL/L (ref 0.5–1.9)
LEUKOCYTE ESTERASE UR QL STRIP: NEGATIVE
LYMPHOCYTES # BLD AUTO: 1.3 K/UL (ref 1–4.8)
LYMPHOCYTES NFR BLD: 7.1 % (ref 18–48)
MAGNESIUM SERPL-MCNC: 2.3 MG/DL (ref 1.6–2.6)
MCH RBC QN AUTO: 28.8 PG (ref 27–31)
MCHC RBC AUTO-ENTMCNC: 32.8 G/DL (ref 32–36)
MCV RBC AUTO: 88 FL (ref 82–98)
MODE: ABNORMAL
MONOCYTES # BLD AUTO: 1.2 K/UL (ref 0.3–1)
MONOCYTES NFR BLD: 6.7 % (ref 4–15)
NEUTROPHILS # BLD AUTO: 15.5 K/UL (ref 1.8–7.7)
NEUTROPHILS NFR BLD: 84.9 % (ref 38–73)
NITRITE UR QL STRIP: NEGATIVE
NRBC BLD-RTO: 0 /100 WBC
PCO2 BLDA: 42.2 MMHG (ref 35–45)
PH SMN: 7.45 [PH] (ref 7.35–7.45)
PH UR STRIP: 6 [PH] (ref 5–8)
PLATELET # BLD AUTO: 659 K/UL (ref 150–350)
PMV BLD AUTO: 8.2 FL (ref 9.2–12.9)
PO2 BLDA: 70 MMHG (ref 80–100)
POC BE: 6 MMOL/L
POC SATURATED O2: 94 % (ref 95–100)
POC TCO2: 31 MMOL/L (ref 23–27)
POTASSIUM SERPL-SCNC: 4.1 MMOL/L (ref 3.5–5.1)
PROT SERPL-MCNC: 6.3 G/DL (ref 6–8.4)
PROT UR QL STRIP: ABNORMAL
PROTHROMBIN TIME: 15 SEC (ref 10.6–14.8)
RBC # BLD AUTO: 3.47 M/UL (ref 4–5.4)
SAMPLE: ABNORMAL
SARS-COV-2 RDRP RESP QL NAA+PROBE: NEGATIVE
SITE: ABNORMAL
SODIUM SERPL-SCNC: 133 MMOL/L (ref 136–145)
SP GR UR STRIP: 1.02 (ref 1–1.03)
SP02: 94
TROPONIN I SERPL DL<=0.01 NG/ML-MCNC: <0.03 NG/ML
URN SPEC COLLECT METH UR: ABNORMAL
UROBILINOGEN UR STRIP-ACNC: ABNORMAL EU/DL
WBC # BLD AUTO: 18.24 K/UL (ref 3.9–12.7)

## 2020-09-03 PROCEDURE — 99900035 HC TECH TIME PER 15 MIN (STAT)

## 2020-09-03 PROCEDURE — 63600175 PHARM REV CODE 636 W HCPCS: Performed by: NURSE PRACTITIONER

## 2020-09-03 PROCEDURE — 99900031 HC PATIENT EDUCATION (STAT)

## 2020-09-03 PROCEDURE — 85025 COMPLETE CBC W/AUTO DIFF WBC: CPT

## 2020-09-03 PROCEDURE — 93005 ELECTROCARDIOGRAM TRACING: CPT | Performed by: INTERNAL MEDICINE

## 2020-09-03 PROCEDURE — 99285 EMERGENCY DEPT VISIT HI MDM: CPT | Mod: 25

## 2020-09-03 PROCEDURE — 81003 URINALYSIS AUTO W/O SCOPE: CPT | Mod: 59

## 2020-09-03 PROCEDURE — 83605 ASSAY OF LACTIC ACID: CPT

## 2020-09-03 PROCEDURE — 84484 ASSAY OF TROPONIN QUANT: CPT

## 2020-09-03 PROCEDURE — 25000003 PHARM REV CODE 250: Performed by: NURSE PRACTITIONER

## 2020-09-03 PROCEDURE — 87040 BLOOD CULTURE FOR BACTERIA: CPT

## 2020-09-03 PROCEDURE — U0002 COVID-19 LAB TEST NON-CDC: HCPCS

## 2020-09-03 PROCEDURE — 83735 ASSAY OF MAGNESIUM: CPT

## 2020-09-03 PROCEDURE — 36600 WITHDRAWAL OF ARTERIAL BLOOD: CPT

## 2020-09-03 PROCEDURE — 80053 COMPREHEN METABOLIC PANEL: CPT

## 2020-09-03 PROCEDURE — 25500020 PHARM REV CODE 255: Performed by: EMERGENCY MEDICINE

## 2020-09-03 PROCEDURE — 85730 THROMBOPLASTIN TIME PARTIAL: CPT

## 2020-09-03 PROCEDURE — 93010 EKG 12-LEAD: ICD-10-PCS | Mod: ,,, | Performed by: INTERNAL MEDICINE

## 2020-09-03 PROCEDURE — 93010 ELECTROCARDIOGRAM REPORT: CPT | Mod: ,,, | Performed by: INTERNAL MEDICINE

## 2020-09-03 PROCEDURE — 80307 DRUG TEST PRSMV CHEM ANLYZR: CPT

## 2020-09-03 PROCEDURE — 82803 BLOOD GASES ANY COMBINATION: CPT

## 2020-09-03 PROCEDURE — 36415 COLL VENOUS BLD VENIPUNCTURE: CPT

## 2020-09-03 PROCEDURE — 85610 PROTHROMBIN TIME: CPT

## 2020-09-03 PROCEDURE — 20000000 HC ICU ROOM

## 2020-09-03 RX ORDER — IPRATROPIUM BROMIDE AND ALBUTEROL SULFATE 2.5; .5 MG/3ML; MG/3ML
3 SOLUTION RESPIRATORY (INHALATION) EVERY 6 HOURS
Status: DISCONTINUED | OUTPATIENT
Start: 2020-09-04 | End: 2020-09-08 | Stop reason: HOSPADM

## 2020-09-03 RX ORDER — LISINOPRIL 40 MG/1
40 TABLET ORAL DAILY
Status: ON HOLD | COMMUNITY
End: 2020-09-08 | Stop reason: SDUPTHER

## 2020-09-03 RX ORDER — AMIODARONE HYDROCHLORIDE 200 MG/1
400 TABLET ORAL 2 TIMES DAILY
Status: DISCONTINUED | OUTPATIENT
Start: 2020-09-03 | End: 2020-09-04

## 2020-09-03 RX ORDER — MORPHINE SULFATE 2 MG/ML
1 INJECTION, SOLUTION INTRAMUSCULAR; INTRAVENOUS EVERY 6 HOURS PRN
Status: DISCONTINUED | OUTPATIENT
Start: 2020-09-04 | End: 2020-09-08 | Stop reason: HOSPADM

## 2020-09-03 RX ORDER — SODIUM CHLORIDE 0.9 % (FLUSH) 0.9 %
10 SYRINGE (ML) INJECTION
Status: DISCONTINUED | OUTPATIENT
Start: 2020-09-03 | End: 2020-09-08 | Stop reason: HOSPADM

## 2020-09-03 RX ORDER — LISINOPRIL 20 MG/1
40 TABLET ORAL DAILY
Status: DISCONTINUED | OUTPATIENT
Start: 2020-09-04 | End: 2020-09-08 | Stop reason: HOSPADM

## 2020-09-03 RX ORDER — ATORVASTATIN CALCIUM 40 MG/1
40 TABLET, FILM COATED ORAL NIGHTLY
Status: DISCONTINUED | OUTPATIENT
Start: 2020-09-03 | End: 2020-09-08 | Stop reason: HOSPADM

## 2020-09-03 RX ORDER — NITROGLYCERIN 0.4 MG/1
0.4 TABLET SUBLINGUAL EVERY 5 MIN PRN
Status: DISCONTINUED | OUTPATIENT
Start: 2020-09-03 | End: 2020-09-08 | Stop reason: HOSPADM

## 2020-09-03 RX ORDER — ASPIRIN 325 MG
325 TABLET ORAL DAILY
Status: DISCONTINUED | OUTPATIENT
Start: 2020-09-04 | End: 2020-09-04

## 2020-09-03 RX ADMIN — APIXABAN 5 MG: 5 TABLET, FILM COATED ORAL at 11:09

## 2020-09-03 RX ADMIN — ATORVASTATIN CALCIUM 40 MG: 40 TABLET, FILM COATED ORAL at 11:09

## 2020-09-03 RX ADMIN — MORPHINE SULFATE 1 MG: 2 INJECTION, SOLUTION INTRAMUSCULAR; INTRAVENOUS at 11:09

## 2020-09-03 RX ADMIN — IOHEXOL 100 ML: 350 INJECTION, SOLUTION INTRAVENOUS at 07:09

## 2020-09-03 RX ADMIN — AMIODARONE HYDROCHLORIDE 400 MG: 200 TABLET ORAL at 11:09

## 2020-09-03 NOTE — ED PROVIDER NOTES
Encounter Date: 9/3/2020       History     Chief Complaint   Patient presents with    Shortness of Breath     sister reports patient having hallucinations. recent cardiac bypass done at [facility].  Pt denies any increased SOB or pain.  EMS report O2 SATS on home 3L was in the 80's.  Breathing treatment given in route to increase SATS to 90's      Here with reported altered mental status noted hallucinations with associated shortness breath patient status post CABG released from the hospital yesterday her postoperative course was complicated by an episode of atrial fibrillation requiring administration of amiodarone to discharge on amiodarone and Eliquis patient did suffer a fall during her hospitalization as well she was evaluated at that time with x-rays and CT scans which were reportedly within normal limits patient reports improved cough she is mildly confused to the day but no she is in the hospital knows that it is September I the 3rd or 4th EMS reports that patient's oxygen saturation with 80s when they arrived her 3 L 90 is nasal cannula were off at the time oxygen saturation improved to 94% on 3 L        Review of patient's allergies indicates:   Allergen Reactions    Chlorthalidone     Codeine     Dyazide [triamterene-hydrochlorothiazid]     Penicillins      Past Medical History:   Diagnosis Date    AAA (abdominal aortic aneurysm)     Anticoagulant long-term use     Coronary artery disease     Foot drop, left foot     Hyperlipidemia     Hypertension     Stroke 2014    LEFT SIDED WEAKNESS     Past Surgical History:   Procedure Laterality Date    APPENDECTOMY      CORONARY ANGIOGRAPHY N/A 7/24/2020    Procedure: ANGIOGRAM, CORONARY ARTERY;  Surgeon: Norbert Vallecillo MD;  Location: [facility];  Service: Cardiology;  Laterality: N/A;    CORONARY ARTERY BYPASS GRAFT (CABG) N/A 8/21/2020    Procedure: CORONARY ARTERY BYPASS GRAFT (CABG) BILATERAL MAMMARY  x  4 VESSELS;  Surgeon: Pascual Gray  MD;  Location: [facility] OR;  Service: Cardiovascular;  Laterality: N/A;    ENDOSCOPIC HARVEST OF VEIN N/A 2020    Procedure: HARVEST-VEIN-ENDOVASCULAR;  Surgeon: Pascual Gray MD;  Location: [facility] OR;  Service: Cardiovascular;  Laterality: N/A;    FOOT SURGERY      HERNIA REPAIR      HYSTERECTOMY      LEFT HEART CATHETERIZATION N/A 2020    Procedure: Left heart cath;  Surgeon: Norbert Vallecillo MD;  Location: [facility] CATH;  Service: Cardiology;  Laterality: N/A;    TONSILLECTOMY       Family History   Problem Relation Age of Onset    Hypertension Mother     Melanoma Neg Hx     Psoriasis Neg Hx     Lupus Neg Hx     Eczema Neg Hx      Social History     Tobacco Use    Smoking status: Former Smoker     Packs/day: 1.00     Years: 50.00     Pack years: 50.00     Types: Cigarettes     Quit date: 2019     Years since quittin.7    Smokeless tobacco: Never Used    Tobacco comment: down from 5 cartons/month to 2 cartons/month   Substance Use Topics    Alcohol use: No    Drug use: No     Review of Systems   Unable to perform ROS: Mental status change       Physical Exam     Initial Vitals   BP Pulse Resp Temp SpO2   20 1756 20 1751 20 1751 20 1751 20 1751   (!) 117/58 84 (!) 28 97.9 °F (36.6 °C) (!) 89 %      MAP       --                Physical Exam    Constitutional: She appears well-developed and well-nourished. No distress.   HENT:   Head: Normocephalic and atraumatic.   Right Ear: External ear normal.   Left Ear: External ear normal.   Mouth/Throat: Oropharynx is clear and moist.   Eyes: Conjunctivae and EOM are normal. Pupils are equal, round, and reactive to light.   Neck: Normal range of motion. Neck supple.   Cardiovascular: Normal rate, regular rhythm, normal heart sounds and intact distal pulses.   Pulmonary/Chest: She is in respiratory distress. She has wheezes. She has rhonchi.   Abdominal: Soft. Bowel sounds are normal. There is no abdominal tenderness.    Musculoskeletal: Normal range of motion. Edema present.   Lymphadenopathy:     She has no cervical adenopathy.   Neurological: She is alert. She has normal strength. GCS eye subscore is 4. GCS verbal subscore is 5. GCS motor subscore is 6.   Skin: Skin is warm and dry. Capillary refill takes less than 2 seconds. No rash noted.   Psychiatric: Her affect is blunt. She is slowed. Cognition and memory are impaired.         ED Course   Procedures  Labs Reviewed   CULTURE, BLOOD   CULTURE, BLOOD   APTT   CBC W/ AUTO DIFFERENTIAL   COMPREHENSIVE METABOLIC PANEL   MAGNESIUM   PROTIME-INR   TROPONIN I   URINALYSIS, REFLEX TO URINE CULTURE   LACTIC ACID, PLASMA   SARS-COV-2 RNA AMPLIFICATION, QUAL          Imaging Results    None          Medical Decision Making:   ED Management:  Patient here with reported hallucinations with associated shortness of breath status post CABG she does have a moderate pleural effusion and possible infiltrate [place] [address] CT scan of the head shows old infarct but no acute changes I have discussed case with hospitalist to evaluate patient emergency department for admission                                 Clinical Impression:       ICD-10-CM ICD-9-CM   1. Pulmonary emphysema, unspecified emphysema type  J43.9 492.8   2. Altered mental status  R41.82 780.97   3. Acute respiratory distress  R06.03 518.82   4. Pleural effusion  J90 511.9                                Eddie Tolbert MD  09/03/20 0510

## 2020-09-03 NOTE — ED NOTES
Bed: 02A Trauma  Expected date:   Expected time:   Means of arrival:   Comments:  Altered mental status

## 2020-09-04 PROBLEM — E87.1 HYPONATREMIA: Status: ACTIVE | Noted: 2020-09-04

## 2020-09-04 PROBLEM — F12.90 MARIJUANA USE: Status: ACTIVE | Noted: 2020-09-04

## 2020-09-04 PROBLEM — R93.89 ABNORMAL CXR: Status: ACTIVE | Noted: 2020-09-04

## 2020-09-04 PROBLEM — I69.30 HISTORY OF CVA WITH RESIDUAL DEFICIT: Chronic | Status: ACTIVE | Noted: 2020-09-04

## 2020-09-04 PROBLEM — D75.839 THROMBOCYTOSIS: Status: ACTIVE | Noted: 2020-09-04

## 2020-09-04 PROBLEM — Z87.891 PERSONAL HISTORY OF TOBACCO USE, PRESENTING HAZARDS TO HEALTH: Status: ACTIVE | Noted: 2020-09-04

## 2020-09-04 PROBLEM — I48.92 ATRIAL FLUTTER, PAROXYSMAL: Status: ACTIVE | Noted: 2020-08-31

## 2020-09-04 PROBLEM — D64.9 ANEMIA: Status: ACTIVE | Noted: 2020-09-04

## 2020-09-04 PROBLEM — G93.41 ENCEPHALOPATHY, METABOLIC: Status: RESOLVED | Noted: 2020-09-03 | Resolved: 2020-09-04

## 2020-09-04 PROBLEM — J98.6 ELEVATED DIAPHRAGM: Status: ACTIVE | Noted: 2020-09-04

## 2020-09-04 PROBLEM — F41.1 GENERALIZED ANXIETY DISORDER: Status: ACTIVE | Noted: 2020-09-04

## 2020-09-04 LAB
AMPHET+METHAMPHET UR QL: NEGATIVE
ANION GAP SERPL CALC-SCNC: 13 MMOL/L (ref 8–16)
BARBITURATES UR QL SCN>200 NG/ML: NEGATIVE
BASOPHILS # BLD AUTO: 0.05 K/UL (ref 0–0.2)
BASOPHILS NFR BLD: 0.4 % (ref 0–1.9)
BENZODIAZ UR QL SCN>200 NG/ML: NEGATIVE
BNP SERPL-MCNC: 258 PG/ML (ref 0–99)
BUN SERPL-MCNC: 15 MG/DL (ref 8–23)
BZE UR QL SCN: NEGATIVE
CALCIUM SERPL-MCNC: 8.2 MG/DL (ref 8.7–10.5)
CANNABINOIDS UR QL SCN: NORMAL
CHLORIDE SERPL-SCNC: 92 MMOL/L (ref 95–110)
CO2 SERPL-SCNC: 28 MMOL/L (ref 23–29)
CREAT SERPL-MCNC: 0.8 MG/DL (ref 0.5–1.4)
CREAT UR-MCNC: 160 MG/DL (ref 15–325)
DIFFERENTIAL METHOD: ABNORMAL
EOSINOPHIL # BLD AUTO: 0.2 K/UL (ref 0–0.5)
EOSINOPHIL NFR BLD: 1.1 % (ref 0–8)
ERYTHROCYTE [DISTWIDTH] IN BLOOD BY AUTOMATED COUNT: 14.7 % (ref 11.5–14.5)
EST. GFR  (AFRICAN AMERICAN): >60 ML/MIN/1.73 M^2
EST. GFR  (NON AFRICAN AMERICAN): >60 ML/MIN/1.73 M^2
GLUCOSE SERPL-MCNC: 101 MG/DL (ref 70–110)
HCT VFR BLD AUTO: 29.6 % (ref 37–48.5)
HGB BLD-MCNC: 9.6 G/DL (ref 12–16)
IMM GRANULOCYTES # BLD AUTO: 0.09 K/UL (ref 0–0.04)
IMM GRANULOCYTES NFR BLD AUTO: 0.7 % (ref 0–0.5)
LYMPHOCYTES # BLD AUTO: 2 K/UL (ref 1–4.8)
LYMPHOCYTES NFR BLD: 15.1 % (ref 18–48)
MAGNESIUM SERPL-MCNC: 2.4 MG/DL (ref 1.6–2.6)
MCH RBC QN AUTO: 29.1 PG (ref 27–31)
MCHC RBC AUTO-ENTMCNC: 32.4 G/DL (ref 32–36)
MCV RBC AUTO: 90 FL (ref 82–98)
MONOCYTES # BLD AUTO: 1.3 K/UL (ref 0.3–1)
MONOCYTES NFR BLD: 9.4 % (ref 4–15)
NEUTROPHILS # BLD AUTO: 9.8 K/UL (ref 1.8–7.7)
NEUTROPHILS NFR BLD: 73.3 % (ref 38–73)
NRBC BLD-RTO: 0 /100 WBC
OPIATES UR QL SCN: NORMAL
PCP UR QL SCN>25 NG/ML: NEGATIVE
PHOSPHATE SERPL-MCNC: 4.5 MG/DL (ref 2.7–4.5)
PLATELET # BLD AUTO: 612 K/UL (ref 150–350)
PMV BLD AUTO: 8 FL (ref 9.2–12.9)
POTASSIUM SERPL-SCNC: 3.5 MMOL/L (ref 3.5–5.1)
RBC # BLD AUTO: 3.3 M/UL (ref 4–5.4)
SODIUM SERPL-SCNC: 133 MMOL/L (ref 136–145)
TOXICOLOGY INFORMATION: NORMAL
WBC # BLD AUTO: 13.4 K/UL (ref 3.9–12.7)

## 2020-09-04 PROCEDURE — 99900035 HC TECH TIME PER 15 MIN (STAT)

## 2020-09-04 PROCEDURE — 63600175 PHARM REV CODE 636 W HCPCS: Performed by: NURSE PRACTITIONER

## 2020-09-04 PROCEDURE — 25000003 PHARM REV CODE 250: Performed by: INTERNAL MEDICINE

## 2020-09-04 PROCEDURE — 25000003 PHARM REV CODE 250: Performed by: NURSE PRACTITIONER

## 2020-09-04 PROCEDURE — 25000242 PHARM REV CODE 250 ALT 637 W/ HCPCS: Performed by: NURSE PRACTITIONER

## 2020-09-04 PROCEDURE — 80048 BASIC METABOLIC PNL TOTAL CA: CPT

## 2020-09-04 PROCEDURE — 63600175 PHARM REV CODE 636 W HCPCS: Performed by: INTERNAL MEDICINE

## 2020-09-04 PROCEDURE — 51702 INSERT TEMP BLADDER CATH: CPT

## 2020-09-04 PROCEDURE — 99223 1ST HOSP IP/OBS HIGH 75: CPT | Mod: ,,, | Performed by: INTERNAL MEDICINE

## 2020-09-04 PROCEDURE — 99223 PR INITIAL HOSPITAL CARE,LEVL III: ICD-10-PCS | Mod: ,,, | Performed by: INTERNAL MEDICINE

## 2020-09-04 PROCEDURE — 84100 ASSAY OF PHOSPHORUS: CPT

## 2020-09-04 PROCEDURE — 83735 ASSAY OF MAGNESIUM: CPT

## 2020-09-04 PROCEDURE — 99900031 HC PATIENT EDUCATION (STAT)

## 2020-09-04 PROCEDURE — 94640 AIRWAY INHALATION TREATMENT: CPT

## 2020-09-04 PROCEDURE — 20000000 HC ICU ROOM

## 2020-09-04 PROCEDURE — 85025 COMPLETE CBC W/AUTO DIFF WBC: CPT

## 2020-09-04 PROCEDURE — 83880 ASSAY OF NATRIURETIC PEPTIDE: CPT

## 2020-09-04 PROCEDURE — 94761 N-INVAS EAR/PLS OXIMETRY MLT: CPT

## 2020-09-04 PROCEDURE — 27000221 HC OXYGEN, UP TO 24 HOURS

## 2020-09-04 PROCEDURE — 36415 COLL VENOUS BLD VENIPUNCTURE: CPT

## 2020-09-04 PROCEDURE — 25000003 PHARM REV CODE 250

## 2020-09-04 RX ORDER — POTASSIUM CHLORIDE 20 MEQ/1
20 TABLET, EXTENDED RELEASE ORAL
Status: DISCONTINUED | OUTPATIENT
Start: 2020-09-04 | End: 2020-09-08 | Stop reason: HOSPADM

## 2020-09-04 RX ORDER — MAGNESIUM SULFATE HEPTAHYDRATE 40 MG/ML
4 INJECTION, SOLUTION INTRAVENOUS
Status: DISCONTINUED | OUTPATIENT
Start: 2020-09-04 | End: 2020-09-08 | Stop reason: HOSPADM

## 2020-09-04 RX ORDER — CHLORHEXIDINE GLUCONATE ORAL RINSE 1.2 MG/ML
15 SOLUTION DENTAL 2 TIMES DAILY
Status: DISCONTINUED | OUTPATIENT
Start: 2020-09-04 | End: 2020-09-08 | Stop reason: HOSPADM

## 2020-09-04 RX ORDER — MAGNESIUM SULFATE HEPTAHYDRATE 40 MG/ML
2 INJECTION, SOLUTION INTRAVENOUS
Status: DISCONTINUED | OUTPATIENT
Start: 2020-09-04 | End: 2020-09-08 | Stop reason: HOSPADM

## 2020-09-04 RX ORDER — FUROSEMIDE 10 MG/ML
40 INJECTION INTRAMUSCULAR; INTRAVENOUS ONCE
Status: COMPLETED | OUTPATIENT
Start: 2020-09-04 | End: 2020-09-04

## 2020-09-04 RX ORDER — LANOLIN ALCOHOL/MO/W.PET/CERES
800 CREAM (GRAM) TOPICAL
Status: DISCONTINUED | OUTPATIENT
Start: 2020-09-04 | End: 2020-09-08 | Stop reason: HOSPADM

## 2020-09-04 RX ORDER — LORAZEPAM 0.5 MG/1
0.5 TABLET ORAL EVERY 6 HOURS PRN
Status: DISCONTINUED | OUTPATIENT
Start: 2020-09-04 | End: 2020-09-06

## 2020-09-04 RX ORDER — BUSPIRONE HYDROCHLORIDE 5 MG/1
5 TABLET ORAL 3 TIMES DAILY
Status: DISCONTINUED | OUTPATIENT
Start: 2020-09-04 | End: 2020-09-08 | Stop reason: HOSPADM

## 2020-09-04 RX ORDER — POTASSIUM CHLORIDE 7.45 MG/ML
20 INJECTION INTRAVENOUS
Status: DISCONTINUED | OUTPATIENT
Start: 2020-09-04 | End: 2020-09-08 | Stop reason: HOSPADM

## 2020-09-04 RX ORDER — POTASSIUM CHLORIDE 20 MEQ/1
40 TABLET, EXTENDED RELEASE ORAL
Status: DISCONTINUED | OUTPATIENT
Start: 2020-09-04 | End: 2020-09-08 | Stop reason: HOSPADM

## 2020-09-04 RX ORDER — MUPIROCIN 20 MG/G
OINTMENT TOPICAL 2 TIMES DAILY
Status: DISCONTINUED | OUTPATIENT
Start: 2020-09-04 | End: 2020-09-08 | Stop reason: HOSPADM

## 2020-09-04 RX ORDER — NAPROXEN SODIUM 220 MG/1
81 TABLET, FILM COATED ORAL DAILY
Status: DISCONTINUED | OUTPATIENT
Start: 2020-09-05 | End: 2020-09-08 | Stop reason: HOSPADM

## 2020-09-04 RX ORDER — AMIODARONE HYDROCHLORIDE 200 MG/1
200 TABLET ORAL 2 TIMES DAILY
Status: DISCONTINUED | OUTPATIENT
Start: 2020-09-04 | End: 2020-09-08 | Stop reason: HOSPADM

## 2020-09-04 RX ORDER — POTASSIUM CHLORIDE 7.45 MG/ML
40 INJECTION INTRAVENOUS
Status: DISCONTINUED | OUTPATIENT
Start: 2020-09-04 | End: 2020-09-08 | Stop reason: HOSPADM

## 2020-09-04 RX ORDER — BUSPIRONE HYDROCHLORIDE 5 MG/1
5 TABLET ORAL ONCE
Status: COMPLETED | OUTPATIENT
Start: 2020-09-04 | End: 2020-09-04

## 2020-09-04 RX ORDER — TRAZODONE HYDROCHLORIDE 50 MG/1
50 TABLET ORAL NIGHTLY PRN
Status: DISCONTINUED | OUTPATIENT
Start: 2020-09-04 | End: 2020-09-08 | Stop reason: HOSPADM

## 2020-09-04 RX ORDER — MAGNESIUM SULFATE 1 G/100ML
1 INJECTION INTRAVENOUS
Status: DISCONTINUED | OUTPATIENT
Start: 2020-09-04 | End: 2020-09-08 | Stop reason: HOSPADM

## 2020-09-04 RX ADMIN — VANCOMYCIN HYDROCHLORIDE 1500 MG: 1 INJECTION, POWDER, LYOPHILIZED, FOR SOLUTION INTRAVENOUS at 06:09

## 2020-09-04 RX ADMIN — APIXABAN 5 MG: 5 TABLET, FILM COATED ORAL at 09:09

## 2020-09-04 RX ADMIN — IPRATROPIUM BROMIDE AND ALBUTEROL SULFATE 3 ML: .5; 3 SOLUTION RESPIRATORY (INHALATION) at 07:09

## 2020-09-04 RX ADMIN — CHLORHEXIDINE GLUCONATE 15 ML: 1.2 RINSE ORAL at 09:09

## 2020-09-04 RX ADMIN — MORPHINE SULFATE 1 MG: 2 INJECTION, SOLUTION INTRAMUSCULAR; INTRAVENOUS at 05:09

## 2020-09-04 RX ADMIN — ASPIRIN 325 MG ORAL TABLET 325 MG: 325 PILL ORAL at 09:09

## 2020-09-04 RX ADMIN — MORPHINE SULFATE 1 MG: 2 INJECTION, SOLUTION INTRAMUSCULAR; INTRAVENOUS at 12:09

## 2020-09-04 RX ADMIN — MUPIROCIN: 20 OINTMENT TOPICAL at 10:09

## 2020-09-04 RX ADMIN — MUPIROCIN 1 TUBE: 20 OINTMENT TOPICAL at 09:09

## 2020-09-04 RX ADMIN — ATORVASTATIN CALCIUM 40 MG: 40 TABLET, FILM COATED ORAL at 09:09

## 2020-09-04 RX ADMIN — MORPHINE SULFATE 1 MG: 2 INJECTION, SOLUTION INTRAMUSCULAR; INTRAVENOUS at 06:09

## 2020-09-04 RX ADMIN — POTASSIUM CHLORIDE 40 MEQ: 20 TABLET, EXTENDED RELEASE ORAL at 05:09

## 2020-09-04 RX ADMIN — BUSPIRONE HYDROCHLORIDE 5 MG: 5 TABLET ORAL at 03:09

## 2020-09-04 RX ADMIN — LORAZEPAM 0.5 MG: 0.5 TABLET ORAL at 09:09

## 2020-09-04 RX ADMIN — AMIODARONE HYDROCHLORIDE 400 MG: 200 TABLET ORAL at 09:09

## 2020-09-04 RX ADMIN — IPRATROPIUM BROMIDE AND ALBUTEROL SULFATE 3 ML: .5; 3 SOLUTION RESPIRATORY (INHALATION) at 12:09

## 2020-09-04 RX ADMIN — IPRATROPIUM BROMIDE AND ALBUTEROL SULFATE 3 ML: .5; 3 SOLUTION RESPIRATORY (INHALATION) at 01:09

## 2020-09-04 RX ADMIN — LISINOPRIL 40 MG: 20 TABLET ORAL at 09:09

## 2020-09-04 RX ADMIN — BUSPIRONE HYDROCHLORIDE 5 MG: 5 TABLET ORAL at 09:09

## 2020-09-04 RX ADMIN — FUROSEMIDE 40 MG: 10 INJECTION, SOLUTION INTRAMUSCULAR; INTRAVENOUS at 09:09

## 2020-09-04 RX ADMIN — BUSPIRONE HYDROCHLORIDE 5 MG: 5 TABLET ORAL at 05:09

## 2020-09-04 RX ADMIN — AMIODARONE HYDROCHLORIDE 200 MG: 200 TABLET ORAL at 09:09

## 2020-09-04 RX ADMIN — TRAZODONE HYDROCHLORIDE 50 MG: 50 TABLET ORAL at 09:09

## 2020-09-04 NOTE — PLAN OF CARE
09/04/20 0704   Patient Assessment/Suction   Level of Consciousness (AVPU) alert   Respiratory Effort Normal;Unlabored   Expansion/Accessory Muscles/Retractions no use of accessory muscles;expansion symmetric   All Lung Fields Breath Sounds coarse;diminished   NOLAN Breath Sounds coarse   LLL Breath Sounds diminished   RUL Breath Sounds coarse   RML Breath Sounds diminished   RLL Breath Sounds diminished   Rhythm/Pattern, Respiratory unlabored   Cough Frequency infrequent   Cough Type nonproductive;fair   PRE-TX-O2   O2 Device (Oxygen Therapy) nasal cannula   $ Is the patient on Low Flow Oxygen? Yes   Flow (L/min) 3   SpO2 97 %   Pulse Oximetry Type Continuous   $ Pulse Oximetry - Multiple Charge Pulse Oximetry - Multiple   Pulse 76   Resp (!) 21   Aerosol Therapy   $ Aerosol Therapy Charges Aerosol Treatment   Daily Review of Necessity (SVN) completed   Respiratory Treatment Status (SVN) given   Treatment Route (SVN) mask;oxygen   Patient Position (SVN) HOB elevated   Post Treatment Assessment (SVN) increased aeration;congestion decreased   Signs of Intolerance (SVN) none   Breath Sounds Post-Respiratory Treatment   Throughout All Fields Post-Treatment All Fields   Throughout All Fields Post-Treatment aeration increased   Post-treatment Heart Rate (beats/min) 75   Post-treatment Resp Rate (breaths/min) 22   Respiratory Evaluation   $ Care Plan Tech Time 15 min   continue q6 tx    Ongoing SW/CM Assessment/Plan of Care Note     See SW/CM flowsheets for goals and other objective data.    Patient/Family discharge goal (s):  Goal #1: Psychosocial needs assessed  Goal #2: Communication facilitated  Goal #3: Education/provision of information (community resources)    PT Recommendation:       OT Recommendation:       SLP Recommendation:  Recommendations for Discharge: SLP: Home    Disposition:       Progress note:    received call from Dilan Roens with [facility]  in regards to plan of care. Release of information signed by patient earlier today. Art reports Crisis familiar with patient from previous interactions. States family notified 's department on night of admission then also received call from close family friend regarding hospitalization. Art reports at this time patient is not on a Chapter hold however requests update on outcome of telepsych consult as [facility] will support MD recommendation. If Chapter Hold is recommended, Crisis worker will present this evening. If out-patient treatment recommended, Crisis will follow up for linkage to out-patient treatment. Please call to [facility] Crisis , leave message for Crisis worker (will receive call back) after telepsych evaluation completed and recommendations provided for further care.Crisis worker on call this evening is Yisel Rosario. Worker also provided RN station contact for Yisel to call and request update if needed.     Update to patient with plan of care. Aware of telepsych consult this evening and update to [facility] for follow up based on recommendations. Patient expresses desire to discharge to home. Support offered to patient.

## 2020-09-04 NOTE — PROGRESS NOTES
[facility] Medicine  Progress Note    Patient name: Yadi Mccall  MRN: [id number]  Admit Date: 9/3/2020   LOS: 1 day     SUBJECTIVE:     Principal problem: Acute hypoxemic respiratory failure    Interval History:  Admitted after presenting with shortness of breath and hallucinations. She was discharged yesterday from [facility] after undergoing 4v CABG. States her SOB is improved. Admits to chronic dry cough. Endorses chronic left sided weakness due to prior CVA. Denies chest pain.     Scheduled Meds:   albuterol-ipratropium  3 mL Nebulization Q6H    amiodarone  200 mg Oral BID    apixaban  5 mg Oral BID    [START ON 9/5/2020] aspirin  81 mg Oral Daily    atorvastatin  40 mg Oral QHS    busPIRone  5 mg Oral TID    chlorhexidine  15 mL Mouth/Throat BID    lisinopriL  40 mg Oral Daily    mupirocin   Nasal BID     Continuous Infusions:  PRN Meds:calcium chloride IVPB, calcium chloride IVPB, calcium chloride IVPB, LORazepam, magnesium oxide, magnesium sulfate IVPB, magnesium sulfate IVPB, magnesium sulfate IVPB, magnesium sulfate IVPB, morphine, nitroGLYCERIN, potassium chloride in water, potassium chloride in water, potassium chloride in water, potassium chloride in water, potassium chloride, potassium chloride, potassium chloride, potassium chloride, sodium chloride 0.9%, sodium phosphate IVPB, sodium phosphate IVPB, sodium phosphate IVPB, sodium phosphate IVPB, sodium phosphate IVPB, traZODone    Review of patient's allergies indicates:   Allergen Reactions    Chlorthalidone     Codeine     Dyazide [triamterene-hydrochlorothiazid]     Penicillins        Review of Systems: As per interval history. Poor historian     OBJECTIVE:     Vital Signs (Most Recent)  Temp: 97.7 °F (36.5 °C) (09/04/20 1505)  Pulse: 70 (09/04/20 1500)  Resp: (!) 25 (09/04/20 1500)  BP: (!) 103/52 (09/04/20 1500)  SpO2: 100 % (09/04/20 1500)    Vital Signs Range (Last 24H):  Temp:  [97.4 °F  (36.3 °C)-98.1 °F (36.7 °C)]   Pulse:  [70-95]   Resp:  [18-35]   BP: ()/(48-70)   SpO2:  [89 %-100 %]     I & O (Last 24H):    Intake/Output Summary (Last 24 hours) at 9/4/2020 1611  Last data filed at 9/4/2020 1101  Gross per 24 hour   Intake 900 ml   Output --   Net 900 ml       Physical Exam:  General: Patient resting comfortably in no acute distress. Appears as stated age. Calm  Eyes: No conjunctival injection. No scleral icterus.  ENT: Hearing grossly intact. No discharge from ears. No nasal discharge.   Neck: Supple, trachea midline. No JVD  CVS: RRR. Sternal wound incision is clean  Lungs: No tachypnea or accessory muscle use. Diminished bilateral breath sounds (R>L). Faint end-expiratory wheeze  Abdomen:  Soft, nontender and nondistended.  No organomegaly  Neuro: Alert. Left leg weakness; chronic. Follows commands. Responds appropriately   Skin:  No rash or erythema noted  Psych: Flat affect. Normal behavior     Laboratory:  CBC:   Recent Labs   Lab 09/04/20  0356   WBC 13.40*   RBC 3.30*   HGB 9.6*   HCT 29.6*   *   MCV 90   MCH 29.1   MCHC 32.4     CMP:   Recent Labs   Lab 09/03/20  1747 09/04/20  0356    101   CALCIUM 8.4* 8.2*   ALBUMIN 3.3*  --    PROT 6.3  --    * 133*   K 4.1 3.5   CO2 28 28   CL 92* 92*   BUN 20 15   CREATININE 1.0 0.8   ALKPHOS 76  --    ALT 32  --    AST 32  --    BILITOT 1.0  --      Cardiac markers:   Recent Labs   Lab 09/03/20 1747   TROPONINI <0.030       Diagnostic Results:  Labs: Reviewed  X-Ray: Reviewed  CT: Reviewed    ASSESSMENT/PLAN:     65-year-old female with ongoing tobacco use who recently underwent 4 vessel CABG is admitted to our facility for respiratory failure after being discharged from outside facility yesterday.  Found to have moderate left-sided pleural effusion along with right hemidiaphragm elevation.  Possible component of undiagnosed COPD.    Active Hospital Problems    Diagnosis  POA    *Acute hypoxemic respiratory failure  [J96.01]  Yes    Abnormal CXR [R93.89]  Yes    Personal history of tobacco use, presenting hazards to health [Z87.891]  Not Applicable    Marijuana use [F12.90]  Yes    Anemia [D64.9]  Yes    Thrombocytosis [D47.3]  Yes    Hyponatremia [E87.1]  Yes    Generalized anxiety disorder [F41.1]  Yes    Elevated diaphragm [J98.6]  Yes    Bilateral pleural effusion [J90]  Yes    Atrial flutter, paroxysmal [I48.92]  Yes     Recent diagnosis after CABG done on 08/24/2020      S/P CABG x 4 [Z95.1]  Not Applicable     On 08/24/2020 by Dr. Gray      Coronary artery disease of native artery of native heart with stable angina pectoris [I25.118]  Yes    Chronic obstructive pulmonary disease with acute exacerbation [J44.1]  Yes    Hyperlipidemia [E78.5]  Yes      Resolved Hospital Problems    Diagnosis Date Resolved POA    Encephalopathy, metabolic [G93.41] 09/04/2020 Yes       Plan:   Supplemental oxygen for hypoxemic respiratory failure; wean as tolerated  Moderate size left pleural effusion; appreciate pulmonology input  Continue IV diuretics as needed on a daily basis  Right hemidiaphragm elevation noted; will assess for phrenic nerve paralysis when able (when off sternal precautions)  Recent hospital stay complicated by postoperative atrial flutter  Continue amiodarone; dose adjusted as per Cardiology.  Continue apixaban for anticoagulation  Nebulized breathing treatments for COPD exacerbation. Hold off on systemic steroids   Possible undiagnosed anxiety disorder; start buspirone 5 mg t.i.d. along with p.r.n. lorazepam   Mobilize. Cardiac rehab after discharge       VTE Risk Mitigation (From admission, onward)         Ordered     apixaban tablet 5 mg  2 times daily      09/03/20 2155     IP VTE LOW RISK PATIENT  Once      09/03/20 2155     Place sequential compression device  Until discontinued      09/03/20 2155                  Department Hospital Medicine  [facility]  Jefferson Segovia MD  Date of  service: 09/04/2020

## 2020-09-04 NOTE — PLAN OF CARE
PT THINKS SHE HAS A HOME HEALTH BUT DOES NOT KNOW THE NAME. CM TO FOLLOW UNTIL DISCHARGE FROM HOSPITAL.     09/04/20 1417   Discharge Assessment   Assessment Type Discharge Planning Assessment   Confirmed/corrected address and phone number on facesheet? Yes   Assessment information obtained from? Patient   Expected Length of Stay (days) 3   Communicated expected length of stay with patient/caregiver yes   Prior to hospitilization cognitive status: Alert/Oriented   Prior to hospitalization functional status: Assistive Equipment  (wlker)   Current cognitive status: Alert/Oriented   Current Functional Status: Needs Assistance   Facility Arrived From: home   Lives With sibling(s)  (Lives with sister Gunjan Mccall)   Able to Return to Prior Arrangements yes   Is patient able to care for self after discharge? Unable to determine at this time (comments)   Patient's perception of discharge disposition home health   Readmission Within the Last 30 Days current reason for admission unrelated to previous admission   Patient currently being followed by outpatient case management? No   Patient currently receives any other outside agency services? No   Part D Coverage [employer]   Do you have any problems affording any of your prescribed medications? No   Does the patient have transportation home? Yes   Transportation Anticipated family or friend will provide   Dialysis Name and Scheduled days N/A   Does the patient receive services at the [facility]mad[facility] Clinic? No   Discharge Plan A Home Health   Discharge Plan B Home with family   Patient/Family in Agreement with Plan yes   Readmission Questionnaire   At the time of your discharge, did someone talk to you about what your health problems were? Yes   At the time of discharge, did someone talk to you about what to watch out for regarding worsening of your health problem? Yes   At the time of discharge, did someone talk to you about what to do if you experienced worsening of your health  problem? Yes   At the time of discharge, did someone talk to you about which medication to take when you left the hospital and which ones to stop taking? Yes   At the time of discharge, did someone talk to you about when and where to follow up with a doctor after you left the hospital? Yes   What do you believe caused you to be sick enough to be re-admitted? SOB/ Hallucinations   How often do you need to have someone help you when you read instructions, pamphlets, or other written material from your doctor or pharmacy? Often   Do you have problems taking your medications as prescribed? No   Do you have any problems affording any of  your prescribed medications? Yes   Do you have problems obtaining/receiving your medications? No   Does the patient have transportation to healthcare appointments? Yes   Living Arrangements mobile home   Does the patient have family/friends to help with healtcare needs after discharge? yes   Does your caregiver provide all the help you need? Yes   Are you currently feeling confused? No   Are you currently having problems thinking? No   Are you currently having memory problems? No   Have you felt down, depressed, or hopeless? 0   Have you felt little interest or pleasure in doing things? 0   In the last 7 days, my sleep quality was: good
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
## 2020-09-04 NOTE — H&P
[facility] Medicine History & Physical Examination   Patient Name: Yadi Mccall  MRN: [id number]  Patient Class: Emergency   Admission Date: 9/3/2020  5:48 PM  Length of Stay: 0  Attending Physician:   Primary Care Provider: Chetan Sweeney MD  Face-to-Face encounter date: 09/03/2020  Code Status:Full Code  MPOA:  Chief Complaint: Shortness of Breath (sister reports patient having hallucinations. recent cardiac bypass done at [facility].  Pt denies any increased SOB or pain.  EMS report O2 SATS on home 3L was in the 80's.  Breathing treatment given in route to increase SATS to 90's )        Patient information was obtained from patient, past medical records and ER records.   HISTORY OF PRESENT ILLNESS:   Yadi Mccall is a 65 y.o. old female who  has a past medical history of AAA (abdominal aortic aneurysm), Anticoagulant long-term use, Coronary artery disease, Foot drop, left foot, Hyperlipidemia, Hypertension, and Stroke (2014).. The patient presented to [facility] on 9/3/2020 with a primary complaint of Shortness of Breath (sister reports patient having hallucinations. recent cardiac bypass done at [facility].  Pt denies any increased SOB or pain.  EMS report O2 SATS on home 3L was in the 80's.  Breathing treatment given in route to increase SATS to 90's )  .     A 65-year-old  female presents emergency room with shortness of breath and altered mental status.      According to the patient's sister and patient had a 4 vessel CABG approximately 2 weeks ago.  She developed postop atrial fibrillation flutter and was placed on amiodarone and Eliquis.  Nonetheless the patient was followed home today with confusion and hypoxia.  She is normally on supplement oxygen at home.      Upon arrival EMS found the patient's statin and 80s and was confused    Upon arrival in the emergency room the patient was hypoxic she was put on supplemental O2 with improvement in oxygen saturation  and ventrally improvement in her mentation but she still remained essentially confused.        On August 24, 2020 the patient had a scheduled 4 vessel CABG with Dr. MARSHA Gray.  On 08/24/2020 she was extubated chest tubes were removed and she was stable.  She did developed postop AFib flutter with a RVR and was placed on amiodarone and Eliquis was discharged home on these medications.      The patient also has a past medical history of chronic complete occlusion to her right carotid artery 6 years ago she had a right hemispheric stroke secondary to the occluded right carotid artery, hypertension, COPD, hyperlipidemia and chronic left footdrop of which she wears a splint a uses a walker to ambulate      REVIEW OF SYSTEMS:   10 Point Review of System was performed and was found to be negative except for that mentioned already in the HPI and   Review of Systems (Negative unless checked off)  Review of Systems   Unable to perform ROS: Mental status change           PAST MEDICAL HISTORY:     Past Medical History:   Diagnosis Date    AAA (abdominal aortic aneurysm)     Anticoagulant long-term use     Coronary artery disease     Foot drop, left foot     Hyperlipidemia     Hypertension     Stroke 2014    LEFT SIDED WEAKNESS       PAST SURGICAL HISTORY:     Past Surgical History:   Procedure Laterality Date    APPENDECTOMY      CORONARY ANGIOGRAPHY N/A 7/24/2020    Procedure: ANGIOGRAM, CORONARY ARTERY;  Surgeon: Norbert Vallecillo MD;  Location: [facility] CATH;  Service: Cardiology;  Laterality: N/A;    CORONARY ARTERY BYPASS GRAFT (CABG) N/A 8/21/2020    Procedure: CORONARY ARTERY BYPASS GRAFT (CABG) BILATERAL MAMMARY  x  4 VESSELS;  Surgeon: Pascual Gray MD;  Location: [facility] OR;  Service: Cardiovascular;  Laterality: N/A;    ENDOSCOPIC HARVEST OF VEIN N/A 8/21/2020    Procedure: HARVEST-VEIN-ENDOVASCULAR;  Surgeon: Pascual Gray MD;  Location: [facility] OR;  Service: Cardiovascular;  Laterality: N/A;    FOOT SURGERY       HERNIA REPAIR      HYSTERECTOMY      LEFT HEART CATHETERIZATION N/A 2020    Procedure: Left heart cath;  Surgeon: Norbert Vallecillo MD;  Location: [facility] CATH;  Service: Cardiology;  Laterality: N/A;    TONSILLECTOMY         ALLERGIES:   Chlorthalidone, Codeine, Dyazide [triamterene-hydrochlorothiazid], and Penicillins    FAMILY HISTORY:     Family History   Problem Relation Age of Onset    Hypertension Mother     Melanoma Neg Hx     Psoriasis Neg Hx     Lupus Neg Hx     Eczema Neg Hx        SOCIAL HISTORY:     Social History     Tobacco Use    Smoking status: Former Smoker     Packs/day: 1.00     Years: 50.00     Pack years: 50.00     Types: Cigarettes     Quit date: 2019     Years since quittin.7    Smokeless tobacco: Never Used    Tobacco comment: down from 5 cartons/month to 2 cartons/month   Substance Use Topics    Alcohol use: No        Social History     Substance and Sexual Activity   Sexual Activity Not on file        HOME MEDICATIONS:     Prior to Admission medications    Medication Sig Start Date End Date Taking? Authorizing Provider   amiodarone (PACERONE) 200 MG Tab Take 1 tablet (200 mg total) by mouth once daily.  Patient taking differently: Take 400 mg by mouth once daily. 400mg for 8 days, or 09/10/20, then 200mg daily thereafter 9/10/20 9/10/21 Yes Demario Elizabeth NP   amiodarone (PACERONE) 400 MG tablet Take 1 tablet (400 mg total) by mouth 2 (two) times daily. 20 Yes Demario Elizabeth NP   apixaban (ELIQUIS) 5 mg Tab Take 1 tablet (5 mg total) by mouth 2 (two) times daily. 20  Yes Demario Elizabeth NP   aspirin 325 MG tablet Take 325 mg by mouth once daily.    Yes Historical Provider, MD   atorvastatin (LIPITOR) 40 MG tablet TAKE 1 TABLET EVERY DAY  Patient taking differently: Take 40 mg by mouth every evening.  20  Yes Chetan Sweeney MD   baclofen (LIORESAL) 20 MG tablet TAKE 1 TABLET THREE TIMES DAILY AS NEEDED  Patient taking differently: Take 20 mg  by mouth 3 (three) times daily as needed (pain).  6/22/20  Yes Chetan Sweeney MD   fluticasone propionate (FLONASE) 50 mcg/actuation nasal spray 2 sprays (100 mcg total) by Each Nostril route once daily.  Patient taking differently: 2 sprays by Each Nostril route daily as needed. TAKE IF NEEDED MORNING OF SURGERY 12/19/19  Yes Chetan Sweeney MD   furosemide (LASIX) 20 MG tablet Take 1 tablet (20 mg total) by mouth 2 (two) times daily. 9/2/20 9/2/21 Yes Demario Elizabeth NP   HYDROcodone-acetaminophen (NORCO) 7.5-325 mg per tablet Take 1 tablet by mouth every 4 (four) hours as needed. 9/2/20  Yes Demario Elizabeth NP   lisinopriL (PRINIVIL,ZESTRIL) 40 MG tablet Take 40 mg by mouth once daily.   Yes Historical Provider, MD   nicotine (NICODERM CQ) 21 mg/24 hr Place 1 patch onto the skin once daily. 5/12/20  Yes Chetan Sweeney MD   rOPINIRole (REQUIP) 2 MG tablet Take 1 tablet (2 mg total) by mouth 3 (three) times daily.  Patient taking differently: Take 2 mg by mouth 3 (three) times daily as needed (restless legs).  1/2/20 1/1/21 Yes Chetan Sweeney MD   traZODone (DESYREL) 100 MG tablet TAKE 1 TABLET NIGHTLY AS NEEDED FOR INSOMNIA  Patient taking differently: Take 100 mg by mouth nightly as needed.  6/22/20  Yes Chetan Sweeney MD   acetaminophen (TYLENOL) 500 MG tablet Take 500 mg by mouth 2 (two) times daily as needed for Pain.     Historical Provider, MD   albuterol-ipratropium (DUO-NEB) 2.5 mg-0.5 mg/3 mL nebulizer solution Take 3 mLs by nebulization every 4 (four) hours. Rescue  Patient taking differently: Take 3 mLs by nebulization every 4 (four) hours as needed.  10/8/19 10/7/20  Chetan Sweeney MD   gabapentin (NEURONTIN) 100 MG capsule 1 by mouth in the morning 1 by mouth in the afternoon and 3 by mouth at bedtime  Patient not taking: Reported on 7/30/2020 12/16/19   Chetan Sweeney MD   guaifenesin (MUCINEX ORAL) Take 2 tablets by mouth as needed (congestion).    Historical Provider, MD   metoprolol tartrate 37.5  "mg Tab Take 37.5 mg by mouth 2 (two) times daily. 9/2/20 9/2/21  Demario Elizabeth, SARAH   nitroGLYCERIN (NITROSTAT) 0.4 MG SL tablet Place 1 tablet (0.4 mg total) under the tongue every 5 (five) minutes as needed for Chest pain. 3/20/20 3/20/21  Chetan Sweeney MD   oxymetazoline HCl (AFRIN, OXYMETAZOLINE, NASL) 1 spray by Nasal route as needed (stuffy nose).    Historical Provider, MD   triamcinolone acetonide 0.1% (KENALOG) 0.1 % ointment Apply topically 2 (two) times daily.  Patient not taking: Reported on 7/30/2020 5/12/20   Chetan Sweeney MD         PHYSICAL EXAM:   BP (!) 115/58   Pulse 88   Temp 97.9 °F (36.6 °C) (Oral)   Resp 18   Ht 5' 8" (1.727 m)   Wt 96.6 kg (213 lb)   SpO2 (!) 94%   BMI 32.39 kg/m²   Vitals Reviewed  General appearance:  Ill-appearing obese female in no apparent distress.  Skin: No Rash.   Neuro: Motor and sensory exams grossly intact. Good tone. Power in all 4 extremities 5/5.   HENT: Atraumatic head. Moist mucous membranes of oral cavity.  Eyes: Normal extraocular movements.   Neck: Supple. No evidence of lymphadenopathy. No thyroidomegaly.  Lungs:  Moderate tachypnea decreased breath sounds on the left rhonchi throughout  Heart: Regular rate and rhythm. S1 and S2 present with no murmurs/gallop/rub. No pedal edema. No JVD present.   Abdomen: Soft, non-distended, non-tender. No rebound tenderness/guarding. No masses or organomegaly. Bowel sounds are normal. Bladder is not palpable.   Extremities: No cyanosis, clubbing, or edema.  Psych/mental status: Alert and oriented. Cooperative. Responds appropriately to questions.   EMERGENCY DEPARTMENT LABS AND IMAGING:   Following labs were Reviewed   Recent Labs   Lab 09/03/20  1747   WBC 18.24*   HGB 10.0*   HCT 30.5*   *   CALCIUM 8.4*   ALBUMIN 3.3*   PROT 6.3   *   K 4.1   CO2 28   CL 92*   BUN 20   CREATININE 1.0   ALKPHOS 76   ALT 32   AST 32   BILITOT 1.0         CMP   Recent Labs   Lab 09/02/20  0703 09/03/20  5907   NA " 130* 133*   K 3.6 4.1   CL 89* 92*   CO2 37* 28    108   BUN 16 20   CREATININE 0.77 1.0   CALCIUM 8.4 8.4*   PROT  --  6.3   ALBUMIN  --  3.3*   BILITOT  --  1.0   ALKPHOS  --  76   AST  --  32   ALT  --  32   ANIONGAP 4* 13   ESTGFRAFRICA >60 >60.0   EGFRNONAA >60 >60.0   , CBC   Recent Labs   Lab 09/03/20  1747   WBC 18.24*   HGB 10.0*   HCT 30.5*   *   , INR   Lab Results   Component Value Date    INR 1.2 09/03/2020    INR 1.4 08/21/2020    INR 1.0 08/18/2020   , Lipid Panel   Lab Results   Component Value Date    CHOL 128 08/08/2020    HDL 36 (L) 08/08/2020    LDLCALC 72.6 08/08/2020    TRIG 97 08/08/2020    CHOLHDL 28.1 08/08/2020   , Troponin   Recent Labs   Lab 09/03/20  1747   TROPONINI <0.030   , A1C: No results for input(s): HGBA1C in the last 4320 hours. and All labs within the past 24 hours have been reviewed  Microbiology Results (last 7 days)     Procedure Component Value Units Date/Time    Blood culture #2 **CANNOT BE ORDERED STAT** [[id number]] Collected: 09/03/20 1950    Order Status: Sent Specimen: Blood from Peripheral, Antecubital, Right Updated: 09/03/20 2003    Blood culture #1 **CANNOT BE ORDERED STAT** [[id number]] Collected: 09/03/20 1911    Order Status: Sent Specimen: Blood from Peripheral, Forearm, Left Updated: 09/03/20 1918        CTA Chest Non-Coronary - PE Study   Final Result      X-Ray Chest AP Portable   Final Result      CT Head Without Contrast   Final Result      X-ray Chest 1 View    Result Date: 8/31/2020  EXAMINATION: XR CHEST 1 VIEW 08/31/2020 at 05:50 INDICATION: Dyspnea, respiratory abnormality COMPARISON 08/28/2020 FINDINGS The sternal wires are aligned.  There is slight asymmetric right hemidiaphragm elevation similar overall.  There is some patchy atelectatic consolidation with a small amount of pleural fluid bilaterally somewhat increased at the lung bases.  No interval pneumothorax or cardiac decompensation is appreciated.  No other significant interval  changes are appreciated. IMPRESSION There is some patchy atelectasis and pleural fluid slightly increased overall at the lung bases. Electronically signed by: Darek Ballesteros MD Date:    08/31/2020 Time:    06:14    X-ray Chest 1 View    Result Date: 8/28/2020  EXAMINATION: XR CHEST 1 VIEW CLINICAL HISTORY: Fall 1 day ago COMPARISON: 08/26/2020 FINDINGS: Single view of the chest shows stable small right pleural fluid.  Atelectatic changes are in both lung bases.  No pneumothorax.  Cardiomediastinal silhouette is stable.     Stable chest Electronically signed by: Sathish Diaz MD Date:    08/28/2020 Time:    13:14    X-ray Chest Pa And Lateral    Result Date: 8/18/2020  EXAMINATION: XR CHEST PA AND LATERAL CLINICAL HISTORY: pre-op testing; Atherosclerotic heart disease of native coronary artery with other forms of angina pectoris COMPARISON: 09/26/2019 FINDINGS: PA and lateral views of the chest show no focal consolidation, pneumothorax or pleural effusion.  Cardiac silhouette and pulmonary vasculature are normal.  Aorta is partially calcified.  No acute osseous findings.     No acute findings in the chest Electronically signed by: Sathish Diaz MD Date:    08/18/2020 Time:    12:15    X-ray Shoulder 2 Or More Views Right    Result Date: 8/28/2020  EXAMINATION: Four views right show CLINICAL HISTORY: Fall COMPARISON: None FINDINGS: No fracture or dislocation.  Degenerative changes are present.  Soft tissues are normal.     No acute osseous findings Electronically signed by: Sathish Diaz MD Date:    08/28/2020 Time:    13:19    X-ray Hand 3 View Left    Result Date: 8/28/2020  EXAMINATION: Four views left hand CLINICAL HISTORY: Fall COMPARISON: None FINDINGS: Bones are demineralized.  There is no displaced fracture or dislocation.     No acute osseous findings Electronically signed by: Sathish Diaz MD Date:    08/28/2020 Time:    13:14    Ct Head Without Contrast    Result Date: 9/3/2020  CT head without contrast  CLINICAL DATA: Altered mental status CMS MANDATED QUALITY DATA - CT RADIATION  436 All CT scans at this facility utilize dose modulation, iterative reconstruction, and/or weight based dosing when appropriate to reduce radiation dose to as low as reasonably achievable. Findings: Thin section axial noncontrast images were obtained from the skull base to the vertex. Comparison is made to August 28. There is no evidence of intracranial mass, acute hemorrhage, or midline shift. Ventricles and sulci are normal. There are no pathologic extra-axial fluid collections. There is no CT evidence of acute ischemic change. Chronic frontal watershed infarct on the right is noted, unchanged compared to August 28. Cerebellum and brainstem are unremarkable. The calvarium is intact. Mild right-sided mastoid opacification is noted. IMPRESSION: 1. No acute intracranial abnormalities. 2. Chronic frontal watershed infarct on the right. 3. Mild right-sided mastoid effusion. Electronically Signed by Mehran Peters M.D. on 9/3/2020 6:38 PM    Ct Head Without Contrast    Result Date: 8/28/2020  EXAMINATION: CT HEAD WITHOUT CONTRAST CLINICAL HISTORY: Fall, head trauma. TECHNIQUE: Axial CT images were obtained of the brain without intravenous contrast.  Coronal and sagittal reformations were obtained.  Automated exposure control utilized to reduce radiation dose.  Total exam DLP is 1391 mGy cm. COMPARISON: None. FINDINGS: Chronic involutional changes are noted.  Chronic white matter microischemic changes are noted.  There is encephalomalacia right [place] distribution.  There is intracranial atherosclerosis.    No acute intracranial hemorrhage, extra-axial fluid collection, hydrocephalus, mass effect, midline shift is noted.  No large vessel territory acute ischemia is identified.  Paranasal sinuses and facial bones will be discussed on dedicated facial CT.  Visualized mastoid air cells demonstrate partial right mastoid effusion.  No acute  displaced calvarial fracture is identified.     1. No acute intracranial abnormalities identified. 2. Chronic involutional and ischemic changes. Electronically signed by: Flo Bosch MD Date:    08/28/2020 Time:    11:29    Cta Chest Non-coronary - Pe Study    Result Date: 9/3/2020  HISTORY: Shortness of breath. CMS MANDATED QUALITY DATA - CT RADIATION  436 All CT scans at this facility utilize dose modulation, iterative reconstruction, and/or weight based dosing when appropriate to reduce radiation dose to as low as reasonably achievable FINDINGS: Thin axial imaging through the chest was performed with 100 mL nonionic IV contrast, with sagittal and coronal reformatted images and 3-D reconstructions performed on an independent workstation, with images stored in the patient's permanent electronic medical record. The pulmonary arteries are fairly well opacified. There are no filling defects to indicate pulmonary thromboembolic disease. Heart size is normal. There is no significant pericardial effusion. Post sternotomy changes are noted. No pathologic mediastinal fluid collections are identified. There is a small amount of soft tissue gas in the right breast, presumably iatrogenic. There is a small to moderate size left-sided pleural effusion with associated left basilar atelectasis. There is moderate dependent atelectasis at the posterior right lung base. Mild apical emphysematous changes are noted bilaterally. There is no evidence of pneumothorax. IMPRESSION: 1. No evidence of pulmonary thromboembolic disease. 2. Small to moderate left-sided pleural effusion. 3. Bibasilar atelectasis. 4. Changes of recent median sternotomy. 5. Small amount of soft tissue gas in the right breast is presumably iatrogenic. Electronically Signed by Mehran Peters M.D. on 9/3/2020 7:54 PM    Ct Cervical Spine Without Contrast    Result Date: 8/28/2020  EXAMINATION: CT CERVICAL SPINE WITHOUT CONTRAST CLINICAL HISTORY: Fall, head and  neck trauma. TECHNIQUE: Axial CT images of the cervical spine were obtained without intravenous contrast.  Coronal and sagittal reformations were obtained.  Automated exposure control utilized to reduce radiation dose.  Total exam DLP is 1391 mGy cm. COMPARISON: None. FINDINGS: Vertebral body heights are preserved.  There is grade 1 anterolisthesis C4 on C5.  There is multilevel disc space narrowing and marginal osteophytosis most severe at C3-C4, C5-C6, C6-C7, C7-T1.  There is multilevel uncovertebral hypertrophy.  There is multilevel facet arthropathy.  Degenerative changes are noted of the atlantoaxial articulation which is otherwise intact.  There is developmental incomplete closure of the posterior ring of C1.  There is no acute displaced fracture appreciated.  There is multilevel osseous neural foraminal narrowing.  There is multilevel osseous spinal canal narrowing on a degenerative basis.  There is subcutaneous emphysema demonstrated within the right neck as described on 08/26/2020 chest radiograph.     1. No acute osseous abnormality is demonstrated. 2. There is multilevel degenerative spondylosis and facet arthropathy. 3. Subcutaneous emphysema right neck again demonstrated. Electronically signed by: Flo Bosch MD Date:    08/28/2020 Time:    11:41    Us Abdominal Aorta    Result Date: 8/12/2020  EXAMINATION: US ABDOMINAL AORTA CLINICAL HISTORY: Atherosclerotic heart disease of native coronary artery with other forms of angina pectoris TECHNIQUE: Limited ultrasound was performed of the abdominal aorta, with cross sectional diameter measurements obtained. COMPARISON: None. FINDINGS: The proximal abdominal aorta measures 2.5 x 2.5 cm. The mid abdominal aorta measures 2.4 x 2 cm. Fusiform dilatation of the infrarenal abdominal aorta, measuring up to 4.0 x 3.8 cm and spanning a length of 6.4 cm. The right iliac artery measures 1.3 x 1.0 cm.  Peak systolic velocity within the right common iliac artery  measures up to 445 centimeters/second, suggestive of greater than 75% luminal stenosis proximally. The left iliac artery measures 1.1 x 0.9 cm.  Peak systolic velocity within the right common iliac artery measures up to 465 centimeters/second, suggestive of greater than 75% proximal luminal stenosis proximally. Aortoiliac atherosclerosis: Moderate     1. Fusiform infrarenal abdominal aortic aneurysm, measuring up to 4.0 x 3.8 cm. 2. Elevated peak systolic velocities within bilateral common iliac arteries (measuring greater than 400 centimeters/second), suggestive of greater than 75% luminal stenoses proximally. Electronically signed by: Maco Samuel Date:    08/12/2020 Time:    15:01    Ct Maxillofacial Without Contrast    Result Date: 8/28/2020  EXAMINATION: CT MAXILLOFACIAL WITHOUT CONTRAST CLINICAL HISTORY: Fall, head, face, neck trauma. TECHNIQUE: Axial CT images of the face were obtained without intravenous contrast.  Coronal and sagittal reformations were obtained.  Automated exposure control utilized to reduce radiation dose.  Total exam DLP is 1391 mGy cm. COMPARISON: None. FINDINGS: There is partial visualization of the subcutaneous emphysema within the right neck as described on previous chest radiograph from 08/26/2020.  No acute displaced fracture, subluxation, or dislocation identified of the facial bones.  There is no hemosinus demonstrated.  There is scattered mild mucoperiosteal thickening within the paranasal sinuses.  Chronic sinusitis changes are noted within the left sphenoid sinus with chronic wall thickening.  The left mastoid air cells appear clear.  There is partial effusion of the right mastoid air cells.  Patient is edentulous.  The orbits appear intact.  There is no lens dislocation.  The globes are intact.  There is no retrobulbar hematoma.  Extraocular muscles appear symmetric.     1. No acute displaced fracture or dislocation identified. Electronically signed by: Flo Bosch MD  Date:    08/28/2020 Time:    11:46    X-ray Chest Ap Portable    Result Date: 9/3/2020  Chest single view CLINICAL DATA: Altered mental status FINDINGS: AP view is compared to August 31. Mild cardiomegaly is stable. There has been previous median sternotomy. The right lung is clear. Hazy increased density in the lower left hemithorax is consistent with a small left pleural effusion and left basilar atelectasis or infiltrate, slightly increased. No acute osseous abnormality is identified. IMPRESSION: 1. Faint left basilar opacity consistent with small pleural effusion and atelectasis or infiltrate, slightly increased compared to August 31. 2. Mild cardiomegaly and post sternotomy changes. Electronically Signed by Mehran Peters M.D. on 9/3/2020 6:39 PM    X-ray Chest Ap Portable    Result Date: 8/26/2020  EXAMINATION: XR CHEST AP PORTABLE presented 08/26/2020 at 10:29 INDICATION: Cardiovascular abnormality, status post surgery COMPARISON 08/24/2020 FINDINGS The sternal wires are aligned with interval right central catheter and central drain removal.  There is some subcutaneous emphysema developing in the interval right lower neck, supraclavicular region.  There is also very minimal subcutaneous emphysema along the inferolateral left chest wall previously technically excluded.  There is slight asymmetric right hemidiaphragm elevation similar with slightly improved central aeration.  Although the subcutaneous emphysema is present, no gross no interval pneumothorax or cardiac decompensation is appreciated.  No other significant interval changes are appreciated. IMPRESSION There is interval central drain and right central catheter removal with some interval developing subcutaneous emphysema about the right supraclavicular, lower neck level.  There does appear to be slightly improved aeration with no pneumothorax or cardiac decompensation appreciated. Electronically signed by: Darek Ballesteros MD Date:    08/26/2020  Time:    10:32    X-ray Chest Ap Portable    Result Date: 8/24/2020  EXAMINATION: XR CHEST AP PORTABLE 08/24/2020 at 04:09 INDICATION: Cardiovascular abnormality, status post CABG COMPARISON 08/23/2020 chest FINDINGS The right central catheter and central drains remain.  The sternal wires are aligned.  There is some improving central, basal aeration with decreased atelectasis.  No interval pneumothorax or cardiac decompensation is appreciated.  No other significant interval changes are appreciated. IMPRESSION There is interval improving central aeration and lung volume appearance overall. Electronically signed by: Darek Ballesteros MD Date:    08/24/2020 Time:    04:59    X-ray Chest Ap Portable    Result Date: 8/23/2020  EXAMINATION: XR CHEST AP PORTABLE 08/23/2020 at 04:14 INDICATION: Cardiovascular abnormality, status post CABG COMPARISON 08/22/2020 FINDINGS The central drains and right central catheter remain along with the aligned sternal wires.  There is some proving central, basal aeration with decreased atelectasis and pleural fluid.  No interval pneumothorax or cardiac decompensation is appreciated.  No other significant interval changes are appreciated. IMPRESSION There is improved central aeration and lung volume appearance overall. Electronically signed by: Darek Ballesteros MD Date:    08/23/2020 Time:    05:51    X-ray Chest Ap Portable    Result Date: 8/22/2020  EXAMINATION: XR CHEST AP PORTABLE 08/22/2020 at 04:28 INDICATION: Cardiovascular abnormality, status post CABG COMPARISON 08/21/2020 chest FINDINGS The patient has been extubated along with gastric tube removal.  The right central line remains along with the central drains and aligned sternal wires.  There is slightly improved central, upper lung zone aeration.  There is some bandlike subsegmental atelectasis residual at the lung bases similar overall.  There appears to be some trace right apical pleural air present.  Correlate overall with the drainage  tube output.  No interval cardiac decompensation is appreciated.  No other significant interval changes are appreciated. IMPRESSION The patient has been extubated.  There is some trace right apical pleural air present with no significant change of aeration overall.  Correlate overall with the drainage tube output. Electronically signed by: Darek Ballesteros MD Date:    08/22/2020 Time:    06:01    X-ray Chest Ap Portable    Result Date: 8/21/2020  EXAMINATION: Single view chest radiograph. CLINICAL HISTORY: CABG; TECHNIQUE: Single view of the chest. COMPARISON: Chest radiograph 08/18/2020. FINDINGS: A single AP view of the chest demonstrates postsurgical changes following median sternotomy and CABG.  There is an endotracheal tube 3.5 cm above the michell.  There is a right IJ catheter in the SVC.  There is a mediastinal drain and a right chest tube in place.  There is minimal bibasilar atelectasis.  There is no pneumothorax.     Postsurgical changes following median sternotomy and CABG with support lines and tubes as above. Electronically signed by: Ubaldo Hawkins MD Date:    08/21/2020 Time:    15:07    Us Carotid Bilateral    Result Date: 8/12/2020  EXAMINATION: US CAROTID BILATERAL CLINICAL HISTORY: Atherosclerotic heart disease of native coronary artery with other forms of angina pectoris TECHNIQUE: Grayscale and color Doppler ultrasound examination of the carotid and vertebral artery systems bilaterally.  Stenosis estimates are per the NASCET measurement criteria. COMPARISON: None. FINDINGS: Right: Right internal carotid artery is completely occluded at its origin in this patient with reported known chronic occlusion.  Moderate homogeneous plaque is noted. Vertebral artery: Antegrade flow and normal waveform. Left: Internal Carotid Artery (ICA)  peak systolic velocity 153 cm/sec ICA/CCA peak systolic velocity ratio: 2.1 Plaque formation: Moderate homogeneous Vertebral artery: Antegrade flow and normal waveform.     1.  Chronic complete occlusion of the right internal carotid artery at its origin in this patient with reported known chronic occlusion. 2. Moderate homogeneous atherosclerotic plaque at the left carotid bifurcation resulting in approximately 50-69% luminal stenosis of the left internal carotid artery. Electronically signed by: Maco Samuel Date:    08/12/2020 Time:    15:23    X-ray Shoulder 2 Or More Views Left    Result Date: 8/28/2020  EXAMINATION: Four views left shoulder CLINICAL HISTORY: Fall COMPARISON: None FINDINGS: No displaced fracture or dislocation.  Soft tissues are normal.     No acute osseous findings Electronically signed by: Sathish Diaz MD Date:    08/28/2020 Time:    13:17    Us Lower Extrem Arteries Bilat With Jackie (xpd)    Result Date: 8/12/2020  EXAMINATION: US ARTERIAL LOWER EXTREMITY BILAT WITH JACKIE (XPD) CLINICAL HISTORY: Atherosclerotic heart disease of native coronary artery with other forms of angina pectoris TECHNIQUE: Bilateral lower extremity arterial duplex ultrasound examination performed. Multiple gray scale and color doppler images were obtained in addition to waveform analysis.  Ankle-brachial indices were calculated. COMPARISON: None FINDINGS: The ankle brachial index on the right is 0.86 and on the left is 0.91.  Moderate atherosclerosis is present throughout both lower extremities. The peak systolic velocities on the right are as follows, in centimeters/second: Common femoral artery: 72 Superficial femoral artery, proximal: 46 Superficial femoral artery, mid portion: 75 Superficial femoral artery, distal: 64 Popliteal artery: 41 Posterior tibial artery: 46 Anterior tibial artery: 36 The peak systolic velocities on the left are as follows, in centimeters/second: Common femoral artery: 131 Superficial femoral artery, proximal: 104.  There is elevated peak systolic velocity ratio of 1.5 at the mid left femoral artery, suggestive of a 30-49% luminal stenosis. Superficial femoral  artery, mid portion: 156 Superficial femoral artery, distal: 199.  Elevated peak systolic velocity ratio of 1.8 between the mid and distal femoral artery, suggestive of a 30-49% luminal stenosis. Popliteal artery: 131 Posterior tibial artery: 52 Anterior tibial artery: 73 Monophasic waveforms are demonstrated throughout both lower extremities.     Ankle-brachial index of 0.86 on the right and 0.91 on the left. Moderate bilateral lower extremity atherosclerotic disease with monophasic waveforms and evidence of 30-49% luminal stenoses involving the mid and distal left femoral artery. Electronically signed by: Maco Samuel Date:    08/12/2020 Time:    15:12  Twelve the EKG reveals a normal sinus rhythm with a normal axis and poor R-wave progression is ST flattening throughout and Q-waves in her inferior leads to 3 and aVF rate 83  millisecond  ASSESSMENT & PLAN:   Yadi Mccall is a 65 y.o. female admitted for    1.  Encephalopathy most likely secondary to hypoxia may also be a component of medication induced patient on anti anxiety medication at home  -improve with supplemental O2  -continue supplemental O2 and aerosol nebulizer treatment  -IV antibiotics for possible pneumonia  -consult Pulmonary    2 moderate left-sided pleural effusion  -consult Pulmonary      3 coronary artery disease status post recent CABG 2 weeks ago  -consult cardiothoracic surgeon Dr. Kike Hu      4.  Probable underlying pneumonia with leukocytosis  -IV antibiotics    5.  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  History of postop AFib flutter  -placed on amiodarone and Eliquis    7.  COPD possible acute exacerbation  -aerosol nebulizer treatment  -IV steroids if not improvement  -supplemental O2    High risk for clinical decline secondary to recent CABG, on blood thinners, acute hypoxic respiratory failure with encephalopathy    Critical care time spent 62  minutes    DVT Prophylaxis: will be placed on continue Eliquis for DVT prophylaxis and will be advised to be as mobile as possible and sit in a chair as tolerated.   ________________________________________________________________________________    Face-to-Face encounter date: 09/03/2020  Encounter included review of the medical records, interviewing and examining the patient face-to-face, discussion with family and other health care providers including emergency medicine physician, admission orders, interpreting lab/test results and formulating a plan of care.   Medical Decision Making during this encounter was  [_] Low Complexity  [_] Moderate Complexity  [x] High Complexity  _________________________________________________________________________________    INPATIENT LIST OF MEDICATIONS   No current facility-administered medications for this encounter.     Current Outpatient Medications:     [START ON 9/10/2020] amiodarone (PACERONE) 200 MG Tab, Take 1 tablet (200 mg total) by mouth once daily. (Patient taking differently: Take 400 mg by mouth once daily. 400mg for 8 days, or 09/10/20, then 200mg daily thereafter), Disp: 30 tablet, Rfl: 11    amiodarone (PACERONE) 400 MG tablet, Take 1 tablet (400 mg total) by mouth 2 (two) times daily., Disp: 60 tablet, Rfl: 11    apixaban (ELIQUIS) 5 mg Tab, Take 1 tablet (5 mg total) by mouth 2 (two) times daily., Disp: 60 tablet, Rfl: 2    aspirin 325 MG tablet, Take 325 mg by mouth once daily. , Disp: , Rfl:     atorvastatin (LIPITOR) 40 MG tablet, TAKE 1 TABLET EVERY DAY (Patient taking differently: Take 40 mg by mouth every evening. ), Disp: 90 tablet, Rfl: 3    baclofen (LIORESAL) 20 MG tablet, TAKE 1 TABLET THREE TIMES DAILY AS NEEDED (Patient taking differently: Take 20 mg by mouth 3 (three) times daily as needed (pain). ), Disp: 270 tablet, Rfl: 3    fluticasone propionate (FLONASE) 50 mcg/actuation nasal spray, 2 sprays (100 mcg total) by Each Nostril route  once daily. (Patient taking differently: 2 sprays by Each Nostril route daily as needed. TAKE IF NEEDED MORNING OF SURGERY), Disp: 16 g, Rfl: 11    furosemide (LASIX) 20 MG tablet, Take 1 tablet (20 mg total) by mouth 2 (two) times daily., Disp: 60 tablet, Rfl: 11    HYDROcodone-acetaminophen (NORCO) 7.5-325 mg per tablet, Take 1 tablet by mouth every 4 (four) hours as needed., Disp: 28 tablet, Rfl: 0    lisinopriL (PRINIVIL,ZESTRIL) 40 MG tablet, Take 40 mg by mouth once daily., Disp: , Rfl:     nicotine (NICODERM CQ) 21 mg/24 hr, Place 1 patch onto the skin once daily., Disp: 30 patch, Rfl: 3    rOPINIRole (REQUIP) 2 MG tablet, Take 1 tablet (2 mg total) by mouth 3 (three) times daily. (Patient taking differently: Take 2 mg by mouth 3 (three) times daily as needed (restless legs). ), Disp: 90 tablet, Rfl: 11    traZODone (DESYREL) 100 MG tablet, TAKE 1 TABLET NIGHTLY AS NEEDED FOR INSOMNIA (Patient taking differently: Take 100 mg by mouth nightly as needed. ), Disp: 90 tablet, Rfl: 3    acetaminophen (TYLENOL) 500 MG tablet, Take 500 mg by mouth 2 (two) times daily as needed for Pain. , Disp: , Rfl:     albuterol-ipratropium (DUO-NEB) 2.5 mg-0.5 mg/3 mL nebulizer solution, Take 3 mLs by nebulization every 4 (four) hours. Rescue (Patient taking differently: Take 3 mLs by nebulization every 4 (four) hours as needed. ), Disp: 1 Box, Rfl: 5    gabapentin (NEURONTIN) 100 MG capsule, 1 by mouth in the morning 1 by mouth in the afternoon and 3 by mouth at bedtime (Patient not taking: Reported on 7/30/2020), Disp: 150 capsule, Rfl: 11    guaifenesin (MUCINEX ORAL), Take 2 tablets by mouth as needed (congestion)., Disp: , Rfl:     metoprolol tartrate 37.5 mg Tab, Take 37.5 mg by mouth 2 (two) times daily., Disp: 30 tablet, Rfl: 3    nitroGLYCERIN (NITROSTAT) 0.4 MG SL tablet, Place 1 tablet (0.4 mg total) under the tongue every 5 (five) minutes as needed for Chest pain., Disp: 20 tablet, Rfl: 0     oxymetazoline HCl (AFRIN, OXYMETAZOLINE, NASL), 1 spray by Nasal route as needed (stuffy nose)., Disp: , Rfl:     triamcinolone acetonide 0.1% (KENALOG) 0.1 % ointment, Apply topically 2 (two) times daily. (Patient not taking: Reported on 7/30/2020), Disp: 30 g, Rfl: 1      Scheduled Meds:  Continuous Infusions:  PRN Meds:.      Krystyna Iniguez  [facility] NP  09/03/2020

## 2020-09-04 NOTE — RESPIRATORY THERAPY
09/03/20 1910   Patient Assessment/Suction   Level of Consciousness (AVPU) alert   PRE-TX-O2   O2 Device (Oxygen Therapy) nasal cannula   Flow (L/min) 5   SpO2 (!) 94 %   Pulse 86   Resp (!) 28   Education   $ Education Other (see comment);15 min  (ABG,deep diaphragmatic breathing exercises)   Labs   $ Was an ABG obtained? Arterial Puncture;ISTAT - Blood gas   $ Labs Tech Time 15 min   Respiratory Evaluation   $ Care Plan Tech Time 15 min   Evaluation For New Orders

## 2020-09-04 NOTE — CONSULTS
"Pulmonary/Critical Care Consult      Patient name: Yadi Mccall  MRN: [id number]  Date: 09/04/2020    Admit Date: 9/3/2020  Consult Requested By: Jefferson Segovia MD    Reason for Consult: COPD, respiratory failure, abnormal CXR    HPI:    9/4/2020 - 64 yo female with h/o CABG about 2 weeks ago at [facility].  Brought in to ER because of increased SOB and mental status change (possible report of hallucinations).  SOB has been a problem for months but got worse over the last few days.  She denies any h/o COPD (but has a h/o smoking about 3 PPD - stopped "recently" and is on home O2 at 3 LPM).  By report when EMS arrived sats were in the 80's on her O2.  Ater getting a treatment her sats improved.  She denies fever, chills, sweat.  Ha some cough which sounds to be chronic and nonproductive.  She has had some chest tightness and possible wheezing.  She denies any CHF symptoms.  Of note is she is not a very good historian.    Review of Systems    Review of Systems   Reason unable to perform ROS: poor historian.   Constitutional: Positive for malaise/fatigue. Negative for chills, diaphoresis, fever and weight loss.   HENT: Negative for congestion.    Eyes: Negative for pain.   Respiratory: Positive for cough and shortness of breath. Negative for hemoptysis, sputum production, wheezing and stridor.         Hypoxemia   Cardiovascular: Positive for chest pain (s/p surgery). Negative for palpitations, orthopnea, claudication, leg swelling and PND.   Gastrointestinal: Negative for abdominal pain, blood in stool, constipation, diarrhea, heartburn, nausea and vomiting.   Genitourinary: Negative for dysuria, frequency, hematuria and urgency.   Musculoskeletal: Negative for falls and myalgias.   Neurological: Negative for dizziness, tingling, tremors, sensory change, speech change, focal weakness, seizures, loss of consciousness, weakness and headaches.   Psychiatric/Behavioral: Positive for hallucinations (by her " report). Negative for depression, substance abuse and suicidal ideas. The patient is not nervous/anxious.        Past Medical History    Past Medical History:   Diagnosis Date    AAA (abdominal aortic aneurysm)     Anticoagulant long-term use     Coronary artery disease     Foot drop, left foot     Hyperlipidemia     Hypertension     Stroke 2014    LEFT SIDED WEAKNESS       Past Surgical History    Past Surgical History:   Procedure Laterality Date    APPENDECTOMY      CORONARY ANGIOGRAPHY N/A 7/24/2020    Procedure: ANGIOGRAM, CORONARY ARTERY;  Surgeon: Norbert Vallecillo MD;  Location: [facility] CATH;  Service: Cardiology;  Laterality: N/A;    CORONARY ARTERY BYPASS GRAFT (CABG) N/A 8/21/2020    Procedure: CORONARY ARTERY BYPASS GRAFT (CABG) BILATERAL MAMMARY  x  4 VESSELS;  Surgeon: Pascual Gray MD;  Location: [facility] OR;  Service: Cardiovascular;  Laterality: N/A;    ENDOSCOPIC HARVEST OF VEIN N/A 8/21/2020    Procedure: HARVEST-VEIN-ENDOVASCULAR;  Surgeon: Pascual Gray MD;  Location: [facility] OR;  Service: Cardiovascular;  Laterality: N/A;    FOOT SURGERY      HERNIA REPAIR      HYSTERECTOMY      LEFT HEART CATHETERIZATION N/A 7/24/2020    Procedure: Left heart cath;  Surgeon: Norbert Vallecillo MD;  Location: [facility] CATH;  Service: Cardiology;  Laterality: N/A;    TONSILLECTOMY         Medications (scheduled):      albuterol-ipratropium  3 mL Nebulization Q6H    amiodarone  200 mg Oral BID    apixaban  5 mg Oral BID    aspirin  325 mg Oral Daily    atorvastatin  40 mg Oral QHS    chlorhexidine  15 mL Mouth/Throat BID    lisinopriL  40 mg Oral Daily    mupirocin   Nasal BID    vancomycin (VANCOCIN) IVPB  1,500 mg Intravenous Q12H       Medications (infusions):         Medications (prn):     calcium chloride IVPB, calcium chloride IVPB, calcium chloride IVPB, magnesium oxide, magnesium sulfate IVPB, magnesium sulfate IVPB, magnesium sulfate IVPB, magnesium sulfate IVPB, morphine, nitroGLYCERIN,  "potassium chloride in water, potassium chloride in water, potassium chloride in water, potassium chloride in water, potassium chloride, potassium chloride, potassium chloride, potassium chloride, sodium chloride 0.9%, sodium phosphate IVPB, sodium phosphate IVPB, sodium phosphate IVPB, sodium phosphate IVPB, sodium phosphate IVPB, Pharmacy to dose Vancomycin consult **AND** vancomycin - pharmacy to dose    Family History:   Family History   Problem Relation Age of Onset    Hypertension Mother     Melanoma Neg Hx     Psoriasis Neg Hx     Lupus Neg Hx     Eczema Neg Hx        Social History: Tobacco:   Social History     Tobacco Use   Smoking Status Former Smoker    Packs/day: 1.00    Years: 50.00    Pack years: 50.00    Types: Cigarettes    Quit date: 2019    Years since quittin.7   Smokeless Tobacco Never Used   Tobacco Comment    down from 5 cartons/month to 2 cartons/month                                EtOH:   Social History     Substance and Sexual Activity   Alcohol Use No                                Drugs:   Social History     Substance and Sexual Activity   Drug Use No                                Occupation: not working                             Asbestos exposure: no    Physical Exam    Vital signs:  Temp:  [97.4 °F (36.3 °C)-98.1 °F (36.7 °C)]   Pulse:  [72-95]   Resp:  [18-35]   BP: ()/(48-70)   SpO2:  [89 %-100 %]     Intake/Output:     Intake/Output Summary (Last 24 hours) at 2020 1330  Last data filed at 2020 1101  Gross per 24 hour   Intake 900 ml   Output --   Net 900 ml        BMI: Estimated body mass index is 30.71 kg/m² as calculated from the following:    Height as of this encounter: 5' 8" (1.727 m).    Weight as of this encounter: 91.6 kg (201 lb 15.1 oz).    Physical Exam   Constitutional: No distress.   Obese, NAD, AAO x 3  Affect is a bit peculiar   HENT:   Head: Normocephalic and atraumatic.   Right Ear: External ear normal.   Left Ear: External ear " normal.   Mouth/Throat: Oropharynx is clear and moist.   Eyes: Pupils are equal, round, and reactive to light. Conjunctivae and EOM are normal. Right eye exhibits no discharge. Left eye exhibits no discharge. No scleral icterus.   Neck: Normal range of motion. Neck supple. No JVD present. No tracheal deviation present. No thyromegaly present.   Cardiovascular: Normal rate, regular rhythm, normal heart sounds and intact distal pulses. Exam reveals no gallop and no friction rub.   No murmur heard.  CABG wound looks OK but + palpable sternal click from movement   Pulmonary/Chest: Effort normal. No stridor. No respiratory distress. She has wheezes (few expiratory). She has no rales.   decreased BS at right base  No acc m use   Abdominal: She exhibits no distension. There is no abdominal tenderness. There is no rebound.   obese   Musculoskeletal: Normal range of motion.         General: Edema (trace edema) present. No tenderness.   Neurological: She is alert.   oriented x 2   Skin: Skin is warm and dry. She is not diaphoretic.   Psychiatric: Mood, memory and judgment normal.   affect is peculiar   Nursing note and vitals reviewed.      Laboratory    Recent Labs   Lab 09/04/20  0356   WBC 13.40*   RBC 3.30*   HGB 9.6*   HCT 29.6*   *   MCV 90   MCH 29.1   MCHC 32.4       Recent Labs   Lab 09/03/20 1747 09/04/20 0356   CALCIUM 8.4* 8.2*   PROT 6.3  --    * 133*   K 4.1 3.5   CO2 28 28   CL 92* 92*   BUN 20 15   CREATININE 1.0 0.8   ALKPHOS 76  --    ALT 32  --    AST 32  --    BILITOT 1.0  --        Recent Labs   Lab 09/03/20 1747   INR 1.2   APTT 30.1       Recent Labs   Lab 09/03/20 1747   TROPONINI <0.030       Additional labs:     BNP - 258    LA - 1.6    Drug screen - + opiates and MJ    Covid - neg    UA - noted    Microbiology:       Microbiology Results (last 7 days)     Procedure Component Value Units Date/Time    Blood culture #2 **CANNOT BE ORDERED STAT** [[id number]] Collected: 09/03/20 1950     Order Status: Completed Specimen: Blood from Peripheral, Antecubital, Right Updated: 09/04/20 0317     Blood Culture, Routine No Growth to date    Blood culture #1 **CANNOT BE ORDERED STAT** [[id number]] Collected: 09/03/20 1911    Order Status: Completed Specimen: Blood from Peripheral, Forearm, Left Updated: 09/04/20 0158     Blood Culture, Routine No Growth to date          Radiology    Imaging Results          CTA Chest Non-Coronary - PE Study (Final result)  Result time 09/03/20 19:45:43    Final result by Grant Peters MD (09/03/20 19:45:43)                 Narrative:    HISTORY: Shortness of breath.    CMS MANDATED QUALITY DATA - CT RADIATION  436    All CT scans at this facility utilize dose modulation, iterative  reconstruction, and/or weight based dosing when appropriate to reduce  radiation dose to as low as reasonably achievable    FINDINGS: Thin axial imaging through the chest was performed with 100  mL nonionic IV contrast, with sagittal and coronal reformatted images  and 3-D reconstructions performed on an independent workstation, with  images stored in the patient's permanent electronic medical record.    The pulmonary arteries are fairly well opacified. There are no filling  defects to indicate pulmonary thromboembolic disease.    Heart size is normal. There is no significant pericardial effusion.  Post sternotomy changes are noted. No pathologic mediastinal fluid  collections are identified. There is a small amount of soft tissue gas  in the right breast, presumably iatrogenic.    There is a small to moderate size left-sided pleural effusion with  associated left basilar atelectasis. There is moderate dependent  atelectasis at the posterior right lung base. Mild apical  emphysematous changes are noted bilaterally. There is no evidence of  pneumothorax.    IMPRESSION:      1. No evidence of pulmonary thromboembolic disease.  2. Small to moderate left-sided pleural effusion.  3. Bibasilar  atelectasis.  4. Changes of recent median sternotomy.  5. Small amount of soft tissue gas in the right breast is presumably  iatrogenic.    Electronically Signed by Mehran Peters M.D. on 9/3/2020 7:54 PM                             X-Ray Chest AP Portable (Final result)  Result time 09/03/20 18:30:40    Final result by Grant Peters MD (09/03/20 18:30:40)                 Narrative:    Chest single view    CLINICAL DATA: Altered mental status    FINDINGS: AP view is compared to August 31.    Mild cardiomegaly is stable. There has been previous median  sternotomy. The right lung is clear. Hazy increased density in the  lower left hemithorax is consistent with a small left pleural effusion  and left basilar atelectasis or infiltrate, slightly increased.    No acute osseous abnormality is identified.    IMPRESSION:  1. Faint left basilar opacity consistent with small pleural effusion  and atelectasis or infiltrate, slightly increased compared to August 31.  2. Mild cardiomegaly and post sternotomy changes.    Electronically Signed by Mehran Peters M.D. on 9/3/2020 6:39 PM                             CT Head Without Contrast (Final result)  Result time 09/03/20 18:25:40    Final result by Grant Peters MD (09/03/20 18:25:40)                 Narrative:    CT head without contrast    CLINICAL DATA: Altered mental status    CMS MANDATED QUALITY DATA - CT RADIATION  436    All CT scans at this facility utilize dose modulation, iterative  reconstruction, and/or weight based dosing when appropriate to reduce  radiation dose to as low as reasonably achievable.    Findings: Thin section axial noncontrast images were obtained from the  skull base to the vertex. Comparison is made to August 28.    There is no evidence of intracranial mass, acute hemorrhage, or  midline shift. Ventricles and sulci are normal. There are no  pathologic extra-axial fluid collections.    There is no CT evidence of acute ischemic change.  Chronic frontal  watershed infarct on the right is noted, unchanged compared to August 28. Cerebellum and brainstem are unremarkable. The calvarium is  intact. Mild right-sided mastoid opacification is noted.    IMPRESSION:  1. No acute intracranial abnormalities.  2. Chronic frontal watershed infarct on the right.  3. Mild right-sided mastoid effusion.    Electronically Signed by Mehran Peters M.D. on 9/3/2020 6:38 PM                              Additional Studies    EKG (9/3)  Vent. Rate : 083 BPM     Atrial Rate : 083 BPM      P-R Int : 192 ms          QRS Dur : 116 ms       QT Int : 406 ms       P-R-T Axes : 050 064 060 degrees      QTc Int : 477 ms     Normal sinus rhythm   Possible Left atrial enlargement   Possible Inferior infarct (cited on or before 27-AUG-2020)   Abnormal ECG   When compared with ECG of 30-AUG-2020 08:45,   Premature atrial complexes are no longer Present   QT has lengthened     ECHO (6/2020)  · Normal left ventricular systolic function. The estimated ejection fraction is 65%.  · Concentric left ventricular hypertrophy.  · Normal LV diastolic function.  · Normal right ventricular systolic function.  · Normal central venous pressure (3 mmHg).    Ventilator Information              Recent Labs     09/03/20  1907   PH 7.454*   PCO2 42.2   PO2 70*   HCO3 29.6*   POCSATURATED 94*   BE 6         Impression    Active Hospital Problems    Diagnosis  POA    *Acute hypoxemic respiratory failure [J96.01]  Unknown    Abnormal CXR [R93.89]  Unknown    Personal history of tobacco use, presenting hazards to health [Z87.891]  Not Applicable    Marijuana use [F12.90]  Unknown    Anemia [D64.9]  Unknown    Thrombocytosis [D47.3]  Unknown    Hyponatremia [E87.1]  Unknown    Encephalopathy, metabolic [G93.41]  Yes    Pleural effusion [J90]  Yes    PAF (paroxysmal atrial fibrillation) [I48.0]  Yes    Coronary artery disease of native artery of native heart with stable angina pectoris [I25.118]   Yes    COPD (chronic obstructive pulmonary disease) [J44.9]  Yes    Hyperlipidemia [E78.5]  Yes      Resolved Hospital Problems   No resolved problems to display.       Plan    · Encephalopathy was likely hypoxemia related  · With regards to left pleural effusion it is small to moderate - would follow and try to diurese  · Would not tap at this time - will need to hold Eliquis for at least 2 days to consider  · Continue treatment of COPD - respiratory treatments would hold on steroid, should be on LABA/LAMA/ICS at [place]  · Needs to stop smoking and stop marijuana  · Ask Dr Gray to assess has some instability of the superior portion of her wound  · Continue with amiodarone, eliquis  · CXR is concerning for right diaphragm paresis - will check US to evaluate  · Increase activity as able  · Monitor H/H and platelets  · Probably needs outpt sleep study if she is willing to do it.    Thank you for this consult.  I will follow with you while the patient is hospitalized.  Please call ([phone]) if you have any questions.    Ousmane Elliott MD

## 2020-09-04 NOTE — CONSULTS
[facility]  Department of Cardiology  Consult Note      PATIENT NAME: Yadi Mccall  MRN: [id number]  TODAY'S DATE: 09/04/2020  ADMIT DATE: 9/3/2020                          CONSULT REQUESTED BY: Jefferson Segovia MD    SUBJECTIVE     PRINCIPAL PROBLEM: Acute hypoxemic respiratory failure      REASON FOR CONSULT:  CHEST PAIN      HPI:    Ms. Mccall is a 65-year-old female patient with a past medical history significant for CAD status post CABG, CVA in 2014, dyslipidemia, abdominal aortic aneurysm, postop AFib a flutter on chronic anticoagulation she had a CABG about 2 weeks ago at [facility].  Apparently per record review she had some shortness of breath and mental status changes.  Patient tells me to check with her sister for history and she tells me currently she denies chest pain or palpitations or shortness of breath she is sitting up in the chair with no complaints currently.    FROM H AND P     Chief Complaint: Shortness of Breath (sister reports patient having hallucinations. recent cardiac bypass done at [facility].  Pt denies any increased SOB or pain.  EMS report O2 SATS on home 3L was in the 80's.  Breathing treatment given in route to increase SATS to 90's )         Patient information was obtained from patient, past medical records and ER records.   HISTORY OF PRESENT ILLNESS:   Yadi Mccall is a 65 y.o. old female who  has a past medical history of AAA (abdominal aortic aneurysm), Anticoagulant long-term use, Coronary artery disease, Foot drop, left foot, Hyperlipidemia, Hypertension, and Stroke (2014).. The patient presented to [facility] on 9/3/2020 with a primary complaint of Shortness of Breath (sister reports patient having hallucinations. recent cardiac bypass done at [facility].  Pt denies any increased SOB or pain.  EMS report O2 SATS on home 3L was in the 80's.  Breathing treatment given in route to increase SATS to 90's )  .      A 65-year-old   female presents emergency room with shortness of breath and altered mental status.       According to the patient's sister and patient had a 4 vessel CABG approximately 2 weeks ago.  She developed postop atrial fibrillation flutter and was placed on amiodarone and Eliquis.  Nonetheless the patient was followed home today with confusion and hypoxia.  She is normally on supplement oxygen at home.       Upon arrival EMS found the patient's statin and 80s and was confused     Upon arrival in the emergency room the patient was hypoxic she was put on supplemental O2 with improvement in oxygen saturation and ventrally improvement in her mentation but she still remained essentially confused.          On August 21, 2020 the patient had a  4 vessel CABG with Dr. MARSHA Gray.  On 08/24/2020 she was extubated chest tubes were removed and she was stable.  She did developed postop AFib flutter with a RVR and was placed on amiodarone and Eliquis was discharged home on these medications.       The patient also has a past medical history of chronic complete occlusion to her right carotid artery 6 years ago she had a right hemispheric stroke secondary to the occluded right carotid artery, hypertension, COPD, hyperlipidemia and chronic left footdrop of which she wears a splint a uses a walker to ambulate      Review of patient's allergies indicates:   Allergen Reactions    Chlorthalidone     Codeine     Dyazide [triamterene-hydrochlorothiazid]     Penicillins        Past Medical History:   Diagnosis Date    AAA (abdominal aortic aneurysm)     Anticoagulant long-term use     Coronary artery disease     Foot drop, left foot     Hyperlipidemia     Hypertension     Stroke 2014    LEFT SIDED WEAKNESS     Past Surgical History:   Procedure Laterality Date    APPENDECTOMY      CORONARY ANGIOGRAPHY N/A 7/24/2020    Procedure: ANGIOGRAM, CORONARY ARTERY;  Surgeon: Norbert Vallecillo MD;  Location: [facility] CATH;  Service: Cardiology;   Laterality: N/A;    CORONARY ARTERY BYPASS GRAFT (CABG) N/A 2020    Procedure: CORONARY ARTERY BYPASS GRAFT (CABG) BILATERAL MAMMARY  x  4 VESSELS;  Surgeon: Pascual Gray MD;  Location: [facility] OR;  Service: Cardiovascular;  Laterality: N/A;    ENDOSCOPIC HARVEST OF VEIN N/A 2020    Procedure: HARVEST-VEIN-ENDOVASCULAR;  Surgeon: Pascual Gray MD;  Location: [facility] OR;  Service: Cardiovascular;  Laterality: N/A;    FOOT SURGERY      HERNIA REPAIR      HYSTERECTOMY      LEFT HEART CATHETERIZATION N/A 2020    Procedure: Left heart cath;  Surgeon: Norbert Vallecillo MD;  Location: [facility] CATH;  Service: Cardiology;  Laterality: N/A;    TONSILLECTOMY       Social History     Tobacco Use    Smoking status: Former Smoker     Packs/day: 1.00     Years: 50.00     Pack years: 50.00     Types: Cigarettes     Quit date: 2019     Years since quittin.7    Smokeless tobacco: Never Used    Tobacco comment: down from 5 cartons/month to 2 cartons/month   Substance Use Topics    Alcohol use: No    Drug use: No        REVIEW OF SYSTEMS  CONSTITUTIONAL: Negative for chills, fatigue and fever.   EYES: No double vision, No blurred vision  NEURO: No headaches, No dizziness  RESPIRATORY: Negative for cough, shortness of breath and wheezing.    CARDIOVASCULAR: Negative for chest pain. Negative for palpitations and leg swelling.   GI: Negative for abdominal pain, No melena, diarrhea, nausea and vomiting.   : Negative for dysuria and frequency, Negative for hematuria  SKIN: Negative for bruising, Negative for edema or discoloration noted.   ENDOCRINE: Negative for polyphagia, Negative for heat intolerance, Negative for cold intolerance  PSYCHIATRIC: Negative for depression, Negative for anxiety, Negative for memory loss  MUSCULOSKELETAL: Negative for neck pain, Negative for muscle weakness, Negative for back pain     OBJECTIVE     VITAL SIGNS (Most Recent)  Temp: 97.7 °F (36.5 °C) (20 0400)  Pulse: 76  (09/04/20 0704)  Resp: (!) 21 (09/04/20 0704)  BP: 123/62 (09/04/20 0500)  SpO2: 97 % (09/04/20 0704)    VENTILATION STATUS  Resp: (!) 21 (09/04/20 0704)  SpO2: 97 % (09/04/20 0704)       I & O (Last 24H):No intake or output data in the 24 hours ending 09/04/20 0905    WEIGHTS  Wt Readings from Last 1 Encounters:   09/03/20 2245 91.6 kg (201 lb 15.1 oz)   09/03/20 2230 91.6 kg (201 lb 15.1 oz)   09/03/20 1751 96.6 kg (213 lb)       PHYSICAL EXAM  GENERAL: well built, well nourished, well-developed in no apparent distress alert and oriented.   HEENT: Normocephalic. Pupils normal and conjunctivae normal.  Mucous membranes normal, no cyanosis or icterus, trachea central,no pallor or icterus is noted..   NECK: No JVD. No bruit..   THYROID: Thyroid not enlarged. No nodules present..   CARDIAC: Regular rate and rhythm. S1 is normal.S2 is normal.No gallops, clicks or murmurs noted at this time.  CHEST ANATOMY: normal.   LUNGS:  DIMINISHEDABDOMEN: Soft no masses or organomegaly.  No abdomen pulsations or bruits.  Normal bowel sounds. No pulsations and no masses felt, No guarding or rebound.   URINARY: No salvador catheter   EXTREMITIES:  BILATERAL CHRONIC EDEMAPERIPHERAL VASCULAR SYSTEM: Good palpable distal pulses.   CENTRAL NERVOUS SYSTEM: No focal motor or sensory deficits noted.   SKIN:  MIDLINE SURGICAL INCISION HEALING WELL SLIGHTLY RED BUT NO OOZING NOTED MUSCLE STRENGTH & TONE: No noteable weakness, atrophy or abnormal movement.     HOME MEDICATIONS:  No current facility-administered medications on file prior to encounter.      Current Outpatient Medications on File Prior to Encounter   Medication Sig Dispense Refill    [START ON 9/10/2020] amiodarone (PACERONE) 200 MG Tab Take 1 tablet (200 mg total) by mouth once daily. (Patient taking differently: Take 400 mg by mouth once daily. 400mg for 8 days, or 09/10/20, then 200mg daily thereafter) 30 tablet 11    amiodarone (PACERONE) 400 MG tablet Take 1 tablet (400 mg  total) by mouth 2 (two) times daily. 60 tablet 11    apixaban (ELIQUIS) 5 mg Tab Take 1 tablet (5 mg total) by mouth 2 (two) times daily. 60 tablet 2    aspirin 325 MG tablet Take 325 mg by mouth once daily.       atorvastatin (LIPITOR) 40 MG tablet TAKE 1 TABLET EVERY DAY (Patient taking differently: Take 40 mg by mouth every evening. ) 90 tablet 3    baclofen (LIORESAL) 20 MG tablet TAKE 1 TABLET THREE TIMES DAILY AS NEEDED (Patient taking differently: Take 20 mg by mouth 3 (three) times daily as needed (pain). ) 270 tablet 3    fluticasone propionate (FLONASE) 50 mcg/actuation nasal spray 2 sprays (100 mcg total) by Each Nostril route once daily. (Patient taking differently: 2 sprays by Each Nostril route daily as needed. TAKE IF NEEDED MORNING OF SURGERY) 16 g 11    furosemide (LASIX) 20 MG tablet Take 1 tablet (20 mg total) by mouth 2 (two) times daily. 60 tablet 11    HYDROcodone-acetaminophen (NORCO) 7.5-325 mg per tablet Take 1 tablet by mouth every 4 (four) hours as needed. 28 tablet 0    lisinopriL (PRINIVIL,ZESTRIL) 40 MG tablet Take 40 mg by mouth once daily.      nicotine (NICODERM CQ) 21 mg/24 hr Place 1 patch onto the skin once daily. 30 patch 3    rOPINIRole (REQUIP) 2 MG tablet Take 1 tablet (2 mg total) by mouth 3 (three) times daily. (Patient taking differently: Take 2 mg by mouth 3 (three) times daily as needed (restless legs). ) 90 tablet 11    traZODone (DESYREL) 100 MG tablet TAKE 1 TABLET NIGHTLY AS NEEDED FOR INSOMNIA (Patient taking differently: Take 100 mg by mouth nightly as needed. ) 90 tablet 3    acetaminophen (TYLENOL) 500 MG tablet Take 500 mg by mouth 2 (two) times daily as needed for Pain.       albuterol-ipratropium (DUO-NEB) 2.5 mg-0.5 mg/3 mL nebulizer solution Take 3 mLs by nebulization every 4 (four) hours. Rescue (Patient taking differently: Take 3 mLs by nebulization every 4 (four) hours as needed. ) 1 Box 5    gabapentin (NEURONTIN) 100 MG capsule 1 by mouth  in the morning 1 by mouth in the afternoon and 3 by mouth at bedtime (Patient not taking: Reported on 7/30/2020) 150 capsule 11    guaifenesin (MUCINEX ORAL) Take 2 tablets by mouth as needed (congestion).      metoprolol tartrate 37.5 mg Tab Take 37.5 mg by mouth 2 (two) times daily. 30 tablet 3    nitroGLYCERIN (NITROSTAT) 0.4 MG SL tablet Place 1 tablet (0.4 mg total) under the tongue every 5 (five) minutes as needed for Chest pain. 20 tablet 0    oxymetazoline HCl (AFRIN, OXYMETAZOLINE, NASL) 1 spray by Nasal route as needed (stuffy nose).      triamcinolone acetonide 0.1% (KENALOG) 0.1 % ointment Apply topically 2 (two) times daily. (Patient not taking: Reported on 7/30/2020) 30 g 1       SCHEDULED MEDS:   albuterol-ipratropium  3 mL Nebulization Q6H    amiodarone  400 mg Oral BID    apixaban  5 mg Oral BID    aspirin  325 mg Oral Daily    atorvastatin  40 mg Oral QHS    furosemide (LASIX) IV  40 mg Intravenous Once    lisinopriL  40 mg Oral Daily    vancomycin (VANCOCIN) IVPB  1,500 mg Intravenous Q12H       CONTINUOUS INFUSIONS:    PRN MEDS:calcium chloride IVPB, calcium chloride IVPB, calcium chloride IVPB, magnesium oxide, magnesium sulfate IVPB, magnesium sulfate IVPB, magnesium sulfate IVPB, magnesium sulfate IVPB, morphine, nitroGLYCERIN, potassium chloride in water, potassium chloride in water, potassium chloride in water, potassium chloride in water, potassium chloride, potassium chloride, potassium chloride, potassium chloride, sodium chloride 0.9%, sodium phosphate IVPB, sodium phosphate IVPB, sodium phosphate IVPB, sodium phosphate IVPB, sodium phosphate IVPB, Pharmacy to dose Vancomycin consult **AND** vancomycin - pharmacy to dose    LABS AND DIAGNOSTICS     CBC LAST 3 DAYS  Recent Labs   Lab 08/31/20  1144 09/03/20  1747 09/04/20  0356   WBC 15.92* 18.24* 13.40*   RBC 3.80* 3.47* 3.30*   HGB 11.2* 10.0* 9.6*   HCT 34.3* 30.5* 29.6*   MCV 90 88 90   MCH 29.5 28.8 29.1   MCHC 32.7 32.8  32.4   RDW 14.1 14.6* 14.7*   * 659* 612*   MPV 8.3* 8.2* 8.0*   GRAN 77.0*  12.3* 84.9*  15.5* 73.3*  9.8*   LYMPH 12.8*  2.0 7.1*  1.3 15.1*  2.0   MONO 8.7  1.4* 6.7  1.2* 9.4  1.3*   BASO 0.05 0.05 0.05   NRBC 0 0 0       COAGULATION LAST 3 DAYS  Recent Labs   Lab 09/03/20 1747   LABPT 15.0*   INR 1.2   APTT 30.1       CHEMISTRY LAST 3 DAYS  Recent Labs   Lab 08/31/20  0510  09/02/20  0703 09/03/20 1747 09/03/20 1907 09/04/20  0356   NA  --    < > 130* 133*  --  133*   K  --    < > 3.6 4.1  --  3.5   CL  --    < > 89* 92*  --  92*   CO2  --    < > 37* 28  --  28   ANIONGAP  --    < > 4* 13  --  13   BUN  --    < > 16 20  --  15   CREATININE  --    < > 0.77 1.0  --  0.8   GLU  --    < > 110 108  --  101   CALCIUM  --    < > 8.4 8.4*  --  8.2*   PH 7.52*  --   --   --  7.454*  --    MG  --    < > 2.1 2.3  --  2.4   ALBUMIN  --   --   --  3.3*  --   --    PROT  --   --   --  6.3  --   --    ALKPHOS  --   --   --  76  --   --    ALT  --   --   --  32  --   --    AST  --   --   --  32  --   --    BILITOT  --   --   --  1.0  --   --     < > = values in this interval not displayed.       CARDIAC PROFILE LAST 3 DAYS  Recent Labs   Lab 09/03/20 1747   TROPONINI <0.030       ENDOCRINE LAST 3 DAYS  No results for input(s): TSH, PROCAL in the last 168 hours.    LAST ARTERIAL BLOOD GAS  ABG  Recent Labs   Lab 09/03/20 1907   PH 7.454*   PO2 70*   PCO2 42.2   HCO3 29.6*   BE 6       LAST 7 DAYS MICROBIOLOGY   Microbiology Results (last 7 days)     Procedure Component Value Units Date/Time    Blood culture #2 **CANNOT BE ORDERED STAT** [[id number]] Collected: 09/03/20 1950    Order Status: Completed Specimen: Blood from Peripheral, Antecubital, Right Updated: 09/04/20 0317     Blood Culture, Routine No Growth to date    Blood culture #1 **CANNOT BE ORDERED STAT** [[id number]] Collected: 09/03/20 1911    Order Status: Completed Specimen: Blood from Peripheral, Forearm, Left Updated: 09/04/20 0158     Blood  Culture, Routine No Growth to date          MOST RECENT IMAGING  X-Ray Chest AP Portable  Narrative: EXAMINATION:  XR CHEST AP PORTABLE    CLINICAL HISTORY:  sob;    FINDINGS:  Portable chest at 04:14 is compared to 09/03/2020 shows hypoinflation.  The patient has had a prior median sternotomy.  The heart is mildly enlarged.    There are small bilateral pleural effusions left greater than right.  There is bibasilar atelectasis.  The upper lobes are clear.  Pulmonary vasculature is normal. No acute osseous abnormality.  Impression: Hypoinflation with small bilateral pleural effusions and bibasilar atelectasis    Mild cardiomegaly    Electronically signed by: Lesly Toribio MD  Date:    09/04/2020  Time:    06:02      ECHOCARDIOGRAM RESULTS (last 5)  Results for orders placed in visit on 06/25/20   Echo Color Flow Doppler? Yes    Narrative · Normal left ventricular systolic function. The estimated ejection   fraction is 65%.  · Concentric left ventricular hypertrophy.  · Normal LV diastolic function.  · Normal right ventricular systolic function.  · Normal central venous pressure (3 mmHg).          CURRENT/PREVIOUS VISIT EKG  Results for orders placed or performed during the hospital encounter of 09/03/20   EKG 12-lead    Collection Time: 09/03/20  5:56 PM    Narrative    Test Reason : R41.82,    Vent. Rate : 083 BPM     Atrial Rate : 083 BPM     P-R Int : 192 ms          QRS Dur : 116 ms      QT Int : 406 ms       P-R-T Axes : 050 064 060 degrees     QTc Int : 477 ms    Normal sinus rhythm  Possible Left atrial enlargement  Possible Inferior infarct (cited on or before 27-AUG-2020)  Abnormal ECG  When compared with ECG of 30-AUG-2020 08:45,  Premature atrial complexes are no longer Present  QT has lengthened    Referred By: [email or web address]   SELF           Confirmed By:            ASSESSMENT/PLAN:     Active Hospital Problems    Diagnosis    *Acute hypoxemic respiratory failure    Encephalopathy, metabolic    Pleural  effusion    PAF (paroxysmal atrial fibrillation)    Coronary artery disease of native artery of native heart with stable angina pectoris    COPD (chronic obstructive pulmonary disease)    Hyperlipidemia       ASSESSMENT & PLAN:     1.  Postop AFib flutter-currently sinus rhythm  2.  CAD status post CABG 2 weeks ago  3.  Encephalopathy on admit secondary to hypoxia  4.  Bilateral Pleural effusions greater to the left  5.  Possible pneumonia  6.  History of right hemispheric CVA to total chronic occlusion of right carotid  7.  COPD  8.  Anemia hemoglobin 9.6  9.  Leukocytosis  10.  Elevated platelet count    RECOMMENDATIONS:  Currently patient denies chest pain or shortness of breath.  She is sitting up in the chair.  Troponin is negative  EKG shows nothing acute  Recommend decreasing amiodarone to 200 mg b.i.d. and changing to daily on the 10th  Continue Eliquis  Recommend decreasing aspirin 81 daily  Continue lisinopril 40 mg daily  Dr. Owen to see patient today      Deborah Kaufman NP  [facility]  Department of Cardiology  Date of Service: 09/04/2020        I have personally interviewed and examined the patient, I have reviewed the Nurse Practitioner's history and physical, assessment, and plan. I agree with the findings and plan.      Esa Owen M.D.  [facility]  Department of Cardiology  Date of Service: 09/04/2020  9:05 AM

## 2020-09-04 NOTE — PROGRESS NOTES
Krishan He is a 78 y.o. male here for evaluation of lung cancer. Pharmacokinetic Initial Assessment: IV Vancomycin    Assessment/Plan:    Initiate intravenous vancomycin with loading dose of 1500 mg once followed by a maintenance dose of vancomycin 1500 mg IV every 12 hours  Desired empiric serum trough concentration is 10 to 15 mcg/mL  Draw vancomycin trough level 60 min prior to fourth dose on 09/05 at approximately 1700  Pharmacy will continue to follow and monitor vancomycin.      Please contact pharmacy at extension [phone] with any questions regarding this assessment.     Thank you for the consult,   Samy Barrett       Patient brief summary:  Yaid Mccall is a 65 y.o. female initiated on antimicrobial therapy with IV Vancomycin for treatment of suspected lower respiratory infection    Drug Allergies:   Review of patient's allergies indicates:   Allergen Reactions    Chlorthalidone     Codeine     Dyazide [triamterene-hydrochlorothiazid]     Penicillins        Actual Body Weight:   91.6 kg    Renal Function:   Estimated Creatinine Clearance: 85.2 mL/min (based on SCr of 0.8 mg/dL).,     CBC (last 72 hours):  Recent Labs   Lab Result Units 09/03/20  1747 09/04/20  0356   WBC K/uL 18.24* 13.40*   Hemoglobin g/dL 10.0* 9.6*   Hematocrit % 30.5* 29.6*   Platelets K/uL 659* 612*   Gran% % 84.9* 73.3*   Lymph% % 7.1* 15.1*   Mono% % 6.7 9.4   Eosinophil% % 0.2 1.1   Basophil% % 0.3 0.4   Differential Method  Automated Automated       Metabolic Panel (last 72 hours):  Recent Labs   Lab Result Units 09/02/20  0703 09/03/20  1747 09/03/20  1909 09/04/20  0356   Sodium mmol/L 130* 133*  --  133*   Potassium mmol/L 3.6 4.1  --  3.5   Chloride mmol/L 89* 92*  --  92*   CO2 mmol/L 37* 28  --  28   Glucose mg/dL 110 108  --  101   Glucose, UA   --   --  Negative  --    BUN, Bld mg/dL 16 20  --  15   Creatinine mg/dL 0.77 1.0  --  0.8   Creatinine, Random Ur mg/dL  --   --  160.0  --    Albumin g/dL  --  3.3*  --   --    Total Bilirubin mg/dL  --  1.0  --   --    Alkaline Phosphatase U/L  --   76  --   --    AST U/L  --  32  --   --    ALT U/L  --  32  --   --    Magnesium mg/dL 2.1 2.3  --  2.4   Phosphorus mg/dL  --   --   --  4.5       Drug levels (last 3 results):  No results for input(s): VANCOMYCINRA, VANCOMYCINPE, VANCOMYCINTR in the last 72 hours.    Microbiologic Results:  Microbiology Results (last 7 days)       Procedure Component Value Units Date/Time    Blood culture #2 **CANNOT BE ORDERED STAT** [[id number]] Collected: 09/03/20 1950    Order Status: Completed Specimen: Blood from Peripheral, Antecubital, Right Updated: 09/04/20 0317     Blood Culture, Routine No Growth to date    Blood culture #1 **CANNOT BE ORDERED STAT** [[id number]] Collected: 09/03/20 1911    Order Status: Completed Specimen: Blood from Peripheral, Forearm, Left Updated: 09/04/20 0158     Blood Culture, Routine No Growth to date

## 2020-09-04 NOTE — PROGRESS NOTES
"[facility] [place]  Adult Nutrition   Progress Note (Initial Assessment)     SUMMARY     Recommendations  Recommendation/Intervention: 1. Continue Low Sodium; 2 gm diet as tolerated and encourage adequate intake. 2. Menu  to obtain meal selections and obtain food preferences daily.  Goals: 1. Patient to meet at least 75% of estimated energy and protein needs.  Nutrition Goal Status: new  Communication of RD Recs: reviewed with RN    Dietitian Rounds Brief  Patient assessed 2' ICU status. Altered mental status and hallucinations per H&P. Patient had recent CABG x 4 on 8/24/2020 per H&P. RD unable ot interview at this time. Patient NPO at time of rounds. RN discussed with MD and Low Sodium, 2gm was started after rounds. RD to monitor PO intake, tolerance of diet.    Reason for Assessment  Reason For Assessment: other (see comments)(ICU status)  Diagnosis: cardiac disease  Relevant Medical History: HLD; COPD; Encephalopathy, metabolic; Marijuana use; anemia; hyponatremia; Personal history of tobacco use, presenting hazards to health  Interdisciplinary Rounds: attended    Nutrition Risk Screen  Nutrition Risk Screen: no indicators present     MST Score: 0  Have you recently lost weight without trying?: No  Weight loss score: 0  Have you been eating poorly because of a decreased appetite?: No  Appetite score: 0       Nutrition/Diet History  Food Allergies: NKFA  Factors Affecting Nutritional Intake: NPO    Anthropometrics  Temp: 98.1 °F (36.7 °C)  Height Method: Stated  Height: 5' 8" (172.7 cm)  Height (inches): 68 in  Weight Method: Bed Scale  Weight: 91.6 kg (201 lb 15.1 oz)  Weight (lb): 201.94 lb  Ideal Body Weight (IBW), Female: 140 lb  % Ideal Body Weight, Female (lb): 144.24 %  BMI (Calculated): 30.7  BMI Grade: 30 - 34.9- obesity - grade I       Weight History:  Wt Readings from Last 10 Encounters:   09/03/20 91.6 kg (201 lb 15.1 oz)   09/02/20 93.6 kg (206 lb 5.6 oz)   08/18/20 95.7 kg (210 lb 15.7 " oz)   07/30/20 94 kg (207 lb 3.7 oz)   07/24/20 93.5 kg (206 lb 2.1 oz)   06/25/20 97.1 kg (214 lb)   06/25/20 97.1 kg (214 lb)   06/02/20 97.1 kg (214 lb 1.1 oz)   05/12/20 96.4 kg (212 lb 8.4 oz)   03/20/20 97 kg (213 lb 13.5 oz)       Lab/Procedures/Meds: Pertinent Labs Reviewed    Clinical Chemistry:  Recent Labs   Lab 09/03/20 1747 09/04/20  0356   * 133*   K 4.1 3.5   CL 92* 92*   CO2 28 28    101   BUN 20 15   CREATININE 1.0 0.8   CALCIUM 8.4* 8.2*   PROT 6.3  --    ALBUMIN 3.3*  --    BILITOT 1.0  --    ALKPHOS 76  --    AST 32  --    ALT 32  --    ANIONGAP 13 13   ESTGFRAFRICA >60.0 >60.0   EGFRNONAA >60.0 >60.0   MG 2.3 2.4   PHOS  --  4.5       CBC:   Recent Labs   Lab 09/04/20  0356   WBC 13.40*   RBC 3.30*   HGB 9.6*   HCT 29.6*   *   MCV 90   MCH 29.1   MCHC 32.4       Cardiac Profile:  Recent Labs   Lab 09/03/20 1747 09/04/20  0356   BNP  --  258*   TROPONINI <0.030  --          Medications: Pertinent Medications reviewed    Scheduled Meds:   albuterol-ipratropium  3 mL Nebulization Q6H    amiodarone  200 mg Oral BID    apixaban  5 mg Oral BID    aspirin  325 mg Oral Daily    atorvastatin  40 mg Oral QHS    busPIRone  5 mg Oral TID    chlorhexidine  15 mL Mouth/Throat BID    lisinopriL  40 mg Oral Daily    mupirocin   Nasal BID    vancomycin (VANCOCIN) IVPB  1,500 mg Intravenous Q12H       Continuous Infusions:    PRN Meds:.calcium chloride IVPB, calcium chloride IVPB, calcium chloride IVPB, LORazepam, magnesium oxide, magnesium sulfate IVPB, magnesium sulfate IVPB, magnesium sulfate IVPB, magnesium sulfate IVPB, morphine, nitroGLYCERIN, potassium chloride in water, potassium chloride in water, potassium chloride in water, potassium chloride in water, potassium chloride, potassium chloride, potassium chloride, potassium chloride, sodium chloride 0.9%, sodium phosphate IVPB, sodium phosphate IVPB, sodium phosphate IVPB, sodium phosphate IVPB, sodium phosphate IVPB,  traZODone, Pharmacy to dose Vancomycin consult **AND** vancomycin - pharmacy to dose    Estimated/Assessed Needs  Weight Used For Calorie Calculations: 91.6 kg (201 lb 15.1 oz)  Energy Calorie Requirements (kcal): 1832 - 2290 (20 - 25 kcal/kg)  Energy Need Method: Kcal/kg  Protein Requirements: 96 - 128 (1.5 - 2 g/kg IBW)  Weight Used For Protein Calculations: 64 kg (141 lb 1.5 oz)(IBW)     Estimated Fluid Requirement Method: RDA Method  RDA Method (mL): 1832       Nutrition Prescription Ordered  Current Diet Order: NPO; Advanced to Low Sodium; 2gm after rounds    Evaluation of Received Nutrient/Fluid Intake  Energy Calories Required: not meeting needs  Protein Required: not meeting needs  Fluid Required: not meeting needs  Tolerance: other (see comments)(NPO)     Intake/Output Summary (Last 24 hours) at 9/4/2020 1448  Last data filed at 9/4/2020 1101  Gross per 24 hour   Intake 900 ml   Output --   Net 900 ml      % Intake of Estimated Energy Needs: 0%  % Meal Intake: NPO    Nutrition Risk  Level of Risk/Frequency of Follow-up: high     Monitor and Evaluation  Food and Nutrient Intake: energy intake, food and beverage intake  Food and Nutrient Adminstration: diet order  Physical Activity and Function: nutrition-related ADLs and IADLs, factors affecting access to physical activity  Anthropometric Measurements: weight, weight change, body mass index  Biochemical Data, Medical Tests and Procedures: electrolyte and renal panel, lipid profile, gastrointestinal profile, glucose/endocrine profile, inflammatory profile  Nutrition-Focused Physical Findings: overall appearance     Nutrition Follow-Up  RD Follow-up?: Yes    Janett Avila RD 09/04/2020 2:48 PM

## 2020-09-05 LAB
ANION GAP SERPL CALC-SCNC: 11 MMOL/L (ref 8–16)
BUN SERPL-MCNC: 14 MG/DL (ref 8–23)
CALCIUM SERPL-MCNC: 7.9 MG/DL (ref 8.7–10.5)
CHLORIDE SERPL-SCNC: 93 MMOL/L (ref 95–110)
CO2 SERPL-SCNC: 28 MMOL/L (ref 23–29)
CREAT SERPL-MCNC: 0.8 MG/DL (ref 0.5–1.4)
ERYTHROCYTE [DISTWIDTH] IN BLOOD BY AUTOMATED COUNT: 15.3 % (ref 11.5–14.5)
EST. GFR  (AFRICAN AMERICAN): >60 ML/MIN/1.73 M^2
EST. GFR  (NON AFRICAN AMERICAN): >60 ML/MIN/1.73 M^2
GLUCOSE SERPL-MCNC: 100 MG/DL (ref 70–110)
HCT VFR BLD AUTO: 30 % (ref 37–48.5)
HGB BLD-MCNC: 9.4 G/DL (ref 12–16)
MAGNESIUM SERPL-MCNC: 2.5 MG/DL (ref 1.6–2.6)
MCH RBC QN AUTO: 28.7 PG (ref 27–31)
MCHC RBC AUTO-ENTMCNC: 31.3 G/DL (ref 32–36)
MCV RBC AUTO: 92 FL (ref 82–98)
PLATELET # BLD AUTO: 596 K/UL (ref 150–350)
PMV BLD AUTO: 8.2 FL (ref 9.2–12.9)
POTASSIUM SERPL-SCNC: 3.7 MMOL/L (ref 3.5–5.1)
RBC # BLD AUTO: 3.28 M/UL (ref 4–5.4)
SODIUM SERPL-SCNC: 132 MMOL/L (ref 136–145)
WBC # BLD AUTO: 13.54 K/UL (ref 3.9–12.7)

## 2020-09-05 PROCEDURE — 85027 COMPLETE CBC AUTOMATED: CPT

## 2020-09-05 PROCEDURE — 99232 PR SUBSEQUENT HOSPITAL CARE,LEVL II: ICD-10-PCS | Mod: ,,, | Performed by: INTERNAL MEDICINE

## 2020-09-05 PROCEDURE — S4991 NICOTINE PATCH NONLEGEND: HCPCS | Performed by: INTERNAL MEDICINE

## 2020-09-05 PROCEDURE — 25000003 PHARM REV CODE 250: Performed by: NURSE PRACTITIONER

## 2020-09-05 PROCEDURE — 94761 N-INVAS EAR/PLS OXIMETRY MLT: CPT

## 2020-09-05 PROCEDURE — 99233 PR SUBSEQUENT HOSPITAL CARE,LEVL III: ICD-10-PCS | Mod: ,,, | Performed by: INTERNAL MEDICINE

## 2020-09-05 PROCEDURE — 80048 BASIC METABOLIC PNL TOTAL CA: CPT

## 2020-09-05 PROCEDURE — 25000242 PHARM REV CODE 250 ALT 637 W/ HCPCS: Performed by: NURSE PRACTITIONER

## 2020-09-05 PROCEDURE — 83735 ASSAY OF MAGNESIUM: CPT

## 2020-09-05 PROCEDURE — 97116 GAIT TRAINING THERAPY: CPT

## 2020-09-05 PROCEDURE — 25000003 PHARM REV CODE 250: Performed by: INTERNAL MEDICINE

## 2020-09-05 PROCEDURE — 25000003 PHARM REV CODE 250

## 2020-09-05 PROCEDURE — 36415 COLL VENOUS BLD VENIPUNCTURE: CPT

## 2020-09-05 PROCEDURE — 63600175 PHARM REV CODE 636 W HCPCS: Performed by: INTERNAL MEDICINE

## 2020-09-05 PROCEDURE — 27000221 HC OXYGEN, UP TO 24 HOURS

## 2020-09-05 PROCEDURE — 63600175 PHARM REV CODE 636 W HCPCS: Performed by: NURSE PRACTITIONER

## 2020-09-05 PROCEDURE — 94799 UNLISTED PULMONARY SVC/PX: CPT

## 2020-09-05 PROCEDURE — 99232 SBSQ HOSP IP/OBS MODERATE 35: CPT | Mod: ,,, | Performed by: INTERNAL MEDICINE

## 2020-09-05 PROCEDURE — 21000000 HC CCU ICU ROOM CHARGE

## 2020-09-05 PROCEDURE — 99900035 HC TECH TIME PER 15 MIN (STAT)

## 2020-09-05 PROCEDURE — 99233 SBSQ HOSP IP/OBS HIGH 50: CPT | Mod: ,,, | Performed by: INTERNAL MEDICINE

## 2020-09-05 PROCEDURE — 94640 AIRWAY INHALATION TREATMENT: CPT

## 2020-09-05 PROCEDURE — 97163 PT EVAL HIGH COMPLEX 45 MIN: CPT

## 2020-09-05 RX ORDER — SPIRONOLACTONE 25 MG/1
25 TABLET ORAL DAILY
Status: DISCONTINUED | OUTPATIENT
Start: 2020-09-05 | End: 2020-09-08 | Stop reason: HOSPADM

## 2020-09-05 RX ORDER — FUROSEMIDE 40 MG/1
40 TABLET ORAL DAILY
Status: DISCONTINUED | OUTPATIENT
Start: 2020-09-06 | End: 2020-09-05

## 2020-09-05 RX ORDER — ROPINIROLE 1 MG/1
1 TABLET, FILM COATED ORAL 3 TIMES DAILY PRN
Status: DISCONTINUED | OUTPATIENT
Start: 2020-09-05 | End: 2020-09-08 | Stop reason: HOSPADM

## 2020-09-05 RX ORDER — ROPINIROLE 2 MG/1
2 TABLET, FILM COATED ORAL 3 TIMES DAILY PRN
Status: DISCONTINUED | OUTPATIENT
Start: 2020-09-05 | End: 2020-09-05

## 2020-09-05 RX ORDER — FUROSEMIDE 40 MG/1
40 TABLET ORAL 2 TIMES DAILY
Status: DISCONTINUED | OUTPATIENT
Start: 2020-09-06 | End: 2020-09-08 | Stop reason: HOSPADM

## 2020-09-05 RX ORDER — HYDROCODONE BITARTRATE AND ACETAMINOPHEN 5; 325 MG/1; MG/1
1 TABLET ORAL EVERY 6 HOURS PRN
Status: DISCONTINUED | OUTPATIENT
Start: 2020-09-05 | End: 2020-09-08 | Stop reason: HOSPADM

## 2020-09-05 RX ORDER — FUROSEMIDE 10 MG/ML
20 INJECTION INTRAMUSCULAR; INTRAVENOUS ONCE
Status: COMPLETED | OUTPATIENT
Start: 2020-09-05 | End: 2020-09-05

## 2020-09-05 RX ORDER — IBUPROFEN 200 MG
1 TABLET ORAL
Status: DISCONTINUED | OUTPATIENT
Start: 2020-09-05 | End: 2020-09-08 | Stop reason: HOSPADM

## 2020-09-05 RX ADMIN — MORPHINE SULFATE 1 MG: 2 INJECTION, SOLUTION INTRAMUSCULAR; INTRAVENOUS at 12:09

## 2020-09-05 RX ADMIN — NICOTINE 1 PATCH: 21 PATCH, EXTENDED RELEASE TRANSDERMAL at 01:09

## 2020-09-05 RX ADMIN — MORPHINE SULFATE 1 MG: 2 INJECTION, SOLUTION INTRAMUSCULAR; INTRAVENOUS at 06:09

## 2020-09-05 RX ADMIN — CHLORHEXIDINE GLUCONATE 15 ML: 1.2 RINSE ORAL at 08:09

## 2020-09-05 RX ADMIN — AMIODARONE HYDROCHLORIDE 200 MG: 200 TABLET ORAL at 08:09

## 2020-09-05 RX ADMIN — ASPIRIN 81 MG CHEWABLE TABLET 81 MG: 81 TABLET CHEWABLE at 08:09

## 2020-09-05 RX ADMIN — METHYLPREDNISOLONE SODIUM SUCCINATE 40 MG: 40 INJECTION, POWDER, FOR SOLUTION INTRAMUSCULAR; INTRAVENOUS at 08:09

## 2020-09-05 RX ADMIN — FUROSEMIDE 20 MG: 10 INJECTION, SOLUTION INTRAMUSCULAR; INTRAVENOUS at 02:09

## 2020-09-05 RX ADMIN — MUPIROCIN: 20 OINTMENT TOPICAL at 09:09

## 2020-09-05 RX ADMIN — MUPIROCIN: 20 OINTMENT TOPICAL at 08:09

## 2020-09-05 RX ADMIN — ROPINIROLE HYDROCHLORIDE 1 MG: 1 TABLET, FILM COATED ORAL at 03:09

## 2020-09-05 RX ADMIN — MORPHINE SULFATE 1 MG: 2 INJECTION, SOLUTION INTRAMUSCULAR; INTRAVENOUS at 08:09

## 2020-09-05 RX ADMIN — APIXABAN 5 MG: 5 TABLET, FILM COATED ORAL at 08:09

## 2020-09-05 RX ADMIN — IPRATROPIUM BROMIDE AND ALBUTEROL SULFATE 3 ML: .5; 3 SOLUTION RESPIRATORY (INHALATION) at 01:09

## 2020-09-05 RX ADMIN — BUSPIRONE HYDROCHLORIDE 5 MG: 5 TABLET ORAL at 02:09

## 2020-09-05 RX ADMIN — ATORVASTATIN CALCIUM 40 MG: 40 TABLET, FILM COATED ORAL at 08:09

## 2020-09-05 RX ADMIN — IPRATROPIUM BROMIDE AND ALBUTEROL SULFATE 3 ML: .5; 3 SOLUTION RESPIRATORY (INHALATION) at 08:09

## 2020-09-05 RX ADMIN — IPRATROPIUM BROMIDE AND ALBUTEROL SULFATE 3 ML: .5; 3 SOLUTION RESPIRATORY (INHALATION) at 06:09

## 2020-09-05 RX ADMIN — BUSPIRONE HYDROCHLORIDE 5 MG: 5 TABLET ORAL at 08:09

## 2020-09-05 RX ADMIN — HYDROCODONE BITARTRATE AND ACETAMINOPHEN 1 TABLET: 5; 325 TABLET ORAL at 02:09

## 2020-09-05 RX ADMIN — SPIRONOLACTONE 25 MG: 25 TABLET ORAL at 06:09

## 2020-09-05 NOTE — PROGRESS NOTES
[facility]  Department of Cardiology  Consult Note      PATIENT NAME: Yadi Mccall  MRN: [id number]  TODAY'S DATE: 09/05/2020  ADMIT DATE: 9/3/2020                          CONSULT REQUESTED BY: Jefferson Segovia MD    SUBJECTIVE     9/5/2020 interval history:    Ms. Mccall is lying in bed in no distress. Chest hurts from coughing she states. No ectopy on telemetry. Currently she is SR.       PRINCIPAL PROBLEM: Acute hypoxemic respiratory failure      REASON FOR CONSULT:  CHEST PAIN      HPI:    Ms. Mccall is a 65-year-old female patient with a past medical history significant for CAD status post CABG, CVA in 2014, dyslipidemia, abdominal aortic aneurysm, postop AFib a flutter on chronic anticoagulation she had a CABG about 2 weeks ago at [facility].  Apparently per record review she had some shortness of breath and mental status changes.  Patient tells me to check with her sister for history and she tells me currently she denies chest pain or palpitations or shortness of breath she is sitting up in the chair with no complaints currently.    FROM H AND P     Chief Complaint: Shortness of Breath (sister reports patient having hallucinations. recent cardiac bypass done at [facility].  Pt denies any increased SOB or pain.  EMS report O2 SATS on home 3L was in the 80's.  Breathing treatment given in route to increase SATS to 90's )         Patient information was obtained from patient, past medical records and ER records.   HISTORY OF PRESENT ILLNESS:   Yadi Mccall is a 65 y.o. old female who  has a past medical history of AAA (abdominal aortic aneurysm), Anticoagulant long-term use, Coronary artery disease, Foot drop, left foot, Hyperlipidemia, Hypertension, and Stroke (2014).. The patient presented to [facility] on 9/3/2020 with a primary complaint of Shortness of Breath (sister reports patient having hallucinations. recent cardiac bypass done at [facility].  Pt denies any  increased SOB or pain.  EMS report O2 SATS on home 3L was in the 80's.  Breathing treatment given in route to increase SATS to 90's )  .      A 65-year-old  female presents emergency room with shortness of breath and altered mental status.       According to the patient's sister and patient had a 4 vessel CABG approximately 2 weeks ago.  She developed postop atrial fibrillation flutter and was placed on amiodarone and Eliquis.  Nonetheless the patient was followed home today with confusion and hypoxia.  She is normally on supplement oxygen at home.       Upon arrival EMS found the patient's statin and 80s and was confused     Upon arrival in the emergency room the patient was hypoxic she was put on supplemental O2 with improvement in oxygen saturation and ventrally improvement in her mentation but she still remained essentially confused.          On August 21, 2020 the patient had a  4 vessel CABG with Dr. MARSHA Gray.  On 08/24/2020 she was extubated chest tubes were removed and she was stable.  She did developed postop AFib flutter with a RVR and was placed on amiodarone and Eliquis was discharged home on these medications.       The patient also has a past medical history of chronic complete occlusion to her right carotid artery 6 years ago she had a right hemispheric stroke secondary to the occluded right carotid artery, hypertension, COPD, hyperlipidemia and chronic left footdrop of which she wears a splint a uses a walker to ambulate      Review of patient's allergies indicates:   Allergen Reactions    Chlorthalidone     Codeine     Dyazide [triamterene-hydrochlorothiazid]     Penicillins        Past Medical History:   Diagnosis Date    AAA (abdominal aortic aneurysm)     Anticoagulant long-term use     Coronary artery disease     Foot drop, left foot     Hyperlipidemia     Hypertension     Stroke 2014    LEFT SIDED WEAKNESS     Past Surgical History:   Procedure Laterality Date    APPENDECTOMY       CORONARY ANGIOGRAPHY N/A 2020    Procedure: ANGIOGRAM, CORONARY ARTERY;  Surgeon: Norbert Vallecillo MD;  Location: [facility] CATH;  Service: Cardiology;  Laterality: N/A;    CORONARY ARTERY BYPASS GRAFT (CABG) N/A 2020    Procedure: CORONARY ARTERY BYPASS GRAFT (CABG) BILATERAL MAMMARY  x  4 VESSELS;  Surgeon: Pascual Gray MD;  Location: [facility] OR;  Service: Cardiovascular;  Laterality: N/A;    ENDOSCOPIC HARVEST OF VEIN N/A 2020    Procedure: HARVEST-VEIN-ENDOVASCULAR;  Surgeon: Pascual Gray MD;  Location: [facility] OR;  Service: Cardiovascular;  Laterality: N/A;    FOOT SURGERY      HERNIA REPAIR      HYSTERECTOMY      LEFT HEART CATHETERIZATION N/A 2020    Procedure: Left heart cath;  Surgeon: Norbert Vallecillo MD;  Location: [facility] CATH;  Service: Cardiology;  Laterality: N/A;    TONSILLECTOMY       Social History     Tobacco Use    Smoking status: Former Smoker     Packs/day: 1.00     Years: 50.00     Pack years: 50.00     Types: Cigarettes     Quit date: 2019     Years since quittin.7    Smokeless tobacco: Never Used    Tobacco comment: down from 5 cartons/month to 2 cartons/month   Substance Use Topics    Alcohol use: No    Drug use: No        REVIEW OF SYSTEMS  CONSTITUTIONAL: Negative for chills, fatigue and fever.   EYES: No double vision, No blurred vision  NEURO: No headaches, No dizziness  RESPIRATORY:  Intermittent cough, shortness of breath and wheezing.    CARDIOVASCULAR:  Positive  for chest wall pain. Negative for palpitations and leg swelling.   GI: Negative for abdominal pain, No melena, diarrhea, nausea and vomiting.   : Negative for dysuria and frequency, Negative for hematuria  SKIN: Negative for bruising, Negative for edema or discoloration noted.   ENDOCRINE: Negative for polyphagia, Negative for heat intolerance, Negative for cold intolerance  PSYCHIATRIC:  Patient does not recall the events of her surgery in detail or events leading up to it.   MUSCULOSKELETAL: Negative for neck pain, Negative for muscle weakness, Negative for back pain     OBJECTIVE     VITAL SIGNS (Most Recent)  Temp: 97.7 °F (36.5 °C) (09/05/20 0701)  Pulse: 79 (09/05/20 0900)  Resp: (!) 28 (09/05/20 0900)  BP: (!) 87/50 (09/05/20 0900)  SpO2: (!) 94 % (09/05/20 0900)    VENTILATION STATUS  Resp: (!) 28 (09/05/20 0900)  SpO2: (!) 94 % (09/05/20 0900)       I & O (Last 24H):    Intake/Output Summary (Last 24 hours) at 9/5/2020 1201  Last data filed at 9/5/2020 1100  Gross per 24 hour   Intake 1660 ml   Output 1550 ml   Net 110 ml       WEIGHTS  Wt Readings from Last 1 Encounters:   09/03/20 2245 91.6 kg (201 lb 15.1 oz)   09/03/20 2230 91.6 kg (201 lb 15.1 oz)   09/03/20 1751 96.6 kg (213 lb)       PHYSICAL EXAM  GENERAL: well built, well nourished, well-developed in no apparent distress alert and oriented.   HEENT: Normocephalic. Pupils normal and conjunctivae normal.  Mucous membranes normal, no cyanosis or icterus, trachea central,no pallor or icterus is noted..   NECK: No JVD. No bruit..   THYROID: Thyroid not enlarged. No nodules present..   CARDIAC: Regular rate and rhythm.   CHEST ANATOMY: normal.   LUNGS:  DIMINISHED  ABDOMEN: Soft no masses or organomegaly.  No abdomen pulsations or bruits.  Normal bowel sounds. No pulsations and no masses felt, No guarding or rebound.   URINARY: No salvador catheter   EXTREMITIES:  BILATERAL CHRONIC EDEMAPERIPHERAL VASCULAR SYSTEM: Good palpable distal pulses.   CENTRAL NERVOUS SYSTEM:  Moving all her extremities no focal gross deficits elicited.     SKIN:  MIDLINE SURGICAL INCISION HEALING WELL SLIGHTLY RED BUT NO OOZING NOTED   MUSCLE STRENGTH & TONE: No noteable weakness, atrophy or abnormal movement.   Memory:  Very poor recall  HOME MEDICATIONS:  No current facility-administered medications on file prior to encounter.      Current Outpatient Medications on File Prior to Encounter   Medication Sig Dispense Refill    [START ON 9/10/2020]  amiodarone (PACERONE) 200 MG Tab Take 1 tablet (200 mg total) by mouth once daily. (Patient taking differently: Take 400 mg by mouth once daily. 400mg for 8 days, or 09/10/20, then 200mg daily thereafter) 30 tablet 11    amiodarone (PACERONE) 400 MG tablet Take 1 tablet (400 mg total) by mouth 2 (two) times daily. 60 tablet 11    apixaban (ELIQUIS) 5 mg Tab Take 1 tablet (5 mg total) by mouth 2 (two) times daily. 60 tablet 2    aspirin 325 MG tablet Take 325 mg by mouth once daily.       atorvastatin (LIPITOR) 40 MG tablet TAKE 1 TABLET EVERY DAY (Patient taking differently: Take 40 mg by mouth every evening. ) 90 tablet 3    baclofen (LIORESAL) 20 MG tablet TAKE 1 TABLET THREE TIMES DAILY AS NEEDED (Patient taking differently: Take 20 mg by mouth 3 (three) times daily as needed (pain). ) 270 tablet 3    fluticasone propionate (FLONASE) 50 mcg/actuation nasal spray 2 sprays (100 mcg total) by Each Nostril route once daily. (Patient taking differently: 2 sprays by Each Nostril route daily as needed. TAKE IF NEEDED MORNING OF SURGERY) 16 g 11    furosemide (LASIX) 20 MG tablet Take 1 tablet (20 mg total) by mouth 2 (two) times daily. 60 tablet 11    HYDROcodone-acetaminophen (NORCO) 7.5-325 mg per tablet Take 1 tablet by mouth every 4 (four) hours as needed. 28 tablet 0    lisinopriL (PRINIVIL,ZESTRIL) 40 MG tablet Take 40 mg by mouth once daily.      nicotine (NICODERM CQ) 21 mg/24 hr Place 1 patch onto the skin once daily. 30 patch 3    rOPINIRole (REQUIP) 2 MG tablet Take 1 tablet (2 mg total) by mouth 3 (three) times daily. (Patient taking differently: Take 2 mg by mouth 3 (three) times daily as needed (restless legs). ) 90 tablet 11    traZODone (DESYREL) 100 MG tablet TAKE 1 TABLET NIGHTLY AS NEEDED FOR INSOMNIA (Patient taking differently: Take 100 mg by mouth nightly as needed. ) 90 tablet 3    acetaminophen (TYLENOL) 500 MG tablet Take 500 mg by mouth 2 (two) times daily as needed for Pain.        albuterol-ipratropium (DUO-NEB) 2.5 mg-0.5 mg/3 mL nebulizer solution Take 3 mLs by nebulization every 4 (four) hours. Rescue (Patient taking differently: Take 3 mLs by nebulization every 4 (four) hours as needed. ) 1 Box 5    gabapentin (NEURONTIN) 100 MG capsule 1 by mouth in the morning 1 by mouth in the afternoon and 3 by mouth at bedtime (Patient not taking: Reported on 7/30/2020) 150 capsule 11    guaifenesin (MUCINEX ORAL) Take 2 tablets by mouth as needed (congestion).      metoprolol tartrate 37.5 mg Tab Take 37.5 mg by mouth 2 (two) times daily. 30 tablet 3    nitroGLYCERIN (NITROSTAT) 0.4 MG SL tablet Place 1 tablet (0.4 mg total) under the tongue every 5 (five) minutes as needed for Chest pain. 20 tablet 0    oxymetazoline HCl (AFRIN, OXYMETAZOLINE, NASL) 1 spray by Nasal route as needed (stuffy nose).      triamcinolone acetonide 0.1% (KENALOG) 0.1 % ointment Apply topically 2 (two) times daily. (Patient not taking: Reported on 7/30/2020) 30 g 1       SCHEDULED MEDS:   albuterol-ipratropium  3 mL Nebulization Q6H    amiodarone  200 mg Oral BID    apixaban  5 mg Oral BID    aspirin  81 mg Oral Daily    atorvastatin  40 mg Oral QHS    busPIRone  5 mg Oral TID    chlorhexidine  15 mL Mouth/Throat BID    lisinopriL  40 mg Oral Daily    mupirocin   Nasal BID    nicotine  1 patch Transdermal Q24H       CONTINUOUS INFUSIONS:    PRN MEDS:calcium chloride IVPB, calcium chloride IVPB, calcium chloride IVPB, LORazepam, magnesium oxide, magnesium sulfate IVPB, magnesium sulfate IVPB, magnesium sulfate IVPB, magnesium sulfate IVPB, morphine, nitroGLYCERIN, potassium chloride in water, potassium chloride in water, potassium chloride in water, potassium chloride in water, potassium chloride, potassium chloride, potassium chloride, potassium chloride, sodium chloride 0.9%, sodium phosphate IVPB, sodium phosphate IVPB, sodium phosphate IVPB, sodium phosphate IVPB, sodium phosphate IVPB,  traZODone    LABS AND DIAGNOSTICS     CBC LAST 3 DAYS  Recent Labs   Lab 08/31/20  1144 09/03/20 1747 09/04/20 0356 09/05/20  0352   WBC 15.92* 18.24* 13.40* 13.54*   RBC 3.80* 3.47* 3.30* 3.28*   HGB 11.2* 10.0* 9.6* 9.4*   HCT 34.3* 30.5* 29.6* 30.0*   MCV 90 88 90 92   MCH 29.5 28.8 29.1 28.7   MCHC 32.7 32.8 32.4 31.3*   RDW 14.1 14.6* 14.7* 15.3*   * 659* 612* 596*   MPV 8.3* 8.2* 8.0* 8.2*   GRAN 77.0*  12.3* 84.9*  15.5* 73.3*  9.8*  --    LYMPH 12.8*  2.0 7.1*  1.3 15.1*  2.0  --    MONO 8.7  1.4* 6.7  1.2* 9.4  1.3*  --    BASO 0.05 0.05 0.05  --    NRBC 0 0 0  --        COAGULATION LAST 3 DAYS  Recent Labs   Lab 09/03/20 1747   LABPT 15.0*   INR 1.2   APTT 30.1       CHEMISTRY LAST 3 DAYS  Recent Labs   Lab 08/31/20  0510  09/03/20  1747 09/03/20  1907 09/04/20 0356 09/05/20 0352   NA  --    < > 133*  --  133* 132*   K  --    < > 4.1  --  3.5 3.7   CL  --    < > 92*  --  92* 93*   CO2  --    < > 28  --  28 28   ANIONGAP  --    < > 13  --  13 11   BUN  --    < > 20  --  15 14   CREATININE  --    < > 1.0  --  0.8 0.8   GLU  --    < > 108  --  101 100   CALCIUM  --    < > 8.4*  --  8.2* 7.9*   PH 7.52*  --   --  7.454*  --   --    MG  --    < > 2.3  --  2.4 2.5   ALBUMIN  --   --  3.3*  --   --   --    PROT  --   --  6.3  --   --   --    ALKPHOS  --   --  76  --   --   --    ALT  --   --  32  --   --   --    AST  --   --  32  --   --   --    BILITOT  --   --  1.0  --   --   --     < > = values in this interval not displayed.       CARDIAC PROFILE LAST 3 DAYS  Recent Labs   Lab 09/03/20  1747 09/04/20  0356   BNP  --  258*   TROPONINI <0.030  --        ENDOCRINE LAST 3 DAYS  No results for input(s): TSH, PROCAL in the last 168 hours.    LAST ARTERIAL BLOOD GAS  ABG  Recent Labs   Lab 09/03/20  1907   PH 7.454*   PO2 70*   PCO2 42.2   HCO3 29.6*   BE 6       LAST 7 DAYS MICROBIOLOGY   Microbiology Results (last 7 days)     Procedure Component Value Units Date/Time    Blood culture #1  **CANNOT BE ORDERED STAT** [[id number]] Collected: 09/03/20 1911    Order Status: Completed Specimen: Blood from Peripheral, Forearm, Left Updated: 09/04/20 2032     Blood Culture, Routine No Growth to date      No Growth to date    Blood culture #2 **CANNOT BE ORDERED STAT** [[id number]] Collected: 09/03/20 1950    Order Status: Completed Specimen: Blood from Peripheral, Antecubital, Right Updated: 09/04/20 2032     Blood Culture, Routine No Growth to date      No Growth to date          MOST RECENT IMAGING  X-Ray Chest AP Portable  Narrative: EXAMINATION:  XR CHEST AP PORTABLE    CLINICAL HISTORY:  sob;    FINDINGS:  Portable chest at 04:14 is compared to 09/03/2020 shows hypoinflation.  The patient has had a prior median sternotomy.  The heart is mildly enlarged.    There are small bilateral pleural effusions left greater than right.  There is bibasilar atelectasis.  The upper lobes are clear.  Pulmonary vasculature is normal. No acute osseous abnormality.  Impression: Hypoinflation with small bilateral pleural effusions and bibasilar atelectasis    Mild cardiomegaly    Electronically signed by: Lesly Toribio MD  Date:    09/04/2020  Time:    06:02      ECHOCARDIOGRAM RESULTS (last 5)  Results for orders placed in visit on 06/25/20   Echo Color Flow Doppler? Yes    Narrative · Normal left ventricular systolic function. The estimated ejection   fraction is 65%.  · Concentric left ventricular hypertrophy.  · Normal LV diastolic function.  · Normal right ventricular systolic function.  · Normal central venous pressure (3 mmHg).          CURRENT/PREVIOUS VISIT EKG  Results for orders placed or performed during the hospital encounter of 09/03/20   EKG 12-lead    Collection Time: 09/03/20  5:56 PM    Narrative    Test Reason : R41.82,    Vent. Rate : 083 BPM     Atrial Rate : 083 BPM     P-R Int : 192 ms          QRS Dur : 116 ms      QT Int : 406 ms       P-R-T Axes : 050 064 060 degrees     QTc Int : 477 ms    Normal  sinus rhythm  Possible Left atrial enlargement  Possible Inferior infarct (cited on or before 27-AUG-2020)  Abnormal ECG  When compared with ECG of 30-AUG-2020 08:45,  Premature atrial complexes are no longer Present  QT has lengthened    Referred By: [email or web address]   SELF           Confirmed By:            ASSESSMENT/PLAN:     Active Hospital Problems    Diagnosis    *Acute hypoxemic respiratory failure    Abnormal CXR    Personal history of tobacco use, presenting hazards to health    Marijuana use    Anemia    Thrombocytosis    Hyponatremia    Generalized anxiety disorder    Elevated diaphragm    History of CVA with residual deficit    Bilateral pleural effusion    Atrial flutter, paroxysmal     Recent diagnosis after CABG done on 08/24/2020      S/P CABG x 4     On 08/24/2020 by Dr. Gray      Coronary artery disease of native artery of native heart with stable angina pectoris    Chronic obstructive pulmonary disease with acute exacerbation    Hyperlipidemia       ASSESSMENT & PLAN:     1.  Postop AFib flutter-currently sinus rhythm  2.  CAD status post CABG 2 weeks ago  3.  Encephalopathy on admit secondary to hypoxia  4.  Bilateral Pleural effusions greater to the left  5.  Possible pneumonia  6.  History of right hemispheric CVA to total chronic occlusion of right carotid  7.  COPD  8.  Anemia hemoglobin 9.4  9.  Leukocytosis  10.  Elevated platelet count    RECOMMENDATIONS:    Review her chest x-ray demonstrates bilateral pleural effusions small to moderate  Some elevation of right hemidiaphragm noted on a portable chest unclear significance  No new recommendations  Start on daily doses of Lasix at 40 mg p.o for 2-3 days and then reduce it to 20 mg daily  Add Aldactone 25 mg to her regimen.  Correct hypokalemia as needed, keep potassium greater than 4.0 and magnesium greater than or equal to 2.0  Thank you for the consultation    Deborah Kaufman NP  [facility]  Department of  Cardiology  Date of Service: 09/05/2020        I have personally interviewed and examined the patient, I have reviewed the Nurse Practitioner's history and physical, assessment, and plan. I agree with the findings and plan.      Cleveland Maravilla M.D.  [facility]  Department of Cardiology  Date of Service: 09/05/2020  9:05 AM

## 2020-09-05 NOTE — PLAN OF CARE
09/04/20 1943   Patient Assessment/Suction   Level of Consciousness (AVPU) alert   Respiratory Effort Normal;Unlabored   Expansion/Accessory Muscles/Retractions no use of accessory muscles   All Lung Fields Breath Sounds equal bilaterally;diminished;crackles, fine   Rhythm/Pattern, Respiratory unlabored   Cough Frequency frequent   Cough Type fair;nonproductive   PRE-TX-O2   O2 Device (Oxygen Therapy) High Flow nasal Cannula   Flow (L/min) 5   SpO2 96 %   Pulse Oximetry Type Continuous   $ Pulse Oximetry - Multiple Charge Pulse Oximetry - Multiple   Pulse 72   Resp 18   Aerosol Therapy   $ Aerosol Therapy Charges Aerosol Treatment   Daily Review of Necessity (SVN) completed   Respiratory Treatment Status (SVN) given   Treatment Route (SVN) mask   Patient Position (SVN) HOB elevated   Post Treatment Assessment (SVN) breath sounds unchanged   Signs of Intolerance (SVN) none   Education   $ Education 15 min;Bronchodilator   Respiratory Evaluation   $ Care Plan Tech Time 15 min   Evaluation For New Orders

## 2020-09-05 NOTE — PT/OT/SLP EVAL
"Physical Therapy Evaluation    Patient Name:  Yadi Mccall   MRN:  [id number]    Recommendations:     Discharge Recommendations:  home health PT, home with home health   Discharge Equipment Recommendations: none   Barriers to discharge: Pt lives with sister who works during the day. Pt says she has $3000 copay for SNF and cannot afford this. Pt was home one day and returned with sob/low sats.     Assessment:     Yadi Mccall is a 65 y.o. female admitted with a medical diagnosis of Acute hypoxemic respiratory failure.  She presents with the following impairments/functional limitations:  weakness, impaired endurance, impaired self care skills, impaired functional mobilty, gait instability, impaired balance, pain, impaired cardiopulmonary response to activity .     Pt agrees to PT eval. Pt reports being on 3L at home. Pt now on 4L o2 at 93% but 88% during ambulation and had to increase to 6L to maintain 90%. Pt reports being afraid to ambulate due to fear of falling. Pt reports falling at [facility] because she got out of bed by herself to [facility] and also fell going into house while her nephew was trying to help her into home after dc from [facility]. Pt requires VC for sternal precautions. Pt reports she was also smoking when she returned home and her O2 was on. Pt is a high fall risk. Pt is A/Ox4 and reports feeling much better today, "I can eat without losing  My breath". BP 94/49 upon entering and 131/81 sitting EOB.      Rehab Prognosis: Fair; patient would benefit from acute skilled PT services to address these deficits and reach maximum level of function.    Recent Surgery: * No surgery found *      Plan:     During this hospitalization, patient to be seen daily to address the identified rehab impairments via gait training, therapeutic activities, therapeutic exercises and progress toward the following goals:    · Plan of Care Expires:  10/05/20    Subjective     Chief Complaint: weakness  Patient/Family Comments/goals: to go " home   Pain/Comfort:  · Pain Rating 1: (incisional pain but unable to rate)    Patients cultural, spiritual, [employer] conflicts given the current situation:      Living Environment:  Pt lives with sister who works during day, prior to CABG pt was independent, no AD. Threshhold to step into house.   Prior to admission, patients level of function was modified independent.  Equipment used at home: rollator, bedside commode, shower chair.  DME owned (not currently used): none.  Upon discharge, patient will have assistance from sister.    Objective:     Communicated with rn prior to session.  Patient found HOB elevated with blood pressure cuff, oxygen, pulse ox (continuous), peripheral IV, telemetry  upon PT entry to room.    General Precautions: Standard, fall, sternal   Orthopedic Precautions:N/A   Braces:       Exams:  · Cognitive Exam:  Patient is oriented to Person, Place, Time and Situation  · Gross Motor Coordination:  WFL  · RUE ROM: wfl within sternal limits  · LUE ROM:  wfl within sternal limits  · RLE ROM: WFL  · RLE Strength: WFL  · LLE ROM: WFL except decreased DF, inv/ev and PF. Pt wears AFO when going outside of house.   · LLE Strength: WFL except poor left ankle    Functional Mobility:  · Bed Mobility:     · Rolling Right: minimum assistance  · Scooting: minimum assistance  · Supine to Sit: minimum assistance  · Sit to Supine: minimum assistance; howver, pt unable to lay flat even for few seconds in order to roll onto back after log roll. Pt then sat straight up due to sob even though was at 90%. Pt left with HOB at 80 degrees with pillow behind back.   · Transfers:     · Sit to Stand:  minimum assistance with rolling walker  · Gait: pt able to ambulate in room min A for RW, noted unsteadiness and fear of falling, and had to increase O2 to 6L tokeep at 90% O2; noted sob after ambulation, no dizziness. VC for sternal precautions.     Therapeutic Activities and Exercises:   education, sternal  precautions, dc planning, poc, log rolling, fall prevention, pursed lip breathing.     AM-PAC 6 CLICK MOBILITY  Total Score:15     Patient left HOB elevated with all lines intact, call button in reach, bed alarm on and rn notified.    GOALS:   Multidisciplinary Problems     Physical Therapy Goals        Problem: Physical Therapy Goal    Goal Priority Disciplines Outcome Goal Variances Interventions   Physical Therapy Goal     PT, PT/OT      Description: Goals to be met by: discharge      Patient will increase functional independence with mobility by performin. Supine to sit with Stand-by Assistance  2. Sit to stand transfer with Supervision  3. Bed to chair transfer with Supervision using Rolling Walker  4. Gait  x 75 feet with Supervision using Rolling Walker.                      History:     Past Medical History:   Diagnosis Date    AAA (abdominal aortic aneurysm)     Anticoagulant long-term use     Coronary artery disease     Foot drop, left foot     Hyperlipidemia     Hypertension     Stroke     LEFT SIDED WEAKNESS       Past Surgical History:   Procedure Laterality Date    APPENDECTOMY      CORONARY ANGIOGRAPHY N/A 2020    Procedure: ANGIOGRAM, CORONARY ARTERY;  Surgeon: Norbert Vallecillo MD;  Location: [facility] CATH;  Service: Cardiology;  Laterality: N/A;    CORONARY ARTERY BYPASS GRAFT (CABG) N/A 2020    Procedure: CORONARY ARTERY BYPASS GRAFT (CABG) BILATERAL MAMMARY  x  4 VESSELS;  Surgeon: Pascual Gray MD;  Location: [facility] OR;  Service: Cardiovascular;  Laterality: N/A;    ENDOSCOPIC HARVEST OF VEIN N/A 2020    Procedure: HARVEST-VEIN-ENDOVASCULAR;  Surgeon: Pascual Gray MD;  Location: [facility] OR;  Service: Cardiovascular;  Laterality: N/A;    FOOT SURGERY      HERNIA REPAIR      HYSTERECTOMY      LEFT HEART CATHETERIZATION N/A 2020    Procedure: Left heart cath;  Surgeon: Norbert Vallecillo MD;  Location: [facility] CATH;  Service: Cardiology;  Laterality: N/A;     TONSILLECTOMY         Time Tracking:     PT Received On: 09/05/20  PT Start Time: 1100     PT Stop Time: 1117  PT Total Time (min): 17 min     Billable Minutes: Evaluation 7 and Gait Training 10      Shanta Ron, PT  09/05/2020

## 2020-09-05 NOTE — PROGRESS NOTES
"Progress Note  PULMONARY    Admit Date: 9/3/2020   09/05/2020  From Dr Elliott's consult--HPI:     9/4/2020 - 64 yo female with h/o CABG about 2 weeks ago at [facility].  Brought in to ER because of increased SOB and mental status change (possible report of hallucinations).  SOB has been a problem for months but got worse over the last few days.  She denies any h/o COPD (but has a h/o smoking about 3 PPD - stopped "recently" and is on home O2 at 3 LPM).  By report when EMS arrived sats were in the 80's on her O2.  Ater getting a treatment her sats improved.  She denies fever, chills, sweat.  Ha some cough which sounds to be chronic and nonproductive.  She has had some chest tightness and possible wheezing.  She denies any CHF symptoms.  Of note is she is not a very good historian.  Plan   Encephalopathy was likely hypoxemia related  · With regards to left pleural effusion it is small to moderate - would follow and try to diurese  · Would not tap at this time - will need to hold Eliquis for at least 2 days to consider  · Continue treatment of COPD - respiratory treatments would hold on steroid, should be on LABA/LAMA/ICS at DC  · Needs to stop smoking and stop marijuana  · Ask Dr Gray to assess has some instability of the superior portion of her wound  · Continue with amiodarone, eliquis  · CXR is concerning for right diaphragm paresis - will check US to evaluate  · Increase activity as able  · Monitor H/H and platelets  · Probably needs outpt sleep study if she is willing to do it.  SUBJECTIVE:     9/5-no new c/o- relates has had some wheezes, breathing still short.        PFSH and Allergies reviewed.    OBJECTIVE:     Vitals (Most recent):  Vitals:    09/05/20 1520   BP: 121/65   Pulse: 77   Resp: 20   Temp: 97.6 °F (36.4 °C)       Vitals (24 hour range):  Temp:  [97.6 °F (36.4 °C)-97.9 °F (36.6 °C)]   Pulse:  [69-82]   Resp:  [16-39]   BP: ()/(44-65)   SpO2:  [85 %-97 %]       Intake/Output Summary " (Last 24 hours) at 9/5/2020 1705  Last data filed at 9/5/2020 1100  Gross per 24 hour   Intake 1660 ml   Output 1550 ml   Net 110 ml          Physical Exam:  The patient's neuro status (alertness,orientation,cognitive function,motor skills,), pharyngeal exam (oral lesions, hygiene, abn dentition,), Neck (jvd,mass,thyroid,nodes in neck and above/below clavicle),RESPIRATORY(symmetry,effort,fremitus,percussion,auscultation),  Cor(rhythm,heart tones including gallops,perfusion,edema)ABD(distention,hepatic&splenomegaly,tenderness,masses), Skin(rash,cyanosis),Psyc(affect,judgement,).  Exam negative except for these pertinent findings:    Dull bilat, chest is symmetric, no distress, abnormal percussion, normal fremitus and good  breath sounds,. Alert, no edema      Radiographs reviewed: view by direct vision  Ct 9/3 rll altectasis infiltrate -high right atelectaiss, sm right effusion  Results for orders placed during the hospital encounter of 08/21/20   X-Ray Chest 1 View    Narrative EXAMINATION:  XR CHEST 1 VIEW 08/31/2020 at 05:50    INDICATION:  Dyspnea, respiratory abnormality    COMPARISON  08/28/2020    FINDINGS  The sternal wires are aligned.  There is slight asymmetric right hemidiaphragm elevation similar overall.  There is some patchy atelectatic consolidation with a small amount of pleural fluid bilaterally somewhat increased at the lung bases.  No interval pneumothorax or cardiac decompensation is appreciated.  No other significant interval changes are appreciated.    IMPRESSION  There is some patchy atelectasis and pleural fluid slightly increased overall at the lung bases.      Electronically signed by: Darek Ballesteros MD  Date:    08/31/2020  Time:    06:14   ]    Labs     Recent Labs   Lab 09/05/20  0352   WBC 13.54*   HGB 9.4*   HCT 30.0*   *     Recent Labs   Lab 09/05/20 0352   *   K 3.7   CL 93*   CO2 28   BUN 14   CREATININE 0.8      CALCIUM 7.9*   MG 2.5   No results for input(s): PH,  PCO2, PO2, HCO3 in the last 24 hours.  Microbiology Results (last 7 days)     Procedure Component Value Units Date/Time    Blood culture #1 **CANNOT BE ORDERED STAT** [[id number]] Collected: 09/03/20 1911    Order Status: Completed Specimen: Blood from Peripheral, Forearm, Left Updated: 09/04/20 2032     Blood Culture, Routine No Growth to date      No Growth to date    Blood culture #2 **CANNOT BE ORDERED STAT** [[id number]] Collected: 09/03/20 1950    Order Status: Completed Specimen: Blood from Peripheral, Antecubital, Right Updated: 09/04/20 2032     Blood Culture, Routine No Growth to date      No Growth to date          Impression:  Active Hospital Problems    Diagnosis  POA    *Acute hypoxemic respiratory failure [J96.01]  Yes    Abnormal CXR [R93.89]  Yes    Personal history of tobacco use, presenting hazards to health [Z87.891]  Not Applicable    Marijuana use [F12.90]  Yes    Anemia [D64.9]  Yes    Thrombocytosis [D47.3]  Yes    Hyponatremia [E87.1]  Yes    Generalized anxiety disorder [F41.1]  Yes    Elevated diaphragm [J98.6]  Yes    History of CVA with residual deficit [I69.30]  Not Applicable     Chronic    Bilateral pleural effusion [J90]  Yes    Atrial flutter, paroxysmal [I48.92]  Yes     Recent diagnosis after CABG done on 08/24/2020      S/P CABG x 4 [Z95.1]  Not Applicable     On 08/24/2020 by Dr. Gray      Coronary artery disease of native artery of native heart with stable angina pectoris [I25.118]  Yes    Chronic obstructive pulmonary disease with acute exacerbation [J44.1]  Yes    Hyperlipidemia [E78.5]  Yes      Resolved Hospital Problems    Diagnosis Date Resolved POA    Encephalopathy, metabolic [G93.41] 09/04/2020 Yes               Plan:   9/5 - 1480/1480/1550 lasix 40/d po, aldactone.  Effusion was small.    On neb rx.  4lpm ox     Will dose solumedrol 40/d , extra 20 iv lasix given- pt was on lasix 20 bid pta- will increase lasix from 40/d to  bid.                          .

## 2020-09-05 NOTE — PROGRESS NOTES
[facility]  Progress Note    Patient name: Yadi Mccall  MRN: [id number]  Admit Date: 9/3/2020   LOS: 2 days     SUBJECTIVE:     Principal problem: Acute hypoxemic respiratory failure    Interval History:  Reports improvement in shortness of breath.  Decent urine output overnight.  Denies chest pain/pressure.     Scheduled Meds:   albuterol-ipratropium  3 mL Nebulization Q6H    amiodarone  200 mg Oral BID    apixaban  5 mg Oral BID    aspirin  81 mg Oral Daily    atorvastatin  40 mg Oral QHS    busPIRone  5 mg Oral TID    chlorhexidine  15 mL Mouth/Throat BID    furosemide (LASIX) IV  20 mg Intravenous Once    lisinopriL  40 mg Oral Daily    mupirocin   Nasal BID    nicotine  1 patch Transdermal Q24H     Continuous Infusions:  PRN Meds:calcium chloride IVPB, calcium chloride IVPB, calcium chloride IVPB, HYDROcodone-acetaminophen, LORazepam, magnesium oxide, magnesium sulfate IVPB, magnesium sulfate IVPB, magnesium sulfate IVPB, magnesium sulfate IVPB, morphine, nitroGLYCERIN, potassium chloride in water, potassium chloride in water, potassium chloride in water, potassium chloride in water, potassium chloride, potassium chloride, potassium chloride, potassium chloride, sodium chloride 0.9%, sodium phosphate IVPB, sodium phosphate IVPB, sodium phosphate IVPB, sodium phosphate IVPB, sodium phosphate IVPB, traZODone    Review of patient's allergies indicates:   Allergen Reactions    Chlorthalidone     Codeine     Dyazide [triamterene-hydrochlorothiazid]     Penicillins        Review of Systems: As per interval history. Poor historian     OBJECTIVE:     Vital Signs (Most Recent)  Temp: 97.7 °F (36.5 °C) (09/05/20 0701)  Pulse: 82 (09/05/20 1305)  Resp: 18 (09/05/20 1305)  BP: (!) 87/50 (09/05/20 0900)  SpO2: 95 % (09/05/20 1305)    Vital Signs Range (Last 24H):  Temp:  [97.7 °F (36.5 °C)-97.9 °F (36.6 °C)]   Pulse:  [69-82]   Resp:  [18-39]   BP: ()/(44-78)   SpO2:   [85 %-100 %]     I & O (Last 24H):    Intake/Output Summary (Last 24 hours) at 9/5/2020 1347  Last data filed at 9/5/2020 1100  Gross per 24 hour   Intake 1660 ml   Output 1550 ml   Net 110 ml       Physical Exam:  General: Patient resting comfortably in no acute distress. Appears as stated age. Calm  Eyes: No conjunctival injection. No scleral icterus.  ENT: Hearing grossly intact. No discharge from ears. No nasal discharge.   Neck: Supple, trachea midline. No JVD  CVS: RRR. Sternal wound incision is clean  Lungs: No tachypnea or accessory muscle use. Diminished bilateral breath sounds (R>L). Faint end-expiratory wheeze  Abdomen:  Soft, nontender and nondistended.  No organomegaly  Neuro: Alert. Left leg weakness; chronic. Follows commands. Responds appropriately   Skin:  No rash or erythema noted  Psych:  Normal mood and behavior  :  Valadez catheter with yellow urine      Laboratory:  CBC:   Recent Labs   Lab 09/05/20  0352   WBC 13.54*   RBC 3.28*   HGB 9.4*   HCT 30.0*   *   MCV 92   MCH 28.7   MCHC 31.3*     CMP:   Recent Labs   Lab 09/03/20  1747  09/05/20  0352      < > 100   CALCIUM 8.4*   < > 7.9*   ALBUMIN 3.3*  --   --    PROT 6.3  --   --    *   < > 132*   K 4.1   < > 3.7   CO2 28   < > 28   CL 92*   < > 93*   BUN 20   < > 14   CREATININE 1.0   < > 0.8   ALKPHOS 76  --   --    ALT 32  --   --    AST 32  --   --    BILITOT 1.0  --   --     < > = values in this interval not displayed.     Cardiac markers:   Recent Labs   Lab 09/03/20 1747   TROPONINI <0.030       Diagnostic Results:  Labs: Reviewed    ASSESSMENT/PLAN:     65-year-old female with ongoing tobacco use who recently underwent 4 vessel CABG is admitted to our facility for respiratory failure after being discharged from outside facility yesterday.  Found to have moderate left-sided pleural effusion along with right hemidiaphragm elevation.  Possible component of undiagnosed COPD.    Active Hospital Problems    Diagnosis  POA     *Acute hypoxemic respiratory failure [J96.01]  Yes    Abnormal CXR [R93.89]  Yes    Personal history of tobacco use, presenting hazards to health [Z87.891]  Not Applicable    Marijuana use [F12.90]  Yes    Anemia [D64.9]  Yes    Thrombocytosis [D47.3]  Yes    Hyponatremia [E87.1]  Yes    Generalized anxiety disorder [F41.1]  Yes    Elevated diaphragm [J98.6]  Yes    History of CVA with residual deficit [I69.30]  Not Applicable     Chronic    Bilateral pleural effusion [J90]  Yes    Atrial flutter, paroxysmal [I48.92]  Yes     Recent diagnosis after CABG done on 08/24/2020      S/P CABG x 4 [Z95.1]  Not Applicable     On 08/24/2020 by Dr. Gray      Coronary artery disease of native artery of native heart with stable angina pectoris [I25.118]  Yes    Chronic obstructive pulmonary disease with acute exacerbation [J44.1]  Yes    Hyperlipidemia [E78.5]  Yes      Resolved Hospital Problems    Diagnosis Date Resolved POA    Encephalopathy, metabolic [G93.41] 09/04/2020 Yes       Plan:   Supplemental oxygen for hypoxemic respiratory failure; wean as tolerated  Moderate size left pleural effusion; appreciate pulmonology input  Repeat CXR tmrw   Continue IV diuretics as needed on a daily basis  Right hemidiaphragm elevation noted; will assess for phrenic nerve paralysis when able (when off sternal precautions)  Recent hospital stay complicated by postoperative atrial flutter  Continue amiodarone; dose adjusted as per Cardiology.  Continue apixaban for anticoagulation  Nebulized breathing treatments for COPD exacerbation  Possible undiagnosed anxiety disorder; start buspirone 5 mg t.i.d. along with p.r.n. lorazepam   Mobilize. Cardiac rehab after discharge   PT/OT consult  Transfer out of ICU    VTE Risk Mitigation (From admission, onward)         Ordered     apixaban tablet 5 mg  2 times daily      09/03/20 2155     IP VTE LOW RISK PATIENT  Once      09/03/20 2155     Place sequential compression device   Until discontinued      09/03/20 2155                  Department Hospital Medicine  [facility]  Jefferson Segovia MD  Date of service: 09/05/2020

## 2020-09-05 NOTE — PLAN OF CARE
09/05/20 0654   Patient Assessment/Suction   Level of Consciousness (AVPU) alert   Respiratory Effort Normal;Unlabored   Expansion/Accessory Muscles/Retractions no use of accessory muscles   All Lung Fields Breath Sounds diminished   NOLAN Breath Sounds clear   LLL Breath Sounds diminished   RUL Breath Sounds clear   RML Breath Sounds diminished   RLL Breath Sounds diminished   Rhythm/Pattern, Respiratory unlabored   Cough Frequency infrequent   Cough Type nonproductive   PRE-TX-O2   O2 Device (Oxygen Therapy) High Flow nasal Cannula   $ Is the patient on Low Flow Oxygen? Yes   Flow (L/min) 4   SpO2 (!) 92 %   Pulse Oximetry Type Continuous   $ Pulse Oximetry - Multiple Charge Pulse Oximetry - Multiple   Pulse 73   Resp (!) 25   Aerosol Therapy   $ Aerosol Therapy Charges Aerosol Treatment   Daily Review of Necessity (SVN) completed   Respiratory Treatment Status (SVN) given   Treatment Route (SVN) mask;oxygen   Patient Position (SVN) sitting in chair   Post Treatment Assessment (SVN) breath sounds unchanged   Signs of Intolerance (SVN) none   continue therapy q6

## 2020-09-06 LAB
ANION GAP SERPL CALC-SCNC: 12 MMOL/L (ref 8–16)
BUN SERPL-MCNC: 13 MG/DL (ref 8–23)
CALCIUM SERPL-MCNC: 8.4 MG/DL (ref 8.7–10.5)
CHLORIDE SERPL-SCNC: 92 MMOL/L (ref 95–110)
CO2 SERPL-SCNC: 30 MMOL/L (ref 23–29)
CREAT SERPL-MCNC: 0.9 MG/DL (ref 0.5–1.4)
ERYTHROCYTE [DISTWIDTH] IN BLOOD BY AUTOMATED COUNT: 15.6 % (ref 11.5–14.5)
EST. GFR  (AFRICAN AMERICAN): >60 ML/MIN/1.73 M^2
EST. GFR  (NON AFRICAN AMERICAN): >60 ML/MIN/1.73 M^2
GLUCOSE SERPL-MCNC: 178 MG/DL (ref 70–110)
HCT VFR BLD AUTO: 35 % (ref 37–48.5)
HGB BLD-MCNC: 11 G/DL (ref 12–16)
MAGNESIUM SERPL-MCNC: 2.5 MG/DL (ref 1.6–2.6)
MCH RBC QN AUTO: 29.2 PG (ref 27–31)
MCHC RBC AUTO-ENTMCNC: 31.4 G/DL (ref 32–36)
MCV RBC AUTO: 93 FL (ref 82–98)
PLATELET # BLD AUTO: 711 K/UL (ref 150–350)
PMV BLD AUTO: 8 FL (ref 9.2–12.9)
POTASSIUM SERPL-SCNC: 4.6 MMOL/L (ref 3.5–5.1)
RBC # BLD AUTO: 3.77 M/UL (ref 4–5.4)
SODIUM SERPL-SCNC: 134 MMOL/L (ref 136–145)
WBC # BLD AUTO: 12.68 K/UL (ref 3.9–12.7)

## 2020-09-06 PROCEDURE — 25000003 PHARM REV CODE 250: Performed by: NURSE PRACTITIONER

## 2020-09-06 PROCEDURE — 63600175 PHARM REV CODE 636 W HCPCS: Performed by: NURSE PRACTITIONER

## 2020-09-06 PROCEDURE — 94799 UNLISTED PULMONARY SVC/PX: CPT

## 2020-09-06 PROCEDURE — 83735 ASSAY OF MAGNESIUM: CPT

## 2020-09-06 PROCEDURE — 94761 N-INVAS EAR/PLS OXIMETRY MLT: CPT

## 2020-09-06 PROCEDURE — 27000221 HC OXYGEN, UP TO 24 HOURS

## 2020-09-06 PROCEDURE — 94640 AIRWAY INHALATION TREATMENT: CPT

## 2020-09-06 PROCEDURE — 63600175 PHARM REV CODE 636 W HCPCS: Performed by: INTERNAL MEDICINE

## 2020-09-06 PROCEDURE — 99900035 HC TECH TIME PER 15 MIN (STAT)

## 2020-09-06 PROCEDURE — 25000242 PHARM REV CODE 250 ALT 637 W/ HCPCS: Performed by: NURSE PRACTITIONER

## 2020-09-06 PROCEDURE — 25000003 PHARM REV CODE 250

## 2020-09-06 PROCEDURE — 25000003 PHARM REV CODE 250: Performed by: INTERNAL MEDICINE

## 2020-09-06 PROCEDURE — 99232 SBSQ HOSP IP/OBS MODERATE 35: CPT | Mod: ,,, | Performed by: INTERNAL MEDICINE

## 2020-09-06 PROCEDURE — 36415 COLL VENOUS BLD VENIPUNCTURE: CPT

## 2020-09-06 PROCEDURE — 21000000 HC CCU ICU ROOM CHARGE

## 2020-09-06 PROCEDURE — 99232 PR SUBSEQUENT HOSPITAL CARE,LEVL II: ICD-10-PCS | Mod: ,,, | Performed by: INTERNAL MEDICINE

## 2020-09-06 PROCEDURE — 80048 BASIC METABOLIC PNL TOTAL CA: CPT

## 2020-09-06 PROCEDURE — 85027 COMPLETE CBC AUTOMATED: CPT

## 2020-09-06 PROCEDURE — S4991 NICOTINE PATCH NONLEGEND: HCPCS | Performed by: INTERNAL MEDICINE

## 2020-09-06 RX ORDER — ALPRAZOLAM 0.25 MG/1
0.25 TABLET ORAL 3 TIMES DAILY PRN
Status: DISCONTINUED | OUTPATIENT
Start: 2020-09-06 | End: 2020-09-08 | Stop reason: HOSPADM

## 2020-09-06 RX ADMIN — FUROSEMIDE 40 MG: 40 TABLET ORAL at 07:09

## 2020-09-06 RX ADMIN — ALPRAZOLAM 0.25 MG: 0.25 TABLET ORAL at 11:09

## 2020-09-06 RX ADMIN — ATORVASTATIN CALCIUM 40 MG: 40 TABLET, FILM COATED ORAL at 08:09

## 2020-09-06 RX ADMIN — IPRATROPIUM BROMIDE AND ALBUTEROL SULFATE 3 ML: .5; 3 SOLUTION RESPIRATORY (INHALATION) at 07:09

## 2020-09-06 RX ADMIN — METHYLPREDNISOLONE SODIUM SUCCINATE 40 MG: 40 INJECTION, POWDER, FOR SOLUTION INTRAMUSCULAR; INTRAVENOUS at 10:09

## 2020-09-06 RX ADMIN — MUPIROCIN: 20 OINTMENT TOPICAL at 10:09

## 2020-09-06 RX ADMIN — MORPHINE SULFATE 1 MG: 2 INJECTION, SOLUTION INTRAMUSCULAR; INTRAVENOUS at 09:09

## 2020-09-06 RX ADMIN — MUPIROCIN: 20 OINTMENT TOPICAL at 08:09

## 2020-09-06 RX ADMIN — ROPINIROLE HYDROCHLORIDE 1 MG: 1 TABLET, FILM COATED ORAL at 02:09

## 2020-09-06 RX ADMIN — MORPHINE SULFATE 1 MG: 2 INJECTION, SOLUTION INTRAMUSCULAR; INTRAVENOUS at 06:09

## 2020-09-06 RX ADMIN — HYDROCODONE BITARTRATE AND ACETAMINOPHEN 1 TABLET: 5; 325 TABLET ORAL at 11:09

## 2020-09-06 RX ADMIN — APIXABAN 5 MG: 5 TABLET, FILM COATED ORAL at 10:09

## 2020-09-06 RX ADMIN — AMIODARONE HYDROCHLORIDE 200 MG: 200 TABLET ORAL at 08:09

## 2020-09-06 RX ADMIN — ROPINIROLE HYDROCHLORIDE 1 MG: 1 TABLET, FILM COATED ORAL at 07:09

## 2020-09-06 RX ADMIN — FUROSEMIDE 40 MG: 40 TABLET ORAL at 10:09

## 2020-09-06 RX ADMIN — HYDROCODONE BITARTRATE AND ACETAMINOPHEN 1 TABLET: 5; 325 TABLET ORAL at 03:09

## 2020-09-06 RX ADMIN — BUSPIRONE HYDROCHLORIDE 5 MG: 5 TABLET ORAL at 08:09

## 2020-09-06 RX ADMIN — IPRATROPIUM BROMIDE AND ALBUTEROL SULFATE 3 ML: .5; 3 SOLUTION RESPIRATORY (INHALATION) at 08:09

## 2020-09-06 RX ADMIN — ASPIRIN 81 MG CHEWABLE TABLET 81 MG: 81 TABLET CHEWABLE at 10:09

## 2020-09-06 RX ADMIN — IPRATROPIUM BROMIDE AND ALBUTEROL SULFATE 3 ML: .5; 3 SOLUTION RESPIRATORY (INHALATION) at 12:09

## 2020-09-06 RX ADMIN — HYDROCODONE BITARTRATE AND ACETAMINOPHEN 1 TABLET: 5; 325 TABLET ORAL at 06:09

## 2020-09-06 RX ADMIN — BUSPIRONE HYDROCHLORIDE 5 MG: 5 TABLET ORAL at 03:09

## 2020-09-06 RX ADMIN — AMIODARONE HYDROCHLORIDE 200 MG: 200 TABLET ORAL at 10:09

## 2020-09-06 RX ADMIN — NICOTINE 1 PATCH: 21 PATCH, EXTENDED RELEASE TRANSDERMAL at 01:09

## 2020-09-06 RX ADMIN — HYDROCODONE BITARTRATE AND ACETAMINOPHEN 1 TABLET: 5; 325 TABLET ORAL at 12:09

## 2020-09-06 RX ADMIN — BUSPIRONE HYDROCHLORIDE 5 MG: 5 TABLET ORAL at 10:09

## 2020-09-06 RX ADMIN — MORPHINE SULFATE 1 MG: 2 INJECTION, SOLUTION INTRAMUSCULAR; INTRAVENOUS at 10:09

## 2020-09-06 RX ADMIN — CHLORHEXIDINE GLUCONATE 15 ML: 1.2 RINSE ORAL at 08:09

## 2020-09-06 RX ADMIN — CHLORHEXIDINE GLUCONATE 15 ML: 1.2 RINSE ORAL at 10:09

## 2020-09-06 RX ADMIN — SPIRONOLACTONE 25 MG: 25 TABLET ORAL at 10:09

## 2020-09-06 RX ADMIN — IPRATROPIUM BROMIDE AND ALBUTEROL SULFATE 3 ML: .5; 3 SOLUTION RESPIRATORY (INHALATION) at 01:09

## 2020-09-06 RX ADMIN — APIXABAN 5 MG: 5 TABLET, FILM COATED ORAL at 09:09

## 2020-09-06 NOTE — PROGRESS NOTES
[facility] Medicine  Progress Note    Patient name: Yadi Mccall  MRN: [id number]  Admit Date: 9/3/2020   LOS: 3 days     SUBJECTIVE:     Principal problem: Acute hypoxemic respiratory failure    Interval History:  No acute overnight events reported.  She was initiated on steroids as well as increasing dose of diuretics yesterday.  Net negative of approximately 3000 mL in the last 24 hours. States her breathing is improved; still needing 4L of O2    Scheduled Meds:   albuterol-ipratropium  3 mL Nebulization Q6H    amiodarone  200 mg Oral BID    apixaban  5 mg Oral BID    aspirin  81 mg Oral Daily    atorvastatin  40 mg Oral QHS    busPIRone  5 mg Oral TID    chlorhexidine  15 mL Mouth/Throat BID    furosemide  40 mg Oral BID    lisinopriL  40 mg Oral Daily    methylPREDNISolone sodium succinate  40 mg Intravenous Daily    mupirocin   Nasal BID    nicotine  1 patch Transdermal Q24H    spironolactone  25 mg Oral Daily     Continuous Infusions:  PRN Meds:ALPRAZolam, calcium chloride IVPB, calcium chloride IVPB, calcium chloride IVPB, HYDROcodone-acetaminophen, magnesium oxide, magnesium sulfate IVPB, magnesium sulfate IVPB, magnesium sulfate IVPB, magnesium sulfate IVPB, morphine, nitroGLYCERIN, potassium chloride in water, potassium chloride in water, potassium chloride in water, potassium chloride in water, potassium chloride, potassium chloride, potassium chloride, potassium chloride, rOPINIRole, sodium chloride 0.9%, sodium phosphate IVPB, sodium phosphate IVPB, sodium phosphate IVPB, sodium phosphate IVPB, sodium phosphate IVPB, traZODone    Review of patient's allergies indicates:   Allergen Reactions    Chlorthalidone     Codeine     Dyazide [triamterene-hydrochlorothiazid]     Penicillins        Review of Systems: As per interval history. Poor historian     OBJECTIVE:     Vital Signs (Most Recent)  Temp: 97.6 °F (36.4 °C) (09/06/20 1114)  Pulse: (!) 118 (09/06/20  1326)  Resp: 18 (09/06/20 1326)  BP: (!) 152/67 (09/06/20 1114)  SpO2: 95 % (09/06/20 1326)    Vital Signs Range (Last 24H):  Temp:  [97.4 °F (36.3 °C)-97.8 °F (36.6 °C)]   Pulse:  []   Resp:  [16-29]   BP: ()/(55-70)   SpO2:  [90 %-99 %]     I & O (Last 24H):    Intake/Output Summary (Last 24 hours) at 9/6/2020 1328  Last data filed at 9/6/2020 0500  Gross per 24 hour   Intake 420 ml   Output 4200 ml   Net -3780 ml       Physical Exam:  General: Patient resting comfortably in no acute distress. Appears as stated age. Calm  Eyes: No conjunctival injection. No scleral icterus.  ENT: Hearing grossly intact. No discharge from ears. No nasal discharge.   Neck: Supple, trachea midline. No JVD  CVS: RRR. Sternal wound incision is clean  Lungs: No tachypnea or accessory muscle use. Diminished bilateral breath sounds (R>L). Faint end-expiratory wheeze  Abdomen:  Soft, nontender and nondistended.  No organomegaly  Neuro: Alert. Left leg weakness; chronic. Follows commands. Responds appropriately   Skin:  No rash or erythema noted  Psych:  Normal mood and behavior  :  Valadez catheter with yellow urine      Laboratory:  CBC:   Recent Labs   Lab 09/06/20  0538   WBC 12.68   RBC 3.77*   HGB 11.0*   HCT 35.0*   *   MCV 93   MCH 29.2   MCHC 31.4*     CMP:   Recent Labs   Lab 09/03/20  1747  09/06/20  0538      < > 178*   CALCIUM 8.4*   < > 8.4*   ALBUMIN 3.3*  --   --    PROT 6.3  --   --    *   < > 134*   K 4.1   < > 4.6   CO2 28   < > 30*   CL 92*   < > 92*   BUN 20   < > 13   CREATININE 1.0   < > 0.9   ALKPHOS 76  --   --    ALT 32  --   --    AST 32  --   --    BILITOT 1.0  --   --     < > = values in this interval not displayed.     Cardiac markers:   Recent Labs   Lab 09/03/20  1747   TROPONINI <0.030       Diagnostic Results:  Labs: Reviewed    ASSESSMENT/PLAN:     65-year-old female with ongoing tobacco use who recently underwent 4 vessel CABG is admitted to our facility for respiratory  failure after being discharged from outside facility yesterday.  Found to have moderate left-sided pleural effusion along with right hemidiaphragm elevation.  Possible component of undiagnosed COPD.    Active Hospital Problems    Diagnosis  POA    *Acute hypoxemic respiratory failure [J96.01]  Yes    Pleural effusion [J90]  Unknown    Abnormal CXR [R93.89]  Yes    Personal history of tobacco use, presenting hazards to health [Z87.891]  Not Applicable    Marijuana use [F12.90]  Yes    Anemia [D64.9]  Yes    Thrombocytosis [D47.3]  Yes    Hyponatremia [E87.1]  Yes    Generalized anxiety disorder [F41.1]  Yes    Elevated diaphragm [J98.6]  Yes    History of CVA with residual deficit [I69.30]  Not Applicable     Chronic    Bilateral pleural effusion [J90]  Yes    Atrial flutter, paroxysmal [I48.92]  Yes     Recent diagnosis after CABG done on 08/24/2020      S/P CABG x 4 [Z95.1]  Not Applicable     On 08/24/2020 by Dr. Gray      Coronary artery disease of native artery of native heart with stable angina pectoris [I25.118]  Yes    Chronic obstructive pulmonary disease with acute exacerbation [J44.1]  Yes    Hyperlipidemia [E78.5]  Yes      Resolved Hospital Problems    Diagnosis Date Resolved POA    Encephalopathy, metabolic [G93.41] 09/04/2020 Yes       Plan:   Supplemental oxygen for hypoxemic respiratory failure; wean as tolerated  Moderate size left pleural effusion; appreciate pulmonology input  Scheduled oral furosemide with good UOP  Right hemidiaphragm elevation noted; will assess for phrenic nerve paralysis when able (when off sternal precautions)  Recent hospital stay complicated by postoperative atrial flutter  Continue amiodarone; dose adjusted as per Cardiology.  Continue apixaban for anticoagulation  Nebulized breathing treatments and systemic steroids for COPD exacerbation  Possible undiagnosed anxiety disorder; start buspirone 5 mg t.i.d. along with p.r.n. alprazolam  Mobilize. Cardiac  rehab after discharge   PT/OT consult  Home O2 eval ordered       Dispo: Likely home in 1-2 days   VTE Risk Mitigation (From admission, onward)         Ordered     apixaban tablet 5 mg  2 times daily      09/03/20 2155     IP VTE LOW RISK PATIENT  Once      09/03/20 2155     Place sequential compression device  Until discontinued      09/03/20 2155                  Department Hospital Medicine  [facility]  Jefferson Segovia MD  Date of service: 09/06/2020

## 2020-09-06 NOTE — PLAN OF CARE
09/05/20 2012   Patient Assessment/Suction   Level of Consciousness (AVPU) alert   Respiratory Effort Normal;Unlabored   Expansion/Accessory Muscles/Retractions no use of accessory muscles   All Lung Fields Breath Sounds equal bilaterally;diminished   Rhythm/Pattern, Respiratory unlabored   Cough Frequency no cough   PRE-TX-O2   O2 Device (Oxygen Therapy) High Flow nasal Cannula   Flow (L/min) 4   SpO2 96 %   Pulse Oximetry Type Continuous   $ Pulse Oximetry - Multiple Charge Pulse Oximetry - Multiple   Pulse 79   Resp 18   Aerosol Therapy   $ Aerosol Therapy Charges Aerosol Treatment   Daily Review of Necessity (SVN) completed   Respiratory Treatment Status (SVN) given   Treatment Route (SVN) mask   Patient Position (SVN) HOB elevated   Post Treatment Assessment (SVN) breath sounds unchanged   Signs of Intolerance (SVN) none   Breath Sounds Post-Respiratory Treatment   Post-treatment Heart Rate (beats/min) 80   Post-treatment Resp Rate (breaths/min) 20   Incentive Spirometer   $ Incentive Spirometer Charges done with encouragement   Administration (IS) instruction provided, follow-up   Number of Repetitions (IS) 10   Level Incentive Spirometer (mL) 750   Patient Tolerance (IS) fair   Respiratory Evaluation   $ Care Plan Tech Time 15 min   Evaluation For Re-Eval 3 day

## 2020-09-06 NOTE — PLAN OF CARE
09/06/20 0729   Patient Assessment/Suction   Level of Consciousness (AVPU) alert   Respiratory Effort Normal;Unlabored   All Lung Fields Breath Sounds clear   PRE-TX-O2   O2 Device (Oxygen Therapy) nasal cannula   $ Is the patient on Low Flow Oxygen? Yes   Flow (L/min) 4   SpO2 (!) 94 %   Pulse Oximetry Type Continuous   $ Pulse Oximetry - Multiple Charge Pulse Oximetry - Multiple   Pulse 81   Resp 18   Aerosol Therapy   $ Aerosol Therapy Charges Aerosol Treatment   Daily Review of Necessity (SVN) completed   Respiratory Treatment Status (SVN) given   Treatment Route (SVN) mask;oxygen   Patient Position (SVN) HOB elevated   Post Treatment Assessment (SVN) breath sounds improved   Signs of Intolerance (SVN) none   Incentive Spirometer   $ Incentive Spirometer Charges done with encouragement   Administration (IS) instruction provided, follow-up   Number of Repetitions (IS) 10   Level Incentive Spirometer (mL) 750   Patient Tolerance (IS) fair   Respiratory Evaluation   $ Care Plan Tech Time 15 min

## 2020-09-06 NOTE — PT/OT/SLP PROGRESS
Occupational Therapy      Patient Name:  Yadi Mccall   MRN:  [id number]    Patient not seen today secondary to (Refused: awaiting patient's rolling walker.). Will follow-up tomorrow.    Rj Yadav OT  9/6/2020

## 2020-09-06 NOTE — CARE UPDATE
09/06/20 1326   Home Oxygen Qualification   Room Air SpO2 At Rest (!) 85 %   SpO2 on Recovery 95 %   Recovery Heart Rate 85 bpm   Recovery O2 LPM 4 LPM   Home O2 Eval Comments PT QUALIFIES FOR HOME O2

## 2020-09-06 NOTE — NURSING
Ambulated in hallway with home rollator walker and O2 at 4L via NC, Pt returned to room and sitting in chair. Repositioned for comfort. Pt complained of shortness of breath with exertion. Relieved after sitting. Family at bedside. Fall precautions reviewed. Pt and sister verbalized understanding. Call light in reach

## 2020-09-06 NOTE — PROGRESS NOTES
"Progress Note  PULMONARY    Admit Date: 9/3/2020   09/06/2020  From Dr Elliott's consult--HPI:     9/4/2020 - 64 yo female with h/o CABG about 2 weeks ago at [facility].  Brought in to ER because of increased SOB and mental status change (possible report of hallucinations).  SOB has been a problem for months but got worse over the last few days.  She denies any h/o COPD (but has a h/o smoking about 3 PPD - stopped "recently" and is on home O2 at 3 LPM).  By report when EMS arrived sats were in the 80's on her O2.  Ater getting a treatment her sats improved.  She denies fever, chills, sweat.  Ha some cough which sounds to be chronic and nonproductive.  She has had some chest tightness and possible wheezing.  She denies any CHF symptoms.  Of note is she is not a very good historian.  Plan   Encephalopathy was likely hypoxemia related  · With regards to left pleural effusion it is small to moderate - would follow and try to diurese  · Would not tap at this time - will need to hold Eliquis for at least 2 days to consider  · Continue treatment of COPD - respiratory treatments would hold on steroid, should be on LABA/LAMA/ICS at DC  · Needs to stop smoking and stop marijuana  · Ask Dr Gray to assess has some instability of the superior portion of her wound  · Continue with amiodarone, eliquis  · CXR is concerning for right diaphragm paresis - will check US to evaluate  · Increase activity as able  · Monitor H/H and platelets  · Probably needs outpt sleep study if she is willing to do it.  SUBJECTIVE:     9/5-no new c/o- relates has had some wheezes, breathing still short.  9/6- no new c/o.          PFSH and Allergies reviewed.    OBJECTIVE:     Vitals (Most recent):  Vitals:    09/06/20 1003   BP:    Pulse:    Resp: (!) 24   Temp:        Vitals (24 hour range):  Temp:  [97.4 °F (36.3 °C)-97.8 °F (36.6 °C)]   Pulse:  [74-89]   Resp:  [16-28]   BP: ()/(55-70)   SpO2:  [90 %-99 %]       Intake/Output Summary " (Last 24 hours) at 9/6/2020 1016  Last data filed at 9/6/2020 0500  Gross per 24 hour   Intake 640 ml   Output 4200 ml   Net -3560 ml          Physical Exam:  The patient's neuro status (alertness,orientation,cognitive function,motor skills,), pharyngeal exam (oral lesions, hygiene, abn dentition,), Neck (jvd,mass,thyroid,nodes in neck and above/below clavicle),RESPIRATORY(symmetry,effort,fremitus,percussion,auscultation),  Cor(rhythm,heart tones including gallops,perfusion,edema)ABD(distention,hepatic&splenomegaly,tenderness,masses), Skin(rash,cyanosis),Psyc(affect,judgement,).  Exam negative except for these pertinent findings:    Dull bilat, chest is symmetric, no distress, abnormal percussion, normal fremitus and good  breath sounds,. Alert, no edema      Radiographs reviewed: view by direct vision  Ct 9/3 rll altectasis infiltrate -high right atelectaiss, sm right effusion  Results for orders placed during the hospital encounter of 08/21/20   X-Ray Chest 1 View    Narrative EXAMINATION:  XR CHEST 1 VIEW 08/31/2020 at 05:50    INDICATION:  Dyspnea, respiratory abnormality    COMPARISON  08/28/2020    FINDINGS  The sternal wires are aligned.  There is slight asymmetric right hemidiaphragm elevation similar overall.  There is some patchy atelectatic consolidation with a small amount of pleural fluid bilaterally somewhat increased at the lung bases.  No interval pneumothorax or cardiac decompensation is appreciated.  No other significant interval changes are appreciated.    IMPRESSION  There is some patchy atelectasis and pleural fluid slightly increased overall at the lung bases.      Electronically signed by: Darek Ballesteros MD  Date:    08/31/2020  Time:    06:14   ]    Labs     Recent Labs   Lab 09/06/20  0538   WBC 12.68   HGB 11.0*   HCT 35.0*   *     Recent Labs   Lab 09/06/20  0538   *   K 4.6   CL 92*   CO2 30*   BUN 13   CREATININE 0.9   *   CALCIUM 8.4*   MG 2.5   No results for input(s):  PH, PCO2, PO2, HCO3 in the last 24 hours.  Microbiology Results (last 7 days)     Procedure Component Value Units Date/Time    Blood culture #1 **CANNOT BE ORDERED STAT** [[id number]] Collected: 09/03/20 1911    Order Status: Completed Specimen: Blood from Peripheral, Forearm, Left Updated: 09/05/20 2032     Blood Culture, Routine No Growth to date      No Growth to date      No Growth to date    Blood culture #2 **CANNOT BE ORDERED STAT** [[id number]] Collected: 09/03/20 1950    Order Status: Completed Specimen: Blood from Peripheral, Antecubital, Right Updated: 09/05/20 2032     Blood Culture, Routine No Growth to date      No Growth to date      No Growth to date          Impression:  Active Hospital Problems    Diagnosis  POA    *Acute hypoxemic respiratory failure [J96.01]  Yes    Pleural effusion [J90]  Unknown    Abnormal CXR [R93.89]  Yes    Personal history of tobacco use, presenting hazards to health [Z87.891]  Not Applicable    Marijuana use [F12.90]  Yes    Anemia [D64.9]  Yes    Thrombocytosis [D47.3]  Yes    Hyponatremia [E87.1]  Yes    Generalized anxiety disorder [F41.1]  Yes    Elevated diaphragm [J98.6]  Yes    History of CVA with residual deficit [I69.30]  Not Applicable     Chronic    Bilateral pleural effusion [J90]  Yes    Atrial flutter, paroxysmal [I48.92]  Yes     Recent diagnosis after CABG done on 08/24/2020      S/P CABG x 4 [Z95.1]  Not Applicable     On 08/24/2020 by Dr. Gray      Coronary artery disease of native artery of native heart with stable angina pectoris [I25.118]  Yes    Chronic obstructive pulmonary disease with acute exacerbation [J44.1]  Yes    Hyperlipidemia [E78.5]  Yes      Resolved Hospital Problems    Diagnosis Date Resolved POA    Encephalopathy, metabolic [G93.41] 09/04/2020 Yes               Plan:   9/5 - 1480/1480/1550 lasix 40/d po, aldactone.  Effusion was small.    On neb rx.  4lpm ox     Will dose solumedrol 40/d , extra 20 iv lasix given- pt  was on lasix 20 bid pta- will increase lasix from 40/d to bid.    9/6 - I/o neg 3200 with  Creat 0.9,   Still dull lower lungs, says breathing better.  Not sure if fluid or steroids.  Ask for rx to help sleep -will change lorazepam to xanax prn.                      .

## 2020-09-06 NOTE — PROGRESS NOTES
[facility]  Department of Cardiology  Consult Note      PATIENT NAME: Yadi Mccall  MRN: [id number]  TODAY'S DATE: 09/06/2020  ADMIT DATE: 9/3/2020                          CONSULT REQUESTED BY: Jefferson Segovia MD    SUBJECTIVE     9/6/2020    Ms. Mccall has moved out of the ICU. She is on cardiology floor. Sitting up in bed in no distress. Denies chest pain or SOB. She is breathing easier.      9/5/2020 interval history:    Ms. Mccall is lying in bed in no distress. Chest hurts from coughing she states. No ectopy on telemetry. Currently she is SR.       PRINCIPAL PROBLEM: Acute hypoxemic respiratory failure      REASON FOR CONSULT:  CHEST PAIN      HPI:    Ms. Mccall is a 65-year-old female patient with a past medical history significant for CAD status post CABG, CVA in 2014, dyslipidemia, abdominal aortic aneurysm, postop AFib a flutter on chronic anticoagulation she had a CABG about 2 weeks ago at [facility].  Apparently per record review she had some shortness of breath and mental status changes.  Patient tells me to check with her sister for history and she tells me currently she denies chest pain or palpitations or shortness of breath she is sitting up in the chair with no complaints currently.    FROM H AND P     Chief Complaint: Shortness of Breath (sister reports patient having hallucinations. recent cardiac bypass done at [facility].  Pt denies any increased SOB or pain.  EMS report O2 SATS on home 3L was in the 80's.  Breathing treatment given in route to increase SATS to 90's )         Patient information was obtained from patient, past medical records and ER records.   HISTORY OF PRESENT ILLNESS:   Yadi Mccall is a 65 y.o. old female who  has a past medical history of AAA (abdominal aortic aneurysm), Anticoagulant long-term use, Coronary artery disease, Foot drop, left foot, Hyperlipidemia, Hypertension, and Stroke (2014).. The patient presented to [facility] on  9/3/2020 with a primary complaint of Shortness of Breath (sister reports patient having hallucinations. recent cardiac bypass done at [facility].  Pt denies any increased SOB or pain.  EMS report O2 SATS on home 3L was in the 80's.  Breathing treatment given in route to increase SATS to 90's )  .      A 65-year-old  female presents emergency room with shortness of breath and altered mental status.       According to the patient's sister and patient had a 4 vessel CABG approximately 2 weeks ago.  She developed postop atrial fibrillation flutter and was placed on amiodarone and Eliquis.  Nonetheless the patient was followed home today with confusion and hypoxia.  She is normally on supplement oxygen at home.       Upon arrival EMS found the patient's statin and 80s and was confused     Upon arrival in the emergency room the patient was hypoxic she was put on supplemental O2 with improvement in oxygen saturation and ventrally improvement in her mentation but she still remained essentially confused.          On August 21, 2020 the patient had a  4 vessel CABG with Dr. MARSHA Gray.  On 08/24/2020 she was extubated chest tubes were removed and she was stable.  She did developed postop AFib flutter with a RVR and was placed on amiodarone and Eliquis was discharged home on these medications.       The patient also has a past medical history of chronic complete occlusion to her right carotid artery 6 years ago she had a right hemispheric stroke secondary to the occluded right carotid artery, hypertension, COPD, hyperlipidemia and chronic left footdrop of which she wears a splint a uses a walker to ambulate      Review of patient's allergies indicates:   Allergen Reactions    Chlorthalidone     Codeine     Dyazide [triamterene-hydrochlorothiazid]     Penicillins        Past Medical History:   Diagnosis Date    AAA (abdominal aortic aneurysm)     Anticoagulant long-term use     Coronary artery disease     Foot  drop, left foot     Hyperlipidemia     Hypertension     Stroke 2014    LEFT SIDED WEAKNESS     Past Surgical History:   Procedure Laterality Date    APPENDECTOMY      CORONARY ANGIOGRAPHY N/A 2020    Procedure: ANGIOGRAM, CORONARY ARTERY;  Surgeon: Norbert Vallecillo MD;  Location: [facility] CATH;  Service: Cardiology;  Laterality: N/A;    CORONARY ARTERY BYPASS GRAFT (CABG) N/A 2020    Procedure: CORONARY ARTERY BYPASS GRAFT (CABG) BILATERAL MAMMARY  x  4 VESSELS;  Surgeon: Pascual Gray MD;  Location: [facility] OR;  Service: Cardiovascular;  Laterality: N/A;    ENDOSCOPIC HARVEST OF VEIN N/A 2020    Procedure: HARVEST-VEIN-ENDOVASCULAR;  Surgeon: Pascual Gray MD;  Location: [facility] OR;  Service: Cardiovascular;  Laterality: N/A;    FOOT SURGERY      HERNIA REPAIR      HYSTERECTOMY      LEFT HEART CATHETERIZATION N/A 2020    Procedure: Left heart cath;  Surgeon: Norbert Vallecillo MD;  Location: [facility] CATH;  Service: Cardiology;  Laterality: N/A;    TONSILLECTOMY       Social History     Tobacco Use    Smoking status: Former Smoker     Packs/day: 1.00     Years: 50.00     Pack years: 50.00     Types: Cigarettes     Quit date: 2019     Years since quittin.7    Smokeless tobacco: Never Used    Tobacco comment: down from 5 cartons/month to 2 cartons/month   Substance Use Topics    Alcohol use: No    Drug use: No        REVIEW OF SYSTEMS  CONSTITUTIONAL: Negative for chills, fatigue and fever.   EYES: No double vision, No blurred vision  NEURO: No headaches, No dizziness  RESPIRATORY:  Intermittent cough, shortness of breath and wheezing.    CARDIOVASCULAR:  Positive  for chest wall pain. Negative for palpitations and leg swelling.   GI: Negative for abdominal pain, No melena, diarrhea, nausea and vomiting.   : Negative for dysuria and frequency, Negative for hematuria  SKIN: Negative for bruising, Negative for edema or discoloration noted.   ENDOCRINE: Negative for polyphagia,  Negative for heat intolerance, Negative for cold intolerance  PSYCHIATRIC:  Patient does not recall the events of her surgery in detail or events leading up to it.  MUSCULOSKELETAL: Negative for neck pain, Negative for muscle weakness, Negative for back pain     OBJECTIVE     VITAL SIGNS (Most Recent)  Temp: 97.6 °F (36.4 °C) (09/06/20 1114)  Pulse: (!) 118 (09/06/20 1326)  Resp: 18 (09/06/20 1326)  BP: (!) 152/67 (09/06/20 1114)  SpO2: 95 % (09/06/20 1326)    VENTILATION STATUS  Resp: 18 (09/06/20 1326)  SpO2: 95 % (09/06/20 1326)       I & O (Last 24H):    Intake/Output Summary (Last 24 hours) at 9/6/2020 1427  Last data filed at 9/6/2020 0500  Gross per 24 hour   Intake 420 ml   Output 4200 ml   Net -3780 ml       WEIGHTS  Wt Readings from Last 1 Encounters:   09/03/20 2245 91.6 kg (201 lb 15.1 oz)   09/03/20 2230 91.6 kg (201 lb 15.1 oz)   09/03/20 1751 96.6 kg (213 lb)       PHYSICAL EXAM  GENERAL: well built, well nourished, well-developed in no apparent distress alert and oriented.   HEENT: Normocephalic. Pupils normal and conjunctivae normal.  Mucous membranes normal, no cyanosis or icterus, trachea central,no pallor or icterus is noted..   NECK: No JVD. No bruit..   THYROID: Thyroid not enlarged. No nodules present..   CARDIAC: Regular rate and rhythm.   CHEST ANATOMY: normal.   LUNGS:  DIMINISHED  ABDOMEN: Soft no masses or organomegaly.  No abdomen pulsations or bruits.  Normal bowel sounds. No pulsations and no masses felt, No guarding or rebound.   URINARY: No salvador catheter   EXTREMITIES:  BILATERAL CHRONIC EDEMAPERIPHERAL VASCULAR SYSTEM: Good palpable distal pulses.   CENTRAL NERVOUS SYSTEM:  Moving all her extremities no focal gross deficits elicited.     SKIN:  MIDLINE SURGICAL INCISION HEALING WELL SLIGHTLY RED BUT NO OOZING NOTED   MUSCLE STRENGTH & TONE: No noteable weakness, atrophy or abnormal movement.   Memory:  Very poor recall  HOME MEDICATIONS:  No current facility-administered  medications on file prior to encounter.      Current Outpatient Medications on File Prior to Encounter   Medication Sig Dispense Refill    [START ON 9/10/2020] amiodarone (PACERONE) 200 MG Tab Take 1 tablet (200 mg total) by mouth once daily. (Patient taking differently: Take 400 mg by mouth once daily. 400mg for 8 days, or 09/10/20, then 200mg daily thereafter) 30 tablet 11    amiodarone (PACERONE) 400 MG tablet Take 1 tablet (400 mg total) by mouth 2 (two) times daily. 60 tablet 11    apixaban (ELIQUIS) 5 mg Tab Take 1 tablet (5 mg total) by mouth 2 (two) times daily. 60 tablet 2    aspirin 325 MG tablet Take 325 mg by mouth once daily.       atorvastatin (LIPITOR) 40 MG tablet TAKE 1 TABLET EVERY DAY (Patient taking differently: Take 40 mg by mouth every evening. ) 90 tablet 3    baclofen (LIORESAL) 20 MG tablet TAKE 1 TABLET THREE TIMES DAILY AS NEEDED (Patient taking differently: Take 20 mg by mouth 3 (three) times daily as needed (pain). ) 270 tablet 3    fluticasone propionate (FLONASE) 50 mcg/actuation nasal spray 2 sprays (100 mcg total) by Each Nostril route once daily. (Patient taking differently: 2 sprays by Each Nostril route daily as needed. TAKE IF NEEDED MORNING OF SURGERY) 16 g 11    furosemide (LASIX) 20 MG tablet Take 1 tablet (20 mg total) by mouth 2 (two) times daily. 60 tablet 11    HYDROcodone-acetaminophen (NORCO) 7.5-325 mg per tablet Take 1 tablet by mouth every 4 (four) hours as needed. 28 tablet 0    lisinopriL (PRINIVIL,ZESTRIL) 40 MG tablet Take 40 mg by mouth once daily.      nicotine (NICODERM CQ) 21 mg/24 hr Place 1 patch onto the skin once daily. 30 patch 3    rOPINIRole (REQUIP) 2 MG tablet Take 1 tablet (2 mg total) by mouth 3 (three) times daily. (Patient taking differently: Take 2 mg by mouth 3 (three) times daily as needed (restless legs). ) 90 tablet 11    traZODone (DESYREL) 100 MG tablet TAKE 1 TABLET NIGHTLY AS NEEDED FOR INSOMNIA (Patient taking differently:  Take 100 mg by mouth nightly as needed. ) 90 tablet 3    acetaminophen (TYLENOL) 500 MG tablet Take 500 mg by mouth 2 (two) times daily as needed for Pain.       albuterol-ipratropium (DUO-NEB) 2.5 mg-0.5 mg/3 mL nebulizer solution Take 3 mLs by nebulization every 4 (four) hours. Rescue (Patient taking differently: Take 3 mLs by nebulization every 4 (four) hours as needed. ) 1 Box 5    gabapentin (NEURONTIN) 100 MG capsule 1 by mouth in the morning 1 by mouth in the afternoon and 3 by mouth at bedtime (Patient not taking: Reported on 7/30/2020) 150 capsule 11    guaifenesin (MUCINEX ORAL) Take 2 tablets by mouth as needed (congestion).      metoprolol tartrate 37.5 mg Tab Take 37.5 mg by mouth 2 (two) times daily. 30 tablet 3    nitroGLYCERIN (NITROSTAT) 0.4 MG SL tablet Place 1 tablet (0.4 mg total) under the tongue every 5 (five) minutes as needed for Chest pain. 20 tablet 0    oxymetazoline HCl (AFRIN, OXYMETAZOLINE, NASL) 1 spray by Nasal route as needed (stuffy nose).      triamcinolone acetonide 0.1% (KENALOG) 0.1 % ointment Apply topically 2 (two) times daily. (Patient not taking: Reported on 7/30/2020) 30 g 1       SCHEDULED MEDS:   albuterol-ipratropium  3 mL Nebulization Q6H    amiodarone  200 mg Oral BID    apixaban  5 mg Oral BID    aspirin  81 mg Oral Daily    atorvastatin  40 mg Oral QHS    busPIRone  5 mg Oral TID    chlorhexidine  15 mL Mouth/Throat BID    furosemide  40 mg Oral BID    lisinopriL  40 mg Oral Daily    methylPREDNISolone sodium succinate  40 mg Intravenous Daily    mupirocin   Nasal BID    nicotine  1 patch Transdermal Q24H    spironolactone  25 mg Oral Daily       CONTINUOUS INFUSIONS:    PRN MEDS:ALPRAZolam, calcium chloride IVPB, calcium chloride IVPB, calcium chloride IVPB, HYDROcodone-acetaminophen, magnesium oxide, magnesium sulfate IVPB, magnesium sulfate IVPB, magnesium sulfate IVPB, magnesium sulfate IVPB, morphine, nitroGLYCERIN, potassium chloride in  water, potassium chloride in water, potassium chloride in water, potassium chloride in water, potassium chloride, potassium chloride, potassium chloride, potassium chloride, rOPINIRole, sodium chloride 0.9%, sodium phosphate IVPB, sodium phosphate IVPB, sodium phosphate IVPB, sodium phosphate IVPB, sodium phosphate IVPB, traZODone    LABS AND DIAGNOSTICS     CBC LAST 3 DAYS  Recent Labs   Lab 08/31/20  1144 09/03/20 1747 09/04/20 0356 09/05/20 0352 09/06/20  0538   WBC 15.92* 18.24* 13.40* 13.54* 12.68   RBC 3.80* 3.47* 3.30* 3.28* 3.77*   HGB 11.2* 10.0* 9.6* 9.4* 11.0*   HCT 34.3* 30.5* 29.6* 30.0* 35.0*   MCV 90 88 90 92 93   MCH 29.5 28.8 29.1 28.7 29.2   MCHC 32.7 32.8 32.4 31.3* 31.4*   RDW 14.1 14.6* 14.7* 15.3* 15.6*   * 659* 612* 596* 711*   MPV 8.3* 8.2* 8.0* 8.2* 8.0*   GRAN 77.0*  12.3* 84.9*  15.5* 73.3*  9.8*  --   --    LYMPH 12.8*  2.0 7.1*  1.3 15.1*  2.0  --   --    MONO 8.7  1.4* 6.7  1.2* 9.4  1.3*  --   --    BASO 0.05 0.05 0.05  --   --    NRBC 0 0 0  --   --        COAGULATION LAST 3 DAYS  Recent Labs   Lab 09/03/20 1747   LABPT 15.0*   INR 1.2   APTT 30.1       CHEMISTRY LAST 3 DAYS  Recent Labs   Lab 08/31/20  0510  09/03/20 1747 09/03/20  1907 09/04/20 0356 09/05/20 0352 09/06/20  0538   NA  --    < > 133*  --  133* 132* 134*   K  --    < > 4.1  --  3.5 3.7 4.6   CL  --    < > 92*  --  92* 93* 92*   CO2  --    < > 28  --  28 28 30*   ANIONGAP  --    < > 13  --  13 11 12   BUN  --    < > 20  --  15 14 13   CREATININE  --    < > 1.0  --  0.8 0.8 0.9   GLU  --    < > 108  --  101 100 178*   CALCIUM  --    < > 8.4*  --  8.2* 7.9* 8.4*   PH 7.52*  --   --  7.454*  --   --   --    MG  --    < > 2.3  --  2.4 2.5 2.5   ALBUMIN  --   --  3.3*  --   --   --   --    PROT  --   --  6.3  --   --   --   --    ALKPHOS  --   --  76  --   --   --   --    ALT  --   --  32  --   --   --   --    AST  --   --  32  --   --   --   --    BILITOT  --   --  1.0  --   --   --   --     < > =  values in this interval not displayed.       CARDIAC PROFILE LAST 3 DAYS  Recent Labs   Lab 09/03/20  1747 09/04/20  0356   BNP  --  258*   TROPONINI <0.030  --        ENDOCRINE LAST 3 DAYS  No results for input(s): TSH, PROCAL in the last 168 hours.    LAST ARTERIAL BLOOD GAS  ABG  Recent Labs   Lab 09/03/20  1907   PH 7.454*   PO2 70*   PCO2 42.2   HCO3 29.6*   BE 6       LAST 7 DAYS MICROBIOLOGY   Microbiology Results (last 7 days)     Procedure Component Value Units Date/Time    Blood culture #1 **CANNOT BE ORDERED STAT** [[id number]] Collected: 09/03/20 1911    Order Status: Completed Specimen: Blood from Peripheral, Forearm, Left Updated: 09/05/20 2032     Blood Culture, Routine No Growth to date      No Growth to date      No Growth to date    Blood culture #2 **CANNOT BE ORDERED STAT** [[id number]] Collected: 09/03/20 1950    Order Status: Completed Specimen: Blood from Peripheral, Antecubital, Right Updated: 09/05/20 2032     Blood Culture, Routine No Growth to date      No Growth to date      No Growth to date          MOST RECENT IMAGING  X-Ray Chest AP Portable  Narrative: EXAMINATION:  XR CHEST AP PORTABLE    CLINICAL HISTORY:  Left sided pleural effusion follow-up;    FINDINGS:  Portable chest at 546 compared with 09/04/2020 shows unchanged cardiomediastinal silhouette. Postoperative changes of CABG again evident.    Bibasilar pleuroparenchymal opacities, left greater than right, show no significant change.  Suggestion of right diaphragm elevation or eventration unchanged, and lung volumes remain low.  Central pulmonary vasculature is normal.  No acute osseous abnormality.  Impression: No significant change.    Electronically signed by: Juan F Keith MD  Date:    09/06/2020  Time:    06:41      ECHOCARDIOGRAM RESULTS (last 5)  Results for orders placed in visit on 06/25/20   Echo Color Flow Doppler? Yes    Narrative · Normal left ventricular systolic function. The estimated ejection   fraction is 65%.  ·  Concentric left ventricular hypertrophy.  · Normal LV diastolic function.  · Normal right ventricular systolic function.  · Normal central venous pressure (3 mmHg).          CURRENT/PREVIOUS VISIT EKG  Results for orders placed or performed during the hospital encounter of 09/03/20   EKG 12-lead    Collection Time: 09/03/20  5:56 PM    Narrative    Test Reason : R41.82,    Vent. Rate : 083 BPM     Atrial Rate : 083 BPM     P-R Int : 192 ms          QRS Dur : 116 ms      QT Int : 406 ms       P-R-T Axes : 050 064 060 degrees     QTc Int : 477 ms    Normal sinus rhythm  Possible Left atrial enlargement  Possible Inferior infarct (cited on or before 27-AUG-2020)  Abnormal ECG  When compared with ECG of 30-AUG-2020 08:45,  Premature atrial complexes are no longer Present  QT has lengthened  Confirmed by Cleveland Maravilla MD ([id number]) on 9/5/2020 5:54:50 PM    Referred By: MELISSA   SELF           Confirmed By:Cleveland Maravilla MD           ASSESSMENT/PLAN:     Active Hospital Problems    Diagnosis    *Acute hypoxemic respiratory failure    Pleural effusion    Abnormal CXR    Personal history of tobacco use, presenting hazards to health    Marijuana use    Anemia    Thrombocytosis    Hyponatremia    Generalized anxiety disorder    Elevated diaphragm    History of CVA with residual deficit    Bilateral pleural effusion    Atrial flutter, paroxysmal     Recent diagnosis after CABG done on 08/24/2020      S/P CABG x 4     On 08/24/2020 by Dr. Gray      Coronary artery disease of native artery of native heart with stable angina pectoris    Chronic obstructive pulmonary disease with acute exacerbation    Hyperlipidemia       ASSESSMENT & PLAN:     1.  Postop AFib flutter-currently sinus rhythm  2.  CAD status post CABG 2 weeks ago  3.  Encephalopathy on admit secondary to hypoxia  4.  Bilateral Pleural effusions greater to the left  5.  Possible pneumonia  6.  History of right hemispheric CVA to total chronic  occlusion of right carotid  7.  COPD  8.  Anemia hemoglobin 9.4  9.  Leukocytosis  10.  Elevated platelet count    RECOMMENDATIONS:      Recommend her going to a facility where she has more help   Her sister works 11 hours per day  I am unsure she will be able to care for herself at home   She tells us it is up to her sister, she makes decisions  From a cardiac standpoint she is cleared for discharge on current regimen  She can follow up with her primary cardiologist in 1-2 weeks time      Deborah Kaufman NP  [facility]  Department of Cardiology  Date of Service: 09/06/2020        I have personally interviewed and examined the patient, I have reviewed the Nurse Practitioner's history and physical, assessment, and plan. I agree with the findings and plan.      Cleveland Maravilla M.D.  [facility]  Department of Cardiology  Date of Service: 09/06/2020  9:05 AM

## 2020-09-07 LAB
ANION GAP SERPL CALC-SCNC: 13 MMOL/L (ref 8–16)
BUN SERPL-MCNC: 16 MG/DL (ref 8–23)
CALCIUM SERPL-MCNC: 8.1 MG/DL (ref 8.7–10.5)
CHLORIDE SERPL-SCNC: 94 MMOL/L (ref 95–110)
CO2 SERPL-SCNC: 27 MMOL/L (ref 23–29)
CREAT SERPL-MCNC: 0.8 MG/DL (ref 0.5–1.4)
ERYTHROCYTE [DISTWIDTH] IN BLOOD BY AUTOMATED COUNT: 15.6 % (ref 11.5–14.5)
EST. GFR  (AFRICAN AMERICAN): >60 ML/MIN/1.73 M^2
EST. GFR  (NON AFRICAN AMERICAN): >60 ML/MIN/1.73 M^2
GLUCOSE SERPL-MCNC: 118 MG/DL (ref 70–110)
HCT VFR BLD AUTO: 32.6 % (ref 37–48.5)
HGB BLD-MCNC: 10.4 G/DL (ref 12–16)
MAGNESIUM SERPL-MCNC: 2.5 MG/DL (ref 1.6–2.6)
MCH RBC QN AUTO: 29.3 PG (ref 27–31)
MCHC RBC AUTO-ENTMCNC: 31.9 G/DL (ref 32–36)
MCV RBC AUTO: 92 FL (ref 82–98)
PLATELET # BLD AUTO: 655 K/UL (ref 150–350)
PMV BLD AUTO: 8.1 FL (ref 9.2–12.9)
POTASSIUM SERPL-SCNC: 4.1 MMOL/L (ref 3.5–5.1)
RBC # BLD AUTO: 3.55 M/UL (ref 4–5.4)
SODIUM SERPL-SCNC: 134 MMOL/L (ref 136–145)
WBC # BLD AUTO: 14.44 K/UL (ref 3.9–12.7)

## 2020-09-07 PROCEDURE — 97530 THERAPEUTIC ACTIVITIES: CPT

## 2020-09-07 PROCEDURE — 83735 ASSAY OF MAGNESIUM: CPT

## 2020-09-07 PROCEDURE — 63600175 PHARM REV CODE 636 W HCPCS: Performed by: INTERNAL MEDICINE

## 2020-09-07 PROCEDURE — 85027 COMPLETE CBC AUTOMATED: CPT

## 2020-09-07 PROCEDURE — 25000242 PHARM REV CODE 250 ALT 637 W/ HCPCS: Performed by: NURSE PRACTITIONER

## 2020-09-07 PROCEDURE — 25000003 PHARM REV CODE 250: Performed by: NURSE PRACTITIONER

## 2020-09-07 PROCEDURE — 94799 UNLISTED PULMONARY SVC/PX: CPT

## 2020-09-07 PROCEDURE — 63600175 PHARM REV CODE 636 W HCPCS: Performed by: NURSE PRACTITIONER

## 2020-09-07 PROCEDURE — 25000003 PHARM REV CODE 250: Performed by: INTERNAL MEDICINE

## 2020-09-07 PROCEDURE — S4991 NICOTINE PATCH NONLEGEND: HCPCS | Performed by: INTERNAL MEDICINE

## 2020-09-07 PROCEDURE — 27000221 HC OXYGEN, UP TO 24 HOURS

## 2020-09-07 PROCEDURE — 80048 BASIC METABOLIC PNL TOTAL CA: CPT

## 2020-09-07 PROCEDURE — 97116 GAIT TRAINING THERAPY: CPT | Mod: CQ

## 2020-09-07 PROCEDURE — 99900031 HC PATIENT EDUCATION (STAT)

## 2020-09-07 PROCEDURE — 99900035 HC TECH TIME PER 15 MIN (STAT)

## 2020-09-07 PROCEDURE — 36415 COLL VENOUS BLD VENIPUNCTURE: CPT

## 2020-09-07 PROCEDURE — 97165 OT EVAL LOW COMPLEX 30 MIN: CPT

## 2020-09-07 PROCEDURE — 94640 AIRWAY INHALATION TREATMENT: CPT

## 2020-09-07 PROCEDURE — 25000003 PHARM REV CODE 250

## 2020-09-07 PROCEDURE — 99232 PR SUBSEQUENT HOSPITAL CARE,LEVL II: ICD-10-PCS | Mod: ,,, | Performed by: INTERNAL MEDICINE

## 2020-09-07 PROCEDURE — 94761 N-INVAS EAR/PLS OXIMETRY MLT: CPT

## 2020-09-07 PROCEDURE — 99232 SBSQ HOSP IP/OBS MODERATE 35: CPT | Mod: ,,, | Performed by: INTERNAL MEDICINE

## 2020-09-07 PROCEDURE — 21000000 HC CCU ICU ROOM CHARGE

## 2020-09-07 RX ADMIN — IPRATROPIUM BROMIDE AND ALBUTEROL SULFATE 3 ML: .5; 3 SOLUTION RESPIRATORY (INHALATION) at 08:09

## 2020-09-07 RX ADMIN — BUSPIRONE HYDROCHLORIDE 5 MG: 5 TABLET ORAL at 03:09

## 2020-09-07 RX ADMIN — MUPIROCIN: 20 OINTMENT TOPICAL at 08:09

## 2020-09-07 RX ADMIN — IPRATROPIUM BROMIDE AND ALBUTEROL SULFATE 3 ML: .5; 3 SOLUTION RESPIRATORY (INHALATION) at 02:09

## 2020-09-07 RX ADMIN — CHLORHEXIDINE GLUCONATE 15 ML: 1.2 RINSE ORAL at 08:09

## 2020-09-07 RX ADMIN — MORPHINE SULFATE 1 MG: 2 INJECTION, SOLUTION INTRAMUSCULAR; INTRAVENOUS at 08:09

## 2020-09-07 RX ADMIN — HYDROCODONE BITARTRATE AND ACETAMINOPHEN 1 TABLET: 5; 325 TABLET ORAL at 08:09

## 2020-09-07 RX ADMIN — ASPIRIN 81 MG CHEWABLE TABLET 81 MG: 81 TABLET CHEWABLE at 08:09

## 2020-09-07 RX ADMIN — METHYLPREDNISOLONE SODIUM SUCCINATE 40 MG: 40 INJECTION, POWDER, FOR SOLUTION INTRAMUSCULAR; INTRAVENOUS at 08:09

## 2020-09-07 RX ADMIN — ATORVASTATIN CALCIUM 40 MG: 40 TABLET, FILM COATED ORAL at 08:09

## 2020-09-07 RX ADMIN — ROPINIROLE HYDROCHLORIDE 1 MG: 1 TABLET, FILM COATED ORAL at 09:09

## 2020-09-07 RX ADMIN — AMIODARONE HYDROCHLORIDE 200 MG: 200 TABLET ORAL at 08:09

## 2020-09-07 RX ADMIN — APIXABAN 5 MG: 5 TABLET, FILM COATED ORAL at 08:09

## 2020-09-07 RX ADMIN — HYDROCODONE BITARTRATE AND ACETAMINOPHEN 1 TABLET: 5; 325 TABLET ORAL at 04:09

## 2020-09-07 RX ADMIN — HYDROCODONE BITARTRATE AND ACETAMINOPHEN 1 TABLET: 5; 325 TABLET ORAL at 11:09

## 2020-09-07 RX ADMIN — FUROSEMIDE 40 MG: 40 TABLET ORAL at 06:09

## 2020-09-07 RX ADMIN — FUROSEMIDE 40 MG: 40 TABLET ORAL at 08:09

## 2020-09-07 RX ADMIN — BUSPIRONE HYDROCHLORIDE 5 MG: 5 TABLET ORAL at 08:09

## 2020-09-07 RX ADMIN — IPRATROPIUM BROMIDE AND ALBUTEROL SULFATE 3 ML: .5; 3 SOLUTION RESPIRATORY (INHALATION) at 01:09

## 2020-09-07 RX ADMIN — LISINOPRIL 40 MG: 20 TABLET ORAL at 08:09

## 2020-09-07 RX ADMIN — SPIRONOLACTONE 25 MG: 25 TABLET ORAL at 08:09

## 2020-09-07 RX ADMIN — NICOTINE 1 PATCH: 21 PATCH, EXTENDED RELEASE TRANSDERMAL at 11:09

## 2020-09-07 RX ADMIN — ALPRAZOLAM 0.25 MG: 0.25 TABLET ORAL at 08:09

## 2020-09-07 NOTE — PLAN OF CARE
09/07/20 0826   Patient Assessment/Suction   Level of Consciousness (AVPU) alert   All Lung Fields Breath Sounds clear   PRE-TX-O2   O2 Device (Oxygen Therapy) Oxymask   $ Is the patient on Low Flow Oxygen? Yes   Flow (L/min) 4   SpO2 98 %   Pulse Oximetry Type Continuous   $ Pulse Oximetry - Multiple Charge Pulse Oximetry - Multiple   Pulse 84   Resp 15   Aerosol Therapy   $ Aerosol Therapy Charges Aerosol Treatment   Daily Review of Necessity (SVN) completed   Respiratory Treatment Status (SVN) given   Treatment Route (SVN) mask   Patient Position (SVN) semi-Ahumada's   Post Treatment Assessment (SVN) increased aeration   Signs of Intolerance (SVN) none   Breath Sounds Post-Respiratory Treatment   Post-treatment Heart Rate (beats/min) 86   Post-treatment Resp Rate (breaths/min) 15

## 2020-09-07 NOTE — PROGRESS NOTES
[facility] Medicine  Progress Note    Patient name: Yadi Mccall  MRN: [id number]  Admit Date: 9/3/2020   LOS: 4 days     SUBJECTIVE:     Principal problem: Acute hypoxemic respiratory failure    Interval History:  No acute overnight events reported.  SOB better at rest but worse with exertion. Has some discomfort at the CABG incision site. No chest pain.     Scheduled Meds:   albuterol-ipratropium  3 mL Nebulization Q6H    amiodarone  200 mg Oral BID    apixaban  5 mg Oral BID    aspirin  81 mg Oral Daily    atorvastatin  40 mg Oral QHS    busPIRone  5 mg Oral TID    chlorhexidine  15 mL Mouth/Throat BID    furosemide  40 mg Oral BID    lisinopriL  40 mg Oral Daily    methylPREDNISolone sodium succinate  40 mg Intravenous Daily    mupirocin   Nasal BID    nicotine  1 patch Transdermal Q24H    spironolactone  25 mg Oral Daily     Continuous Infusions:  PRN Meds:ALPRAZolam, calcium chloride IVPB, calcium chloride IVPB, calcium chloride IVPB, HYDROcodone-acetaminophen, magnesium oxide, magnesium sulfate IVPB, magnesium sulfate IVPB, magnesium sulfate IVPB, magnesium sulfate IVPB, morphine, nitroGLYCERIN, potassium chloride in water, potassium chloride in water, potassium chloride in water, potassium chloride in water, potassium chloride, potassium chloride, potassium chloride, potassium chloride, rOPINIRole, sodium chloride 0.9%, sodium phosphate IVPB, sodium phosphate IVPB, sodium phosphate IVPB, sodium phosphate IVPB, sodium phosphate IVPB, traZODone    Review of patient's allergies indicates:   Allergen Reactions    Chlorthalidone     Codeine     Dyazide [triamterene-hydrochlorothiazid]     Penicillins        Review of Systems: As per interval history. Poor historian     OBJECTIVE:     Vital Signs (Most Recent)  Temp: 96.2 °F (35.7 °C) (09/07/20 1526)  Pulse: 84 (09/07/20 1526)  Resp: 17 (09/07/20 1604)  BP: 100/61 (09/07/20 1526)  SpO2: (!) 94 % (09/07/20 1526)    Vital  Signs Range (Last 24H):  Temp:  [96.2 °F (35.7 °C)-98 °F (36.7 °C)]   Pulse:  []   Resp:  [15-20]   BP: ()/(52-64)   SpO2:  [94 %-99 %]     I & O (Last 24H):    Intake/Output Summary (Last 24 hours) at 9/7/2020 1757  Last data filed at 9/7/2020 1600  Gross per 24 hour   Intake 360 ml   Output 2375 ml   Net -2015 ml       Physical Exam:  General: Patient resting comfortably in no acute distress. Appears as stated age. Calm  Eyes: No conjunctival injection. No scleral icterus.  ENT: Hearing grossly intact. No discharge from ears. No nasal discharge.   Neck: Supple, trachea midline. No JVD  CVS: RRR. Sternal wound incision is clean  Lungs: No tachypnea or accessory muscle use. Diminished bilateral breath sounds (R>L). Faint end-expiratory wheeze  Abdomen:  Soft, nontender and nondistended.  No organomegaly  Neuro: Alert. Left leg weakness; chronic. Follows commands. Responds appropriately   Skin:  No rash or erythema noted  Psych:  Normal mood and behavior    Laboratory:  CBC:   Recent Labs   Lab 09/07/20  0430   WBC 14.44*   RBC 3.55*   HGB 10.4*   HCT 32.6*   *   MCV 92   MCH 29.3   MCHC 31.9*     CMP:   Recent Labs   Lab 09/03/20  1747  09/07/20  0430      < > 118*   CALCIUM 8.4*   < > 8.1*   ALBUMIN 3.3*  --   --    PROT 6.3  --   --    *   < > 134*   K 4.1   < > 4.1   CO2 28   < > 27   CL 92*   < > 94*   BUN 20   < > 16   CREATININE 1.0   < > 0.8   ALKPHOS 76  --   --    ALT 32  --   --    AST 32  --   --    BILITOT 1.0  --   --     < > = values in this interval not displayed.     Cardiac markers:   Recent Labs   Lab 09/03/20  1747   TROPONINI <0.030       Diagnostic Results:  Labs: Reviewed    ASSESSMENT/PLAN:     65-year-old female with ongoing tobacco use who recently underwent 4 vessel CABG is admitted to our facility for respiratory failure after being discharged from outside facility yesterday.  Found to have moderate left-sided pleural effusion along with right hemidiaphragm  elevation.  Possible component of undiagnosed COPD.    Active Hospital Problems    Diagnosis  POA    *Acute hypoxemic respiratory failure [J96.01]  Yes    Pleural effusion [J90]  Yes    Abnormal CXR [R93.89]  Yes    Personal history of tobacco use, presenting hazards to health [Z87.891]  Not Applicable    Marijuana use [F12.90]  Yes    Anemia [D64.9]  Yes    Thrombocytosis [D47.3]  Yes    Hyponatremia [E87.1]  Yes    Generalized anxiety disorder [F41.1]  Yes    Elevated diaphragm [J98.6]  Yes    History of CVA with residual deficit [I69.30]  Not Applicable     Chronic    Bilateral pleural effusion [J90]  Yes    Atrial flutter, paroxysmal [I48.92]  Yes     Recent diagnosis after CABG done on 08/24/2020      S/P CABG x 4 [Z95.1]  Not Applicable     On 08/24/2020 by Dr. Gray      Coronary artery disease of native artery of native heart with stable angina pectoris [I25.118]  Yes    Chronic obstructive pulmonary disease with acute exacerbation [J44.1]  Yes    Hyperlipidemia [E78.5]  Yes      Resolved Hospital Problems    Diagnosis Date Resolved POA    Encephalopathy, metabolic [G93.41] 09/04/2020 Yes       Plan:   Supplemental oxygen for hypoxemic respiratory failure; wean as tolerated  Moderate size left pleural effusion; appreciate pulmonology input  Scheduled oral furosemide with good UOP  Right hemidiaphragm elevation noted; fluoro of diaphragm done - result pending   Recent hospital stay complicated by postoperative atrial flutter  Continue amiodarone; dose adjusted as per Cardiology.  Continue apixaban for anticoagulation  Nebulized breathing treatments and systemic steroids for COPD exacerbation  Possible undiagnosed anxiety disorder; continue buspirone 5 mg t.i.d. along with p.r.n. alprazolam  Mobilize. Cardiac rehab after discharge   PT/OT consult - recommend home health       Dispo: Likely home in 1-2 days   VTE Risk Mitigation (From admission, onward)         Ordered     apixaban tablet 5 mg   2 times daily      09/03/20 2155     IP VTE LOW RISK PATIENT  Once      09/03/20 2155     Place sequential compression device  Until discontinued      09/03/20 2155                  Department Hospital Medicine  [facility]  Jefferson Segovia MD  Date of service: 09/07/2020

## 2020-09-07 NOTE — PLAN OF CARE
09/06/20 2013   Patient Assessment/Suction   Level of Consciousness (AVPU) alert   Respiratory Effort Normal;Unlabored   Expansion/Accessory Muscles/Retractions no use of accessory muscles   All Lung Fields Breath Sounds equal bilaterally;diminished;clear   Rhythm/Pattern, Respiratory unlabored   Cough Frequency no cough   Cough Type fair;nonproductive   PRE-TX-O2   O2 Device (Oxygen Therapy) Oxymask   Flow (L/min) 4   SpO2 (!) 94 %   Pulse Oximetry Type Continuous   $ Pulse Oximetry - Multiple Charge Pulse Oximetry - Multiple   Pulse 100   Resp 20   Aerosol Therapy   $ Aerosol Therapy Charges Aerosol Treatment   Daily Review of Necessity (SVN) completed   Respiratory Treatment Status (SVN) given   Treatment Route (SVN) mask   Patient Position (SVN) HOB elevated   Post Treatment Assessment (SVN) breath sounds unchanged   Signs of Intolerance (SVN) none   Breath Sounds Post-Respiratory Treatment   Post-treatment Heart Rate (beats/min) 98   Post-treatment Resp Rate (breaths/min) 20   Incentive Spirometer   $ Incentive Spirometer Charges done with encouragement   Administration (IS) instruction provided, follow-up   Number of Repetitions (IS) 10   Level Incentive Spirometer (mL) 750   Patient Tolerance (IS) fair   Respiratory Evaluation   $ Care Plan Tech Time 15 min   Evaluation For Re-Eval 3 day

## 2020-09-07 NOTE — PT/OT/SLP PROGRESS
"Physical Therapy Treatment    Patient Name:  Yadi Mccall   MRN:  [id number]    Recommendations:     Discharge Recommendations:  home health PT, home with home health   Discharge Equipment Recommendations: none   Barriers to discharge: Decreased caregiver support    Assessment:     Yadi Mccall is a 65 y.o. female admitted with a medical diagnosis of Acute hypoxemic respiratory failure.  She presents with the following impairments/functional limitations:  weakness, impaired endurance, impaired self care skills, impaired functional mobilty, gait instability, impaired balance, impaired cardiopulmonary response to activity. Pt able to tolerate increased distance during gait training noting Pt with min SOB. Pt ambulated on 6L o2. Pt ambulated with decreased stance time and toe first gait pattern on LLE.  Continue with PT and POC.     Rehab Prognosis: Good; patient would benefit from acute skilled PT services to address these deficits and reach maximum level of function.    Recent Surgery: * No surgery found *      Plan:     During this hospitalization, patient to be seen daily to address the identified rehab impairments via gait training, therapeutic activities, therapeutic exercises and progress toward the following goals:    · Plan of Care Expires:  10/05/20    Subjective     Chief Complaint: Pt reports "lets walk. I am ready to go home!"  Patient/Family Comments/goals: return home   Pain/Comfort:  · Pain Rating 1: (Pt denied pain when asked)      Objective:     Communicated with RN prior to session.  Patient found up in chair with blood pressure cuff, oxygen, pulse ox (continuous), telemetry upon PT entry to room.     General Precautions: Standard, fall, sternal   Orthopedic Precautions:N/A   Braces:       Functional Mobility:  · Transfers:     · Sit to Stand:  minimum assistance with rollator  · Bed to Chair: contact guard assistance with  rollator  using  Step Transfer  · Gait: 55 feet with rollator and CGA x 2 " trials. Pt required one seated rest break between trials       AM-PAC 6 CLICK MOBILITY          Therapeutic Activities and Exercises:    sit<> stands; transfer training; gait training       Patient left sitting EOB  with all lines intact and call button in reach..    GOALS:   Multidisciplinary Problems     Physical Therapy Goals        Problem: Physical Therapy Goal    Goal Priority Disciplines Outcome Goal Variances Interventions   Physical Therapy Goal     PT, PT/OT Ongoing, Progressing     Description: Goals to be met by: discharge      Patient will increase functional independence with mobility by performin. Supine to sit with Stand-by Assistance  2. Sit to stand transfer with Supervision  3. Bed to chair transfer with Supervision using Rolling Walker  4. Gait  x 75 feet with Supervision using Rolling Walker.                      Time Tracking:     PT Received On: 20  PT Start Time: 1139     PT Stop Time: 1149  PT Total Time (min): 10 min     Billable Minutes: Gait Training 10    Treatment Type: Treatment  PT/PTA: PTA     PTA Visit Number: 2     Hillary Hammant, PTA  2020

## 2020-09-07 NOTE — PLAN OF CARE
Problem: Physical Therapy Goal  Goal: Physical Therapy Goal  Description: Goals to be met by: discharge      Patient will increase functional independence with mobility by performin. Supine to sit with Stand-by Assistance  2. Sit to stand transfer with Supervision  3. Bed to chair transfer with Supervision using Rolling Walker  4. Gait  x 75 feet with Supervision using Rolling Walker.     Outcome: Ongoing, Progressing   Pt progressing toward meeting goals

## 2020-09-07 NOTE — PROGRESS NOTES
"Progress Note  PULMONARY    Admit Date: 9/3/2020   09/07/2020  From Dr Elliott's consult--HPI:     9/4/2020 - 64 yo female with h/o CABG about 2 weeks ago at [facility].  Brought in to ER because of increased SOB and mental status change (possible report of hallucinations).  SOB has been a problem for months but got worse over the last few days.  She denies any h/o COPD (but has a h/o smoking about 3 PPD - stopped "recently" and is on home O2 at 3 LPM).  By report when EMS arrived sats were in the 80's on her O2.  Ater getting a treatment her sats improved.  She denies fever, chills, sweat.  Ha some cough which sounds to be chronic and nonproductive.  She has had some chest tightness and possible wheezing.  She denies any CHF symptoms.  Of note is she is not a very good historian.  Plan   Encephalopathy was likely hypoxemia related  · With regards to left pleural effusion it is small to moderate - would follow and try to diurese  · Would not tap at this time - will need to hold Eliquis for at least 2 days to consider  · Continue treatment of COPD - respiratory treatments would hold on steroid, should be on LABA/LAMA/ICS at DC  · Needs to stop smoking and stop marijuana  · Ask Dr Gray to assess has some instability of the superior portion of her wound  · Continue with amiodarone, eliquis  · CXR is concerning for right diaphragm paresis - will check US to evaluate  · Increase activity as able  · Monitor H/H and platelets  · Probably needs outpt sleep study if she is willing to do it.  SUBJECTIVE:     9/5-no new c/o- relates has had some wheezes, breathing still short.  9/6- no new c/o.  9/7 no new c/o- breathing better but marked vera          PFSH and Allergies reviewed.    OBJECTIVE:     Vitals (Most recent):  Vitals:    09/07/20 1200   BP: (!) 92/52   Pulse: 81   Resp: 18   Temp: 97.1 °F (36.2 °C)       Vitals (24 hour range):  Temp:  [96.7 °F (35.9 °C)-98 °F (36.7 °C)]   Pulse:  []   Resp:  [15-22] "   BP: ()/(52-64)   SpO2:  [94 %-99 %]       Intake/Output Summary (Last 24 hours) at 9/7/2020 1226  Last data filed at 9/7/2020 1159  Gross per 24 hour   Intake 360 ml   Output 1525 ml   Net -1165 ml          Physical Exam:  The patient's neuro status (alertness,orientation,cognitive function,motor skills,), pharyngeal exam (oral lesions, hygiene, abn dentition,), Neck (jvd,mass,thyroid,nodes in neck and above/below clavicle),RESPIRATORY(symmetry,effort,fremitus,percussion,auscultation),  Cor(rhythm,heart tones including gallops,perfusion,edema)ABD(distention,hepatic&splenomegaly,tenderness,masses), Skin(rash,cyanosis),Psyc(affect,judgement,).  Exam negative except for these pertinent findings:    Dull bilat, chest is symmetric, no distress, abnormal percussion, normal fremitus and good  breath sounds,. Alert, no edema      Radiographs reviewed: view by direct vision  Ct 9/3 rll altectasis infiltrate -high right atelectaiss, sm right effusion  Results for orders placed during the hospital encounter of 08/21/20   X-Ray Chest 1 View    Narrative EXAMINATION:  XR CHEST 1 VIEW 08/31/2020 at 05:50    INDICATION:  Dyspnea, respiratory abnormality    COMPARISON  08/28/2020    FINDINGS  The sternal wires are aligned.  There is slight asymmetric right hemidiaphragm elevation similar overall.  There is some patchy atelectatic consolidation with a small amount of pleural fluid bilaterally somewhat increased at the lung bases.  No interval pneumothorax or cardiac decompensation is appreciated.  No other significant interval changes are appreciated.    IMPRESSION  There is some patchy atelectasis and pleural fluid slightly increased overall at the lung bases.      Electronically signed by: Darek Ballesteros MD  Date:    08/31/2020  Time:    06:14   ]    Labs     Recent Labs   Lab 09/07/20  0430   WBC 14.44*   HGB 10.4*   HCT 32.6*   *     Recent Labs   Lab 09/07/20  0430   *   K 4.1   CL 94*   CO2 27   BUN 16    CREATININE 0.8   *   CALCIUM 8.1*   MG 2.5   No results for input(s): PH, PCO2, PO2, HCO3 in the last 24 hours.  Microbiology Results (last 7 days)     Procedure Component Value Units Date/Time    Blood culture #1 **CANNOT BE ORDERED STAT** [[id number]] Collected: 09/03/20 1911    Order Status: Completed Specimen: Blood from Peripheral, Forearm, Left Updated: 09/06/20 2032     Blood Culture, Routine No Growth to date      No Growth to date      No Growth to date      No Growth to date    Blood culture #2 **CANNOT BE ORDERED STAT** [[id number]] Collected: 09/03/20 1950    Order Status: Completed Specimen: Blood from Peripheral, Antecubital, Right Updated: 09/06/20 2032     Blood Culture, Routine No Growth to date      No Growth to date      No Growth to date      No Growth to date          Impression:  Active Hospital Problems    Diagnosis  POA    *Acute hypoxemic respiratory failure [J96.01]  Yes    Pleural effusion [J90]  Unknown    Abnormal CXR [R93.89]  Yes    Personal history of tobacco use, presenting hazards to health [Z87.891]  Not Applicable    Marijuana use [F12.90]  Yes    Anemia [D64.9]  Yes    Thrombocytosis [D47.3]  Yes    Hyponatremia [E87.1]  Yes    Generalized anxiety disorder [F41.1]  Yes    Elevated diaphragm [J98.6]  Yes    History of CVA with residual deficit [I69.30]  Not Applicable     Chronic    Bilateral pleural effusion [J90]  Yes    Atrial flutter, paroxysmal [I48.92]  Yes     Recent diagnosis after CABG done on 08/24/2020      S/P CABG x 4 [Z95.1]  Not Applicable     On 08/24/2020 by Dr. Gray      Coronary artery disease of native artery of native heart with stable angina pectoris [I25.118]  Yes    Chronic obstructive pulmonary disease with acute exacerbation [J44.1]  Yes    Hyperlipidemia [E78.5]  Yes      Resolved Hospital Problems    Diagnosis Date Resolved POA    Encephalopathy, metabolic [G93.41] 09/04/2020 Yes               Plan:   9/5 - 1480/1480/1550 lasix  40/d po, aldactone.  Effusion was small.    On neb rx.  4lpm ox     Will dose solumedrol 40/d , extra 20 iv lasix given- pt was on lasix 20 bid pta- will increase lasix from 40/d to bid.    9/6 - I/o neg 3200 with  Creat 0.9,   Still dull lower lungs, says breathing better.  Not sure if fluid or steroids.  Ask for rx to help sleep -will change lorazepam to xanax prn.        9/7 I/o incomplete, bun/creat 16/0.8,  40/d solumedrol, lasix 40 bid po.   Had diaphragm floro yesterday - no report- pt doesn't recall    Marked vera, f/ucxr am            .

## 2020-09-08 VITALS
HEART RATE: 83 BPM | HEIGHT: 68 IN | SYSTOLIC BLOOD PRESSURE: 112 MMHG | TEMPERATURE: 97 F | WEIGHT: 201.94 LBS | BODY MASS INDEX: 30.61 KG/M2 | OXYGEN SATURATION: 92 % | DIASTOLIC BLOOD PRESSURE: 56 MMHG | RESPIRATION RATE: 18 BRPM

## 2020-09-08 PROBLEM — J90 BILATERAL PLEURAL EFFUSION: Status: RESOLVED | Noted: 2020-09-03 | Resolved: 2020-09-08

## 2020-09-08 LAB
ANION GAP SERPL CALC-SCNC: 8 MMOL/L (ref 8–16)
BACTERIA BLD CULT: NORMAL
BACTERIA BLD CULT: NORMAL
BUN SERPL-MCNC: 24 MG/DL (ref 8–23)
CALCIUM SERPL-MCNC: 8.3 MG/DL (ref 8.7–10.5)
CHLORIDE SERPL-SCNC: 95 MMOL/L (ref 95–110)
CO2 SERPL-SCNC: 30 MMOL/L (ref 23–29)
CREAT SERPL-MCNC: 0.9 MG/DL (ref 0.5–1.4)
ERYTHROCYTE [DISTWIDTH] IN BLOOD BY AUTOMATED COUNT: 15.8 % (ref 11.5–14.5)
EST. GFR  (AFRICAN AMERICAN): >60 ML/MIN/1.73 M^2
EST. GFR  (NON AFRICAN AMERICAN): >60 ML/MIN/1.73 M^2
GLUCOSE SERPL-MCNC: 106 MG/DL (ref 70–110)
HCT VFR BLD AUTO: 34.1 % (ref 37–48.5)
HGB BLD-MCNC: 10.7 G/DL (ref 12–16)
MAGNESIUM SERPL-MCNC: 2.5 MG/DL (ref 1.6–2.6)
MCH RBC QN AUTO: 28.9 PG (ref 27–31)
MCHC RBC AUTO-ENTMCNC: 31.4 G/DL (ref 32–36)
MCV RBC AUTO: 92 FL (ref 82–98)
PLATELET # BLD AUTO: 653 K/UL (ref 150–350)
PMV BLD AUTO: 7.9 FL (ref 9.2–12.9)
POTASSIUM SERPL-SCNC: 4.4 MMOL/L (ref 3.5–5.1)
RBC # BLD AUTO: 3.7 M/UL (ref 4–5.4)
SODIUM SERPL-SCNC: 133 MMOL/L (ref 136–145)
WBC # BLD AUTO: 15.51 K/UL (ref 3.9–12.7)

## 2020-09-08 PROCEDURE — 25000003 PHARM REV CODE 250: Performed by: NURSE PRACTITIONER

## 2020-09-08 PROCEDURE — 99233 PR SUBSEQUENT HOSPITAL CARE,LEVL III: ICD-10-PCS | Mod: ,,, | Performed by: INTERNAL MEDICINE

## 2020-09-08 PROCEDURE — 83735 ASSAY OF MAGNESIUM: CPT

## 2020-09-08 PROCEDURE — 25000003 PHARM REV CODE 250: Performed by: INTERNAL MEDICINE

## 2020-09-08 PROCEDURE — 27000221 HC OXYGEN, UP TO 24 HOURS

## 2020-09-08 PROCEDURE — 94640 AIRWAY INHALATION TREATMENT: CPT

## 2020-09-08 PROCEDURE — 99900035 HC TECH TIME PER 15 MIN (STAT)

## 2020-09-08 PROCEDURE — 25000242 PHARM REV CODE 250 ALT 637 W/ HCPCS: Performed by: NURSE PRACTITIONER

## 2020-09-08 PROCEDURE — 63600175 PHARM REV CODE 636 W HCPCS: Performed by: INTERNAL MEDICINE

## 2020-09-08 PROCEDURE — S4991 NICOTINE PATCH NONLEGEND: HCPCS | Performed by: INTERNAL MEDICINE

## 2020-09-08 PROCEDURE — 99233 SBSQ HOSP IP/OBS HIGH 50: CPT | Mod: ,,, | Performed by: INTERNAL MEDICINE

## 2020-09-08 PROCEDURE — 36415 COLL VENOUS BLD VENIPUNCTURE: CPT

## 2020-09-08 PROCEDURE — 97116 GAIT TRAINING THERAPY: CPT | Mod: CQ

## 2020-09-08 PROCEDURE — 80048 BASIC METABOLIC PNL TOTAL CA: CPT

## 2020-09-08 PROCEDURE — 94761 N-INVAS EAR/PLS OXIMETRY MLT: CPT

## 2020-09-08 PROCEDURE — 85027 COMPLETE CBC AUTOMATED: CPT

## 2020-09-08 PROCEDURE — 25000003 PHARM REV CODE 250

## 2020-09-08 RX ORDER — METOPROLOL SUCCINATE 25 MG/1
25 TABLET, EXTENDED RELEASE ORAL DAILY
Qty: 30 TABLET | Refills: 11 | Status: SHIPPED | OUTPATIENT
Start: 2020-09-08 | End: 2020-10-05 | Stop reason: SDUPTHER

## 2020-09-08 RX ORDER — NAPROXEN SODIUM 220 MG/1
81 TABLET, FILM COATED ORAL DAILY
Qty: 360 TABLET | Refills: 0 | Status: SHIPPED | OUTPATIENT
Start: 2020-09-09 | End: 2024-02-15

## 2020-09-08 RX ORDER — SPIRONOLACTONE 25 MG/1
25 TABLET ORAL DAILY
Qty: 30 TABLET | Refills: 11 | Status: SHIPPED | OUTPATIENT
Start: 2020-09-09 | End: 2020-10-05 | Stop reason: SDUPTHER

## 2020-09-08 RX ORDER — FUROSEMIDE 40 MG/1
TABLET ORAL
Qty: 36 TABLET | Refills: 2 | Status: SHIPPED | OUTPATIENT
Start: 2020-09-08 | End: 2020-10-05 | Stop reason: DRUGHIGH

## 2020-09-08 RX ORDER — LISINOPRIL 20 MG/1
20 TABLET ORAL DAILY
Qty: 90 TABLET | Refills: 0 | Status: SHIPPED | OUTPATIENT
Start: 2020-09-08 | End: 2021-01-29

## 2020-09-08 RX ORDER — PREDNISONE 20 MG/1
40 TABLET ORAL DAILY
Qty: 10 TABLET | Refills: 0 | Status: SHIPPED | OUTPATIENT
Start: 2020-09-08 | End: 2020-09-13

## 2020-09-08 RX ORDER — BUSPIRONE HYDROCHLORIDE 5 MG/1
5 TABLET ORAL 3 TIMES DAILY
Qty: 90 TABLET | Refills: 11 | Status: SHIPPED | OUTPATIENT
Start: 2020-09-08 | End: 2021-01-29

## 2020-09-08 RX ORDER — AMIODARONE HYDROCHLORIDE 200 MG/1
TABLET ORAL
Qty: 34 TABLET | Refills: 2 | Status: SHIPPED | OUTPATIENT
Start: 2020-09-08 | End: 2020-10-05 | Stop reason: CLARIF

## 2020-09-08 RX ADMIN — ROPINIROLE HYDROCHLORIDE 1 MG: 1 TABLET, FILM COATED ORAL at 02:09

## 2020-09-08 RX ADMIN — LISINOPRIL 40 MG: 20 TABLET ORAL at 09:09

## 2020-09-08 RX ADMIN — HYDROCODONE BITARTRATE AND ACETAMINOPHEN 1 TABLET: 5; 325 TABLET ORAL at 03:09

## 2020-09-08 RX ADMIN — AMIODARONE HYDROCHLORIDE 200 MG: 200 TABLET ORAL at 09:09

## 2020-09-08 RX ADMIN — BUSPIRONE HYDROCHLORIDE 5 MG: 5 TABLET ORAL at 09:09

## 2020-09-08 RX ADMIN — FUROSEMIDE 40 MG: 40 TABLET ORAL at 09:09

## 2020-09-08 RX ADMIN — HYDROCODONE BITARTRATE AND ACETAMINOPHEN 1 TABLET: 5; 325 TABLET ORAL at 09:09

## 2020-09-08 RX ADMIN — IPRATROPIUM BROMIDE AND ALBUTEROL SULFATE 3 ML: .5; 3 SOLUTION RESPIRATORY (INHALATION) at 08:09

## 2020-09-08 RX ADMIN — SPIRONOLACTONE 25 MG: 25 TABLET ORAL at 09:09

## 2020-09-08 RX ADMIN — METHYLPREDNISOLONE SODIUM SUCCINATE 40 MG: 40 INJECTION, POWDER, FOR SOLUTION INTRAMUSCULAR; INTRAVENOUS at 09:09

## 2020-09-08 RX ADMIN — BUSPIRONE HYDROCHLORIDE 5 MG: 5 TABLET ORAL at 03:09

## 2020-09-08 RX ADMIN — HYDROCODONE BITARTRATE AND ACETAMINOPHEN 1 TABLET: 5; 325 TABLET ORAL at 02:09

## 2020-09-08 RX ADMIN — IPRATROPIUM BROMIDE AND ALBUTEROL SULFATE 3 ML: .5; 3 SOLUTION RESPIRATORY (INHALATION) at 01:09

## 2020-09-08 RX ADMIN — APIXABAN 5 MG: 5 TABLET, FILM COATED ORAL at 09:09

## 2020-09-08 RX ADMIN — CHLORHEXIDINE GLUCONATE 15 ML: 1.2 RINSE ORAL at 09:09

## 2020-09-08 RX ADMIN — NICOTINE 1 PATCH: 21 PATCH, EXTENDED RELEASE TRANSDERMAL at 11:09

## 2020-09-08 RX ADMIN — MUPIROCIN: 20 OINTMENT TOPICAL at 09:09

## 2020-09-08 RX ADMIN — ASPIRIN 81 MG CHEWABLE TABLET 81 MG: 81 TABLET CHEWABLE at 09:09

## 2020-09-08 NOTE — PLAN OF CARE
09/08/20 1427   Final Note   Assessment Type Final Discharge Note   Anticipated Discharge Disposition Home-Health   Post-Acute Status   Post-Acute Authorization Home Health   Post-Acute Placement Status Referrals Sent  ([facility])   Home Health Status Referrals Sent  ([facility])   Part D Coverage [employer] Medicare   Patient choice form signed by patient/caregiver List with quality metrics by geographic area provided   Discharge Delays None known at this time

## 2020-09-08 NOTE — PLAN OF CARE
09/08/20 0817   Patient Assessment/Suction   Level of Consciousness (AVPU) alert   All Lung Fields Breath Sounds clear;diminished   PRE-TX-O2   O2 Device (Oxygen Therapy) nasal cannula   $ Is the patient on Low Flow Oxygen? Yes   Flow (L/min) 4   SpO2 96 %   Pulse Oximetry Type Continuous   $ Pulse Oximetry - Multiple Charge Pulse Oximetry - Multiple   Pulse 77   Resp 15   Aerosol Therapy   $ Aerosol Therapy Charges Aerosol Treatment   Daily Review of Necessity (SVN) completed   Respiratory Treatment Status (SVN) given   Treatment Route (SVN) mask   Patient Position (SVN) semi-Ahumada's   Post Treatment Assessment (SVN) increased aeration   Signs of Intolerance (SVN) none   Breath Sounds Post-Respiratory Treatment   Post-treatment Heart Rate (beats/min) 78   Post-treatment Resp Rate (breaths/min) 15   Respiratory Evaluation   $ Care Plan Tech Time 15 min

## 2020-09-08 NOTE — PROGRESS NOTES
"[facility]  Adult Nutrition   Progress Note (Follow-Up)    SUMMARY     Recommendations/Interventions:    Recommendation/Intervention: 1. Continue current diet as tolerated,encourage intake. 2.  to assist in meal choices daily.  Goals: 1. Patient to meet at least 75% of estimated needs via PO intake of meals.  Nutrition Goal Status: progressing towards goal    Dietitian Rounds Brief:  · Seen 2' f/u. Appetite and intake good-eating most of meals depending on the food. Patient was complaining about having no salt-educated patient on why she has low sodium diet order. Compliance may be an issue. No other complaints. Will continue to monitor intake, labs, and plan of care.  Reason for Assessment  Reason For Assessment: RD follow-up  Diagnosis: cardiac disease  Relevant Medical History: HLD; COPD; Encephalopathy, metabolic; Marijuana use; anemia; hyponatremia; Personal history of tobacco use, presenting hazards to health  Interdisciplinary Rounds: attended    Nutrition Risk Screen  Nutrition Risk Screen: no indicators present     MST Score: 0  Have you recently lost weight without trying?: No  Weight loss score: 0  Have you been eating poorly because of a decreased appetite?: No  Appetite score: 0       Nutrition/Diet History  Food Allergies: NKFA  Factors Affecting Nutritional Intake: pain    Anthropometrics  Temp: 96.8 °F (36 °C)  Height Method: Stated  Height: 5' 8" (172.7 cm)  Height (inches): 68 in  Weight Method: Bed Scale  Weight: 91.6 kg (201 lb 15.1 oz)  Weight (lb): 201.94 lb  Ideal Body Weight (IBW), Female: 140 lb  % Ideal Body Weight, Female (lb): 144.24 %  BMI (Calculated): 30.7  BMI Grade: 30 - 34.9- obesity - grade I     Weight History:  Wt Readings from Last 10 Encounters:   09/03/20 91.6 kg (201 lb 15.1 oz)   09/03/20 93.4 kg (206 lb)   09/02/20 93.6 kg (206 lb 5.6 oz)   08/18/20 95.7 kg (210 lb 15.7 oz)   07/30/20 94 kg (207 lb 3.7 oz)   07/24/20 93.5 kg (206 lb 2.1 oz)   06/25/20 " 97.1 kg (214 lb)   06/25/20 97.1 kg (214 lb)   06/02/20 97.1 kg (214 lb 1.1 oz)   05/12/20 96.4 kg (212 lb 8.4 oz)     Lab/Procedures/Meds: Pertinent Labs Reviewed  Clinical Chemistry:  Recent Labs   Lab 09/03/20  1747 09/04/20  0356 09/08/20  0515   * 133* 133*   K 4.1 3.5 4.4   CL 92* 92* 95   CO2 28 28 30*    101 106   BUN 20 15 24*   CREATININE 1.0 0.8 0.9   CALCIUM 8.4* 8.2* 8.3*   PROT 6.3  --   --    ALBUMIN 3.3*  --   --    BILITOT 1.0  --   --    ALKPHOS 76  --   --    AST 32  --   --    ALT 32  --   --    ANIONGAP 13 13 8   ESTGFRAFRICA >60.0 >60.0 >60.0   EGFRNONAA >60.0 >60.0 >60.0   MG 2.3 2.4 2.5   PHOS  --  4.5  --     < > = values in this interval not displayed.     CBC:   Recent Labs   Lab 09/08/20  0515   WBC 15.51*   RBC 3.70*   HGB 10.7*   HCT 34.1*   *   MCV 92   MCH 28.9   MCHC 31.4*     Cardiac Profile:  Recent Labs   Lab 09/03/20  1747 09/04/20  0356   BNP  --  258*   TROPONINI <0.030  --      Medications: Pertinent Medications reviewed  Scheduled Meds:   albuterol-ipratropium  3 mL Nebulization Q6H    amiodarone  200 mg Oral BID    apixaban  5 mg Oral BID    aspirin  81 mg Oral Daily    atorvastatin  40 mg Oral QHS    busPIRone  5 mg Oral TID    chlorhexidine  15 mL Mouth/Throat BID    furosemide  40 mg Oral BID    lisinopriL  40 mg Oral Daily    methylPREDNISolone sodium succinate  40 mg Intravenous Daily    mupirocin   Nasal BID    nicotine  1 patch Transdermal Q24H    spironolactone  25 mg Oral Daily     Continuous Infusions:  PRN Meds:.ALPRAZolam, calcium chloride IVPB, calcium chloride IVPB, calcium chloride IVPB, HYDROcodone-acetaminophen, magnesium oxide, magnesium sulfate IVPB, magnesium sulfate IVPB, magnesium sulfate IVPB, magnesium sulfate IVPB, morphine, nitroGLYCERIN, potassium chloride in water, potassium chloride in water, potassium chloride in water, potassium chloride in water, potassium chloride, potassium chloride, potassium chloride,  potassium chloride, rOPINIRole, sodium chloride 0.9%, sodium phosphate IVPB, sodium phosphate IVPB, sodium phosphate IVPB, sodium phosphate IVPB, sodium phosphate IVPB, traZODone    Antibiotics (From admission, onward)    Start     Stop Route Frequency Ordered    09/04/20 1030  mupirocin 2 % ointment  (MRSA Decolonization Orders [facility])      09/09 0859 Nasl 2 times daily 09/04/20 0921        Estimated/Assessed Needs    Weight Used For Calorie Calculations: 91.6 kg (201 lb 15.1 oz)  Energy Calorie Requirements (kcal): 4479-6642 kcals/day (20-25 kcal/kg)  Energy Need Method: Kcal/kg  Protein Requirements:  g/day (1.5-2 g/kg IBW)  Weight Used For Protein Calculations: 64 kg (141 lb 1.5 oz)     Estimated Fluid Requirement Method: RDA Method    Nutrition Prescription Ordered    Current Diet Order: Low Sodium Diet; 1500 mL Fluid Restriction    Evaluation of Received Nutrient/Fluid Intake    Energy Calories Required: meeting needs  Protein Required: meeting needs  Fluid Required: meeting needs  Tolerance: tolerating  % Intake of Estimated Energy Needs: 50 - 75 % and 75 - 100 %  % Meal Intake: NPO    Intake/Output Summary (Last 24 hours) at 9/8/2020 0809  Last data filed at 9/8/2020 0200  Gross per 24 hour   Intake 360 ml   Output 2675 ml   Net -2315 ml      Nutrition Risk    Level of Risk/Frequency of Follow-up: moderate  Monitor and Evaluation    Food and Nutrient Intake: energy intake, food and beverage intake  Food and Nutrient Adminstration: diet order  Knowledge/Beliefs/Attitudes: food and nutrition knowledge/skill, beliefs and attitudes  Physical Activity and Function: nutrition-related ADLs and IADLs, factors affecting access to physical activity  Anthropometric Measurements: weight, weight change, body mass index  Biochemical Data, Medical Tests and Procedures: electrolyte and renal panel, lipid profile, gastrointestinal profile, glucose/endocrine profile, inflammatory profile  Nutrition-Focused Physical  Findings: overall appearance     Nutrition Follow-Up    RD Follow-up?: Yes  Neelam Jarvis RD 09/08/2020 10:10 AM

## 2020-09-08 NOTE — PT/OT/SLP EVAL
Occupational Therapy   Evaluation    Name: Yadi Mccall  MRN: [id number]  Admitting Diagnosis:  Acute hypoxemic respiratory failure    The primary encounter diagnosis was Pulmonary emphysema, unspecified emphysema type. Diagnoses of Altered mental status, Acute respiratory distress, Pleural effusion, Acute hypoxemic respiratory failure, Abnormal CXR, Bilateral pleural effusion, Chronic obstructive pulmonary disease with acute exacerbation, and Elevated diaphragm were also pertinent to this visit.  S/p CABG x4 on 8/21/2020  Recommendations:     Discharge Recommendations: home with home health, home health OT  Discharge Equipment Recommendations:  none  Barriers to discharge:  None    Assessment:     Yadi Mccall is a 65 y.o. female with a medical diagnosis of Acute hypoxemic respiratory failure.  She presents with Performance deficits affecting function: weakness, impaired endurance, impaired self care skills, impaired functional mobilty, gait instability, impaired balance, decreased safety awareness, impaired coordination.      Rehab Prognosis: Good; patient would benefit from acute skilled OT services to address these deficits and reach maximum level of function.       Plan:     Patient to be seen 5 x/week to address the above listed problems via self-care/home management, therapeutic activities, therapeutic exercises  · Plan of Care Expires: 10/07/20  · Plan of Care Reviewed with: patient    Subjective     Chief Complaint: fears falling when transfers 2/2 previous fall at other facility got up by herself from [facility].  Patient/Family Comments/goals: go home with sister and get HH therapy.     Occupational Profile:  Living Environment: lives with sister in mobility hoe with ramp, walk in tub with seat.  Previous level of function: indep-Chuy with ADLS, ambulated with rollator.  Roles and Routines: fairly active  Equipment Used at Home:  cane, quad, rollator, wheelchair, oxygen, bedside commode(walk n tub with seat, L  foot AFO, ramp)  Assistance upon Discharge: sister but works days, pt says sister will be home with her during the day.    Pain/Comfort:  · Pain Rating 1: 0/10  · Pain Rating Post-Intervention 1: 0/10    Patients cultural, spiritual, Orthodox conflicts given the current situation:      Objective:     Communicated with: nurse prior to session.  Patient found HOB elevated with pulse ox (continuous), oxygen, telemetry, bed alarm upon OT entry to room.    General Precautions: Standard, fall, sternal   Orthopedic Precautions:N/A   Braces: N/A     Occupational Performance:    Bed Mobility:    · Patient completed Rolling/Turning to Right with stand by assistance and min vc to use sternal precautions, avoid pushing /pulling with arms, HOB elevated  · Patient completed Scooting/Bridging with stand by assistance and min vc  · Patient completed Supine to Sit with stand by assistance and min vc  · Patient completed Sit to Supine with independence and stand by assistance    Functional Mobility/Transfers:  · Patient completed Sit <> Stand Transfer with contact guard assistance and said she was anxioopis and feared falling so had her hands on bed to make her feel safer as she stood but did not push up through arms  with  no assistive device   · Patient completed Toilet Transfer Step Transfer technique with contact guard assistance with  bedside commode  · Functional Mobility: NT    Activities of Daily Living:  · Feeding:  independence .  · Grooming: supervision seated for adherence to sternal precaution with overhead reaching  · Lower Body Dressing: stand by assistance socks, crossed ankle over opposite knee seated EOB, mild SOB with exertion  · Toileting: minimum assistance clothes management/steadying assist 2/2 fear falling, anxious, nervous, shaky    Cognitive/Visual Perceptual:  Cognitive/Psychosocial Skills:     -       Oriented to: Person, Place, Time and Situation   -       Follows Commands/attention:Follows two-step  commands  -       Communication: clear/fluent  -       Memory: No Deficits noted  -       Safety awareness/insight to disability: impaired   -       Mood/Affect/Coping skills/emotional control: Cooperative and Anxious  Visual/Perceptual:      -Intact thick glasses    Physical Exam:  Balance:    -       sitting:  good   stanmding;  poor plus, shaky, unsteady, anxiety about falling, says she fell at previous hospital.  Postural examination/scapula alignment:    -       Rounded shoulders  Skin integrity: sternal incison with dressing bandage upper incision, lower incision dried scar  Dominant hand:    -       right  Upper Extremity Range of Motion:     -       Right Upper Extremity: WFL, sternal prec. 90  -       Left Upper Extremity: WFL, same  Upper Extremity Strength:    -       Right Upper Extremity: WFL  -       Left Upper Extremity: WFL   Strength: wfl  Fine Motor Coordination:mild tremors    AMPAC 6 Click ADL:  AMPAC Total Score: 19    Treatment & Education:  ---role of OT and POC, pt. verbalized understanding  --sternal precautions, min vc for adherence, purse lip breathing for mod SOB with exertion during transfers and LB ADLS and relaxation for increased anxiety and fear falling during transfers, required mod vc for correctness of tech.  Education:    Patient left HOB elevated with all lines intact, call button in reach and bed alarm on    GOALS:   Multidisciplinary Problems     Occupational Therapy Goals        Problem: Occupational Therapy Goal    Goal Priority Disciplines Outcome Interventions   Occupational Therapy Goal     OT, PT/OT Ongoing, Progressing    Description: Goals to be met by: discharge    Patient will increase functional independence with ADLs by performing:    UE Dressing with Stand-by Assistance.  LE Dressing with Stand-by Assistance.  Grooming while standing at sink with Stand-by Assistance.  Toileting from [facility] with Stand-by Assistance for hygiene and clothing management.   Toilet  transfer to [facility] with Stand-by Assistance.                     History:     Past Medical History:   Diagnosis Date    AAA (abdominal aortic aneurysm)     Anticoagulant long-term use     Coronary artery disease     Foot drop, left foot     Hyperlipidemia     Hypertension     Stroke 2014    LEFT SIDED WEAKNESS       Past Surgical History:   Procedure Laterality Date    APPENDECTOMY      CORONARY ANGIOGRAPHY N/A 7/24/2020    Procedure: ANGIOGRAM, CORONARY ARTERY;  Surgeon: Norbert Vallecillo MD;  Location: [facility] CATH;  Service: Cardiology;  Laterality: N/A;    CORONARY ARTERY BYPASS GRAFT (CABG) N/A 8/21/2020    Procedure: CORONARY ARTERY BYPASS GRAFT (CABG) BILATERAL MAMMARY  x  4 VESSELS;  Surgeon: Pascual Gray MD;  Location: [facility] OR;  Service: Cardiovascular;  Laterality: N/A;    ENDOSCOPIC HARVEST OF VEIN N/A 8/21/2020    Procedure: HARVEST-VEIN-ENDOVASCULAR;  Surgeon: Pascual Gray MD;  Location: [facility] OR;  Service: Cardiovascular;  Laterality: N/A;    FOOT SURGERY      HERNIA REPAIR      HYSTERECTOMY      LEFT HEART CATHETERIZATION N/A 7/24/2020    Procedure: Left heart cath;  Surgeon: Norbert Vallecillo MD;  Location: [facility] CATH;  Service: Cardiology;  Laterality: N/A;    TONSILLECTOMY         Time Tracking:     OT Date of Treatment: 09/07/20  OT Start Time: 1646  OT Stop Time: 1705  OT Total Time (min): 19 min    Billable Minutes:Evaluation 10  Therapeutic Activity 9  Total Time 19    Rachel Laws OT  9/8/2020

## 2020-09-08 NOTE — PLAN OF CARE
Built and sent referral to [facility] at fax [phone] and called the office that the referral sent, callback number on referral, and they have this  callback number as well.        09/08/20 1425   Post-Acute Status   Post-Acute Authorization Placement   Post-Acute Placement Status Referrals Sent  ([facility], [facility] already has at home.)   Part D Coverage [employer] Medicare   Patient choice form signed by patient/caregiver List with quality metrics by geographic area provided   Discharge Delays None known at this time   Discharge Plan   Discharge Plan A Home with family;Home Health   Discharge Plan B Home with family

## 2020-09-08 NOTE — PLAN OF CARE
"Reassessed pt due to previously with [facility] after Stroke with L sided Deficits (still present), and [facility] not taking pt back since on their "Do NotTake Back List."  Called [facility] and they will review referral, pt and Mrs Hollins pt sister aware and ok with this. Choice Form done.  PCP is Dr Chetan Sweeney in [place].       09/08/20 1300   Discharge Assessment   Assessment Type Discharge Planning Assessment   Confirmed/corrected address and phone number on facesheet? Yes   Assessment information obtained from? Patient   Expected Length of Stay (days) 2   Communicated expected length of stay with patient/caregiver yes   Prior to hospitilization cognitive status: Alert/Oriented   Prior to hospitalization functional status: Independent;Assistive Equipment   Current cognitive status: Alert/Oriented   Current Functional Status: Independent;Assistive Equipment   Facility Arrived From: Home   Lives With sibling(s)  (Lives with her Sister Mrs Hollins)   Who are your caregiver(s) and their phone number(s)? Sister Mrs Gunjan Mccall at cell [phone]   Patient's perception of discharge disposition home or selfcare;home health   Readmission Within the Last 30 Days current reason for admission unrelated to previous admission   If yes, most recent facility name: [facility]   Patient currently being followed by outpatient case management? Yes   If yes, name of outpatient case management following: other (comments)  ([facility] seeing pt after previous admission from [facility] but told this CM today that they have pt on the "Do Not Take Back List")   Equipment Currently Used at Home wheelchair;walker, rolling;rollator;cane, straight;cane, quad;bedside commode;oxygen;other (see comments)  (Walk in tub with a seat)   Part D Coverage [employer] Medicare   Do you have any problems affording any of your prescribed medications? No   Is the patient taking medications as prescribed? yes   Does the patient have transportation " home? Yes   Transportation Anticipated family or friend will provide   Dialysis Name and Scheduled days NA   Does the patient receive services at the [facility]? No   Discharge Plan A Home with family;Home Health   Discharge Plan B Home with family   DME Needed Upon Discharge  none   Patient/Family in Agreement with Plan yes   Readmission Questionnaire   At the time of your discharge, did someone talk to you about what your health problems were? Yes   At the time of discharge, did someone talk to you about what to watch out for regarding worsening of your health problem? Yes   At the time of discharge, did someone talk to you about what to do if you experienced worsening of your health problem? Yes   At the time of discharge, did someone talk to you about which medication to take when you left the hospital and which ones to stop taking? Yes   At the time of discharge, did someone talk to you about when and where to follow up with a doctor after you left the hospital? Yes   What do you believe caused you to be sick enough to be re-admitted? SOB with Hallucinations   How often do you need to have someone help you when you read instructions, pamphlets, or other written material from your doctor or pharmacy? Sometimes   Do you have problems taking your medications as prescribed? No   Do you have any problems affording any of  your prescribed medications? No   Do you have problems obtaining/receiving your medications? No   Does the patient have transportation to healthcare appointments? Yes   Living Arrangements house   Does the patient have family/friends to help with healtcare needs after discharge? yes   Does your caregiver provide all the help you need? Yes   Are you currently feeling confused? No   Are you currently having problems thinking? No   Are you currently having memory problems? No   Have you felt down, depressed, or hopeless? 0   Have you felt little interest or pleasure in doing things? 0   In the  last 7 days, my sleep quality was: poor

## 2020-09-08 NOTE — DISCHARGE SUMMARY
[facility] Medicine  Discharge Summary      Patient Name: Yadi Mccall  MRN: [id number]  Admission Date: 9/3/2020  Hospital Length of Stay: 5 days  Discharge Date and Time: 9/8/2020  3:52 PM  Attending Physician: No att. providers found   Discharging Provider: Jefferson Segovia MD  Primary Care Provider: Chetan Sweeney MD        Hospital Course:   65-year-old female with ongoing tobacco use who recently underwent 4 vessel CABG is admitted to our facility for respiratory failure after being discharged from outside facility yesterday.  Found to have moderate left-sided pleural effusion along with right hemidiaphragm elevation.      Acute hypoxemic respiratory failure likely secondary to multifactorial etiologies including bilateral pleural effusions in the recent postoperative setting, COPD exacerbation as well as right hemidiaphragm elevation.  Seen by pulmonology as well as Cardiology.  Her cardiac medications adjusted as outlined below.  She was managed for COPD exacerbation with scheduled nebulized breathing treatments and systemic steroids. Received diuretics for bilateral pleural effusions.  She had significant improvement in symptoms.  She continued to require supplemental oxygen (of note she was recently prescribed home oxygen at the time of discharge from outside facility).  Seen by PT/OT who recommended home health which has been arranged with assistance of case management.  Deemed medically stable to be discharged home.  I discussed medication changes with her sister Gunjan Mccall over the telephone who assisted patient with medications.  Discussed follow-up with both Cardiology as well as cardiothoracic surgery.    Instructions provided to follow up with primary care physician as outpatient. Patient verbalized understanding and is aware to contact primary care physician or return to ED if new or worsening symptoms.    Physical exam on the day of discharge:  General: Patient resting  comfortably in no acute distress.  Lungs:  No tachypnea or accessory muscle use.  Faint bibasilar crackles.  Diminished breath sounds at the right lung base  Cor: Regular rate and rhythm. No murmurs. Trace pedal edema.  Abd: Soft. Nontender. Non-distended.  Neuro: A&O x3. Moving all 4 extremities equally  Ext: No clubbing. No cyanosis.        Consults:   Consults (From admission, onward)        Status Ordering Provider     Consult to Pulmonology  Once     Provider:  Ousmane Elliott MD    Completed EM DAVIS     Inpatient consult to Cardiology  Once     Provider:  Yoon Russell MD    Completed EM DAVIS     Inpatient consult to Hospitalist  Once     Provider:  Christi Alonzo MD    Acknowledged JAE STEVENSON          No new Assessment & Plan notes have been filed under this hospital service since the last note was generated.  Service: Hospital Medicine    Final Active Diagnoses:    Diagnosis Date Noted POA    PRINCIPAL PROBLEM:  Acute hypoxemic respiratory failure [J96.01] 09/26/2019 Yes    Abnormal CXR [R93.89] 09/04/2020 Yes    Personal history of tobacco use, presenting hazards to health [Z87.891] 09/04/2020 Not Applicable    Marijuana use [F12.90] 09/04/2020 Yes    Anemia [D64.9] 09/04/2020 Yes    Thrombocytosis [D47.3] 09/04/2020 Yes    Hyponatremia [E87.1] 09/04/2020 Yes    Generalized anxiety disorder [F41.1] 09/04/2020 Yes    Elevated diaphragm [J98.6] 09/04/2020 Yes    History of CVA with residual deficit [I69.30] 09/04/2020 Not Applicable     Chronic    Atrial flutter, paroxysmal [I48.92] 08/31/2020 Yes    S/P CABG x 4 [Z95.1] 08/22/2020 Not Applicable    Coronary artery disease of native artery of native heart with stable angina pectoris [I25.118]  Yes    Chronic obstructive pulmonary disease with acute exacerbation [J44.1] 09/26/2019 Yes    Hyperlipidemia [E78.5] 02/15/2017 Yes      Problems Resolved During this Admission:    Diagnosis Date Noted Date Resolved  POA    Pleural effusion [J90]  09/08/2020 Yes    Encephalopathy, metabolic [G93.41] 09/03/2020 09/04/2020 Yes    Bilateral pleural effusion [J90] 09/03/2020 09/08/2020 Yes       Discharged Condition: fair    Disposition: Home-Health Care Svc    Follow Up:  Follow-up Information     Chetan Sweeney MD. Schedule an appointment as soon as possible for a visit in 2 weeks.    Specialties: Family Medicine, Internal Medicine  Why: Hospital discharge follow-up  Contact information:  [address] [facility] [address]  [phone]             Follow-up with your cardiologist at [facility]  On 9/17/2020.    Why: Post CABG follow-up            Kike Gray MD In 1 week.    Specialties: Cardiothoracic Surgery, Vascular Surgery, Cardiovascular Disease, Cardiology  Why: Post CABG follow-up   Contact information:  [address] [facility] [address]  [phone]                 Patient Instructions:      Ambulatory referral/consult to Home Health   Standing Status: Future   Referral Priority: Routine Referral Type: Home Health   Referral Reason: Specialty Services Required   Requested Specialty: Home Health Services   Number of Visits Requested: 1     Diet Cardiac     Notify your health care provider if you experience any of the following:  temperature >100.4     Notify your health care provider if you experience any of the following:  persistent nausea and vomiting or diarrhea     Notify your health care provider if you experience any of the following:  persistent dizziness, light-headedness, or visual disturbances     Notify your health care provider if you experience any of the following:  difficulty breathing or increased cough     Notify your health care provider if you experience any of the following:   Order Comments: Recurrent or worsening symptoms     Activity as tolerated       Significant Diagnostic Studies: Labs:   CMP   Recent Labs   Lab 09/07/20  0430 09/08/20  0515   * 133*   K 4.1 4.4   CL 94* 95   CO2  27 30*   * 106   BUN 16 24*   CREATININE 0.8 0.9   CALCIUM 8.1* 8.3*   ANIONGAP 13 8   ESTGFRAFRICA >60.0 >60.0   EGFRNONAA >60.0 >60.0    and CBC   Recent Labs   Lab 09/07/20  0430 09/08/20  0515   WBC 14.44* 15.51*   HGB 10.4* 10.7*   HCT 32.6* 34.1*   * 653*     Radiology:   Imaging Results          CTA Chest Non-Coronary - PE Study (Final result)  Result time 09/03/20 19:45:43    Final result by Grant Peters MD (09/03/20 19:45:43)                 Narrative:    HISTORY: Shortness of breath.    CMS MANDATED QUALITY DATA - CT RADIATION  436    All CT scans at this facility utilize dose modulation, iterative  reconstruction, and/or weight based dosing when appropriate to reduce  radiation dose to as low as reasonably achievable    FINDINGS: Thin axial imaging through the chest was performed with 100  mL nonionic IV contrast, with sagittal and coronal reformatted images  and 3-D reconstructions performed on an independent workstation, with  images stored in the patient's permanent electronic medical record.    The pulmonary arteries are fairly well opacified. There are no filling  defects to indicate pulmonary thromboembolic disease.    Heart size is normal. There is no significant pericardial effusion.  Post sternotomy changes are noted. No pathologic mediastinal fluid  collections are identified. There is a small amount of soft tissue gas  in the right breast, presumably iatrogenic.    There is a small to moderate size left-sided pleural effusion with  associated left basilar atelectasis. There is moderate dependent  atelectasis at the posterior right lung base. Mild apical  emphysematous changes are noted bilaterally. There is no evidence of  pneumothorax.    IMPRESSION:      1. No evidence of pulmonary thromboembolic disease.  2. Small to moderate left-sided pleural effusion.  3. Bibasilar atelectasis.  4. Changes of recent median sternotomy.  5. Small amount of soft tissue gas in the right  breast is presumably  iatrogenic.    Electronically Signed by Mehran Peters M.D. on 9/3/2020 7:54 PM                             X-Ray Chest AP Portable (Final result)  Result time 09/03/20 18:30:40    Final result by Grant Pteers MD (09/03/20 18:30:40)                 Narrative:    Chest single view    CLINICAL DATA: Altered mental status    FINDINGS: AP view is compared to August 31.    Mild cardiomegaly is stable. There has been previous median  sternotomy. The right lung is clear. Hazy increased density in the  lower left hemithorax is consistent with a small left pleural effusion  and left basilar atelectasis or infiltrate, slightly increased.    No acute osseous abnormality is identified.    IMPRESSION:  1. Faint left basilar opacity consistent with small pleural effusion  and atelectasis or infiltrate, slightly increased compared to August 31.  2. Mild cardiomegaly and post sternotomy changes.    Electronically Signed by Mehran Peters M.D. on 9/3/2020 6:39 PM                             CT Head Without Contrast (Final result)  Result time 09/03/20 18:25:40    Final result by Grant Peters MD (09/03/20 18:25:40)                 Narrative:    CT head without contrast    CLINICAL DATA: Altered mental status    CMS MANDATED QUALITY DATA - CT RADIATION  436    All CT scans at this facility utilize dose modulation, iterative  reconstruction, and/or weight based dosing when appropriate to reduce  radiation dose to as low as reasonably achievable.    Findings: Thin section axial noncontrast images were obtained from the  skull base to the vertex. Comparison is made to August 28.    There is no evidence of intracranial mass, acute hemorrhage, or  midline shift. Ventricles and sulci are normal. There are no  pathologic extra-axial fluid collections.    There is no CT evidence of acute ischemic change. Chronic frontal  watershed infarct on the right is noted, unchanged compared to August 28. Cerebellum  and brainstem are unremarkable. The calvarium is  intact. Mild right-sided mastoid opacification is noted.    IMPRESSION:  1. No acute intracranial abnormalities.  2. Chronic frontal watershed infarct on the right.  3. Mild right-sided mastoid effusion.    Electronically Signed by Mehran Peters M.D. on 9/3/2020 6:38 PM                                Medications:  Reconciled Home Medications:      Medication List      START taking these medications    aspirin 81 MG Chew  Take 1 tablet (81 mg total) by mouth once daily.  Start taking on: September 9, 2020  Replaces: aspirin 325 MG tablet     busPIRone 5 MG Tab  Commonly known as: BUSPAR  Take 1 tablet (5 mg total) by mouth 3 (three) times daily.     metoprolol succinate 25 MG 24 hr tablet  Commonly known as: TOPROL-XL  Take 1 tablet (25 mg total) by mouth once daily.     OXYGEN-AIR DELIVERY SYSTEMS MISC  3 L/min by local intranasal application route continuous. NC 3lpm continuous     predniSONE 20 MG tablet  Commonly known as: DELTASONE  Take 2 tablets (40 mg total) by mouth once daily. for 5 days     spironolactone 25 MG tablet  Commonly known as: ALDACTONE  Take 1 tablet (25 mg total) by mouth once daily.  Start taking on: September 9, 2020        CHANGE how you take these medications    albuterol-ipratropium 2.5 mg-0.5 mg/3 mL nebulizer solution  Commonly known as: DUO-NEB  Take 3 mLs by nebulization every 4 (four) hours. Rescue  What changed:   · when to take this  · reasons to take this  · additional instructions     amiodarone 200 MG Tab  Commonly known as: PACERONE  Take 1 tablet (200 mg total) by mouth 2 (two) times daily for 2 days, THEN 1 tablet (200 mg total) once daily.  Start taking on: September 8, 2020  What changed:   · medication strength  · See the new instructions.  · Another medication with the same name was removed. Continue taking this medication, and follow the directions you see here.     atorvastatin 40 MG tablet  Commonly known as:  LIPITOR  TAKE 1 TABLET EVERY DAY  What changed: when to take this     baclofen 20 MG tablet  Commonly known as: LIORESAL  TAKE 1 TABLET THREE TIMES DAILY AS NEEDED  What changed: See the new instructions.     furosemide 40 MG tablet  Commonly known as: LASIX  Take 1 tablet (40 mg total) by mouth 2 (two) times daily for 3 days, THEN 1 tablet (40 mg total) once daily.  Start taking on: September 8, 2020  What changed:   · medication strength  · See the new instructions.     HYDROcodone-acetaminophen 7.5-325 mg per tablet  Commonly known as: NORCO  Take 1 tablet by mouth every 4 (four) hours as needed.  What changed: reasons to take this     lisinopriL 20 MG tablet  Commonly known as: PRINIVIL,ZESTRIL  Take 1 tablet (20 mg total) by mouth once daily.  What changed:   · medication strength  · how much to take     rOPINIRole 2 MG tablet  Commonly known as: REQUIP  Take 1 tablet (2 mg total) by mouth 3 (three) times daily.  What changed:   · when to take this  · reasons to take this     traZODone 100 MG tablet  Commonly known as: DESYREL  TAKE 1 TABLET NIGHTLY AS NEEDED FOR INSOMNIA  What changed:   · when to take this  · reasons to take this        CONTINUE taking these medications    acetaminophen 500 MG tablet  Commonly known as: TYLENOL  Take 500 mg by mouth 2 (two) times daily as needed for Pain or Temperature greater than (mild pain or temp > 100.5).     AFRIN (OXYMETAZOLINE) NASL  1 spray by Nasal route as needed (stuffy nose).     apixaban 5 mg Tab  Commonly known as: ELIQUIS  Take 1 tablet (5 mg total) by mouth 2 (two) times daily.     fluticasone propionate 50 mcg/actuation nasal spray  Commonly known as: FLONASE  2 sprays (100 mcg total) by Each Nostril route once daily.     MUCINEX ORAL  Take 2 tablets by mouth as needed (congestion).     nicotine 21 mg/24 hr  Commonly known as: NICODERM CQ  Place 1 patch onto the skin once daily.     nitroGLYCERIN 0.4 MG SL tablet  Commonly known as: NITROSTAT  Place 1 tablet  (0.4 mg total) under the tongue every 5 (five) minutes as needed for Chest pain.     triamcinolone acetonide 0.1% 0.1 % ointment  Commonly known as: KENALOG  Apply topically 2 (two) times daily.        STOP taking these medications    aspirin 325 MG tablet  Replaced by: aspirin 81 MG Chew     gabapentin 100 MG capsule  Commonly known as: NEURONTIN     metoprolol tartrate 37.5 mg Tab            Indwelling Lines/Drains at time of discharge:   Lines/Drains/Airways     None                 Time spent on the discharge of patient: 38 minutes  Patient was seen and examined on the date of discharge and determined to be suitable for discharge.         Jefferson Segovia MD  Department of Hospital Medicine  [facility]

## 2020-09-08 NOTE — PLAN OF CARE
09/08/20 1300   Discharge Assessment   Assessment Type Discharge Planning Reassessment     Spoke with patient and her sister Mrs Franco about their Preference on the Patient Choice Form, explained to patient and family that they have the right to choose any agency, and a list of agencies were provided to patient and family to review, they verbalized an understanding, had [facility] after [place], but [facility] has her on the Do Not Take Back List, and they both ok with [facility] and if not then fiorst available, pt signed form, and form scanned into CM notes.    Called [facility] and spoke with the office at [phone] and they will review referral and let this CM know, updated pt.

## 2020-09-08 NOTE — PLAN OF CARE
Problem: Physical Therapy Goal  Goal: Physical Therapy Goal  Description: Goals to be met by: discharge      Patient will increase functional independence with mobility by performin. Supine to sit with Stand-by Assistance  2. Sit to stand transfer with Supervision  3. Bed to chair transfer with Supervision using Rolling Walker  4. Gait  x 75 feet with Supervision using Rolling Walker.     Outcome: Ongoing, Progressing   Pt continues to progress towards goals.

## 2020-09-08 NOTE — PLAN OF CARE
Problem: Occupational Therapy Goal  Goal: Occupational Therapy Goal  Description: Goals to be met by: discharge    Patient will increase functional independence with ADLs by performing:    UE Dressing with Stand-by Assistance.  LE Dressing with Stand-by Assistance.  Grooming while standing at sink with Stand-by Assistance.  Toileting from [facility] with Stand-by Assistance for hygiene and clothing management.   Toilet transfer to [facility] with Stand-by Assistance.    Outcome: Ongoing, Progressing   OT initial eval completed and treatment initiated. Full report to follow in chart.  REC: HHOT.  Continue with OT POC.

## 2020-09-08 NOTE — PLAN OF CARE
Problem: Occupational Therapy Goal  Goal: Occupational Therapy Goal  Description: Goals to be met by: discharge    Patient will increase functional independence with ADLs by performing:    UE Dressing with Stand-by Assistance.  LE Dressing with Stand-by Assistance.  Grooming while standing at sink with Stand-by Assistance.  Toileting from [facility] with Stand-by Assistance for hygiene and clothing management.   Toilet transfer to [facility] with Stand-by Assistance.    Outcome: Adequate for Care Transition   No goals met 2/2 discharged home with .

## 2020-09-08 NOTE — PLAN OF CARE
09/07/20 2039   Patient Assessment/Suction   Level of Consciousness (AVPU) alert   Respiratory Effort Normal;Unlabored   Expansion/Accessory Muscles/Retractions no use of accessory muscles   All Lung Fields Breath Sounds equal bilaterally;diminished   Rhythm/Pattern, Respiratory unlabored   Cough Frequency no cough   PRE-TX-O2   O2 Device (Oxygen Therapy) nasal cannula   Flow (L/min) 4   SpO2 (!) 94 %   Pulse Oximetry Type Continuous   $ Pulse Oximetry - Multiple Charge Pulse Oximetry - Multiple   Pulse 82   Resp 18   Aerosol Therapy   $ Aerosol Therapy Charges Aerosol Treatment   Daily Review of Necessity (SVN) completed   Respiratory Treatment Status (SVN) given   Treatment Route (SVN) mask   Patient Position (SVN) semi-Ahumada's   Post Treatment Assessment (SVN) breath sounds unchanged   Signs of Intolerance (SVN) none   Breath Sounds Post-Respiratory Treatment   Post-treatment Heart Rate (beats/min) 82   Post-treatment Resp Rate (breaths/min) 20   Incentive Spirometer   $ Incentive Spirometer Charges done with encouragement   Administration (IS) instruction provided, follow-up   Number of Repetitions (IS) 10   Level Incentive Spirometer (mL) 1250   Patient Tolerance (IS) good   Education   $ Education Bronchodilator;15 min   Respiratory Evaluation   $ Care Plan Tech Time 15 min   Evaluation For Re-Eval 5+ day

## 2020-09-08 NOTE — PT/OT/SLP PROGRESS
Physical Therapy Treatment    Patient Name:  Yadi Mccall   MRN:  [id number]    Recommendations:     Discharge Recommendations:  home health PT   Discharge Equipment Recommendations: none   Barriers to discharge: None    Assessment:     Yadi Mccall is a 65 y.o. female admitted with a medical diagnosis of Acute hypoxemic respiratory failure.  She presents with the following impairments/functional limitations:  weakness, impaired endurance, impaired self care skills, impaired functional mobilty, gait instability, impaired balance, decreased safety awareness, impaired coordination. Patient presents resting in bed with HOB elevated; agreeable to PT treatment. Patient eager to ambulate. Patient ambulated 3 separate trials requiring rest breaks between trials 2/2 SOB and fatigue. Patient ambulates on 3L O2 and sats noted to be 90-94% throughout treatment. PTA educated patient on importance of home safety and energy conservation as patient fatigues quickly. Patient also does require some verbal cueing to maintain sternal precautions. RN notified of patient's performance. Continue with PT and POC.    Rehab Prognosis: Good; patient would benefit from acute skilled PT services to address these deficits and reach maximum level of function.    Recent Surgery: * No surgery found *      Plan:     During this hospitalization, patient to be seen daily to address the identified rehab impairments via gait training, therapeutic activities, therapeutic exercises and progress toward the following goals:    · Plan of Care Expires:  10/05/20    Subjective     Chief Complaint: SOB with exertion  Patient/Family Comments/goals:   Pain/Comfort:  · Pain Rating 1: 0/10      Objective:     Communicated with RN prior to session.  Patient found HOB elevated with pulse ox (continuous), oxygen, telemetry upon PT entry to room.     General Precautions: Standard, fall, sternal   Orthopedic Precautions:N/A   Braces:       Functional Mobility:  · Bed  Mobility:     · Supine to Sit: stand by assistance  · Sit to Supine: stand by assistance  · Transfers:     · Sit to Stand:  minimum assistance with Rollator  · Gait: 25ft; 80ft; 30ft with Rollator and Min Assist; patient requires verbal cueing to widen turns       AM-PAC 6 CLICK MOBILITY          Therapeutic Activities and Exercises:   bed mobility; sitting EOB for trunk control and midline orientation; sit <> stands; transfer training; sternal precaution education; energy conservation education; home safety education    Patient left HOB elevated with all lines intact, call button in reach and RN notified..    GOALS:   Multidisciplinary Problems     Physical Therapy Goals        Problem: Physical Therapy Goal    Goal Priority Disciplines Outcome Goal Variances Interventions   Physical Therapy Goal     PT, PT/OT Ongoing, Progressing     Description: Goals to be met by: discharge      Patient will increase functional independence with mobility by performin. Supine to sit with Stand-by Assistance  2. Sit to stand transfer with Supervision  3. Bed to chair transfer with Supervision using Rolling Walker  4. Gait  x 75 feet with Supervision using Rolling Walker.                      Time Tracking:     PT Received On: 20  PT Start Time: 1418     PT Stop Time: 1433  PT Total Time (min): 15 min     Billable Minutes: Gait Training 15    Treatment Type: Treatment  PT/PTA: PTA     PTA Visit Number: 2     Dilma Hutson, PTA  2020

## 2020-09-08 NOTE — PT/OT/SLP DISCHARGE
Occupational Therapy Discharge Summary    Yadi Mccall  MRN: [id number]   Principal Problem: Acute hypoxemic respiratory failure      Patient Discharged from acute Occupational Therapy on 9/8/2020  Please refer to prior OT note dated 9/7/2020 for functional status.    Assessment:      Patient appropriate for care in another setting.    Objective:     GOALS:   Multidisciplinary Problems     Occupational Therapy Goals        Problem: Occupational Therapy Goal    Goal Priority Disciplines Outcome Interventions   Occupational Therapy Goal     OT, PT/OT Adequate for Care Transition    Description: Goals to be met by: discharge    Patient will increase functional independence with ADLs by performing:    UE Dressing with Stand-by Assistance.  LE Dressing with Stand-by Assistance.  Grooming while standing at sink with Stand-by Assistance.  Toileting from [facility] with Stand-by Assistance for hygiene and clothing management.   Toilet transfer to [facility] with Stand-by Assistance.                     Reasons for Discontinuation of Therapy Services  Transfer to alternate level of care.      Plan:     Patient Discharged to: Home with Home Health Service    Rachel Laws, OT  9/8/2020

## 2020-09-08 NOTE — HOSPITAL COURSE
65-year-old female with ongoing tobacco use who recently underwent 4 vessel CABG is admitted to our facility for respiratory failure after being discharged from outside facility yesterday.  Found to have moderate left-sided pleural effusion along with right hemidiaphragm elevation.      Acute hypoxemic respiratory failure likely secondary to multifactorial etiologies including bilateral pleural effusions in the recent postoperative setting, COPD exacerbation as well as right hemidiaphragm elevation.  Seen by pulmonology as well as Cardiology.  Her cardiac medications adjusted as outlined below.  She was managed for COPD exacerbation with scheduled nebulized breathing treatments and systemic steroids. Received diuretics for bilateral pleural effusions.  She had significant improvement in symptoms.  She continued to require supplemental oxygen (of note she was recently prescribed home oxygen at the time of discharge from outside facility).  Seen by PT/OT who recommended home health which has been arranged with assistance of case management.  Deemed medically stable to be discharged home.  I discussed medication changes with her sister Gunjan Mccall over the telephone who assisted patient with medications.  Discussed follow-up with both Cardiology as well as cardiothoracic surgery.    Instructions provided to follow up with primary care physician as outpatient. Patient verbalized understanding and is aware to contact primary care physician or return to ED if new or worsening symptoms.    Physical exam on the day of discharge:  General: Patient resting comfortably in no acute distress.  Lungs:  No tachypnea or accessory muscle use.  Faint bibasilar crackles.  Diminished breath sounds at the right lung base  Cor: Regular rate and rhythm. No murmurs. Trace pedal edema.  Abd: Soft. Nontender. Non-distended.  Neuro: A&O x3. Moving all 4 extremities equally  Ext: No clubbing. No cyanosis.

## 2020-09-08 NOTE — PLAN OF CARE
Important Message from Medicare was sign, explained and given to patient/caregiver on 09/08/2020 at 11:23am

## 2020-09-09 ENCOUNTER — TELEPHONE (OUTPATIENT)
Dept: VASCULAR SURGERY | Facility: CLINIC | Age: 65
End: 2020-09-09

## 2020-09-09 NOTE — TELEPHONE ENCOUNTER
----- Message from Sherie Iniguez sent at 9/9/2020  9:39 AM CDT -----  Regarding: Post-op appt  Gunjan Cal, sister of patient calling in regards to if her sister appt that is on Sept 17th can be push to a later time that day due to she will be the one bringing her sister to appt and she would have to leave work in [place] and  her sister who lives in [place]         Please advise Gunjan can be contact at [phone]

## 2020-09-09 NOTE — PROGRESS NOTES
[facility]  Pulmonology  Progress Note    Subjective     9/8/2020:  No major issues overnight.  Subjectively improved and back at baseline.  No new complaints and inquiring about discharge.    Review of Systems   Constitutional: Negative for fever, chills and fatigue.   Respiratory: Negative for cough, hemoptysis, sputum production, shortness of breath, orthopnea and pleurisy.    Cardiovascular: Negative for chest pain, palpitations and leg swelling.   Gastrointestinal: Negative for nausea, vomiting and abdominal pain.      I have personally reviewed the following during today's evaluation:  past medical history, ROS, family history, social history, surgical history, current inpatient medications,drug allergies, vital signs over the past 24 hours, results of relevant diagnostic studies and nursing/provider documentation from the past 24 hours.     Objective     VS Temp:  [96.8 °F (36 °C)-98.2 °F (36.8 °C)]   Pulse:  [70-85]   Resp:  [15-20]   BP: (102-112)/(54-59)   SpO2:  [88 %-96 %]   Ideal body weight: 63.9 kg (140 lb 14 oz)  Adjusted ideal body weight: 75 kg (165 lb 4.8 oz)   I/O   Intake/Output Summary (Last 24 hours) at 9/8/2020 1951  Last data filed at 9/8/2020 0200  Gross per 24 hour   Intake --   Output 1150 ml   Net -1150 ml        Vent SpO2 (!) 92% on 2L NC   PE Physical Exam   Constitutional: She is oriented to person, place, and time. She appears well-developed and well-nourished.  Non-toxic appearance. No distress.   HENT:   Head: Normocephalic and atraumatic.   Mouth/Throat: Uvula is midline, oropharynx is clear and moist and mucous membranes are normal. Mallampati Score: II.   Neck: Trachea normal and normal range of motion. Neck supple. No thyromegaly present.   Cardiovascular: Normal rate, regular rhythm, normal heart sounds and intact distal pulses.   Silver alginate dressing overlying a sternotomy   Pulmonary/Chest: Normal expansion, symmetric chest wall expansion and effort  normal. She has no wheezes. She has no rhonchi. She has no rales.   Abdominal: Soft. Bowel sounds are normal. She exhibits no distension. There is no abdominal tenderness.   Musculoskeletal: Normal range of motion.         General: No tenderness, deformity or edema.   Lymphadenopathy: No supraclavicular adenopathy is present.     She has no cervical adenopathy.     She has no axillary adenopathy.   Neurological: She is alert and oriented to person, place, and time. She has normal strength. No cranial nerve deficit or sensory deficit. GCS eye subscore is 4. GCS verbal subscore is 5. GCS motor subscore is 6.   Skin: Skin is warm, dry and intact. No rash noted.   Psychiatric: She has a normal mood and affect.   Nursing note and vitals reviewed.        Labs I have personally reviewed and interpreted all labs / diagnostic studies obtained over the past 24 hours, and relevant results are as follows:  Recent Labs   Lab 09/08/20  0515   WBC 15.51*   RBC 3.70*   HGB 10.7*   HCT 34.1*   *   MCV 92   MCH 28.9   MCHC 31.4*   *   K 4.4   CL 95   CO2 30*   BUN 24*   CREATININE 0.9   MG 2.5      Imaging I have personally reviewed and interpreted the following images and reviewed the associated Radiology report.  I have reviewed and interpreted all pertinent imaging results/findings within the past 24 hours.  CXR:   1. Mild interval improvement, with decreasing atelectasis or infiltrate and small pleural effusion on the left.   My impression:  Markedly improved pulmonary edema and resolved pleural effusion.  Persistently elevated right hemidiaphragm.     Micro I have personally reviewed and interpreted the available culture data.  Relevant results are as follows.  Blood Culture   Lab Results   Component Value Date    LABBLOO No Growth to date 09/03/2020    LABBLOO No Growth to date 09/03/2020    LABBLOO No Growth to date 09/03/2020    LABBLOO No Growth to date 09/03/2020    LABBLOO No Growth to date 09/03/2020   ,  Sputum Culture No results found for: GSRESP, RESPIRATORYC and Urine Culture  No results found for: LABURIN   Medications Scheduled      Continuous Infusions:      PRN           Assessment       Active Hospital Problems    Diagnosis    *Acute hypoxemic respiratory failure    Abnormal CXR    Personal history of tobacco use, presenting hazards to health    Marijuana use    Anemia    Thrombocytosis    Hyponatremia    Generalized anxiety disorder    Elevated diaphragm    History of CVA with residual deficit    Atrial flutter, paroxysmal     Recent diagnosis after CABG done on 08/24/2020      S/P CABG x 4     On 08/24/2020 by Dr. Gray      Coronary artery disease of native artery of native heart with stable angina pectoris    Chronic obstructive pulmonary disease with acute exacerbation    Hyperlipidemia      My Impression:  Improved cardiogenic pulmonary edmea.  Stable to transition to the outpatient setting.  Not an AEoCOPD.    Plan     · Stable to discharge on PO diuretics.  · No evidence of diaphragmatic paralysis but clearly elevated hemidiaphragm.  Nothing to do about it.  · Will need home oxygen.       Kevin Bowens MD  Pulmonary / Critical Care Medicine  [facility]

## 2020-09-17 ENCOUNTER — OFFICE VISIT (OUTPATIENT)
Dept: VASCULAR SURGERY | Facility: CLINIC | Age: 65
End: 2020-09-17
Payer: MEDICARE

## 2020-09-17 VITALS — HEIGHT: 68 IN | WEIGHT: 196.88 LBS | BODY MASS INDEX: 29.84 KG/M2

## 2020-09-17 DIAGNOSIS — Z95.1 S/P CABG (CORONARY ARTERY BYPASS GRAFT): Primary | ICD-10-CM

## 2020-09-17 PROCEDURE — 99024 POSTOP FOLLOW-UP VISIT: CPT | Mod: HCNC,S$GLB,, | Performed by: THORACIC SURGERY (CARDIOTHORACIC VASCULAR SURGERY)

## 2020-09-17 PROCEDURE — 99024 PR POST-OP FOLLOW-UP VISIT: ICD-10-PCS | Mod: HCNC,S$GLB,, | Performed by: THORACIC SURGERY (CARDIOTHORACIC VASCULAR SURGERY)

## 2020-09-17 PROCEDURE — 99999 PR PBB SHADOW E&M-EST. PATIENT-LVL IV: ICD-10-PCS | Mod: PBBFAC,HCNC,, | Performed by: THORACIC SURGERY (CARDIOTHORACIC VASCULAR SURGERY)

## 2020-09-17 PROCEDURE — 99999 PR PBB SHADOW E&M-EST. PATIENT-LVL IV: CPT | Mod: PBBFAC,HCNC,, | Performed by: THORACIC SURGERY (CARDIOTHORACIC VASCULAR SURGERY)

## 2020-09-17 NOTE — PROGRESS NOTES
The patient is a 65-year-old female who underwent coronary artery bypass grafting on 08/21/2019.  She was discharged home on 09/02/2000 20 but became short of breath at home and went to the emergency room at [facility] on 09/03.  He she was found to have a left pleural effusion.  She was treated with diuretics and a pleural effusion improved significantly.  She still requiring some oxygen.  We will get a chest x-ray.  She will follow up with her cardiologist and will probably need to see a pulmonologist also.

## 2020-09-21 ENCOUNTER — EXTERNAL HOME HEALTH (OUTPATIENT)
Dept: HOME HEALTH SERVICES | Facility: HOSPITAL | Age: 65
End: 2020-09-21

## 2020-09-22 ENCOUNTER — TELEPHONE (OUTPATIENT)
Dept: VASCULAR SURGERY | Facility: CLINIC | Age: 65
End: 2020-09-22

## 2020-09-22 NOTE — TELEPHONE ENCOUNTER
Calling to report 3 lb weight loss in 2 days, and lower extremity swelling, request orders advice re: lasix.  Informed Dr Gray currently in surgery and does not manage medications, will forward message to patient's cardiologist, Dr Woo, for further assistance. Voices understanding and is agreeable, states patient is scheduled to see Dr Woo on 10/5 for hospital follow up.

## 2020-09-22 NOTE — TELEPHONE ENCOUNTER
----- Message from Evelin Kam sent at 9/22/2020 12:22 PM CDT -----  Contact: [facility]states that she needs the office to call her regarding the pt....[phone]

## 2020-09-29 ENCOUNTER — PATIENT MESSAGE (OUTPATIENT)
Dept: OTHER | Facility: OTHER | Age: 65
End: 2020-09-29

## 2020-10-02 ENCOUNTER — PATIENT OUTREACH (OUTPATIENT)
Dept: ADMINISTRATIVE | Facility: OTHER | Age: 65
End: 2020-10-02

## 2020-10-02 DIAGNOSIS — Z12.31 BREAST CANCER SCREENING BY MAMMOGRAM: Primary | ICD-10-CM

## 2020-10-02 NOTE — PROGRESS NOTES
LINKS immunization registry updated  Care Everywhere updated  Health Maintenance updated  DIS/Chart reviewed for overdue Proactive [facility] Encounters (SHERIF) health maintenance testing (CRS, Breast Ca, Diabetic Eye Exam)   Orders entered: screening mammogram  Portal message sent to patient with scheduling link for mammogram 7/28/20

## 2020-10-05 ENCOUNTER — PATIENT MESSAGE (OUTPATIENT)
Dept: ADMINISTRATIVE | Facility: HOSPITAL | Age: 65
End: 2020-10-05

## 2020-10-05 ENCOUNTER — OFFICE VISIT (OUTPATIENT)
Dept: CARDIOLOGY | Facility: CLINIC | Age: 65
End: 2020-10-05
Payer: MEDICARE

## 2020-10-05 VITALS
SYSTOLIC BLOOD PRESSURE: 112 MMHG | BODY MASS INDEX: 30.17 KG/M2 | DIASTOLIC BLOOD PRESSURE: 62 MMHG | HEART RATE: 96 BPM | HEIGHT: 68 IN | WEIGHT: 199.06 LBS

## 2020-10-05 DIAGNOSIS — I25.10 CORONARY ARTERY DISEASE, ANGINA PRESENCE UNSPECIFIED, UNSPECIFIED VESSEL OR LESION TYPE, UNSPECIFIED WHETHER NATIVE OR TRANSPLANTED HEART: ICD-10-CM

## 2020-10-05 DIAGNOSIS — I48.91 ATRIAL FIBRILLATION/FLUTTER: ICD-10-CM

## 2020-10-05 DIAGNOSIS — I48.92 ATRIAL FIBRILLATION/FLUTTER: ICD-10-CM

## 2020-10-05 DIAGNOSIS — I25.10 CORONARY ARTERY DISEASE INVOLVING NATIVE CORONARY ARTERY OF NATIVE HEART WITHOUT ANGINA PECTORIS: ICD-10-CM

## 2020-10-05 DIAGNOSIS — I69.30 HISTORY OF CVA WITH RESIDUAL DEFICIT: Chronic | ICD-10-CM

## 2020-10-05 DIAGNOSIS — Z95.1 S/P CABG X 4: ICD-10-CM

## 2020-10-05 DIAGNOSIS — I10 ESSENTIAL HYPERTENSION: ICD-10-CM

## 2020-10-05 DIAGNOSIS — I25.118 CORONARY ARTERY DISEASE OF NATIVE ARTERY OF NATIVE HEART WITH STABLE ANGINA PECTORIS: ICD-10-CM

## 2020-10-05 DIAGNOSIS — Z95.1 S/P CABG X 4: Primary | ICD-10-CM

## 2020-10-05 PROCEDURE — 3008F PR BODY MASS INDEX (BMI) DOCUMENTED: ICD-10-PCS | Mod: HCNC,CPTII,S$GLB, | Performed by: INTERNAL MEDICINE

## 2020-10-05 PROCEDURE — 99214 OFFICE O/P EST MOD 30 MIN: CPT | Mod: HCNC,S$GLB,, | Performed by: INTERNAL MEDICINE

## 2020-10-05 PROCEDURE — 3074F SYST BP LT 130 MM HG: CPT | Mod: HCNC,CPTII,S$GLB, | Performed by: INTERNAL MEDICINE

## 2020-10-05 PROCEDURE — 3078F DIAST BP <80 MM HG: CPT | Mod: HCNC,CPTII,S$GLB, | Performed by: INTERNAL MEDICINE

## 2020-10-05 PROCEDURE — 3078F PR MOST RECENT DIASTOLIC BLOOD PRESSURE < 80 MM HG: ICD-10-PCS | Mod: HCNC,CPTII,S$GLB, | Performed by: INTERNAL MEDICINE

## 2020-10-05 PROCEDURE — 99999 PR PBB SHADOW E&M-EST. PATIENT-LVL III: CPT | Mod: PBBFAC,HCNC,, | Performed by: INTERNAL MEDICINE

## 2020-10-05 PROCEDURE — 99999 PR PBB SHADOW E&M-EST. PATIENT-LVL III: ICD-10-PCS | Mod: PBBFAC,HCNC,, | Performed by: INTERNAL MEDICINE

## 2020-10-05 PROCEDURE — 3074F PR MOST RECENT SYSTOLIC BLOOD PRESSURE < 130 MM HG: ICD-10-PCS | Mod: HCNC,CPTII,S$GLB, | Performed by: INTERNAL MEDICINE

## 2020-10-05 PROCEDURE — 99499 UNLISTED E&M SERVICE: CPT | Mod: S$GLB,,, | Performed by: INTERNAL MEDICINE

## 2020-10-05 PROCEDURE — 99214 PR OFFICE/OUTPT VISIT, EST, LEVL IV, 30-39 MIN: ICD-10-PCS | Mod: HCNC,S$GLB,, | Performed by: INTERNAL MEDICINE

## 2020-10-05 PROCEDURE — 99499 RISK ADDL DX/OHS AUDIT: ICD-10-PCS | Mod: S$GLB,,, | Performed by: INTERNAL MEDICINE

## 2020-10-05 PROCEDURE — 3008F BODY MASS INDEX DOCD: CPT | Mod: HCNC,CPTII,S$GLB, | Performed by: INTERNAL MEDICINE

## 2020-10-05 RX ORDER — AMIODARONE HYDROCHLORIDE 200 MG/1
200 TABLET ORAL DAILY
Qty: 90 TABLET | Refills: 3 | Status: SHIPPED | OUTPATIENT
Start: 2020-10-05 | End: 2022-02-04

## 2020-10-05 RX ORDER — SPIRONOLACTONE 25 MG/1
25 TABLET ORAL DAILY
Qty: 90 TABLET | Refills: 3 | Status: SHIPPED | OUTPATIENT
Start: 2020-10-05 | End: 2021-01-29

## 2020-10-05 RX ORDER — FUROSEMIDE 40 MG/1
40 TABLET ORAL DAILY
Qty: 90 TABLET | Refills: 3 | Status: SHIPPED | OUTPATIENT
Start: 2020-10-05 | End: 2021-01-29

## 2020-10-05 RX ORDER — METOPROLOL SUCCINATE 25 MG/1
25 TABLET, EXTENDED RELEASE ORAL DAILY
Qty: 90 TABLET | Refills: 3 | Status: SHIPPED | OUTPATIENT
Start: 2020-10-05 | End: 2022-04-19

## 2020-10-05 NOTE — PROGRESS NOTES
Subjective:    Patient ID:  Yadi Mccall is a 65 y.o. female who presents for follow-up of Coronary Artery Disease (follow up after CABG X4), Hyperlipidemia, and Hypertension      Pt here for hospital f/u. She was seen in for chest pain and SPECT stress was positive. Cath showed 3 V CAD and she had 4V CABG in mid August. Coronary artery bypass graft x4 using left internal mammary artery to left anterior descending artery, right internal mammary artery as a free graft to the obtuse marginal branch, saphenous vein graft to the diagonal branch, and saphenous vein graft to the posterolateral branch. She had an episode of post-op A-fib/flutter and was started on eliquis and amiodarone. She had a readmission after discharge to [facility] with COPD and CHF with volume overload. Presently she is on home O2 and slowly improving.       Review of Systems   Constitution: Negative for weight gain and weight loss.   HENT: Negative.    Eyes: Negative.    Cardiovascular: Positive for dyspnea on exertion. Negative for chest pain, claudication, cyanosis, irregular heartbeat, leg swelling, near-syncope, orthopnea (no PND), palpitations and syncope.   Respiratory: Positive for shortness of breath. Negative for cough, hemoptysis and snoring.    Endocrine: Negative.    Skin: Negative.    Musculoskeletal: Negative for joint pain, muscle cramps, muscle weakness and myalgias.   Gastrointestinal: Negative for diarrhea, hematemesis, nausea and vomiting.   Genitourinary: Negative.    Neurological: Negative for dizziness, focal weakness, light-headedness, loss of balance, numbness, paresthesias and seizures.   Psychiatric/Behavioral: Negative.         Objective:    Physical Exam   Constitutional: She is oriented to person, place, and time. She appears well-developed and well-nourished.   Eyes: Pupils are equal, round, and reactive to light.   Neck: Normal range of motion. No thyromegaly present.   Cardiovascular: Normal rate, regular rhythm, S1  normal, S2 normal, normal heart sounds, intact distal pulses and normal pulses.  No extrasystoles are present. PMI is not displaced. Exam reveals no friction rub.   No murmur heard.  Sternotomy well healed   Pulmonary/Chest: Effort normal. She has decreased breath sounds in the right lower field. She has no wheezes. She has no rales. She exhibits no tenderness.   Abdominal: Soft. Bowel sounds are normal. She exhibits no distension and no mass. There is no abdominal tenderness.   Musculoskeletal: Normal range of motion.         General: No edema.   Neurological: She is alert and oriented to person, place, and time.   Skin: Skin is warm and dry.   Vitals reviewed.      Test(s) Reviewed  I have reviewed the following in detail:  [x] Stress test   [x] Angiography   [x] Echocardiogram   [] Labs   [x] Other: CXR;  Hospital records       Assessment:       1. Coronary artery disease involving native coronary artery of native heart without angina pectoris    2. S/P CABG x 4 - LIMA to LAD; TWAN to OM; SVG's to diag and PDA    3. Essential hypertension    4. Atrial fibrillation/flutter    5. History of CVA with residual deficit         Plan:       Pt slowly improving  Cardiac status stable  Will hold lisinopril at this time due to low BP  Continue other meds  We discussed her surgery, meds and prognosis  All questions answered  meds refilled  Enroll in cardiac rehab in [place]  F/u 2 months

## 2020-10-08 ENCOUNTER — DOCUMENT SCAN (OUTPATIENT)
Dept: HOME HEALTH SERVICES | Facility: HOSPITAL | Age: 65
End: 2020-10-08

## 2020-11-03 ENCOUNTER — CLINICAL SUPPORT (OUTPATIENT)
Dept: CARDIAC REHAB | Facility: HOSPITAL | Age: 65
End: 2020-11-03
Payer: MEDICARE

## 2020-11-03 DIAGNOSIS — Z95.1 S/P CABG (CORONARY ARTERY BYPASS GRAFT): ICD-10-CM

## 2020-11-03 PROCEDURE — 93797 PHYS/QHP OP CAR RHAB WO ECG: CPT

## 2020-11-05 ENCOUNTER — TELEPHONE (OUTPATIENT)
Dept: FAMILY MEDICINE | Facility: CLINIC | Age: 65
End: 2020-11-05

## 2020-11-05 DIAGNOSIS — R09.02 HYPOXEMIA: Primary | ICD-10-CM

## 2020-11-05 NOTE — TELEPHONE ENCOUNTER
You were the last one to order oxygen for patient.She is requesting a prescription for oxygen for 6 months or a year.Was sent home from hospital on oxygen.

## 2020-11-05 NOTE — TELEPHONE ENCOUNTER
----- Message from Jose Zamarripa sent at 11/4/2020  4:13 PM CST -----  Contact: pt at [phone]  Type: Needs Medical Advice  Who Called:  pt  Best Call Back Number: [phone]  Additional Information: pt is calling asking for you to call in a prescription for oxygen to [facility] for 6 months to a year. Please call back and advise

## 2020-11-09 ENCOUNTER — CLINICAL SUPPORT (OUTPATIENT)
Dept: CARDIAC REHAB | Facility: HOSPITAL | Age: 65
End: 2020-11-09
Attending: PATHOLOGY
Payer: MEDICARE

## 2020-11-09 ENCOUNTER — PATIENT OUTREACH (OUTPATIENT)
Dept: ADMINISTRATIVE | Facility: HOSPITAL | Age: 65
End: 2020-11-09

## 2020-11-09 DIAGNOSIS — Z95.1 S/P CABG (CORONARY ARTERY BYPASS GRAFT): Primary | ICD-10-CM

## 2020-11-09 PROCEDURE — 93798 PHYS/QHP OP CAR RHAB W/ECG: CPT

## 2020-11-09 NOTE — PROGRESS NOTES
"  PATIENT CONTACTED ABOUT OVER DUE COLONOSCOPY AND MAMMOGRAM.  PATIENT STATED " I DON'T DO THOSE".  "

## 2020-11-11 ENCOUNTER — CLINICAL SUPPORT (OUTPATIENT)
Dept: CARDIAC REHAB | Facility: HOSPITAL | Age: 65
End: 2020-11-11
Attending: PATHOLOGY
Payer: MEDICARE

## 2020-11-11 DIAGNOSIS — Z95.1 S/P CABG X 4: ICD-10-CM

## 2020-11-11 PROCEDURE — 93798 PHYS/QHP OP CAR RHAB W/ECG: CPT

## 2020-11-13 ENCOUNTER — CLINICAL SUPPORT (OUTPATIENT)
Dept: CARDIAC REHAB | Facility: HOSPITAL | Age: 65
End: 2020-11-13
Attending: PATHOLOGY
Payer: MEDICARE

## 2020-11-13 ENCOUNTER — TELEPHONE (OUTPATIENT)
Dept: FAMILY MEDICINE | Facility: CLINIC | Age: 65
End: 2020-11-13

## 2020-11-13 DIAGNOSIS — Z95.1 S/P CABG X 4: ICD-10-CM

## 2020-11-13 PROCEDURE — 93798 PHYS/QHP OP CAR RHAB W/ECG: CPT

## 2020-11-13 NOTE — TELEPHONE ENCOUNTER
----- Message from Mony Veras sent at 11/13/2020  4:22 PM CST -----  Contact: karis  Type: Needs Medical Advice    Who Called:  [facility]  Best Call Back Number: [phone]  Additional Information: Requesting a call back regarding the orders that was sent over   Please Advise ---Thank you

## 2020-11-16 ENCOUNTER — CLINICAL SUPPORT (OUTPATIENT)
Dept: CARDIAC REHAB | Facility: HOSPITAL | Age: 65
End: 2020-11-16
Attending: PATHOLOGY
Payer: MEDICARE

## 2020-11-16 ENCOUNTER — TELEPHONE (OUTPATIENT)
Dept: FAMILY MEDICINE | Facility: CLINIC | Age: 65
End: 2020-11-16

## 2020-11-16 DIAGNOSIS — Z95.1 S/P CABG X 4: ICD-10-CM

## 2020-11-16 PROCEDURE — 93798 PHYS/QHP OP CAR RHAB W/ECG: CPT

## 2020-11-16 NOTE — TELEPHONE ENCOUNTER
----- Message from Katerine Griffin sent at 11/16/2020  8:38 AM CST -----  Contact: Karis/ [facility]  Type: Needs Medical Advice  Who Called:  Karis  Symptoms (please be specific):  na  How long has patient had these symptoms:  na  Pharm[facility] and phone #:  [facility]  Best Call Back Number: [phone]  Additional Information: Karis states orders from Dr. Sweeney that office received has questions for the nurse.  Please call to advise.  Thanks!

## 2020-11-16 NOTE — TELEPHONE ENCOUNTER
Karis said notes from the hospital are too old to use for order of oxygen.I told her I would [contact patient and make her an appointment.

## 2020-11-18 ENCOUNTER — CLINICAL SUPPORT (OUTPATIENT)
Dept: CARDIAC REHAB | Facility: HOSPITAL | Age: 65
End: 2020-11-18
Attending: PATHOLOGY
Payer: MEDICARE

## 2020-11-18 DIAGNOSIS — Z95.1 S/P CABG X 4: ICD-10-CM

## 2020-11-18 PROCEDURE — 93798 PHYS/QHP OP CAR RHAB W/ECG: CPT

## 2020-11-20 ENCOUNTER — CLINICAL SUPPORT (OUTPATIENT)
Dept: CARDIAC REHAB | Facility: HOSPITAL | Age: 65
End: 2020-11-20
Attending: PATHOLOGY
Payer: MEDICARE

## 2020-11-20 DIAGNOSIS — Z95.1 S/P CABG X 4: ICD-10-CM

## 2020-11-20 PROCEDURE — 93798 PHYS/QHP OP CAR RHAB W/ECG: CPT

## 2020-11-23 ENCOUNTER — CLINICAL SUPPORT (OUTPATIENT)
Dept: CARDIAC REHAB | Facility: HOSPITAL | Age: 65
End: 2020-11-23
Attending: PATHOLOGY
Payer: MEDICARE

## 2020-11-23 DIAGNOSIS — Z95.1 S/P CABG X 4: ICD-10-CM

## 2020-11-23 PROCEDURE — 93798 PHYS/QHP OP CAR RHAB W/ECG: CPT

## 2020-11-30 ENCOUNTER — CLINICAL SUPPORT (OUTPATIENT)
Dept: CARDIAC REHAB | Facility: HOSPITAL | Age: 65
End: 2020-11-30
Attending: PATHOLOGY
Payer: MEDICARE

## 2020-11-30 DIAGNOSIS — Z95.1 S/P CABG X 4: ICD-10-CM

## 2020-11-30 PROCEDURE — 93798 PHYS/QHP OP CAR RHAB W/ECG: CPT

## 2020-12-02 ENCOUNTER — TELEPHONE (OUTPATIENT)
Dept: FAMILY MEDICINE | Facility: CLINIC | Age: 65
End: 2020-12-02

## 2020-12-02 ENCOUNTER — CLINICAL SUPPORT (OUTPATIENT)
Dept: CARDIAC REHAB | Facility: HOSPITAL | Age: 65
End: 2020-12-02
Attending: PATHOLOGY
Payer: MEDICARE

## 2020-12-02 ENCOUNTER — PATIENT MESSAGE (OUTPATIENT)
Dept: ADMINISTRATIVE | Facility: HOSPITAL | Age: 65
End: 2020-12-02

## 2020-12-02 DIAGNOSIS — Z95.1 S/P CABG X 4: ICD-10-CM

## 2020-12-02 PROCEDURE — 93798 PHYS/QHP OP CAR RHAB W/ECG: CPT

## 2020-12-02 NOTE — TELEPHONE ENCOUNTER
----- Message from Adele Haddad sent at 12/2/2020 10:11 AM CST -----  Patient is requesting an appt for check up and medication refills.  She has to come at 4:20 on a Tues or Thurs.  Please call her at [phone].  Thank you!

## 2020-12-04 ENCOUNTER — TELEPHONE (OUTPATIENT)
Dept: FAMILY MEDICINE | Facility: CLINIC | Age: 65
End: 2020-12-04

## 2020-12-04 ENCOUNTER — CLINICAL SUPPORT (OUTPATIENT)
Dept: CARDIAC REHAB | Facility: HOSPITAL | Age: 65
End: 2020-12-04
Attending: PATHOLOGY
Payer: MEDICARE

## 2020-12-04 DIAGNOSIS — Z95.1 S/P CABG (CORONARY ARTERY BYPASS GRAFT): ICD-10-CM

## 2020-12-04 PROCEDURE — 93798 PHYS/QHP OP CAR RHAB W/ECG: CPT

## 2020-12-05 NOTE — TELEPHONE ENCOUNTER
Patient's only hemoglobin A1c is 5.8.  She has hyperglycemia but not diabetes.  The orthopedic surgeon diagnosed her as having very mild diabetes but I disagree with that diagnosis.

## 2020-12-05 NOTE — TELEPHONE ENCOUNTER
----- Message from Nguyen Mitchell LPN sent at 12/4/2020  2:52 PM CST -----  Regarding: Hemoglobin A1c  Good Afternoon Roxanna Stark has this pt on their non-complaint DM report. I do not see this diagnosis in her chart, but she did have an abnormal A1c last year. The dx must have been dropped somewhere. Roxanna is requiring a A1c before the end of the year. She is coming in to see you on 12/15. If you agree, can you please order the Ha1c and have her do it while in the clinic? Please advise. Thank you!    Nguyen Mitchell LPN  Clinical Care Coordinator  [address] [address]  P: [phone]  F: [phone]

## 2020-12-07 ENCOUNTER — CLINICAL SUPPORT (OUTPATIENT)
Dept: CARDIAC REHAB | Facility: HOSPITAL | Age: 65
End: 2020-12-07
Attending: PATHOLOGY
Payer: MEDICARE

## 2020-12-07 ENCOUNTER — PATIENT OUTREACH (OUTPATIENT)
Dept: ADMINISTRATIVE | Facility: OTHER | Age: 65
End: 2020-12-07

## 2020-12-07 ENCOUNTER — PATIENT OUTREACH (OUTPATIENT)
Dept: ADMINISTRATIVE | Facility: HOSPITAL | Age: 65
End: 2020-12-07

## 2020-12-07 DIAGNOSIS — Z95.1 S/P CABG X 4: ICD-10-CM

## 2020-12-07 PROCEDURE — 93798 PHYS/QHP OP CAR RHAB W/ECG: CPT

## 2020-12-07 NOTE — PROGRESS NOTES
LINKS immunization registry not responding  Care Everywhere updated  Health Maintenance updated  DIS/Chart reviewed for overdue Proactive [facility] Encounters (SHERIF) health maintenance testing (CRS, Breast Ca, Diabetic Eye Exam)   Orders entered:N/A  Portal message sent to patient with scheduling link for mammogram 7/28/20

## 2020-12-09 ENCOUNTER — CLINICAL SUPPORT (OUTPATIENT)
Dept: CARDIAC REHAB | Facility: HOSPITAL | Age: 65
End: 2020-12-09
Attending: PATHOLOGY
Payer: MEDICARE

## 2020-12-09 DIAGNOSIS — Z95.1 S/P CABG X 4: ICD-10-CM

## 2020-12-09 PROCEDURE — 93798 PHYS/QHP OP CAR RHAB W/ECG: CPT

## 2020-12-11 ENCOUNTER — PATIENT MESSAGE (OUTPATIENT)
Dept: OTHER | Facility: OTHER | Age: 65
End: 2020-12-11

## 2020-12-11 ENCOUNTER — CLINICAL SUPPORT (OUTPATIENT)
Dept: CARDIAC REHAB | Facility: HOSPITAL | Age: 65
End: 2020-12-11
Attending: PATHOLOGY
Payer: MEDICARE

## 2020-12-11 DIAGNOSIS — Z95.1 S/P CABG X 4: ICD-10-CM

## 2020-12-11 PROCEDURE — 93798 PHYS/QHP OP CAR RHAB W/ECG: CPT

## 2020-12-14 ENCOUNTER — CLINICAL SUPPORT (OUTPATIENT)
Dept: CARDIAC REHAB | Facility: HOSPITAL | Age: 65
End: 2020-12-14
Attending: PATHOLOGY
Payer: MEDICARE

## 2020-12-14 DIAGNOSIS — Z95.1 S/P CABG X 4: ICD-10-CM

## 2020-12-14 PROCEDURE — 93798 PHYS/QHP OP CAR RHAB W/ECG: CPT

## 2020-12-15 ENCOUNTER — OFFICE VISIT (OUTPATIENT)
Dept: FAMILY MEDICINE | Facility: CLINIC | Age: 65
End: 2020-12-15
Payer: MEDICARE

## 2020-12-15 VITALS
HEIGHT: 68 IN | TEMPERATURE: 97 F | HEART RATE: 79 BPM | OXYGEN SATURATION: 87 % | SYSTOLIC BLOOD PRESSURE: 108 MMHG | RESPIRATION RATE: 20 BRPM | WEIGHT: 195.31 LBS | DIASTOLIC BLOOD PRESSURE: 62 MMHG | BODY MASS INDEX: 29.6 KG/M2

## 2020-12-15 DIAGNOSIS — G47.01 INSOMNIA DUE TO MEDICAL CONDITION: Primary | ICD-10-CM

## 2020-12-15 DIAGNOSIS — M21.372 LEFT FOOT DROP: ICD-10-CM

## 2020-12-15 DIAGNOSIS — J44.1 COPD EXACERBATION: ICD-10-CM

## 2020-12-15 DIAGNOSIS — R09.02 HYPOXIA: ICD-10-CM

## 2020-12-15 PROCEDURE — 99214 OFFICE O/P EST MOD 30 MIN: CPT | Mod: S$GLB,,, | Performed by: INTERNAL MEDICINE

## 2020-12-15 PROCEDURE — 1126F PR PAIN SEVERITY QUANTIFIED, NO PAIN PRESENT: ICD-10-PCS | Mod: S$GLB,,, | Performed by: INTERNAL MEDICINE

## 2020-12-15 PROCEDURE — 3078F PR MOST RECENT DIASTOLIC BLOOD PRESSURE < 80 MM HG: ICD-10-PCS | Mod: CPTII,S$GLB,, | Performed by: INTERNAL MEDICINE

## 2020-12-15 PROCEDURE — 3074F SYST BP LT 130 MM HG: CPT | Mod: CPTII,S$GLB,, | Performed by: INTERNAL MEDICINE

## 2020-12-15 PROCEDURE — 99214 PR OFFICE/OUTPT VISIT, EST, LEVL IV, 30-39 MIN: ICD-10-PCS | Mod: S$GLB,,, | Performed by: INTERNAL MEDICINE

## 2020-12-15 PROCEDURE — 3008F PR BODY MASS INDEX (BMI) DOCUMENTED: ICD-10-PCS | Mod: CPTII,S$GLB,, | Performed by: INTERNAL MEDICINE

## 2020-12-15 PROCEDURE — 1126F AMNT PAIN NOTED NONE PRSNT: CPT | Mod: S$GLB,,, | Performed by: INTERNAL MEDICINE

## 2020-12-15 PROCEDURE — 3008F BODY MASS INDEX DOCD: CPT | Mod: CPTII,S$GLB,, | Performed by: INTERNAL MEDICINE

## 2020-12-15 PROCEDURE — 3078F DIAST BP <80 MM HG: CPT | Mod: CPTII,S$GLB,, | Performed by: INTERNAL MEDICINE

## 2020-12-15 PROCEDURE — 3074F PR MOST RECENT SYSTOLIC BLOOD PRESSURE < 130 MM HG: ICD-10-PCS | Mod: CPTII,S$GLB,, | Performed by: INTERNAL MEDICINE

## 2020-12-15 RX ORDER — ALBUTEROL SULFATE 90 UG/1
2 AEROSOL, METERED RESPIRATORY (INHALATION) EVERY 6 HOURS PRN
Qty: 18 G | Refills: 3 | Status: SHIPPED | OUTPATIENT
Start: 2020-12-15 | End: 2021-03-09

## 2020-12-15 RX ORDER — DOXEPIN HYDROCHLORIDE 10 MG/1
10 CAPSULE ORAL NIGHTLY
Qty: 30 CAPSULE | Refills: 3 | Status: SHIPPED | OUTPATIENT
Start: 2020-12-15 | End: 2021-01-29

## 2020-12-15 NOTE — PATIENT INSTRUCTIONS
Let me know the name of the brace that you need and I will order it.  Call [facility] [phone]        Avoid  alcohol  Low-Salt Diet  This diet removes foods that are high in salt. It also limits the amount of salt you use when cooking. It is most often used for people with high blood pressure, edema (fluid retention), and kidney, liver, or heart disease.  Table salt contains the mineral sodium. Your body needs sodium to work normally. But too much sodium can make your health problems worse. Your healthcare provider is recommending a low-salt (also called low-sodium) diet for you. Your total daily allowance of salt is 1,500 to 2,300 milligrams (mg). It is less than 1 teaspoon of table salt. This means you can have only about 500 to 700 mg of sodium at each meal. People with certain health problems should limit salt intake to the lower end of the recommended range.    When you cook, don´t add much salt. If you can cook without using salt, even better. Don´t add salt to your food at the table.  When shopping, read food labels. Salt is often called sodium on the label. Choose foods that are salt-free, low salt, or very low salt. Note that foods with reduced salt may not lower your salt intake enough.    Beans, potatoes, and pasta  Ok: Dry beans, split peas, lentils, potatoes, rice, macaroni, pasta, spaghetti without added salt  Avoid: Potato chips, tortilla chips, and similar products  Breads and cereals  Ok: Low-sodium breads, rolls, cereals, and cakes; low-salt crackers, matzo crackers  Avoid: Salted crackers, pretzels, popcorn, [place] toast, pancakes, muffins  Dairy  Ok: Milk, chocolate milk, hot chocolate mix, low-salt cheeses, and yogurt  Avoid: Processed cheese and cheese spreads; Roquefort, Camembert, and cottage cheese; buttermilk, instant breakfast drink  Desserts  Ok: Ice cream, frozen yogurt, juice bars, gelatin, cookies and pies, sugar, honey, jelly, hard candy  Avoid: Most pies, cakes and cookies  prepared or processed with salt; instant pudding  Drinks  Ok: Tea, coffee, fizzy (carbonated) drinks, juices  Avoid: Flavored coffees, electrolyte replacement drinks, sports drinks  Meats  Ok: All fresh meat, fish, poultry, low-salt tuna, eggs, egg substitute  Avoid: Smoked, pickled, brine-cured, or salted meats and fish. This includes hughes, chipped beef, corned beef, hot dogs, deli meats, ham, kosher meats, salt pork, sausage, canned tuna, salted codfish, smoked salmon, herring, sardines, or anchovies.  Seasonings and spices  Ok: Most seasonings are okay. Good substitutes for salt include: fresh herb blends, hot sauce, lemon, garlic, ayala, vinegar, dry mustard, parsley, cilantro, horseradish, tomato paste, regular margarine, mayonnaise, unsalted butter, cream cheese, vegetable oil, cream, low-salt salad dressing and gravy.  Avoid: Regular ketchup, relishes, pickles, soy sauce, teriyaki sauce, Worcestershire sauce, BBQ sauce, tartar sauce, meat tenderizer, chili sauce, regular gravy, regular salad dressing, salted butter  Soups  Ok: Low-salt soups and broths made with allowed foods  Avoid: Bouillon cubes, soups with smoked or salted meats, regular soup and broth  Vegetables  Ok: Most vegetables are okay; also low-salt tomato and vegetable juices  Avoid: Sauerkraut and other brine-soaked vegetables; pickles and other pickled vegetables; tomato juice, olives  © 4324-0212 [employer]. [address] [place] [address], [place] [address]. All rights reserved. This information is not intended as a substitute for professional medical care. Always follow your healthcare professional's instructions.      If you have labs scheduled at [facility] you need to make an appointment. This is a measure to protect you from covid 19.      Thank you for choosing [facility].     Please fill out the patient experience survey.

## 2020-12-15 NOTE — PROGRESS NOTES
"Subjective:      4:42 PM     Patient ID: Yadi Mccall is a 65 y.o. female.    Chief Complaint: Insomnia (refills,discuss getting oxygen)    HPI     CHIEF COMPLAINT: insomnia .  HPI:  She has had insomnia since she had her bypass surgery on 08/21/2020.  She uses oxygen at night.  She admits to snoring.  She is not tired initially when she wakes up but often wakes up with a headache.  Later in the day she can fall asleep watching television.    ONSET/TIMING: Onset    4 months     ago. Sudden: no .  Similar problems in past: no. ..    DURATION:  Intermittent    QUALITY/COURSE: .unchanged.     Can't fall asleep:  No. . Wakes up early: yes.      INTENSITY/SEVERITY:  Severity 7 the command to preop test rosina no symptoms in persist or the mass the the through the the wounds in the    (on a 1-10 scale).      MODIFIERS/TREATMENTS: . Taking medications: yes.  . Effective: yes.  SYMPTOMS/RELATED: . Possible medication side effects include:     .     The following symptoms/statements  are positive if BOLD, negative otherwise.      CONTEXT/WHEN:  .Nocturnal. . Napping.  shift work . Time_zone_changes.     REVIEW OF SYSTEMS :  .   nocturia . Restless_legs . pain . Obstructive_sleep_apnea  . depression . anxiety . Substance_abuse . Copd . Grief .           The patient has a footdrop of the left foot and has a brace that does not fit in her shoe.  She had 1 before that did and she wants that type a brace.  .             Review of Systems      Objective:      Vitals:    12/15/20 1630   BP: 108/62   Pulse: 79   Resp: 20   Temp: 97.1 °F (36.2 °C)   TempSrc: Oral   SpO2: (!) 87%   Weight: 88.6 kg (195 lb 5.2 oz)   Height: 5' 8" (1.727 m)   PainSc: 0-No pain    The patient is obviously orthopnea with respiratory rate 28 when she lays flat.  Physical Exam  Vitals signs and nursing note reviewed.   Constitutional:       Appearance: She is well-developed.   Cardiovascular:      Rate and Rhythm: Normal rate and regular rhythm.      Heart " sounds: Normal heart sounds.   Pulmonary:      Effort: Pulmonary effort is normal.      Breath sounds: Normal breath sounds.   Abdominal:      Palpations: Abdomen is soft.      Tenderness: There is no abdominal tenderness.   Musculoskeletal:      Right lower leg: Edema present.      Left lower leg: Edema present.      Comments: 3+ edema bilaterally   Neurological:      Mental Status: She is alert.   Psychiatric:         Behavior: Behavior normal.         Thought Content: Thought content normal.       No results found for this or any previous visit (from the past 1008 hour(s)).       Assessment:       1. Insomnia due to medical condition    2. Hypoxia    3. Left foot drop    4. COPD exacerbation          Plan:       Insomnia due to medical condition  -     doxepin (SINEQUAN) 10 MG capsule; Take 1 capsule (10 mg total) by mouth every evening.  Dispense: 30 capsule; Refill: 3    Hypoxia  -     OXYGEN FOR HOME USE    Left foot drop    COPD exacerbation  -     albuterol (PROVENTIL/VENTOLIN HFA) 90 mcg/actuation inhaler; Inhale 2 puffs into the lungs every 6 (six) hours as needed for Wheezing.  Dispense: 18 g; Refill: 3      Follow up in about 6 weeks (around 1/26/2021).

## 2020-12-16 ENCOUNTER — CLINICAL SUPPORT (OUTPATIENT)
Dept: CARDIAC REHAB | Facility: HOSPITAL | Age: 65
End: 2020-12-16
Attending: PATHOLOGY
Payer: MEDICARE

## 2020-12-16 DIAGNOSIS — Z95.1 S/P CABG X 4: ICD-10-CM

## 2020-12-16 PROCEDURE — 93798 PHYS/QHP OP CAR RHAB W/ECG: CPT

## 2020-12-18 ENCOUNTER — TELEPHONE (OUTPATIENT)
Dept: FAMILY MEDICINE | Facility: CLINIC | Age: 65
End: 2020-12-18

## 2020-12-18 ENCOUNTER — CLINICAL SUPPORT (OUTPATIENT)
Dept: CARDIAC REHAB | Facility: HOSPITAL | Age: 65
End: 2020-12-18
Attending: PATHOLOGY
Payer: MEDICARE

## 2020-12-18 DIAGNOSIS — M21.379 FOOT-DROP, UNSPECIFIED LATERALITY: Primary | ICD-10-CM

## 2020-12-18 DIAGNOSIS — Z95.1 S/P CABG X 4: ICD-10-CM

## 2020-12-18 PROCEDURE — 93798 PHYS/QHP OP CAR RHAB W/ECG: CPT

## 2020-12-18 NOTE — TELEPHONE ENCOUNTER
----- Message from Stephanie Monaco sent at 12/17/2020  2:40 PM CST -----  Type: Needs Medical Advice  Who Called:  Patient  Best Call Back Number: [phone] (home)   Additional Information:the pt needs a nurse to call her back in regards to the who was the brace comp the Dr referred her to she has lost the paper please call her back

## 2020-12-18 NOTE — TELEPHONE ENCOUNTER
Pt called in regards to wanting to know what company the provider recommended for her brace. Will forward message to provider for further assistance.

## 2020-12-21 ENCOUNTER — CLINICAL SUPPORT (OUTPATIENT)
Dept: CARDIAC REHAB | Facility: HOSPITAL | Age: 65
End: 2020-12-21
Attending: PATHOLOGY
Payer: MEDICARE

## 2020-12-21 DIAGNOSIS — Z95.1 S/P CABG X 4: ICD-10-CM

## 2020-12-21 PROCEDURE — 93798 PHYS/QHP OP CAR RHAB W/ECG: CPT

## 2020-12-22 ENCOUNTER — TELEPHONE (OUTPATIENT)
Dept: FAMILY MEDICINE | Facility: CLINIC | Age: 65
End: 2020-12-22

## 2020-12-22 NOTE — TELEPHONE ENCOUNTER
----- Message from Vianca Raines sent at 12/22/2020 10:11 AM CST -----  Contact: [employer]  Type: Needs Advice    Who Called: [facility]/"[employer]  Best Call Back Number: [phone]  Additional Info-the RX Afo for home use is not carried by pharmacy.  Please call pharmacy with any questions.  Please Advise-Thank you~

## 2020-12-28 ENCOUNTER — TELEPHONE (OUTPATIENT)
Dept: FAMILY MEDICINE | Facility: CLINIC | Age: 65
End: 2020-12-28

## 2020-12-28 ENCOUNTER — CLINICAL SUPPORT (OUTPATIENT)
Dept: CARDIAC REHAB | Facility: HOSPITAL | Age: 65
End: 2020-12-28
Attending: PATHOLOGY
Payer: MEDICARE

## 2020-12-28 DIAGNOSIS — Z95.1 S/P CABG X 4: ICD-10-CM

## 2020-12-28 PROCEDURE — 93798 PHYS/QHP OP CAR RHAB W/ECG: CPT

## 2020-12-28 NOTE — TELEPHONE ENCOUNTER
----- Message from Rhonda Wei sent at 12/28/2020  1:44 PM CST -----  Regarding: advice  Contact: Patient/[phone]  Type: Needs Medical Advice  Who Called:  Patient/[phone]      Additional Information: States that the doctor ordered a AFO brace from [employer] [facility] [facility] who has been trying to get in touch with the office but to no avail. The office will not call them back. They do not make this brace so the office needs to order it from somewhere else. Please call patient to let her know who the office is ordering it from.

## 2020-12-29 NOTE — TELEPHONE ENCOUNTER
Spoke with patient on the phone and told her that we faxed the order to  [facility] on 12/22/2020 and that we also refaxed it yesterday 12/28/2020. I let her know to contact us if they did not receive or if she has any further questions or concerns.   Pt verbalized understanding.

## 2020-12-30 ENCOUNTER — CLINICAL SUPPORT (OUTPATIENT)
Dept: CARDIAC REHAB | Facility: HOSPITAL | Age: 65
End: 2020-12-30
Attending: PATHOLOGY
Payer: MEDICARE

## 2020-12-30 DIAGNOSIS — Z95.1 S/P CABG X 4: ICD-10-CM

## 2020-12-30 PROCEDURE — 93798 PHYS/QHP OP CAR RHAB W/ECG: CPT

## 2021-01-04 ENCOUNTER — CLINICAL SUPPORT (OUTPATIENT)
Dept: CARDIAC REHAB | Facility: HOSPITAL | Age: 66
End: 2021-01-04
Attending: PATHOLOGY
Payer: MEDICARE

## 2021-01-04 ENCOUNTER — PATIENT MESSAGE (OUTPATIENT)
Dept: ADMINISTRATIVE | Facility: HOSPITAL | Age: 66
End: 2021-01-04

## 2021-01-04 DIAGNOSIS — Z95.1 S/P CABG X 4: ICD-10-CM

## 2021-01-04 PROCEDURE — 93798 PHYS/QHP OP CAR RHAB W/ECG: CPT

## 2021-01-05 ENCOUNTER — TELEPHONE (OUTPATIENT)
Dept: FAMILY MEDICINE | Facility: CLINIC | Age: 66
End: 2021-01-05

## 2021-01-06 ENCOUNTER — CLINICAL SUPPORT (OUTPATIENT)
Dept: CARDIAC REHAB | Facility: HOSPITAL | Age: 66
End: 2021-01-06
Attending: PATHOLOGY
Payer: MEDICARE

## 2021-01-06 DIAGNOSIS — Z95.1 S/P CABG X 4: ICD-10-CM

## 2021-01-06 PROCEDURE — 93798 PHYS/QHP OP CAR RHAB W/ECG: CPT

## 2021-01-08 ENCOUNTER — CLINICAL SUPPORT (OUTPATIENT)
Dept: CARDIAC REHAB | Facility: HOSPITAL | Age: 66
End: 2021-01-08
Attending: PATHOLOGY
Payer: MEDICARE

## 2021-01-08 DIAGNOSIS — Z95.1 S/P CABG X 4: ICD-10-CM

## 2021-01-08 PROCEDURE — 93798 PHYS/QHP OP CAR RHAB W/ECG: CPT

## 2021-01-11 ENCOUNTER — CLINICAL SUPPORT (OUTPATIENT)
Dept: CARDIAC REHAB | Facility: HOSPITAL | Age: 66
End: 2021-01-11
Attending: PATHOLOGY
Payer: MEDICARE

## 2021-01-11 DIAGNOSIS — Z95.1 S/P CABG X 4: ICD-10-CM

## 2021-01-11 PROCEDURE — 93798 PHYS/QHP OP CAR RHAB W/ECG: CPT

## 2021-01-12 ENCOUNTER — PATIENT OUTREACH (OUTPATIENT)
Dept: ADMINISTRATIVE | Facility: HOSPITAL | Age: 66
End: 2021-01-12

## 2021-01-13 ENCOUNTER — CLINICAL SUPPORT (OUTPATIENT)
Dept: CARDIAC REHAB | Facility: HOSPITAL | Age: 66
End: 2021-01-13
Attending: PATHOLOGY
Payer: MEDICARE

## 2021-01-13 DIAGNOSIS — Z95.1 S/P CABG X 4: ICD-10-CM

## 2021-01-13 PROCEDURE — 93798 PHYS/QHP OP CAR RHAB W/ECG: CPT

## 2021-01-15 ENCOUNTER — CLINICAL SUPPORT (OUTPATIENT)
Dept: CARDIAC REHAB | Facility: HOSPITAL | Age: 66
End: 2021-01-15
Attending: PATHOLOGY
Payer: MEDICARE

## 2021-01-15 DIAGNOSIS — Z95.1 S/P CABG X 4: ICD-10-CM

## 2021-01-15 PROCEDURE — 93798 PHYS/QHP OP CAR RHAB W/ECG: CPT

## 2021-01-18 ENCOUNTER — CLINICAL SUPPORT (OUTPATIENT)
Dept: CARDIAC REHAB | Facility: HOSPITAL | Age: 66
End: 2021-01-18
Payer: MEDICARE

## 2021-01-18 DIAGNOSIS — Z95.1 S/P CABG X 4: ICD-10-CM

## 2021-01-18 PROCEDURE — 93798 PHYS/QHP OP CAR RHAB W/ECG: CPT

## 2021-01-20 ENCOUNTER — CLINICAL SUPPORT (OUTPATIENT)
Dept: CARDIAC REHAB | Facility: HOSPITAL | Age: 66
End: 2021-01-20
Payer: MEDICARE

## 2021-01-20 DIAGNOSIS — Z95.1 S/P CABG X 4: ICD-10-CM

## 2021-01-20 PROCEDURE — 93798 PHYS/QHP OP CAR RHAB W/ECG: CPT

## 2021-01-22 ENCOUNTER — CLINICAL SUPPORT (OUTPATIENT)
Dept: CARDIAC REHAB | Facility: HOSPITAL | Age: 66
End: 2021-01-22
Payer: MEDICARE

## 2021-01-22 DIAGNOSIS — Z95.1 S/P CABG X 4: ICD-10-CM

## 2021-01-22 PROCEDURE — 93798 PHYS/QHP OP CAR RHAB W/ECG: CPT

## 2021-01-25 ENCOUNTER — CLINICAL SUPPORT (OUTPATIENT)
Dept: CARDIAC REHAB | Facility: HOSPITAL | Age: 66
End: 2021-01-25
Payer: MEDICARE

## 2021-01-25 DIAGNOSIS — Z95.1 S/P CABG X 4: ICD-10-CM

## 2021-01-25 PROCEDURE — 93798 PHYS/QHP OP CAR RHAB W/ECG: CPT

## 2021-01-27 ENCOUNTER — CLINICAL SUPPORT (OUTPATIENT)
Dept: CARDIAC REHAB | Facility: HOSPITAL | Age: 66
End: 2021-01-27
Payer: MEDICARE

## 2021-01-27 DIAGNOSIS — Z95.1 S/P CABG X 4: ICD-10-CM

## 2021-01-27 PROCEDURE — 93798 PHYS/QHP OP CAR RHAB W/ECG: CPT

## 2021-01-29 ENCOUNTER — OFFICE VISIT (OUTPATIENT)
Dept: FAMILY MEDICINE | Facility: CLINIC | Age: 66
End: 2021-01-29
Payer: MEDICARE

## 2021-01-29 ENCOUNTER — CLINICAL SUPPORT (OUTPATIENT)
Dept: CARDIAC REHAB | Facility: HOSPITAL | Age: 66
End: 2021-01-29
Payer: MEDICARE

## 2021-01-29 VITALS
OXYGEN SATURATION: 96 % | BODY MASS INDEX: 28.07 KG/M2 | HEART RATE: 88 BPM | RESPIRATION RATE: 18 BRPM | WEIGHT: 185.19 LBS | DIASTOLIC BLOOD PRESSURE: 60 MMHG | HEIGHT: 68 IN | SYSTOLIC BLOOD PRESSURE: 110 MMHG | TEMPERATURE: 98 F

## 2021-01-29 DIAGNOSIS — J44.9 CHRONIC OBSTRUCTIVE PULMONARY DISEASE, UNSPECIFIED COPD TYPE: ICD-10-CM

## 2021-01-29 DIAGNOSIS — I10 ESSENTIAL HYPERTENSION: ICD-10-CM

## 2021-01-29 DIAGNOSIS — Z95.1 S/P CABG X 4: ICD-10-CM

## 2021-01-29 DIAGNOSIS — E78.5 HYPERLIPIDEMIA, UNSPECIFIED HYPERLIPIDEMIA TYPE: ICD-10-CM

## 2021-01-29 DIAGNOSIS — I25.10 CORONARY ARTERY DISEASE, ANGINA PRESENCE UNSPECIFIED, UNSPECIFIED VESSEL OR LESION TYPE, UNSPECIFIED WHETHER NATIVE OR TRANSPLANTED HEART: ICD-10-CM

## 2021-01-29 DIAGNOSIS — I69.30 HISTORY OF CVA WITH RESIDUAL DEFICIT: Primary | Chronic | ICD-10-CM

## 2021-01-29 DIAGNOSIS — D64.9 ANEMIA, UNSPECIFIED TYPE: ICD-10-CM

## 2021-01-29 PROCEDURE — 93798 PHYS/QHP OP CAR RHAB W/ECG: CPT

## 2021-01-29 PROCEDURE — 99214 OFFICE O/P EST MOD 30 MIN: CPT | Mod: S$GLB,,, | Performed by: NURSE PRACTITIONER

## 2021-01-29 PROCEDURE — 1126F PR PAIN SEVERITY QUANTIFIED, NO PAIN PRESENT: ICD-10-PCS | Mod: S$GLB,,, | Performed by: NURSE PRACTITIONER

## 2021-01-29 PROCEDURE — 1126F AMNT PAIN NOTED NONE PRSNT: CPT | Mod: S$GLB,,, | Performed by: NURSE PRACTITIONER

## 2021-01-29 PROCEDURE — 99214 PR OFFICE/OUTPT VISIT, EST, LEVL IV, 30-39 MIN: ICD-10-PCS | Mod: S$GLB,,, | Performed by: NURSE PRACTITIONER

## 2021-01-29 PROCEDURE — 3008F PR BODY MASS INDEX (BMI) DOCUMENTED: ICD-10-PCS | Mod: CPTII,S$GLB,, | Performed by: NURSE PRACTITIONER

## 2021-01-29 PROCEDURE — 3008F BODY MASS INDEX DOCD: CPT | Mod: CPTII,S$GLB,, | Performed by: NURSE PRACTITIONER

## 2021-02-01 ENCOUNTER — CLINICAL SUPPORT (OUTPATIENT)
Dept: CARDIAC REHAB | Facility: HOSPITAL | Age: 66
End: 2021-02-01
Payer: MEDICARE

## 2021-02-01 DIAGNOSIS — Z95.1 S/P CABG X 4: ICD-10-CM

## 2021-02-01 PROCEDURE — 93798 PHYS/QHP OP CAR RHAB W/ECG: CPT

## 2021-02-03 ENCOUNTER — CLINICAL SUPPORT (OUTPATIENT)
Dept: CARDIAC REHAB | Facility: HOSPITAL | Age: 66
End: 2021-02-03
Payer: MEDICARE

## 2021-02-03 DIAGNOSIS — Z95.1 S/P CABG X 4: ICD-10-CM

## 2021-02-03 PROCEDURE — 93798 PHYS/QHP OP CAR RHAB W/ECG: CPT

## 2021-02-05 ENCOUNTER — CLINICAL SUPPORT (OUTPATIENT)
Dept: CARDIAC REHAB | Facility: HOSPITAL | Age: 66
End: 2021-02-05
Payer: MEDICARE

## 2021-02-05 DIAGNOSIS — Z95.1 S/P CABG X 4: Primary | ICD-10-CM

## 2021-02-05 PROCEDURE — 93798 PHYS/QHP OP CAR RHAB W/ECG: CPT

## 2021-02-08 ENCOUNTER — CLINICAL SUPPORT (OUTPATIENT)
Dept: CARDIAC REHAB | Facility: HOSPITAL | Age: 66
End: 2021-02-08
Payer: MEDICARE

## 2021-02-08 DIAGNOSIS — Z95.1 S/P CABG X 4: ICD-10-CM

## 2021-02-08 PROCEDURE — 93798 PHYS/QHP OP CAR RHAB W/ECG: CPT

## 2021-03-10 ENCOUNTER — TELEPHONE (OUTPATIENT)
Dept: FAMILY MEDICINE | Facility: CLINIC | Age: 66
End: 2021-03-10

## 2021-03-17 ENCOUNTER — PATIENT OUTREACH (OUTPATIENT)
Dept: ADMINISTRATIVE | Facility: OTHER | Age: 66
End: 2021-03-17

## 2021-03-18 ENCOUNTER — TELEPHONE (OUTPATIENT)
Dept: FAMILY MEDICINE | Facility: CLINIC | Age: 66
End: 2021-03-18

## 2021-03-18 ENCOUNTER — PES CALL (OUTPATIENT)
Dept: ADMINISTRATIVE | Facility: CLINIC | Age: 66
End: 2021-03-18

## 2021-03-19 ENCOUNTER — OFFICE VISIT (OUTPATIENT)
Dept: CARDIOLOGY | Facility: CLINIC | Age: 66
End: 2021-03-19
Payer: MEDICARE

## 2021-03-19 VITALS
BODY MASS INDEX: 28.33 KG/M2 | HEIGHT: 68 IN | WEIGHT: 186.94 LBS | DIASTOLIC BLOOD PRESSURE: 70 MMHG | HEART RATE: 90 BPM | SYSTOLIC BLOOD PRESSURE: 121 MMHG

## 2021-03-19 DIAGNOSIS — I10 ESSENTIAL HYPERTENSION: ICD-10-CM

## 2021-03-19 DIAGNOSIS — I25.10 CORONARY ARTERY DISEASE INVOLVING NATIVE CORONARY ARTERY OF NATIVE HEART WITHOUT ANGINA PECTORIS: Primary | ICD-10-CM

## 2021-03-19 DIAGNOSIS — I69.30 HISTORY OF CVA WITH RESIDUAL DEFICIT: Chronic | ICD-10-CM

## 2021-03-19 DIAGNOSIS — Z95.1 S/P CABG X 4: ICD-10-CM

## 2021-03-19 DIAGNOSIS — I48.92 ATRIAL FLUTTER, PAROXYSMAL: ICD-10-CM

## 2021-03-19 PROCEDURE — 1126F PR PAIN SEVERITY QUANTIFIED, NO PAIN PRESENT: ICD-10-PCS | Mod: S$GLB,,, | Performed by: INTERNAL MEDICINE

## 2021-03-19 PROCEDURE — 3078F PR MOST RECENT DIASTOLIC BLOOD PRESSURE < 80 MM HG: ICD-10-PCS | Mod: CPTII,S$GLB,, | Performed by: INTERNAL MEDICINE

## 2021-03-19 PROCEDURE — 99499 RISK ADDL DX/OHS AUDIT: ICD-10-PCS | Mod: S$GLB,,, | Performed by: INTERNAL MEDICINE

## 2021-03-19 PROCEDURE — 99214 OFFICE O/P EST MOD 30 MIN: CPT | Mod: S$GLB,,, | Performed by: INTERNAL MEDICINE

## 2021-03-19 PROCEDURE — 99499 UNLISTED E&M SERVICE: CPT | Mod: S$GLB,,, | Performed by: INTERNAL MEDICINE

## 2021-03-19 PROCEDURE — 3008F PR BODY MASS INDEX (BMI) DOCUMENTED: ICD-10-PCS | Mod: CPTII,S$GLB,, | Performed by: INTERNAL MEDICINE

## 2021-03-19 PROCEDURE — 3008F BODY MASS INDEX DOCD: CPT | Mod: CPTII,S$GLB,, | Performed by: INTERNAL MEDICINE

## 2021-03-19 PROCEDURE — 99999 PR PBB SHADOW E&M-EST. PATIENT-LVL III: CPT | Mod: PBBFAC,,, | Performed by: INTERNAL MEDICINE

## 2021-03-19 PROCEDURE — 3074F PR MOST RECENT SYSTOLIC BLOOD PRESSURE < 130 MM HG: ICD-10-PCS | Mod: CPTII,S$GLB,, | Performed by: INTERNAL MEDICINE

## 2021-03-19 PROCEDURE — 99999 PR PBB SHADOW E&M-EST. PATIENT-LVL III: ICD-10-PCS | Mod: PBBFAC,,, | Performed by: INTERNAL MEDICINE

## 2021-03-19 PROCEDURE — 3078F DIAST BP <80 MM HG: CPT | Mod: CPTII,S$GLB,, | Performed by: INTERNAL MEDICINE

## 2021-03-19 PROCEDURE — 3074F SYST BP LT 130 MM HG: CPT | Mod: CPTII,S$GLB,, | Performed by: INTERNAL MEDICINE

## 2021-03-19 PROCEDURE — 99214 PR OFFICE/OUTPT VISIT, EST, LEVL IV, 30-39 MIN: ICD-10-PCS | Mod: S$GLB,,, | Performed by: INTERNAL MEDICINE

## 2021-03-19 PROCEDURE — 1126F AMNT PAIN NOTED NONE PRSNT: CPT | Mod: S$GLB,,, | Performed by: INTERNAL MEDICINE

## 2021-03-31 ENCOUNTER — TELEPHONE (OUTPATIENT)
Dept: FAMILY MEDICINE | Facility: CLINIC | Age: 66
End: 2021-03-31

## 2021-04-01 ENCOUNTER — TELEPHONE (OUTPATIENT)
Dept: FAMILY MEDICINE | Facility: CLINIC | Age: 66
End: 2021-04-01

## 2021-04-01 NOTE — TELEPHONE ENCOUNTER
Called patient and made her an appointment.   Notification Instructions: Patient will be notified of biopsy results. However, patient instructed to call the office if not contacted within 2 weeks.

## 2021-04-05 ENCOUNTER — PATIENT MESSAGE (OUTPATIENT)
Dept: ADMINISTRATIVE | Facility: HOSPITAL | Age: 66
End: 2021-04-05

## 2021-04-13 ENCOUNTER — OFFICE VISIT (OUTPATIENT)
Dept: HOME HEALTH SERVICES | Facility: CLINIC | Age: 66
End: 2021-04-13
Payer: MEDICARE

## 2021-04-13 VITALS
BODY MASS INDEX: 28.19 KG/M2 | WEIGHT: 186 LBS | TEMPERATURE: 97 F | HEART RATE: 91 BPM | SYSTOLIC BLOOD PRESSURE: 116 MMHG | HEIGHT: 68 IN | DIASTOLIC BLOOD PRESSURE: 67 MMHG

## 2021-04-13 DIAGNOSIS — M21.372 LEFT FOOT DROP: ICD-10-CM

## 2021-04-13 DIAGNOSIS — I48.91 ATRIAL FIBRILLATION/FLUTTER: ICD-10-CM

## 2021-04-13 DIAGNOSIS — F17.210 CIGARETTE NICOTINE DEPENDENCE WITHOUT COMPLICATION: ICD-10-CM

## 2021-04-13 DIAGNOSIS — I25.10 CORONARY ARTERY DISEASE INVOLVING NATIVE CORONARY ARTERY OF NATIVE HEART WITHOUT ANGINA PECTORIS: ICD-10-CM

## 2021-04-13 DIAGNOSIS — D75.839 THROMBOCYTOSIS: ICD-10-CM

## 2021-04-13 DIAGNOSIS — I48.92 ATRIAL FIBRILLATION/FLUTTER: ICD-10-CM

## 2021-04-13 DIAGNOSIS — I71.40 AAA (ABDOMINAL AORTIC ANEURYSM) WITHOUT RUPTURE: ICD-10-CM

## 2021-04-13 DIAGNOSIS — E78.2 MIXED HYPERLIPIDEMIA: ICD-10-CM

## 2021-04-13 DIAGNOSIS — I69.30 HISTORY OF CVA WITH RESIDUAL DEFICIT: Chronic | ICD-10-CM

## 2021-04-13 DIAGNOSIS — I69.354 HEMIPARESIS AFFECTING LEFT SIDE AS LATE EFFECT OF CEREBROVASCULAR ACCIDENT: ICD-10-CM

## 2021-04-13 DIAGNOSIS — I10 ESSENTIAL HYPERTENSION: ICD-10-CM

## 2021-04-13 DIAGNOSIS — Z00.00 ENCOUNTER FOR PREVENTIVE HEALTH EXAMINATION: Primary | ICD-10-CM

## 2021-04-13 DIAGNOSIS — J44.9 CHRONIC OBSTRUCTIVE PULMONARY DISEASE, UNSPECIFIED COPD TYPE: ICD-10-CM

## 2021-04-13 PROCEDURE — 3008F PR BODY MASS INDEX (BMI) DOCUMENTED: ICD-10-PCS | Mod: CPTII,S$GLB,, | Performed by: NURSE PRACTITIONER

## 2021-04-13 PROCEDURE — 99499 UNLISTED E&M SERVICE: CPT | Mod: S$GLB,,, | Performed by: NURSE PRACTITIONER

## 2021-04-13 PROCEDURE — 99499 RISK ADDL DX/OHS AUDIT: ICD-10-PCS | Mod: S$GLB,,, | Performed by: NURSE PRACTITIONER

## 2021-04-13 PROCEDURE — G0439 PPPS, SUBSEQ VISIT: HCPCS | Mod: S$GLB,,, | Performed by: NURSE PRACTITIONER

## 2021-04-13 PROCEDURE — G0439 PR MEDICARE ANNUAL WELLNESS SUBSEQUENT VISIT: ICD-10-PCS | Mod: S$GLB,,, | Performed by: NURSE PRACTITIONER

## 2021-04-13 PROCEDURE — 3008F BODY MASS INDEX DOCD: CPT | Mod: CPTII,S$GLB,, | Performed by: NURSE PRACTITIONER

## 2021-04-14 ENCOUNTER — TELEPHONE (OUTPATIENT)
Dept: FAMILY MEDICINE | Facility: CLINIC | Age: 66
End: 2021-04-14

## 2021-04-14 PROBLEM — J96.01 ACUTE HYPOXEMIC RESPIRATORY FAILURE: Status: RESOLVED | Noted: 2019-09-26 | Resolved: 2021-04-14

## 2021-04-14 PROBLEM — M21.372 LEFT FOOT DROP: Status: ACTIVE | Noted: 2021-04-14

## 2021-04-14 PROBLEM — I69.354 HEMIPARESIS AFFECTING LEFT SIDE AS LATE EFFECT OF CEREBROVASCULAR ACCIDENT: Status: ACTIVE | Noted: 2021-04-14

## 2021-04-14 PROBLEM — I71.40 AAA (ABDOMINAL AORTIC ANEURYSM) WITHOUT RUPTURE: Status: ACTIVE | Noted: 2021-04-14

## 2021-04-14 PROBLEM — J44.9 COPD (CHRONIC OBSTRUCTIVE PULMONARY DISEASE): Status: ACTIVE | Noted: 2021-04-14

## 2021-04-14 RX ORDER — IPRATROPIUM BROMIDE AND ALBUTEROL SULFATE 2.5; .5 MG/3ML; MG/3ML
SOLUTION RESPIRATORY (INHALATION)
Qty: 1 BOX | Refills: 5 | Status: SHIPPED | OUTPATIENT
Start: 2021-04-14 | End: 2021-06-17

## 2021-06-03 DIAGNOSIS — Z12.11 COLON CANCER SCREENING: ICD-10-CM

## 2021-07-07 ENCOUNTER — PATIENT MESSAGE (OUTPATIENT)
Dept: ADMINISTRATIVE | Facility: HOSPITAL | Age: 66
End: 2021-07-07

## 2021-08-16 ENCOUNTER — TELEPHONE (OUTPATIENT)
Dept: FAMILY MEDICINE | Facility: CLINIC | Age: 66
End: 2021-08-16

## 2021-08-16 RX ORDER — LISINOPRIL 40 MG/1
40 TABLET ORAL NIGHTLY
Qty: 90 TABLET | Refills: 0 | Status: SHIPPED | OUTPATIENT
Start: 2021-08-16 | End: 2021-08-23 | Stop reason: SDUPTHER

## 2021-08-23 RX ORDER — LISINOPRIL 40 MG/1
40 TABLET ORAL NIGHTLY
Qty: 90 TABLET | Refills: 0 | Status: SHIPPED | OUTPATIENT
Start: 2021-08-23 | End: 2022-02-04 | Stop reason: DRUGHIGH

## 2021-10-07 ENCOUNTER — PATIENT MESSAGE (OUTPATIENT)
Dept: ADMINISTRATIVE | Facility: HOSPITAL | Age: 66
End: 2021-10-07

## 2021-12-01 ENCOUNTER — PATIENT OUTREACH (OUTPATIENT)
Dept: ADMINISTRATIVE | Facility: HOSPITAL | Age: 66
End: 2021-12-01
Payer: MEDICAID

## 2022-01-02 ENCOUNTER — PATIENT MESSAGE (OUTPATIENT)
Dept: ADMINISTRATIVE | Facility: HOSPITAL | Age: 67
End: 2022-01-02
Payer: COMMERCIAL

## 2022-02-04 ENCOUNTER — OFFICE VISIT (OUTPATIENT)
Dept: FAMILY MEDICINE | Facility: CLINIC | Age: 67
End: 2022-02-04
Payer: COMMERCIAL

## 2022-02-04 VITALS
BODY MASS INDEX: 27.3 KG/M2 | DIASTOLIC BLOOD PRESSURE: 80 MMHG | HEART RATE: 96 BPM | SYSTOLIC BLOOD PRESSURE: 151 MMHG | HEIGHT: 68 IN | WEIGHT: 180.13 LBS | OXYGEN SATURATION: 97 % | TEMPERATURE: 98 F

## 2022-02-04 DIAGNOSIS — D64.9 ANEMIA, UNSPECIFIED TYPE: ICD-10-CM

## 2022-02-04 DIAGNOSIS — F41.1 GENERALIZED ANXIETY DISORDER: ICD-10-CM

## 2022-02-04 DIAGNOSIS — I25.10 CORONARY ARTERY DISEASE INVOLVING NATIVE CORONARY ARTERY OF NATIVE HEART WITHOUT ANGINA PECTORIS: ICD-10-CM

## 2022-02-04 DIAGNOSIS — I48.92 ATRIAL FLUTTER, PAROXYSMAL: ICD-10-CM

## 2022-02-04 DIAGNOSIS — F17.210 CIGARETTE NICOTINE DEPENDENCE WITHOUT COMPLICATION: ICD-10-CM

## 2022-02-04 DIAGNOSIS — I10 ESSENTIAL HYPERTENSION: Primary | ICD-10-CM

## 2022-02-04 DIAGNOSIS — Z28.39 IMMUNIZATION DEFICIENCY: ICD-10-CM

## 2022-02-04 DIAGNOSIS — J44.1 COPD EXACERBATION: ICD-10-CM

## 2022-02-04 DIAGNOSIS — E78.2 MIXED HYPERLIPIDEMIA: ICD-10-CM

## 2022-02-04 DIAGNOSIS — J41.0 SIMPLE CHRONIC BRONCHITIS: ICD-10-CM

## 2022-02-04 DIAGNOSIS — I69.30 HISTORY OF CVA WITH RESIDUAL DEFICIT: Chronic | ICD-10-CM

## 2022-02-04 DIAGNOSIS — Z00.00 PERIODIC HEALTH ASSESSMENT, GENERAL SCREENING, ADULT: ICD-10-CM

## 2022-02-04 DIAGNOSIS — G25.81 RESTLESS LEGS: ICD-10-CM

## 2022-02-04 PROCEDURE — 99214 PR OFFICE/OUTPT VISIT, EST, LEVL IV, 30-39 MIN: ICD-10-PCS | Mod: 25,S$GLB,, | Performed by: PHYSICIAN ASSISTANT

## 2022-02-04 PROCEDURE — 90686 IIV4 VACC NO PRSV 0.5 ML IM: CPT | Mod: S$GLB,,, | Performed by: PHYSICIAN ASSISTANT

## 2022-02-04 PROCEDURE — 90686 FLU VACCINE (QUAD) GREATER THAN OR EQUAL TO 3YO PRESERVATIVE FREE IM: ICD-10-PCS | Mod: S$GLB,,, | Performed by: PHYSICIAN ASSISTANT

## 2022-02-04 PROCEDURE — G0008 ADMIN INFLUENZA VIRUS VAC: HCPCS | Mod: S$GLB,,, | Performed by: PHYSICIAN ASSISTANT

## 2022-02-04 PROCEDURE — 99214 OFFICE O/P EST MOD 30 MIN: CPT | Mod: 25,S$GLB,, | Performed by: PHYSICIAN ASSISTANT

## 2022-02-04 PROCEDURE — G0008 FLU VACCINE (QUAD) GREATER THAN OR EQUAL TO 3YO PRESERVATIVE FREE IM: ICD-10-PCS | Mod: S$GLB,,, | Performed by: PHYSICIAN ASSISTANT

## 2022-02-04 RX ORDER — LISINOPRIL 10 MG/1
10 TABLET ORAL DAILY
Qty: 90 TABLET | Refills: 3 | Status: SHIPPED | OUTPATIENT
Start: 2022-02-04 | End: 2022-04-19 | Stop reason: SDUPTHER

## 2022-02-04 RX ORDER — FLUTICASONE PROPIONATE AND SALMETEROL 250; 50 UG/1; UG/1
1 POWDER RESPIRATORY (INHALATION) 2 TIMES DAILY
Qty: 1 EACH | Refills: 11 | Status: SHIPPED | OUTPATIENT
Start: 2022-02-04 | End: 2022-04-19

## 2022-02-04 RX ORDER — ROPINIROLE 2 MG/1
TABLET, FILM COATED ORAL
Qty: 90 TABLET | Refills: 5 | Status: SHIPPED | OUTPATIENT
Start: 2022-02-04 | End: 2022-02-25 | Stop reason: ALTCHOICE

## 2022-02-04 RX ORDER — LISINOPRIL 40 MG/1
40 TABLET ORAL NIGHTLY
Qty: 90 TABLET | Refills: 0 | Status: CANCELLED | OUTPATIENT
Start: 2022-02-04

## 2022-02-04 RX ORDER — ALBUTEROL SULFATE 90 UG/1
2 AEROSOL, METERED RESPIRATORY (INHALATION) EVERY 6 HOURS
Qty: 18 G | Refills: 5 | Status: SHIPPED | OUTPATIENT
Start: 2022-02-04 | End: 2022-10-24 | Stop reason: SDUPTHER

## 2022-02-04 RX ORDER — BACLOFEN 20 MG/1
20 TABLET ORAL 2 TIMES DAILY
Qty: 270 TABLET | Refills: 3 | Status: SHIPPED | OUTPATIENT
Start: 2022-02-04 | End: 2022-04-19 | Stop reason: SDUPTHER

## 2022-02-12 ENCOUNTER — LAB VISIT (OUTPATIENT)
Dept: LAB | Facility: HOSPITAL | Age: 67
End: 2022-02-12
Attending: PHYSICIAN ASSISTANT
Payer: COMMERCIAL

## 2022-02-12 DIAGNOSIS — I69.30 HISTORY OF CVA WITH RESIDUAL DEFICIT: Chronic | ICD-10-CM

## 2022-02-12 DIAGNOSIS — J44.1 COPD EXACERBATION: ICD-10-CM

## 2022-02-12 DIAGNOSIS — F41.1 GENERALIZED ANXIETY DISORDER: ICD-10-CM

## 2022-02-12 DIAGNOSIS — I10 ESSENTIAL HYPERTENSION: ICD-10-CM

## 2022-02-12 DIAGNOSIS — G25.81 RESTLESS LEGS: ICD-10-CM

## 2022-02-12 DIAGNOSIS — I25.10 CORONARY ARTERY DISEASE INVOLVING NATIVE CORONARY ARTERY OF NATIVE HEART WITHOUT ANGINA PECTORIS: ICD-10-CM

## 2022-02-12 DIAGNOSIS — J41.0 SIMPLE CHRONIC BRONCHITIS: ICD-10-CM

## 2022-02-12 DIAGNOSIS — D64.9 ANEMIA, UNSPECIFIED TYPE: ICD-10-CM

## 2022-02-12 DIAGNOSIS — Z00.00 PERIODIC HEALTH ASSESSMENT, GENERAL SCREENING, ADULT: ICD-10-CM

## 2022-02-12 DIAGNOSIS — E78.2 MIXED HYPERLIPIDEMIA: ICD-10-CM

## 2022-02-12 DIAGNOSIS — I48.92 ATRIAL FLUTTER, PAROXYSMAL: ICD-10-CM

## 2022-02-12 DIAGNOSIS — F17.210 CIGARETTE NICOTINE DEPENDENCE WITHOUT COMPLICATION: ICD-10-CM

## 2022-02-12 LAB
ALBUMIN SERPL BCP-MCNC: 3.7 G/DL (ref 3.5–5.2)
ALP SERPL-CCNC: 92 U/L (ref 55–135)
ALT SERPL W/O P-5'-P-CCNC: <5 U/L (ref 10–44)
ANION GAP SERPL CALC-SCNC: 10 MMOL/L (ref 8–16)
AST SERPL-CCNC: 14 U/L (ref 10–40)
BASOPHILS # BLD AUTO: 0.08 K/UL (ref 0–0.2)
BASOPHILS NFR BLD: 0.8 % (ref 0–1.9)
BILIRUB SERPL-MCNC: 0.6 MG/DL (ref 0.1–1)
BUN SERPL-MCNC: 13 MG/DL (ref 8–23)
CALCIUM SERPL-MCNC: 9.5 MG/DL (ref 8.7–10.5)
CHLORIDE SERPL-SCNC: 104 MMOL/L (ref 95–110)
CHOLEST SERPL-MCNC: 175 MG/DL (ref 120–199)
CHOLEST/HDLC SERPL: 4.2 {RATIO} (ref 2–5)
CO2 SERPL-SCNC: 26 MMOL/L (ref 23–29)
CREAT SERPL-MCNC: 0.8 MG/DL (ref 0.5–1.4)
DIFFERENTIAL METHOD: ABNORMAL
EOSINOPHIL # BLD AUTO: 0.1 K/UL (ref 0–0.5)
EOSINOPHIL NFR BLD: 1.4 % (ref 0–8)
ERYTHROCYTE [DISTWIDTH] IN BLOOD BY AUTOMATED COUNT: 15.1 % (ref 11.5–14.5)
EST. GFR  (AFRICAN AMERICAN): >60 ML/MIN/1.73 M^2
EST. GFR  (NON AFRICAN AMERICAN): >60 ML/MIN/1.73 M^2
GLUCOSE SERPL-MCNC: 93 MG/DL (ref 70–110)
HCT VFR BLD AUTO: 52.2 % (ref 37–48.5)
HDLC SERPL-MCNC: 42 MG/DL (ref 40–75)
HDLC SERPL: 24 % (ref 20–50)
HGB BLD-MCNC: 16.9 G/DL (ref 12–16)
IMM GRANULOCYTES # BLD AUTO: 0.02 K/UL (ref 0–0.04)
IMM GRANULOCYTES NFR BLD AUTO: 0.2 % (ref 0–0.5)
LDLC SERPL CALC-MCNC: 113.4 MG/DL (ref 63–159)
LYMPHOCYTES # BLD AUTO: 3 K/UL (ref 1–4.8)
LYMPHOCYTES NFR BLD: 29.8 % (ref 18–48)
MCH RBC QN AUTO: 30.2 PG (ref 27–31)
MCHC RBC AUTO-ENTMCNC: 32.4 G/DL (ref 32–36)
MCV RBC AUTO: 93 FL (ref 82–98)
MONOCYTES # BLD AUTO: 0.7 K/UL (ref 0.3–1)
MONOCYTES NFR BLD: 7.3 % (ref 4–15)
NEUTROPHILS # BLD AUTO: 6 K/UL (ref 1.8–7.7)
NEUTROPHILS NFR BLD: 60.5 % (ref 38–73)
NONHDLC SERPL-MCNC: 133 MG/DL
NRBC BLD-RTO: 0 /100 WBC
PLATELET # BLD AUTO: 218 K/UL (ref 150–450)
PMV BLD AUTO: 10.4 FL (ref 9.2–12.9)
POTASSIUM SERPL-SCNC: 4.8 MMOL/L (ref 3.5–5.1)
PROT SERPL-MCNC: 6.5 G/DL (ref 6–8.4)
RBC # BLD AUTO: 5.59 M/UL (ref 4–5.4)
SODIUM SERPL-SCNC: 140 MMOL/L (ref 136–145)
TRIGL SERPL-MCNC: 98 MG/DL (ref 30–150)
TSH SERPL DL<=0.005 MIU/L-ACNC: 1.28 UIU/ML (ref 0.4–4)
WBC # BLD AUTO: 9.98 K/UL (ref 3.9–12.7)

## 2022-02-12 PROCEDURE — 80061 LIPID PANEL: CPT | Performed by: PHYSICIAN ASSISTANT

## 2022-02-12 PROCEDURE — 87389 HIV-1 AG W/HIV-1&-2 AB AG IA: CPT | Performed by: PHYSICIAN ASSISTANT

## 2022-02-12 PROCEDURE — 84443 ASSAY THYROID STIM HORMONE: CPT | Performed by: PHYSICIAN ASSISTANT

## 2022-02-12 PROCEDURE — 85025 COMPLETE CBC W/AUTO DIFF WBC: CPT | Performed by: PHYSICIAN ASSISTANT

## 2022-02-12 PROCEDURE — 36415 COLL VENOUS BLD VENIPUNCTURE: CPT | Mod: PO | Performed by: PHYSICIAN ASSISTANT

## 2022-02-12 PROCEDURE — 80053 COMPREHEN METABOLIC PANEL: CPT | Performed by: PHYSICIAN ASSISTANT

## 2022-02-14 LAB — HIV 1+2 AB+HIV1 P24 AG SERPL QL IA: NEGATIVE

## 2022-02-25 ENCOUNTER — OFFICE VISIT (OUTPATIENT)
Dept: FAMILY MEDICINE | Facility: CLINIC | Age: 67
End: 2022-02-25
Payer: COMMERCIAL

## 2022-02-25 VITALS
WEIGHT: 175.5 LBS | HEIGHT: 68 IN | DIASTOLIC BLOOD PRESSURE: 72 MMHG | BODY MASS INDEX: 26.6 KG/M2 | HEART RATE: 90 BPM | SYSTOLIC BLOOD PRESSURE: 127 MMHG | TEMPERATURE: 98 F | OXYGEN SATURATION: 96 %

## 2022-02-25 DIAGNOSIS — E78.2 MIXED HYPERLIPIDEMIA: ICD-10-CM

## 2022-02-25 DIAGNOSIS — G25.81 RESTLESS LEG SYNDROME: ICD-10-CM

## 2022-02-25 DIAGNOSIS — K92.1 BLOOD IN STOOL: ICD-10-CM

## 2022-02-25 DIAGNOSIS — F17.200 NICOTINE DEPENDENCE, UNCOMPLICATED, UNSPECIFIED NICOTINE PRODUCT TYPE: ICD-10-CM

## 2022-02-25 DIAGNOSIS — J42 CHRONIC BRONCHITIS, UNSPECIFIED CHRONIC BRONCHITIS TYPE: Primary | ICD-10-CM

## 2022-02-25 DIAGNOSIS — I10 ESSENTIAL HYPERTENSION: ICD-10-CM

## 2022-02-25 DIAGNOSIS — I25.10 CORONARY ARTERY DISEASE INVOLVING NATIVE CORONARY ARTERY OF NATIVE HEART WITHOUT ANGINA PECTORIS: ICD-10-CM

## 2022-02-25 DIAGNOSIS — Z95.1 S/P CABG X 4: ICD-10-CM

## 2022-02-25 DIAGNOSIS — I49.9 IRREGULARLY IRREGULAR PULSE RHYTHM: ICD-10-CM

## 2022-02-25 PROCEDURE — 93010 ELECTROCARDIOGRAM REPORT: CPT | Mod: S$GLB,,, | Performed by: INTERNAL MEDICINE

## 2022-02-25 PROCEDURE — 99214 PR OFFICE/OUTPT VISIT, EST, LEVL IV, 30-39 MIN: ICD-10-PCS | Mod: S$GLB,,, | Performed by: PHYSICIAN ASSISTANT

## 2022-02-25 PROCEDURE — 93010 EKG 12-LEAD: ICD-10-PCS | Mod: S$GLB,,, | Performed by: INTERNAL MEDICINE

## 2022-02-25 PROCEDURE — 99214 OFFICE O/P EST MOD 30 MIN: CPT | Mod: S$GLB,,, | Performed by: PHYSICIAN ASSISTANT

## 2022-02-25 PROCEDURE — 93005 EKG 12-LEAD: ICD-10-PCS | Mod: S$GLB,,, | Performed by: PHYSICIAN ASSISTANT

## 2022-02-25 PROCEDURE — 93005 ELECTROCARDIOGRAM TRACING: CPT | Mod: S$GLB,,, | Performed by: PHYSICIAN ASSISTANT

## 2022-02-25 RX ORDER — PRAMIPEXOLE DIHYDROCHLORIDE 0.12 MG/1
0.12 TABLET ORAL 3 TIMES DAILY
Qty: 90 TABLET | Refills: 11 | Status: SHIPPED | OUTPATIENT
Start: 2022-02-25 | End: 2022-04-19

## 2022-02-25 NOTE — PROGRESS NOTES
Subjective:       Patient ID: Yadi Mccall is a 66 y.o. female.    Chief Complaint: Follow-up (Lab results), Chest Pain (Feeling tightness), Cough (Productive clear), Shortness of Breath (+smoker-pt denies any fever ), and Melena (Started 3-4 days ago.)    HPI   Pt. Feels well  Pt feels better  Periodic dark stool  Chest pains on and off  Recent lab tests good  Needs shingles immunization  Review of Systems   Constitutional: Negative.  Negative for activity change, appetite change, chills, diaphoresis, fatigue, fever and unexpected weight change.   HENT: Negative.    Eyes: Negative.    Respiratory: Negative.  Negative for cough and shortness of breath.    Cardiovascular: Positive for chest pain. Negative for leg swelling.   Gastrointestinal: Positive for change in bowel habit and change in bowel habit. Negative for abdominal distention, abdominal pain, anal bleeding, blood in stool, constipation, diarrhea, nausea, rectal pain, vomiting, reflux and fecal incontinence.   Endocrine: Negative.    Genitourinary: Negative.    Musculoskeletal: Negative.    Integumentary:  Negative for rash. Negative.   Neurological: Negative.          Objective:      Physical Exam  Vitals reviewed.   Constitutional:       General: She is not in acute distress.     Appearance: Normal appearance. She is normal weight. She is not ill-appearing, toxic-appearing or diaphoretic.   HENT:      Head: Normocephalic and atraumatic.      Right Ear: Tympanic membrane, ear canal and external ear normal. There is no impacted cerumen.      Left Ear: Tympanic membrane, ear canal and external ear normal. There is no impacted cerumen.      Nose: Nose normal.      Mouth/Throat:      Mouth: Mucous membranes are moist.      Pharynx: Oropharynx is clear. No oropharyngeal exudate or posterior oropharyngeal erythema.   Eyes:      General: No scleral icterus.     Conjunctiva/sclera: Conjunctivae normal.   Neck:      Vascular: No carotid bruit.   Cardiovascular:       Rate and Rhythm: Normal rate. Rhythm irregular.      Pulses: Normal pulses.      Heart sounds: Normal heart sounds. No murmur heard.    No friction rub. No gallop.   Pulmonary:      Effort: Pulmonary effort is normal. No respiratory distress.      Breath sounds: Normal breath sounds. No stridor. No wheezing, rhonchi or rales.   Chest:      Chest wall: No tenderness.   Abdominal:      General: Abdomen is flat. Bowel sounds are normal. There is no distension.      Palpations: Abdomen is soft. There is no mass.      Tenderness: There is no abdominal tenderness. There is no guarding or rebound.      Hernia: No hernia is present.   Musculoskeletal:         General: No swelling.      Cervical back: Normal range of motion and neck supple. No rigidity or tenderness.      Right lower leg: No edema.      Left lower leg: No edema.   Lymphadenopathy:      Cervical: No cervical adenopathy.   Skin:     General: Skin is warm and dry.      Findings: No rash.   Neurological:      General: No focal deficit present.      Mental Status: She is alert and oriented to person, place, and time.         Assessment:       Problem List Items Addressed This Visit     Nicotine dependence    Hyperlipidemia    Essential hypertension    S/P CABG x 4 - LIMA to LAD; TWAN to OM; SVG's to diag and PDA    CAD (coronary artery disease)    COPD (chronic obstructive pulmonary disease) - Primary    Restless leg syndrome    Relevant Medications    pramipexole (MIRAPEX) 0.125 MG tablet      Other Visit Diagnoses     Irregularly irregular pulse rhythm        Relevant Orders    EKG 12-lead    Blood in stool        Relevant Orders    Occult blood x 1, stool    Occult blood x 1, stool    Occult blood x 1, stool          Plan:       Yadi was seen today for follow-up, chest pain, cough, shortness of breath and melena.    Diagnoses and all orders for this visit:    Chronic bronchitis, unspecified chronic bronchitis type    Coronary artery disease involving native  coronary artery of native heart without angina pectoris    Essential hypertension    Mixed hyperlipidemia    S/P CABG x 4 - LIMA to LAD; TWAN to OM; SVG's to diag and PDA    Nicotine dependence, uncomplicated, unspecified nicotine product type    Restless leg syndrome  -     pramipexole (MIRAPEX) 0.125 MG tablet; Take 1 tablet (0.125 mg total) by mouth 3 (three) times daily.    Irregularly irregular pulse rhythm  -     EKG 12-lead; Future    Blood in stool  -     Occult blood x 1, stool; Future  -     Occult blood x 1, stool; Future  -     Occult blood x 1, stool; Future     DC requip  Discussed otc's  F/u check 2 wks  Discussed smoking cessation    EKG shows sinus dysrhythmia   No change when compared to previous EKG  Cardiology f/u

## 2022-04-04 ENCOUNTER — TELEPHONE (OUTPATIENT)
Dept: FAMILY MEDICINE | Facility: CLINIC | Age: 67
End: 2022-04-04
Payer: COMMERCIAL

## 2022-04-04 NOTE — TELEPHONE ENCOUNTER
----- Message from Mony Veras sent at 4/4/2022  9:54 AM CDT -----  Contact: pt  Type: Needs Medical Advice    Who Called:  PT  Best Call Back Number: [phone]    Additional Information: Requesting a call back regarding pt is needing a referral to see a podiatry doctor in [place]   Please Advise ---Thank you

## 2022-04-04 NOTE — TELEPHONE ENCOUNTER
Spoke with patient in regards to her message. Advised the patient to be seen. Scheduled patient for 04/19 with Dr. Hooks to discuss her foot pain.

## 2022-04-13 NOTE — ADDENDUM NOTE
Addended by: OFELIA SAL on: 4/13/2022 12:44 PM     Modules accepted: Orders     Received prescription renewal request for Eszopiclone 3 MG tablet    Last appt: 10/27/20    Next appt:  2/11/21    Verified dosage(s) against:   [x] provider appt note 10/27/20  [] Other: Medication list/Rx history    Last Rx written on 10/27/20 for 30 day supply with 2 refills.    Per ePDMP, last dispensed on 12/24/20    Is the patient due for refill of this medication(s):   [x]  Yes  []  NO, Start date(s) adjusted accordingly    PDMP review:   [x]  Criteria met. Prescription sent to provider to sign.  []  Criteria NOT MET-

## 2022-04-18 NOTE — PROGRESS NOTES
"Spoke with patient regarding Mr. Vilchis suggestion, patient said "she is no going see no cardiologist, she made the appointment because her foot hurts " she is going to take care of her foot first , everything else can wait."

## 2022-04-19 ENCOUNTER — OFFICE VISIT (OUTPATIENT)
Dept: FAMILY MEDICINE | Facility: CLINIC | Age: 67
End: 2022-04-19
Payer: COMMERCIAL

## 2022-04-19 VITALS
WEIGHT: 172 LBS | TEMPERATURE: 98 F | SYSTOLIC BLOOD PRESSURE: 136 MMHG | BODY MASS INDEX: 26.07 KG/M2 | OXYGEN SATURATION: 96 % | HEIGHT: 68 IN | HEART RATE: 57 BPM | DIASTOLIC BLOOD PRESSURE: 68 MMHG

## 2022-04-19 DIAGNOSIS — L84 CALLUS OF FOOT: ICD-10-CM

## 2022-04-19 DIAGNOSIS — G25.81 RESTLESS LEGS: ICD-10-CM

## 2022-04-19 DIAGNOSIS — I10 ESSENTIAL HYPERTENSION: ICD-10-CM

## 2022-04-19 DIAGNOSIS — Z12.31 BREAST CANCER SCREENING BY MAMMOGRAM: Primary | ICD-10-CM

## 2022-04-19 DIAGNOSIS — G25.81 RESTLESS LEG SYNDROME: ICD-10-CM

## 2022-04-19 PROCEDURE — 99213 OFFICE O/P EST LOW 20 MIN: CPT | Mod: S$GLB,,, | Performed by: FAMILY MEDICINE

## 2022-04-19 PROCEDURE — 99213 PR OFFICE/OUTPT VISIT, EST, LEVL III, 20-29 MIN: ICD-10-PCS | Mod: S$GLB,,, | Performed by: FAMILY MEDICINE

## 2022-04-19 RX ORDER — ROPINIROLE 2 MG/1
2 TABLET, FILM COATED ORAL NIGHTLY
Qty: 30 TABLET | Refills: 11 | Status: SHIPPED | OUTPATIENT
Start: 2022-04-19 | End: 2022-04-20 | Stop reason: SDUPTHER

## 2022-04-19 RX ORDER — LISINOPRIL 10 MG/1
10 TABLET ORAL DAILY
Qty: 90 TABLET | Refills: 3 | Status: SHIPPED | OUTPATIENT
Start: 2022-04-19 | End: 2022-04-20 | Stop reason: SDUPTHER

## 2022-04-19 RX ORDER — BACLOFEN 20 MG/1
20 TABLET ORAL 2 TIMES DAILY
Qty: 60 TABLET | Refills: 11 | Status: SHIPPED | OUTPATIENT
Start: 2022-04-19 | End: 2022-04-20 | Stop reason: SDUPTHER

## 2022-04-19 RX ORDER — ROPINIROLE 2 MG/1
2 TABLET, FILM COATED ORAL NIGHTLY
COMMUNITY
End: 2022-04-19 | Stop reason: SDUPTHER

## 2022-04-19 NOTE — PATIENT INSTRUCTIONS
Lance Grullon,     If you are due for any health screening(s) below please notify me so we can arrange them to be ordered and scheduled to maintain your health. Most healthy patients complete it. Don't lose out on improving your health.     Health Maintenance   Topic Date Due    Mammogram  Never done    High Dose Statin  Never done    LDCT Lung Screen  09/03/2021    Hemoglobin A1c  09/27/2022    Lipid Panel  02/12/2023    TETANUS VACCINE  02/15/2023    Hepatitis C Screening  Completed       Breast Cancer Screening    Breast cancer is the second most common cancer in women after skin cancer, and the second leading cause of death from cancer after lung cancer. Mammograms can detect breast cancer early, which significantly increases the chances of curing the cancer.      A screening mammogram is an x-ray image of the breasts used for early breast cancer detection. It can help reduce the number of deaths from breast cancer among women. To get a clear image, the breast is placed between two plastic plates to make it flat. How often a mammogram is needed depends on your age and your breast cancer risk.    Although you are still overdue for this important screening, due to the COVID-19 pandemic, we recommend scheduling it in the near future.  Colon Cancer Screening    Of cancers affecting both men and women, colorectal cancer is the third leading cancer killer in the [place]. But it doesnt have to be. Screening can prevent colorectal cancer or find it at an early stage when treatment often leads to a cure.    A colonoscopy is the preferred test for detecting colon cancer. It is needed only once every 10 years if results are negative. While sedated, a flexible, lighted tube with a tiny camera is inserted into the rectum and advanced through the colon to look for cancers. An alternative screening test that is used at home and returned to the lab may also be used. It detects hidden blood in bowel movements which could  indicate cancer in the colon. If results are positive, you will need a colonoscopy to determine if the blood is a sign of cancer. This type of follow up (diagnostic) colonoscopy usually requires additional copays as required by your insurance provider. Please contact your PCP if you have any questions.    Although you are still overdue for this important screening, due to the COVID-19 pandemic, we recommend scheduling it in the near future.

## 2022-04-19 NOTE — PROGRESS NOTES
Subjective:       Patient ID: Yadi Mccall is a 66 y.o. female.    Chief Complaint: Foot Pain (Bottom of foot , denies any open wounds. Mentions has had stroke 6 years ago and has drop foot ever since. Wears a brace on left leg ever since the stroke)    Patient is  A 66 year old female with history of cabgx4, hyperlipidemia,smoking, copd, hypertension, generalized anxiety disorder, hypertension, restless leg syndrome, presenting for routine health maintenance and to establish care, patient endorses compliance with non- compliance medication regimen and denies any acute symptoms today. Requesting medication refill. Patient does not want cancer screenings and states that she will not get treatment if she has cancer.   She has stopped taking her metoprolol and her daily inhaler. She would like to be on as little medication as posible against medical advice.         Current Outpatient Medications:     acetaminophen (TYLENOL) 500 MG tablet, Take 500 mg by mouth 2 (two) times daily as needed for Pain or Temperature greater than (mild pain or temp > 100.5)., Disp: , Rfl:     albuterol (PROVENTIL/VENTOLIN HFA) 90 mcg/actuation inhaler, Inhale 2 puffs into the lungs every 6 (six) hours., Disp: 18 g, Rfl: 5    aspirin 81 MG Chew, Take 1 tablet (81 mg total) by mouth once daily., Disp: 360 tablet, Rfl: 0    baclofen (LIORESAL) 20 MG tablet, Take 1 tablet (20 mg total) by mouth 2 (two) times daily., Disp: 60 tablet, Rfl: 11    lisinopriL 10 MG tablet, Take 1 tablet (10 mg total) by mouth once daily., Disp: 90 tablet, Rfl: 3    nitroGLYCERIN (NITROSTAT) 0.4 MG SL tablet, Place 1 tablet (0.4 mg total) under the tongue every 5 (five) minutes as needed for Chest pain., Disp: 20 tablet, Rfl: 0    OXYGEN-AIR DELIVERY SYSTEMS MISC, 3 L/min by local intranasal application route continuous. NC 3lpm continuous, Disp: , Rfl:     oxymetazoline HCl (AFRIN, OXYMETAZOLINE, NASL), 1 spray by Nasal route as needed (stuffy nose)., Disp:  , Rfl:     rOPINIRole (REQUIP) 2 MG tablet, Take 1 tablet (2 mg total) by mouth every evening., Disp: 30 tablet, Rfl: 11    Review of patient's allergies indicates:   Allergen Reactions    Chlorthalidone     Codeine     Dyazide [triamterene-hydrochlorothiazid]     Penicillins        Past Medical History:   Diagnosis Date    AAA (abdominal aortic aneurysm)     Anticoagulant long-term use     Coronary artery disease     Foot drop, left foot     Hyperlipidemia     Hypertension     Stroke     LEFT SIDED WEAKNESS       Past Surgical History:   Procedure Laterality Date    APPENDECTOMY      CORONARY ANGIOGRAPHY N/A 2020    Procedure: ANGIOGRAM, CORONARY ARTERY;  Surgeon: Norbert Vallecillo MD;  Location: [facility] CATH;  Service: Cardiology;  Laterality: N/A;    CORONARY ARTERY BYPASS GRAFT (CABG) N/A 2020    Procedure: CORONARY ARTERY BYPASS GRAFT (CABG) BILATERAL MAMMARY  x  4 VESSELS;  Surgeon: Pascual Gray MD;  Location: [facility] OR;  Service: Cardiovascular;  Laterality: N/A;    ENDOSCOPIC HARVEST OF VEIN N/A 2020    Procedure: HARVEST-VEIN-ENDOVASCULAR;  Surgeon: Pascual Gray MD;  Location: [facility] OR;  Service: Cardiovascular;  Laterality: N/A;    FOOT SURGERY      HERNIA REPAIR      HYSTERECTOMY      LEFT HEART CATHETERIZATION N/A 2020    Procedure: Left heart cath;  Surgeon: Norbert Vallecillo MD;  Location: [facility] CATH;  Service: Cardiology;  Laterality: N/A;    TONSILLECTOMY         Family History   Problem Relation Age of Onset    Hypertension Mother     Melanoma Neg Hx     Psoriasis Neg Hx     Lupus Neg Hx     Eczema Neg Hx        Social History     Tobacco Use    Smoking status: Current Every Day Smoker     Packs/day: 1.00     Years: 50.00     Pack years: 50.00     Types: Cigarettes     Last attempt to quit: 2019     Years since quittin.3    Smokeless tobacco: Never Used    Tobacco comment: down from 5 cartons/month to 2 cartons/month   Substance Use  "Topics    Alcohol use: No    Drug use: No       Review of Systems   Constitutional: Negative for activity change, appetite change, chills, diaphoresis, fatigue, fever and unexpected weight change.   HENT: Negative for hearing loss, postnasal drip, rhinorrhea, sinus pressure/congestion, sore throat and trouble swallowing.    Eyes: Negative for visual disturbance.   Respiratory: Negative for apnea, chest tightness, shortness of breath and wheezing.    Cardiovascular: Negative for chest pain.   Gastrointestinal: Negative for abdominal pain, blood in stool, change in bowel habit, constipation, diarrhea, nausea, vomiting and change in bowel habit.   Endocrine: Negative for polydipsia and polyphagia.   Genitourinary: Negative for dysuria, enuresis, flank pain, frequency and urgency.   Integumentary:  Negative for rash and mole/lesion.   Neurological: Negative for dizziness, light-headedness, headaches and memory loss.   Psychiatric/Behavioral: Negative for confusion, decreased concentration, sleep disturbance and suicidal ideas. The patient is not nervous/anxious.            Objective:      Vitals:    04/19/22 1524   BP: 136/68   BP Location: Right arm   Patient Position: Sitting   BP Method: Medium (Manual)   Pulse: (!) 57   Temp: 97.8 °F (36.6 °C)   SpO2: 96%   Weight: 78 kg (172 lb)   Height: 5' 8" (1.727 m)     Physical Exam  Constitutional:       General: She is not in acute distress.     Appearance: She is normal weight. She is not ill-appearing, toxic-appearing or diaphoretic.   HENT:      Head: Normocephalic and atraumatic.      Right Ear: External ear normal.      Left Ear: External ear normal.      Nose: Nose normal. No congestion.      Mouth/Throat:      Mouth: Mucous membranes are moist.      Pharynx: Oropharynx is clear. No oropharyngeal exudate or posterior oropharyngeal erythema.   Eyes:      General: No scleral icterus.     Extraocular Movements: Extraocular movements intact.      Conjunctiva/sclera: " Conjunctivae normal.   Cardiovascular:      Rate and Rhythm: Normal rate and regular rhythm.      Pulses: Normal pulses.      Heart sounds: Normal heart sounds. No murmur heard.    No friction rub. No gallop.   Pulmonary:      Effort: Pulmonary effort is normal. No respiratory distress.      Breath sounds: Normal breath sounds. No stridor. No wheezing, rhonchi or rales.   Chest:      Chest wall: No tenderness.   Abdominal:      General: Abdomen is flat. Bowel sounds are normal. There is no distension.      Palpations: Abdomen is soft. There is no mass.      Tenderness: There is no abdominal tenderness. There is no guarding or rebound.      Hernia: No hernia is present.   Musculoskeletal:      Cervical back: Normal range of motion.      Right lower leg: No edema.      Left lower leg: No edema.   Skin:     Coloration: Skin is not jaundiced or pale.      Findings: No bruising, erythema or rash.   Neurological:      General: No focal deficit present.      Mental Status: She is alert and oriented to person, place, and time. Mental status is at baseline.   Psychiatric:         Mood and Affect: Mood normal.         Behavior: Behavior normal.         Thought Content: Thought content normal.         Judgment: Judgment normal.           Assessment:       1. Breast cancer screening by mammogram    2. Callus of foot    3. Essential hypertension    4. Restless leg syndrome    5. Restless legs        Plan:           Breast cancer screening by mammogram  -     Cancel: Mammo Digital Screening Bilat; Future; Expected date: 04/19/2022    Callus of foot  -     Ambulatory referral/consult to Podiatry; Future; Expected date: 04/26/2022    Essential hypertension  -     lisinopriL 10 MG tablet; Take 1 tablet (10 mg total) by mouth once daily.  Dispense: 90 tablet; Refill: 3    Restless leg syndrome  -     rOPINIRole (REQUIP) 2 MG tablet; Take 1 tablet (2 mg total) by mouth every evening.  Dispense: 30 tablet; Refill: 11    Restless  legs  -     baclofen (LIORESAL) 20 MG tablet; Take 1 tablet (20 mg total) by mouth 2 (two) times daily.  Dispense: 60 tablet; Refill: 11        Follow up in about 6 months (around 10/19/2022).    Beau Hooks MD         DISCLAIMER: This note was prepared with [employer] voice recognition transcription software. Garbled syntax, mangled pronouns, and other bizarre constructions may be attributed to that software system.

## 2022-04-20 DIAGNOSIS — I10 ESSENTIAL HYPERTENSION: ICD-10-CM

## 2022-04-20 DIAGNOSIS — G25.81 RESTLESS LEG SYNDROME: ICD-10-CM

## 2022-04-20 DIAGNOSIS — G25.81 RESTLESS LEGS: ICD-10-CM

## 2022-04-20 RX ORDER — BACLOFEN 20 MG/1
20 TABLET ORAL 2 TIMES DAILY
Qty: 180 TABLET | Refills: 3 | Status: SHIPPED | OUTPATIENT
Start: 2022-04-20 | End: 2023-08-10 | Stop reason: SDUPTHER

## 2022-04-20 RX ORDER — ROPINIROLE 2 MG/1
2 TABLET, FILM COATED ORAL NIGHTLY
Qty: 90 TABLET | Refills: 3 | Status: SHIPPED | OUTPATIENT
Start: 2022-04-20 | End: 2022-11-04 | Stop reason: SDUPTHER

## 2022-04-20 RX ORDER — LISINOPRIL 10 MG/1
10 TABLET ORAL DAILY
Qty: 90 TABLET | Refills: 3 | Status: SHIPPED | OUTPATIENT
Start: 2022-04-20 | End: 2022-12-15 | Stop reason: SDUPTHER

## 2022-04-20 NOTE — TELEPHONE ENCOUNTER
"----- Message from Maral Mccall sent at 4/20/2022 12:08 PM CDT -----  "Type:  Patient Call Back    Who Called:DANTE MCCALL [[id number]]    What is the reqeust in detail:Pt requesting call back has questions in regards to medication. Please advise    Can the clinic reply by [facility]?no    Best Call Back Number:[phone]      Additional Information:            "

## 2022-04-20 NOTE — TELEPHONE ENCOUNTER
Prescriptions for Baclofen, Lisinopril, and Ropinirole were sent to the wrong pharmacy. Patient requesting to transfer these prescriptions to [facility]. Thank you.

## 2022-04-29 NOTE — PATIENT INSTRUCTIONS
Capsulitis of the Second Toe    What Is Capsulitis of the Second Toe?    Ligaments surrounding the joint at the base of the second toe form a capsule, which helps the joint to function properly. Capsulitis is a condition in which these ligaments have become inflamed. Although capsulitis can also occur in the joints of the third or fourth toes, it most commonly affects the second toe. This inflammation causes considerable discomfort and, if left untreated, can eventually lead to a weakening of surrounding ligaments that can cause dislocation of the toe. Capsulitis--also referred to as predislocation syndrome--is a common condition that can occur at any age.     Causes     It is generally believed that capsulitis of the second toe is a result of abnormal foot mechanics, where the ball of the foot beneath the toe joint takes an excessive amount of weightbearing pressure.  Certain conditions or characteristics can make a person prone to experiencing excessive pressure on the ball of the foot. These most commonly include a severe bunion deformity, a second toe longer than the big toe, an arch that is structurally unstable and a tight calf muscle.      Symptoms  Because capsulitis of the second toe is a progressive disorder and usually worsens if left untreated, early recognition and treatment are important. In the earlier stages--the best time to seek treatment--the symptoms may include:  Pain, particularly on the ball of the foot. It can feel like there's a marble in the shoe or a sock is bunched up  Swelling in the area of pain, including the base of the toe  Difficulty wearing shoes  Pain when walking barefoot     In more advanced stages, the supportive ligaments weaken, leading to failure of the joint to stabilize the toe. The unstable toe drifts toward the big toe and eventually crosses over and lies on top of the big toe--resulting in crossover toe, the end stage of capsulitis. The symptoms of crossover toe are the  same as those experienced during the earlier stages. Although the crossing over of the toe usually occurs over a period of time, it can appear more quickly if caused by injury or overuse.      Diagnosis  An accurate diagnosis is essential because the symptoms of capsulitis can be similar to those of a condition called Gage's neuroma, which is treated differently from capsulitis. In arriving at a diagnosis, the foot and ankle surgeon will examine the foot, press on it and maneuver it to reproduce the symptoms. The surgeon will also look for potential causes and test the stability of the joint. X-rays are usually ordered, and other imaging studies are sometimes needed.      Nonsurgical Treatment  The best time to treat capsulitis of the second toe is during the early stages, before the toe starts to drift toward the big toe. At that time, nonsurgical approaches can be used to stabilize the joint, reduce the symptoms and address the underlying cause of the condition.    The foot and ankle surgeon may select one or more of the following options for early treatment of capsulitis:  Rest and ice. Staying off the foot and applying ice packs help reduce the swelling and pain. Apply an ice pack, placing a thin towel between the ice and the skin. Use ice for 20 minutes and then wait at least 40 minutes before icing again.  Oral medications. Nonsteroidal anti-inflammatory drugs (NSAIDs), such as ibuprofen, may help relieve the pain and inflammation.  Taping/splinting. It may be necessary to tape the toe so that it will stay in the correct position. This helps relieve the pain and prevent further drifting of the toe.  Stretching. Stretching exercises may be prescribed for patients who have tight calf muscles.  Shoe modifications. Supportive shoes with stiff soles are recommended because they control the motion and lessen the amount of pressure on the ball of the foot.  Orthotic devices. Custom shoe inserts are often very  beneficial. These include arch supports or a metatarsal pad that distributes the weight away from the joint.      When Is Surgery Needed?  Once the second toe starts moving toward the big toe, it will never go back to its normal position unless surgery is performed. The foot and ankle surgeon will select the procedure or combination of procedures best suited to the individual patient.       Foot Drop  Foot drop (drop foot) is a disorder that affects the foot. It may be hard to raise the foot at the ankle. There may also be pain, weakness and numbness in the foot. Because it is hard to lift the foot, there is a tendency to drag your foot and toes when walking. To compensate for this, you may find yourself raising your leg high on the affected side while walking.  Food drop has many possible causes. These include:  Injury to a nerve around the spinal cord  Compression to a nerve in the spine  Nerve damage in the leg  Stroke  Tumor on a nerve  Diabetes  ALS (Mary Lou Gehrig's disease), multiple sclerosis, or Parkinson disease   Treatment usually focuses on treating the cause. Surgery may be an option to treat the underlying problem. If you are having difficulty walking due to the foot drop, an in-shoe splint may be useful. A cane or walker may help you maintain your balance. Talk to your healthcare provider about these options.  Home care  Follow the treatment plan suggested by your healthcare provider  If you have reduced feeling in your foot, take extra precautions to protect it:  Wear comfortable, proper fitting shoes. Avoid sandals or open-toe shoes.  Wash your feet daily with warm water and mild soap.  After drying, apply a moisturizing cream or lotion.  Wear cotton socks and change them every day.  Trim toe nails carefully. Do not cut your cuticles.  Do not use heating pads or hot water bottles on your feet.  Do not put your foot in a hot tub without first checking the temperature with your hand or elbow.  Check your  feet daily for skin breaks, blisters, swelling, or redness. If you have a sore that does not heal see your health care provider.  Schedule yearly foot exams.  Be cautious when going up or down stairs.  Weakness or loss of feeling in the foot or leg may interfere with your ability to safely drive a car. Discuss this concern with your healthcare provider before you resume driving.  Follow-up care  Follow up with your healthcare provider or as advised by our staff.  When to seek medical advice  Call your healthcare provider if any of the following occur:  Increasing pain, weakness, numbness in the foot or leg  Any redness, swelling, or sore on your foot that does not heal  New injury to the foot  Date Last Reviewed: 8/23/2015  © 1366-1067 [employer]. [address] [place] [id number]. All rights reserved. This information is not intended as a substitute for professional medical care. Always follow your healthcare professional's instructions.

## 2022-05-02 ENCOUNTER — HOSPITAL ENCOUNTER (OUTPATIENT)
Dept: RADIOLOGY | Facility: CLINIC | Age: 67
Discharge: HOME | End: 2022-05-02
Attending: PODIATRIST
Payer: COMMERCIAL

## 2022-05-02 ENCOUNTER — OFFICE VISIT (OUTPATIENT)
Dept: PODIATRY | Facility: CLINIC | Age: 67
End: 2022-05-02
Payer: COMMERCIAL

## 2022-05-02 VITALS
HEART RATE: 60 BPM | RESPIRATION RATE: 20 BRPM | HEIGHT: 68 IN | WEIGHT: 172 LBS | OXYGEN SATURATION: 97 % | BODY MASS INDEX: 26.07 KG/M2

## 2022-05-02 DIAGNOSIS — R60.0 LOWER EXTREMITY EDEMA: ICD-10-CM

## 2022-05-02 DIAGNOSIS — L84 CALLUS OF FOOT: ICD-10-CM

## 2022-05-02 DIAGNOSIS — M77.52 CAPSULITIS OF METATARSOPHALANGEAL (MTP) JOINT OF LEFT FOOT: Primary | ICD-10-CM

## 2022-05-02 DIAGNOSIS — M21.372 LEFT FOOT DROP: ICD-10-CM

## 2022-05-02 DIAGNOSIS — M20.42 HAMMER TOE OF LEFT FOOT: ICD-10-CM

## 2022-05-02 DIAGNOSIS — M79.672 LEFT FOOT PAIN: ICD-10-CM

## 2022-05-02 DIAGNOSIS — L85.1 ACQUIRED KERATODERMA: ICD-10-CM

## 2022-05-02 PROCEDURE — 73630 XR FOOT COMPLETE 3 VIEW LEFT: ICD-10-PCS | Mod: LT,S$GLB,, | Performed by: RADIOLOGY

## 2022-05-02 PROCEDURE — 73630 X-RAY EXAM OF FOOT: CPT | Mod: LT,S$GLB,, | Performed by: RADIOLOGY

## 2022-05-02 PROCEDURE — 99204 PR OFFICE/OUTPT VISIT, NEW, LEVL IV, 45-59 MIN: ICD-10-PCS | Mod: S$GLB,,, | Performed by: PODIATRIST

## 2022-05-02 PROCEDURE — 99204 OFFICE O/P NEW MOD 45 MIN: CPT | Mod: S$GLB,,, | Performed by: PODIATRIST

## 2022-05-02 NOTE — PROGRESS NOTES
"  [address] [facility] [address]  [place] [address]  Phone: [phone]   [phone]    Patient's PCP:Beau Hooks MD  Referring Provider: Dr. Beau Hooks    Subjective:      Chief Complaint:: Foot Pain (Left foot pain ) and Foot Problem (Left drop foot following stroke)    HPI  Yadi Mccall is a 66 y.o. female who presents today with a complaint of intermittent left foot pain, callus and foot drop lasting for eight years. Onset of symptoms sharp stabbing pain with burning sensation and reports no trauma.  Current symptoms include  intermittent sharp stabbing pain, burning sensation, toes curling under with pain at base, left drop foot following stroke and left foot callus.  Aggravating factors are walking and weightbearing. Symptoms have progressed over time. Treatment to date have included ibuprofen, and brace.    Vitals:    05/02/22 1619   Pulse: 60   Resp: 20   SpO2: 97%   Weight: 78 kg (172 lb)   Height: 5' 8" (1.727 m)   PainSc:   8      Shoe Size: 10 Men's    Past Surgical History:   Procedure Laterality Date    APPENDECTOMY      CORONARY ANGIOGRAPHY N/A 7/24/2020    Procedure: ANGIOGRAM, CORONARY ARTERY;  Surgeon: Norbert Vallecillo MD;  Location: [facility] CATH;  Service: Cardiology;  Laterality: N/A;    CORONARY ARTERY BYPASS GRAFT (CABG) N/A 8/21/2020    Procedure: CORONARY ARTERY BYPASS GRAFT (CABG) BILATERAL MAMMARY  x  4 VESSELS;  Surgeon: Pascual Gray MD;  Location: [facility] OR;  Service: Cardiovascular;  Laterality: N/A;    ENDOSCOPIC HARVEST OF VEIN N/A 8/21/2020    Procedure: HARVEST-VEIN-ENDOVASCULAR;  Surgeon: Pascual Gray MD;  Location: [facility] OR;  Service: Cardiovascular;  Laterality: N/A;    FOOT SURGERY      HERNIA REPAIR      HYSTERECTOMY      LEFT HEART CATHETERIZATION N/A 7/24/2020    Procedure: Left heart cath;  Surgeon: Norbert Vallecillo MD;  Location: [facility] CATH;  Service: Cardiology;  Laterality: N/A;    TONSILLECTOMY       Past Medical History:   Diagnosis Date "    AAA (abdominal aortic aneurysm)     Anticoagulant long-term use     Coronary artery disease     Foot drop, left foot     Hyperlipidemia     Hypertension     Stroke 2014    LEFT SIDED WEAKNESS     Family History   Problem Relation Age of Onset    Hypertension Mother     Melanoma Neg Hx     Psoriasis Neg Hx     Lupus Neg Hx     Eczema Neg Hx         Social History:   Marital Status: Single  Alcohol History:  reports no history of alcohol use.  Tobacco History:  reports that she has been smoking cigarettes. She has a 50.00 pack-year smoking history. She has never used smokeless tobacco.  Drug History:  reports no history of drug use.    Review of patient's allergies indicates:   Allergen Reactions    Chlorthalidone     Codeine     Dyazide [triamterene-hydrochlorothiazid]     Penicillins        Current Outpatient Medications   Medication Sig Dispense Refill    acetaminophen (TYLENOL) 500 MG tablet Take 500 mg by mouth 2 (two) times daily as needed for Pain or Temperature greater than (mild pain or temp > 100.5).      albuterol (PROVENTIL/VENTOLIN HFA) 90 mcg/actuation inhaler Inhale 2 puffs into the lungs every 6 (six) hours. 18 g 5    aspirin 81 MG Chew Take 1 tablet (81 mg total) by mouth once daily. 360 tablet 0    baclofen (LIORESAL) 20 MG tablet Take 1 tablet (20 mg total) by mouth 2 (two) times daily. 180 tablet 3    lisinopriL 10 MG tablet Take 1 tablet (10 mg total) by mouth once daily. 90 tablet 3    nitroGLYCERIN (NITROSTAT) 0.4 MG SL tablet Place 1 tablet (0.4 mg total) under the tongue every 5 (five) minutes as needed for Chest pain. 20 tablet 0    OXYGEN-AIR DELIVERY SYSTEMS MISC 3 L/min by local intranasal application route continuous. NC 3lpm continuous      oxymetazoline HCl (AFRIN, OXYMETAZOLINE, NASL) 1 spray by Nasal route as needed (stuffy nose).      rOPINIRole (REQUIP) 2 MG tablet Take 1 tablet (2 mg total) by mouth every evening. 90 tablet 3     No current  facility-administered medications for this visit.       Review of Systems   Constitutional: Negative for chills, fatigue, fever and unexpected weight change.   HENT: Negative for hearing loss and trouble swallowing.    Eyes: Negative for photophobia and visual disturbance.   Respiratory: Negative for cough, shortness of breath and wheezing.    Cardiovascular: Negative for chest pain, palpitations and leg swelling.   Gastrointestinal: Negative for abdominal pain and nausea.   Genitourinary: Negative for dysuria and frequency.   Musculoskeletal: Positive for arthralgias, gait problem, joint swelling and myalgias. Negative for back pain.   Skin: Negative for rash and wound.   Neurological: Positive for weakness. Negative for tremors, seizures, numbness and headaches.   Hematological: Does not bruise/bleed easily.         Objective:        Physical Exam:   Foot Exam    General  Orientation: alert and oriented to person, place, and time   Affect: appropriate   Gait: antalgic       Left Foot/Ankle      Inspection and Palpation  Ecchymosis: none  Tenderness: lesser metatarsophalangeal joints   Swelling: lesser metatarsophalangeal joints (Moderate edema of the lower extremity)  Arch: normal  Hammertoes: first toe, second toe, third toe, fourth toe and fifth toe  Claw toes: absent  Hallux valgus: yes  Hallux limitus: no  Skin Exam: callus; no drainage, no ulcer and no erythema   Neurovascular  Dorsalis pedis: 2+  Posterior tibial: 2+  Capillary refill: 2+  Varicose veins: not present  Saphenous nerve sensation: normal  Tibial nerve sensation: normal  Superficial peroneal nerve sensation: normal  Deep peroneal nerve sensation: normal  Sural nerve sensation: normal    Edema  Type of edema: pitting  Edema grade: 1+    Muscle Strength  Ankle dorsiflexion: 3  Ankle plantar flexion: 4-  Ankle inversion: 4-  Ankle eversion: 4-  Great toe extension: 4-  Great toe flexion: 4-    Range of Motion    Passive  Ankle dorsiflexion:  0      Tests  Anterior drawer: negative   Talar tilt: negative   PT Tinel's sign: negative  Paresthesia: negative        Physical Exam  Cardiovascular:      Pulses:           Dorsalis pedis pulses are 2+ on the left side.        Posterior tibial pulses are 2+ on the left side.   Musculoskeletal:      Left foot: Bunion present.   Feet:      Left foot:      Skin integrity: Callus present. No ulcer or erythema.               Left Ankle/Foot Exam     Swelling   The patient is swollen on the lesser metatarsophalangeal joints.    Tenderness   The patient is tender to palpation of the lesser metatarsophalangeal joints.      Muscle Strength   Left Lower Extremity   Ankle Dorsiflexion:  3   Plantar flexion:  4-/5     Vascular Exam       Left Pulses  Dorsalis Pedis:      2+  Posterior Tibial:      2+             Imaging: X-Ray Foot Complete Left  Narrative: EXAMINATION:  XR FOOT COMPLETE 3 VIEW LEFT    CLINICAL HISTORY:  .  Pain in left foot    TECHNIQUE:  AP, lateral and oblique views of the left foot were performed.    COMPARISON:  None    FINDINGS:  Hallux valgus.  Bunion formation 1st metatarsal head.  Enthesophytes at site of insertion of the distal Achilles tendon.  Hammertoe configuration of toes.  Old healed fracture of the 5th meta tarsal.  Soft tissue swelling anterior foot.  No acute fracture or dislocation.  Impression: As above    Electronically signed by: Ene Leyva MD  Date:    05/02/2022  Time:    18:18               Assessment:       1. Capsulitis of metatarsophalangeal (MTP) joint of left foot    2. Hammer toe of left foot    3. Lower extremity edema    4. Acquired keratoderma    5. Left foot drop    6. Left foot pain    7. Callus of foot      Plan:   Capsulitis of metatarsophalangeal (MTP) joint of left foot    Hammer toe of left foot    Lower extremity edema    Acquired keratoderma    Left foot drop  -     X-Ray Foot Complete Left    Left foot pain  -     X-Ray Foot Complete Left    Callus of foot  -      Ambulatory referral/consult to Podiatry      Follow up if symptoms worsen or fail to improve.    Procedures         I discussed with the patient that she is getting capsulitis of the lesser toes due to the hammertoe deformity she has as well as her drop foot.  We discussed different potential treatment options.  We are going to try adding metatarsal pads to her left foot AFO.  I also discussed with her that she may need a new AFO made which would include a custom footplate with built-in metatarsal pads.    Counseling:     I provided patient education verbally regarding:   Patient diagnosis, treatment options, as well as alternatives, risks, and benefits.     I explained what joint inflammation is and  conservative treatment of off loading the joint with OTC inserts and pads vs custom made orthotics.  The use of ice, heat, oral antiinflammatory and topical antiinflammatory and shoe modification as well as rest of the affected area with decrease walking, standing and non-impact exercising.      This note was created using Dragon voice recognition software that occasionally misinterpreted phrases or words.

## 2022-05-12 ENCOUNTER — PATIENT MESSAGE (OUTPATIENT)
Dept: SMOKING CESSATION | Facility: CLINIC | Age: 67
End: 2022-05-12
Payer: COMMERCIAL

## 2022-05-16 ENCOUNTER — TELEPHONE (OUTPATIENT)
Dept: PODIATRY | Facility: CLINIC | Age: 67
End: 2022-05-16
Payer: COMMERCIAL

## 2022-05-16 NOTE — TELEPHONE ENCOUNTER
----- Message from Leona Bauer sent at 5/16/2022 10:43 AM CDT -----  Contact: pt  Type: Needs Appt    Who Called: pt  Best Call Back Number: [phone]    Inquiry/Question: pt needs a sooner appt and she needs it after 4:00 P.M. Please call pt and advise.       Thank you~

## 2022-05-23 ENCOUNTER — OFFICE VISIT (OUTPATIENT)
Dept: PODIATRY | Facility: CLINIC | Age: 67
End: 2022-05-23
Payer: COMMERCIAL

## 2022-05-23 VITALS — BODY MASS INDEX: 26.07 KG/M2 | HEIGHT: 68 IN | WEIGHT: 172 LBS | RESPIRATION RATE: 16 BRPM

## 2022-05-23 DIAGNOSIS — M21.372 LEFT FOOT DROP: ICD-10-CM

## 2022-05-23 DIAGNOSIS — M20.42 HAMMER TOE OF LEFT FOOT: ICD-10-CM

## 2022-05-23 DIAGNOSIS — M77.52 CAPSULITIS OF METATARSOPHALANGEAL (MTP) JOINT OF LEFT FOOT: Primary | ICD-10-CM

## 2022-05-23 DIAGNOSIS — M20.12 HALLUX VALGUS OF LEFT FOOT: ICD-10-CM

## 2022-05-23 DIAGNOSIS — L85.1 ACQUIRED KERATODERMA: ICD-10-CM

## 2022-05-23 DIAGNOSIS — R60.0 LOWER EXTREMITY EDEMA: ICD-10-CM

## 2022-05-23 DIAGNOSIS — M79.672 LEFT FOOT PAIN: ICD-10-CM

## 2022-05-23 PROCEDURE — 99213 OFFICE O/P EST LOW 20 MIN: CPT | Mod: S$GLB,,, | Performed by: PODIATRIST

## 2022-05-23 PROCEDURE — 99213 PR OFFICE/OUTPT VISIT, EST, LEVL III, 20-29 MIN: ICD-10-PCS | Mod: S$GLB,,, | Performed by: PODIATRIST

## 2022-05-23 NOTE — PROGRESS NOTES
"  [address] [facility] [address]  [place] [address]  Phone: [phone]   [phone]    Patient's PCP:Beau Hooks MD  Referring Provider: No ref. provider found    Subjective:      Chief Complaint:: Follow-up    HPI  Yadi Mccall is a 66 y.o. female who presents today for follow up capsulitis of MTP joint of left foot.  Presents with AFO on left foot with her shoe.  Presents with the crest pad underneath hammertoes as directed which has helped tremendously, however, the bunion pads have not helped at all.  Still experiencing pain in the ball of her foot, especially when walking with shoes. Walking barefoot causes pain in the ball of her foot, but not nearly as much as walking with the AFO/shoes.      Vitals:    05/23/22 1559   Resp: 16   Weight: 78 kg (172 lb)   Height: 5' 8" (1.727 m)   PainSc: 10-Worst pain ever      Shoe Size: 10 men's    Past Surgical History:   Procedure Laterality Date    APPENDECTOMY      CORONARY ANGIOGRAPHY N/A 7/24/2020    Procedure: ANGIOGRAM, CORONARY ARTERY;  Surgeon: Norbert Vallecillo MD;  Location: [facility] CATH;  Service: Cardiology;  Laterality: N/A;    CORONARY ARTERY BYPASS GRAFT (CABG) N/A 8/21/2020    Procedure: CORONARY ARTERY BYPASS GRAFT (CABG) BILATERAL MAMMARY  x  4 VESSELS;  Surgeon: Pascual Gray MD;  Location: [facility] OR;  Service: Cardiovascular;  Laterality: N/A;    ENDOSCOPIC HARVEST OF VEIN N/A 8/21/2020    Procedure: HARVEST-VEIN-ENDOVASCULAR;  Surgeon: Pascual Gray MD;  Location: [facility] OR;  Service: Cardiovascular;  Laterality: N/A;    FOOT SURGERY      HERNIA REPAIR      HYSTERECTOMY      LEFT HEART CATHETERIZATION N/A 7/24/2020    Procedure: Left heart cath;  Surgeon: Norbert Vallecillo MD;  Location: [facility] CATH;  Service: Cardiology;  Laterality: N/A;    TONSILLECTOMY       Past Medical History:   Diagnosis Date    AAA (abdominal aortic aneurysm)     Anticoagulant long-term use     Coronary artery disease     Foot drop, left foot     " Hyperlipidemia     Hypertension     Stroke 2014    LEFT SIDED WEAKNESS     Family History   Problem Relation Age of Onset    Hypertension Mother     Melanoma Neg Hx     Psoriasis Neg Hx     Lupus Neg Hx     Eczema Neg Hx         Social History:   Marital Status: Single  Alcohol History:  reports no history of alcohol use.  Tobacco History:  reports that she has been smoking cigarettes. She has a 50.00 pack-year smoking history. She has never used smokeless tobacco.  Drug History:  reports no history of drug use.    Review of patient's allergies indicates:   Allergen Reactions    Chlorthalidone     Codeine     Dyazide [triamterene-hydrochlorothiazid]     Penicillins        Current Outpatient Medications   Medication Sig Dispense Refill    acetaminophen (TYLENOL) 500 MG tablet Take 500 mg by mouth 2 (two) times daily as needed for Pain or Temperature greater than (mild pain or temp > 100.5).      albuterol (PROVENTIL/VENTOLIN HFA) 90 mcg/actuation inhaler Inhale 2 puffs into the lungs every 6 (six) hours. 18 g 5    baclofen (LIORESAL) 20 MG tablet Take 1 tablet (20 mg total) by mouth 2 (two) times daily. 180 tablet 3    lisinopriL 10 MG tablet Take 1 tablet (10 mg total) by mouth once daily. 90 tablet 3    OXYGEN-AIR DELIVERY SYSTEMS MISC 3 L/min by local intranasal application route continuous. NC 3lpm continuous      oxymetazoline HCl (AFRIN, OXYMETAZOLINE, NASL) 1 spray by Nasal route as needed (stuffy nose).      rOPINIRole (REQUIP) 2 MG tablet Take 1 tablet (2 mg total) by mouth every evening. 90 tablet 3    aspirin 81 MG Chew Take 1 tablet (81 mg total) by mouth once daily. 360 tablet 0    nitroGLYCERIN (NITROSTAT) 0.4 MG SL tablet Place 1 tablet (0.4 mg total) under the tongue every 5 (five) minutes as needed for Chest pain. 20 tablet 0     No current facility-administered medications for this visit.       Review of Systems   Constitutional: Negative for chills, fatigue, fever and unexpected  weight change.   HENT: Negative for hearing loss and trouble swallowing.    Eyes: Negative for photophobia and visual disturbance.   Respiratory: Negative for cough, shortness of breath and wheezing.    Cardiovascular: Negative for chest pain, palpitations and leg swelling.   Gastrointestinal: Negative for abdominal pain and nausea.   Genitourinary: Negative for dysuria and frequency.   Musculoskeletal: Positive for arthralgias, gait problem, joint swelling and myalgias. Negative for back pain.   Skin: Negative for rash and wound.   Neurological: Positive for weakness. Negative for tremors, seizures, numbness and headaches.   Hematological: Does not bruise/bleed easily.         Objective:        Physical Exam:   Foot Exam    General  Orientation: alert and oriented to person, place, and time   Affect: appropriate   Gait: antalgic       Left Foot/Ankle      Inspection and Palpation  Ecchymosis: none  Tenderness: lesser metatarsophalangeal joints   Swelling: lesser metatarsophalangeal joints (Moderate edema of the lower extremity)  Arch: normal  Hammertoes: first toe, second toe, third toe, fourth toe and fifth toe  Claw toes: absent  Hallux valgus: yes  Hallux limitus: no  Skin Exam: callus; no drainage, no ulcer and no erythema   Neurovascular  Dorsalis pedis: 2+  Posterior tibial: 2+  Capillary refill: 2+  Varicose veins: not present  Saphenous nerve sensation: normal  Tibial nerve sensation: normal  Superficial peroneal nerve sensation: normal  Deep peroneal nerve sensation: normal  Sural nerve sensation: normal    Edema  Type of edema: pitting  Edema grade: 1+    Muscle Strength  Ankle dorsiflexion: 3  Ankle plantar flexion: 4-  Ankle inversion: 4-  Ankle eversion: 4-  Great toe extension: 4-  Great toe flexion: 4-    Range of Motion    Passive  Ankle dorsiflexion: 0      Tests  Anterior drawer: negative   Talar tilt: negative   PT Tinel's sign: negative  Paresthesia: negative        Physical  Exam  Cardiovascular:      Pulses:           Dorsalis pedis pulses are 2+ on the left side.        Posterior tibial pulses are 2+ on the left side.   Musculoskeletal:      Left foot: Bunion present.   Feet:      Left foot:      Skin integrity: Callus present. No ulcer or erythema.               Left Ankle/Foot Exam     Swelling   The patient is swollen on the lesser metatarsophalangeal joints.    Tenderness   The patient is tender to palpation of the lesser metatarsophalangeal joints.      Muscle Strength   Left Lower Extremity   Ankle Dorsiflexion:  3   Plantar flexion:  4-/5     Vascular Exam       Left Pulses  Dorsalis Pedis:      2+  Posterior Tibial:      2+             Imaging: none            Assessment:       1. Capsulitis of metatarsophalangeal (MTP) joint of left foot    2. Hammer toe of left foot    3. Hallux valgus of left foot    4. Left foot drop    5. Acquired keratoderma    6. Lower extremity edema    7. Left foot pain      Plan:   Capsulitis of metatarsophalangeal (MTP) joint of left foot  -     ANKLE FOOT ORTHOSIS FOR HOME USE    Hammer toe of left foot  -     ANKLE FOOT ORTHOSIS FOR HOME USE    Hallux valgus of left foot    Left foot drop  -     ANKLE FOOT ORTHOSIS FOR HOME USE    Acquired keratoderma    Lower extremity edema    Left foot pain      Follow up if symptoms worsen or fail to improve.    Procedures        I explained to the patient that as we discussed on her previous visit I believe that she needs a new AFO made with some manner custom foot plate that can accommodate a metatarsal pad.  I am going to give her a prescription refer her to [facility] for fitting of the brace.    Counseling:     I provided patient education verbally regarding:   Patient diagnosis, treatment options, as well as alternatives, risks, and benefits.     This note was created using Dragon voice recognition software that occasionally misinterpreted phrases or words.

## 2022-05-23 NOTE — PATIENT INSTRUCTIONS
Capsulitis of the Second Toe    What Is Capsulitis of the Second Toe?    Ligaments surrounding the joint at the base of the second toe form a capsule, which helps the joint to function properly. Capsulitis is a condition in which these ligaments have become inflamed. Although capsulitis can also occur in the joints of the third or fourth toes, it most commonly affects the second toe. This inflammation causes considerable discomfort and, if left untreated, can eventually lead to a weakening of surrounding ligaments that can cause dislocation of the toe. Capsulitis--also referred to as predislocation syndrome--is a common condition that can occur at any age.     Causes     It is generally believed that capsulitis of the second toe is a result of abnormal foot mechanics, where the ball of the foot beneath the toe joint takes an excessive amount of weightbearing pressure.  Certain conditions or characteristics can make a person prone to experiencing excessive pressure on the ball of the foot. These most commonly include a severe bunion deformity, a second toe longer than the big toe, an arch that is structurally unstable and a tight calf muscle.      Symptoms  Because capsulitis of the second toe is a progressive disorder and usually worsens if left untreated, early recognition and treatment are important. In the earlier stages--the best time to seek treatment--the symptoms may include:  Pain, particularly on the ball of the foot. It can feel like there's a marble in the shoe or a sock is bunched up  Swelling in the area of pain, including the base of the toe  Difficulty wearing shoes  Pain when walking barefoot     In more advanced stages, the supportive ligaments weaken, leading to failure of the joint to stabilize the toe. The unstable toe drifts toward the big toe and eventually crosses over and lies on top of the big toe--resulting in crossover toe, the end stage of capsulitis. The symptoms of crossover toe are the  same as those experienced during the earlier stages. Although the crossing over of the toe usually occurs over a period of time, it can appear more quickly if caused by injury or overuse.      Diagnosis  An accurate diagnosis is essential because the symptoms of capsulitis can be similar to those of a condition called Gage's neuroma, which is treated differently from capsulitis. In arriving at a diagnosis, the foot and ankle surgeon will examine the foot, press on it and maneuver it to reproduce the symptoms. The surgeon will also look for potential causes and test the stability of the joint. X-rays are usually ordered, and other imaging studies are sometimes needed.      Nonsurgical Treatment  The best time to treat capsulitis of the second toe is during the early stages, before the toe starts to drift toward the big toe. At that time, nonsurgical approaches can be used to stabilize the joint, reduce the symptoms and address the underlying cause of the condition.    The foot and ankle surgeon may select one or more of the following options for early treatment of capsulitis:  Rest and ice. Staying off the foot and applying ice packs help reduce the swelling and pain. Apply an ice pack, placing a thin towel between the ice and the skin. Use ice for 20 minutes and then wait at least 40 minutes before icing again.  Oral medications. Nonsteroidal anti-inflammatory drugs (NSAIDs), such as ibuprofen, may help relieve the pain and inflammation.  Taping/splinting. It may be necessary to tape the toe so that it will stay in the correct position. This helps relieve the pain and prevent further drifting of the toe.  Stretching. Stretching exercises may be prescribed for patients who have tight calf muscles.  Shoe modifications. Supportive shoes with stiff soles are recommended because they control the motion and lessen the amount of pressure on the ball of the foot.  Orthotic devices. Custom shoe inserts are often very  beneficial. These include arch supports or a metatarsal pad that distributes the weight away from the joint.      When Is Surgery Needed?  Once the second toe starts moving toward the big toe, it will never go back to its normal position unless surgery is performed. The foot and ankle surgeon will select the procedure or combination of procedures best suited to the individual patient.       Bunion    You have a bunion. This is a bony bump at the base of your big toe, along the inside edge of your foot. As the bump gets bigger, it can become red, swollen, and painful with shoe wear.  Bunions may occur if you wear shoes that are too tight and pinch your toes together. High heels may make this worse. In some cases a bunion is due to poor alignment of the foot and ankle. This puts extra weight on the instep of each foot.  Once a bunion forms, it changes the way weight is spread all across your foot. This causes the bunion to get worse over time. The big toe will bend more and more toward the other toes.  A minor bunion can be treated by:  Wearing properly fitting shoes  Using bunion pads  Wearing shoe inserts, called orthotics, to better align the foot and ankle  Physical therapy with ultrasound or whirlpool baths can ease pain, redness, and swelling. Severe cases may require surgery. If you dont treat what is causing the bunion, it may get larger and more painful.  Home care  Limit high heels. These shoes force your foot forward, crowding the toes together.  Switch to comfortable shoes with a wide toe area. Or have your existing shoes stretched by a shoe repair shop.  Avoid shoes that are tight, narrow, or pointed.  If you are flat-footed, using arch supports may help prevent further deformity. The best shoe inserts are the ones custom made by a foot specialist, called a podiatrist, or other healthcare provider.  Put a bunion pad over the bunion to ease pressure of your shoe against the bunion. You can buy these pads  at most pharmacies without a prescription  To reduce pain and swelling, apply an ice pack over the injured area for 15 to 20 minutes. Do this every 1 to 2 hours the first day. Keep using ice 3 to 4 times a day until the pain and swelling goes away.  To make an ice pack, put ice cubes in a sealed zip-lock plastic bag. Wrap the bag in a clean, thin towel or cloth. Never put ice or an ice pack directly on the skin.  You may use over-the-counter pain medicine to control pain, unless another medicine was prescribed. Talk with your provider before using these medicines if you have chronic liver or kidney disease, or ever had a stomach ulcer or GI (gastrointestinal) bleeding.  Follow-up care  Follow up with a podiatrist or foot doctor, or as advised.  If X-rays were taken, you will be notified of any new findings that may affect your care.  When to seek medical care  Contact your healthcare provider if any of the following occur:  Increasing pain or redness around the base of the big toe  Painful ingrown toenail, with redness and swelling or pus around the nail  Date Last Reviewed: 11/21/2015  © 1712-4762 [employer]. [address] [place] [address]. All rights reserved. This information is not intended as a substitute for professional medical care. Always follow your healthcare professional's instructions.     What Are Mallet, Hammer, and Claw Toes?  Mallet, hammer, and claw toes are most often caused by wearing shoes that are too short or heels that are too high. This jams the toes against the front of the shoe and causes one or more joints to bend. Rarely, disease can cause the joints in the toes to bend. Mallet, hammer, and claw toes are among the most common toe problems. They occur most often in the longest of the four smaller toes.      Inside your toes  There are 3 bones in each of your 4 smaller toes. Where 2 bones connect is called a joint. Normally the toes lie flat. But pressure on the  toes or the front of the foot can cause one or more joints to bend. This curls the toe. Toes that stay curled are called mallet toes, hammer toes, or claw toes, depending on which joints are bent.    Symptoms  You may feel pain in the toe or in the ball of your foot. A corn (a hard growth of skin on the top of the toe) may form where the toe rubs against the top of the shoe. Or a callus (a hard growth of skin on the bottom of the foot) may form under the tip of the toe or on the ball of the foot. Corns and calluses can also be painful.    Date Last Reviewed: 10/18/2015  © 7808-9512 [employer]. [address] [place] [address], [place] [address]. All rights reserved. This information is not intended as a substitute for professional medical care. Always follow your healthcare professional's instructions.

## 2022-05-31 ENCOUNTER — PATIENT MESSAGE (OUTPATIENT)
Dept: ADMINISTRATIVE | Facility: HOSPITAL | Age: 67
End: 2022-05-31
Payer: COMMERCIAL

## 2022-06-23 DIAGNOSIS — Z12.31 OTHER SCREENING MAMMOGRAM: ICD-10-CM

## 2022-10-24 ENCOUNTER — OFFICE VISIT (OUTPATIENT)
Dept: FAMILY MEDICINE | Facility: CLINIC | Age: 67
End: 2022-10-24
Payer: COMMERCIAL

## 2022-10-24 VITALS
RESPIRATION RATE: 14 BRPM | SYSTOLIC BLOOD PRESSURE: 139 MMHG | WEIGHT: 175.69 LBS | OXYGEN SATURATION: 95 % | HEART RATE: 97 BPM | TEMPERATURE: 98 F | HEIGHT: 68 IN | BODY MASS INDEX: 26.63 KG/M2 | DIASTOLIC BLOOD PRESSURE: 80 MMHG

## 2022-10-24 DIAGNOSIS — J44.1 COPD EXACERBATION: ICD-10-CM

## 2022-10-24 DIAGNOSIS — Z95.1 S/P CABG X 4: ICD-10-CM

## 2022-10-24 DIAGNOSIS — I65.23 BILATERAL CAROTID ARTERY STENOSIS: ICD-10-CM

## 2022-10-24 DIAGNOSIS — Z12.31 ENCOUNTER FOR SCREENING MAMMOGRAM FOR MALIGNANT NEOPLASM OF BREAST: ICD-10-CM

## 2022-10-24 DIAGNOSIS — Z12.11 ENCOUNTER FOR FIT (FECAL IMMUNOCHEMICAL TEST) SCREENING: ICD-10-CM

## 2022-10-24 DIAGNOSIS — I25.10 CORONARY ARTERY DISEASE INVOLVING NATIVE CORONARY ARTERY OF NATIVE HEART WITHOUT ANGINA PECTORIS: ICD-10-CM

## 2022-10-24 DIAGNOSIS — J42 CHRONIC BRONCHITIS, UNSPECIFIED CHRONIC BRONCHITIS TYPE: ICD-10-CM

## 2022-10-24 DIAGNOSIS — I69.354 HEMIPARESIS AFFECTING LEFT SIDE AS LATE EFFECT OF CEREBROVASCULAR ACCIDENT: ICD-10-CM

## 2022-10-24 DIAGNOSIS — I71.40 ABDOMINAL AORTIC ANEURYSM (AAA) WITHOUT RUPTURE, UNSPECIFIED PART: ICD-10-CM

## 2022-10-24 DIAGNOSIS — J30.1 SEASONAL ALLERGIC RHINITIS DUE TO POLLEN: ICD-10-CM

## 2022-10-24 DIAGNOSIS — N95.9 MENOPAUSAL AND POSTMENOPAUSAL DISORDER: ICD-10-CM

## 2022-10-24 DIAGNOSIS — I10 ESSENTIAL HYPERTENSION: ICD-10-CM

## 2022-10-24 DIAGNOSIS — Z23 NEED FOR PNEUMOCOCCAL VACCINATION: ICD-10-CM

## 2022-10-24 DIAGNOSIS — F17.200 TOBACCO USE DISORDER: ICD-10-CM

## 2022-10-24 DIAGNOSIS — Z23 FLU VACCINE NEED: ICD-10-CM

## 2022-10-24 DIAGNOSIS — E78.2 MIXED HYPERLIPIDEMIA: Primary | ICD-10-CM

## 2022-10-24 DIAGNOSIS — I69.30 HISTORY OF CVA WITH RESIDUAL DEFICIT: Chronic | ICD-10-CM

## 2022-10-24 DIAGNOSIS — M21.372 LEFT FOOT DROP: ICD-10-CM

## 2022-10-24 PROCEDURE — 99999 PR PBB SHADOW E&M-EST. PATIENT-LVL V: CPT | Mod: PBBFAC,,, | Performed by: FAMILY MEDICINE

## 2022-10-24 PROCEDURE — 4010F ACE/ARB THERAPY RXD/TAKEN: CPT | Mod: CPTII,S$GLB,, | Performed by: FAMILY MEDICINE

## 2022-10-24 PROCEDURE — 90677 PCV20 VACCINE IM: CPT | Mod: PBBFAC,PO

## 2022-10-24 PROCEDURE — 99214 PR OFFICE/OUTPT VISIT, EST, LEVL IV, 30-39 MIN: ICD-10-PCS | Mod: S$GLB,,, | Performed by: FAMILY MEDICINE

## 2022-10-24 PROCEDURE — 99999 PR PBB SHADOW E&M-EST. PATIENT-LVL V: ICD-10-PCS | Mod: PBBFAC,,, | Performed by: FAMILY MEDICINE

## 2022-10-24 PROCEDURE — 4010F PR ACE/ARB THEARPY RXD/TAKEN: ICD-10-PCS | Mod: CPTII,S$GLB,, | Performed by: FAMILY MEDICINE

## 2022-10-24 PROCEDURE — 90472 IMMUNIZATION ADMIN EACH ADD: CPT | Mod: PBBFAC,PO

## 2022-10-24 PROCEDURE — G0008 ADMIN INFLUENZA VIRUS VAC: HCPCS | Mod: PBBFAC,PO

## 2022-10-24 PROCEDURE — 99214 OFFICE O/P EST MOD 30 MIN: CPT | Mod: S$GLB,,, | Performed by: FAMILY MEDICINE

## 2022-10-24 RX ORDER — ATORVASTATIN CALCIUM 40 MG/1
40 TABLET, FILM COATED ORAL DAILY
Qty: 90 TABLET | Refills: 3 | Status: SHIPPED | OUTPATIENT
Start: 2022-10-24 | End: 2022-12-15 | Stop reason: SDUPTHER

## 2022-10-24 RX ORDER — IPRATROPIUM BROMIDE AND ALBUTEROL SULFATE 2.5; .5 MG/3ML; MG/3ML
3 SOLUTION RESPIRATORY (INHALATION) EVERY 6 HOURS PRN
Qty: 120 EACH | Refills: 11 | Status: SHIPPED | OUTPATIENT
Start: 2022-10-24 | End: 2023-10-24

## 2022-10-24 RX ORDER — ALBUTEROL SULFATE 90 UG/1
2 AEROSOL, METERED RESPIRATORY (INHALATION) EVERY 6 HOURS PRN
Qty: 54 G | Status: SHIPPED | OUTPATIENT
Start: 2022-10-24 | End: 2022-12-15 | Stop reason: SDUPTHER

## 2022-10-24 RX ORDER — FLUTICASONE PROPIONATE 50 MCG
1 SPRAY, SUSPENSION (ML) NASAL 2 TIMES DAILY
Qty: 16 G | Status: SHIPPED | OUTPATIENT
Start: 2022-10-24 | End: 2023-08-10 | Stop reason: ALTCHOICE

## 2022-10-24 NOTE — PROGRESS NOTES
Subjective:       Patient ID: Yadi Mccall is a 67 y.o. female.    Chief Complaint: Establish Care    HPI  Review of Systems   Constitutional:  Negative for fatigue and unexpected weight change.   Respiratory:  Negative for chest tightness and shortness of breath.    Cardiovascular:  Negative for chest pain, palpitations and leg swelling.   Gastrointestinal:  Negative for abdominal pain.   Musculoskeletal:  Negative for arthralgias.   Neurological:  Negative for dizziness, syncope, light-headedness and headaches.     Patient Active Problem List   Diagnosis    Cerebrovascular accident (CVA)    Nicotine dependence    Hyperlipidemia    Essential hypertension    Skin lesion of scalp    Hypokalemia    Palpitations    PVC's (premature ventricular contractions)    Abnormal nuclear stress test    Coronary artery disease involving native coronary artery of native heart without angina pectoris    S/P CABG x 4 2020 - LIMA to LAD; TWAN to OM; SVG's to diag and PDA    Atrial flutter, paroxysmal - post op event    Atrial fibrillation/flutter    CAD (coronary artery disease)    Abnormal CXR    Personal history of tobacco use, presenting hazards to health    Marijuana use    Anemia    Thrombocytosis    Hyponatremia    Generalized anxiety disorder    Elevated diaphragm    History of CVA with residual deficit    COPD (chronic obstructive pulmonary disease)    Hemiparesis affecting left side as late effect of cerebrovascular accident    AAA (abdominal aortic aneurysm) without rupture    Left foot drop    Restless leg syndrome     Patient is here to establish care. New to me. Uses walker rollator and cane. H/o non compliance with meds and is current smoker unwilling to quit    Refuses cancer screenings.     Podiatry Dr. SUMIT Ackerman treating capsulitis MTP left foot. Has AFO on left foot with shoe.  Orthotic specialist made adjustments and has helped pain    Card Dr. Woo cad s/p CABG x 4 9/2020 (had a flutter post op) , h/o CVA  2014  with residual left foot drop and LLE weakness, AAA    PVD -AAA, carotid disease u/s 2020 1. Chronic complete occlusion of the right internal carotid artery at its origin in this patient with reported known chronic occlusion.  2. Moderate homogeneous atherosclerotic plaque at the left carotid bifurcation resulting in approximately 50-69% luminal stenosis of the left internal carotid artery  U/s LE madalyn 2020 Ankle-brachial index of 0.86 on the right and 0.91 on the left.   moderate bilateral lower extremity atherosclerotic disease with monophasic waveforms and evidence of 30-49% luminal stenoses involving the mid and distal left femoral artery  Objective:      Physical Exam  Vitals and nursing note reviewed.   Constitutional:       Appearance: She is well-developed.   Cardiovascular:      Rate and Rhythm: Normal rate and regular rhythm.      Heart sounds: Normal heart sounds.   Pulmonary:      Effort: Pulmonary effort is normal.      Breath sounds: Normal breath sounds.   Skin:     General: Skin is warm and dry.   Neurological:      Mental Status: She is alert and oriented to person, place, and time.       Assessment:       1. Mixed hyperlipidemia    2. Essential hypertension    3. Coronary artery disease involving native coronary artery of native heart without angina pectoris    4. S/P CABG x 4 - LIMA to LAD; TWAN to OM; SVG's to diag and PDA    5. Abdominal aortic aneurysm (AAA) without rupture, unspecified part    6. History of CVA with residual deficit    7. Left foot drop    8. Hemiparesis affecting left side as late effect of cerebrovascular accident    9. Chronic bronchitis, unspecified chronic bronchitis type    10. Encounter for screening mammogram for malignant neoplasm of breast    11. Menopausal and postmenopausal disorder    12. Encounter for FIT (fecal immunochemical test) screening    13. Need for pneumococcal vaccination    14. Flu vaccine need    15. Tobacco use disorder    16. Bilateral carotid artery  stenosis    17. COPD exacerbation    18. Seasonal allergic rhinitis due to pollen        Plan:         1. Mixed hyperlipidemia  Restart statin due to risk factors  - CBC Auto Differential; Future  - Comprehensive Metabolic Panel; Future  - Lipid Panel; Future  - atorvastatin (LIPITOR) 40 MG tablet; Take 1 tablet (40 mg total) by mouth once daily.  Dispense: 90 tablet; Refill: 3    2. Essential hypertension  Controlled on current medications.  Continue current medications.      3. Coronary artery disease involving native coronary artery of native heart without angina pectoris  Cont to lower risk factors. Refer to establish care in [place]  - Ambulatory referral/consult to Cardiology; Future    4. S/P CABG x 4 - LIMA to LAD; TWAN to OM; SVG's to diag and PDA  refer  - Ambulatory referral/consult to Cardiology; Future    5. Abdominal aortic aneurysm (AAA) without rupture, unspecified part  Screen and treat as indicated:    - US Abdominal Aorta; Future    6. History of CVA with residual deficit  Cont use of orthotic and walker    7. Left foot drop  See above    8. Hemiparesis affecting left side as late effect of cerebrovascular accident  Chronic and stable. Cont fall precautions    9. Chronic bronchitis, unspecified chronic bronchitis type  Smoking cessation recommended. continue  - albuterol (PROVENTIL/VENTOLIN HFA) 90 mcg/actuation inhaler; Inhale 2 puffs into the lungs every 6 (six) hours as needed for Wheezing.  Dispense: 54 g; Refill: PRN  - albuterol-ipratropium (DUO-NEB) 2.5 mg-0.5 mg/3 mL nebulizer solution; Take 3 mLs by nebulization every 6 (six) hours as needed for Wheezing. Rescue  Dispense: 120 each; Refill: 11    10. Encounter for screening mammogram for malignant neoplasm of breast  Declined     11. Menopausal and postmenopausal disorder  Screen and treat as indicated:    - DXA Bone Density Spine And Hip; Future    12. Encounter for FIT (fecal immunochemical test) screening  Declined any colon cancer  screening    13. Need for pneumococcal vaccination  Immunize today.  Counseled patient on risks, benefits and side effects.  Patient elected to proceed with vaccination.    - (In Office Administered) Pneumococcal Conjugate Vaccine (20 Valent) (IM)    14. Flu vaccine need  Immunize today.  Counseled patient on risks, benefits and side effects.  Patient elected to proceed with vaccination.    - Influenza (FLUAD) - Quadrivalent (Adjuvanted) *Preferred* (65+) (PF)    15. Tobacco use disorder  Patient counseled on smoking cessation. I discussed options such as nicotine replacement products, wellbutrin, and chantix.  Side effects, benefits and risks were discussed regarding each.  Printed materials were given.  I offered a referral to [facility] smoking cessation program.  All questions were answered.      16. Bilateral carotid artery stenosis  Screen and treat as indicated:    - US Carotid Bilateral; Future    17. COPD exacerbation  Cont current gmgmt    18. Seasonal allergic rhinitis due to pollen  D/c afrin. Recommend otc non-sedating antihistamine such as Loratadine and/or steroid nasal spray such as Flonase as directed and as needed.  Please return to clinic if symptoms persist after these interventions.    - fluticasone propionate (FLONASE) 50 mcg/actuation nasal spray; 1 spray (50 mcg total) by Each Nostril route 2 (two) times a day.  Dispense: 16 g; Refill: PRN      Time spent with patient: 20 minutes    Patient with be reevaluated in 3 months or sooner prn    Greater than 50% of this visit was spent counseling as described in above documentation:Yes

## 2022-10-24 NOTE — PROGRESS NOTES
2 patient identifiers used (name and ). Administered Flu vaccine IM. Patient tolerated well, no bleeding at insertion site noted. Pain 0 on scale 0/10. Aseptic technique maintained. Immunization information given to patient. Advised patient to remain in clinic for 15 minutes to monitor for reaction. No AR noted.  2 patient identifiers used (name and ). Administered Prevnar 20 vaccine IM. Patient tolerated well, no bleeding at insertion site noted. Pain rated as 0 on scale of 0/10. Aseptic technique maintained. Immunization information given to patient.

## 2022-11-04 ENCOUNTER — TELEPHONE (OUTPATIENT)
Dept: FAMILY MEDICINE | Facility: CLINIC | Age: 67
End: 2022-11-04
Payer: MEDICARE

## 2022-11-04 DIAGNOSIS — G25.81 RESTLESS LEG SYNDROME: ICD-10-CM

## 2022-11-04 NOTE — TELEPHONE ENCOUNTER
----- Message from Dilma Leavitt sent at 11/4/2022 11:40 AM CDT -----  Contact: self  Type:  RX Refill Request        Who Called:  patient  Refill or New Rx:  refill  RX Name and Strength:  rOPINIRole (REQUIP) 2 MG tablet  How is the patient currently taking it? (ex. 1XDay):  2 times a day  Is this a 30 day or 90 day RX:  90d  Preferred Pharmacy with phone number:      [facility] - [place], [place] - [address]  [address] [address]  Phone: [phone] Fax: [phone]       Local or Mail Order:  mail  Ordering Provider:  Dr. Jessee Barnard Call Back Number:  [phone]   Additional Information:  The caller stated that she needs a refill on her medication. Please call pt back. Thanks.

## 2022-11-04 NOTE — TELEPHONE ENCOUNTER
----- Message from Dilma Leavitt sent at 11/4/2022 11:38 AM CDT -----  Contact: self  Type: Needs Medical Advice    Who Called:  patient  Symptoms (please be specific):  PA Jesse Mcknight    Best Call Back Number: [phone]   Additional Information: The pt stated that the office needs to send in a PA for blood work  to [place]. The pt stated that she is getting her blood work on 11/5. Please call pt back and advice. Thanks.              
Returned call to [facility] in regards to PA for blood work. Left voicemail to return call.    
Pt came in with sob, near syncopal episode no trauma to head, hypotensive, complaining of cp--ekg came stemi called.  Concern of neuro symptoms sent for dissection study. Ct showed pulmonary embolism. TPA and heparin started.  Discussed with IR and ICU.  Pt had 5 sec episode of pt staring off and mumbling. as per fam no tonic clonic.  pt once alert said he was sleeping. concern of signs of stroke after tpa started repeat CT head done-- IV contrast still in system.  DIscussed with family and pt about tpa and neuro symptoms and concern for head bleed.  Pt and family decided that tpa tx is more important at this moment with close follow up for signs of neuro episodes.

## 2022-11-07 RX ORDER — ROPINIROLE 2 MG/1
2 TABLET, FILM COATED ORAL NIGHTLY
Qty: 90 TABLET | Refills: 3 | Status: SHIPPED | OUTPATIENT
Start: 2022-11-07 | End: 2023-01-09 | Stop reason: SDUPTHER

## 2022-12-06 ENCOUNTER — PES CALL (OUTPATIENT)
Dept: ADMINISTRATIVE | Facility: CLINIC | Age: 67
End: 2022-12-06
Payer: MEDICARE

## 2022-12-15 DIAGNOSIS — I10 ESSENTIAL HYPERTENSION: ICD-10-CM

## 2022-12-15 DIAGNOSIS — E78.2 MIXED HYPERLIPIDEMIA: ICD-10-CM

## 2022-12-15 DIAGNOSIS — J42 CHRONIC BRONCHITIS, UNSPECIFIED CHRONIC BRONCHITIS TYPE: ICD-10-CM

## 2022-12-15 NOTE — TELEPHONE ENCOUNTER
Care Due:                  Date            Visit Type   Department     Provider  --------------------------------------------------------------------------------                                EP -                              PRIMARY      [facility]  Last Visit: 10-      CARE ([facility])   MEDICINE       Megan Wadsworth  Next Visit: None Scheduled  None         None Found                                                            Last  Test          Frequency    Reason                     Performed    Due Date  --------------------------------------------------------------------------------    CMP.........  12 months..  atorvastatin.............  02- 02-    Lipid Panel.  12 months..  atorvastatin.............  02- 02-    [employer] Embedded Care Gaps. Reference number: [id number]. 12/15/2022   11:44:54 AM CST

## 2022-12-18 RX ORDER — LISINOPRIL 10 MG/1
10 TABLET ORAL DAILY
Qty: 90 TABLET | Refills: 3 | Status: SHIPPED | OUTPATIENT
Start: 2022-12-18 | End: 2023-02-01 | Stop reason: SDUPTHER

## 2022-12-18 RX ORDER — ALBUTEROL SULFATE 90 UG/1
2 AEROSOL, METERED RESPIRATORY (INHALATION) EVERY 6 HOURS PRN
Qty: 54 G | Status: SHIPPED | OUTPATIENT
Start: 2022-12-18 | End: 2023-01-09 | Stop reason: SDUPTHER

## 2022-12-18 RX ORDER — ATORVASTATIN CALCIUM 40 MG/1
40 TABLET, FILM COATED ORAL DAILY
Qty: 90 TABLET | Refills: 3 | Status: SHIPPED | OUTPATIENT
Start: 2022-12-18 | End: 2023-04-10 | Stop reason: SDUPTHER

## 2023-01-03 ENCOUNTER — PATIENT MESSAGE (OUTPATIENT)
Dept: ADMINISTRATIVE | Facility: HOSPITAL | Age: 68
End: 2023-01-03
Payer: MEDICARE

## 2023-01-09 ENCOUNTER — OFFICE VISIT (OUTPATIENT)
Dept: FAMILY MEDICINE | Facility: CLINIC | Age: 68
End: 2023-01-09
Payer: MEDICARE

## 2023-01-09 VITALS
TEMPERATURE: 98 F | HEART RATE: 100 BPM | HEIGHT: 68 IN | DIASTOLIC BLOOD PRESSURE: 64 MMHG | RESPIRATION RATE: 20 BRPM | SYSTOLIC BLOOD PRESSURE: 119 MMHG | BODY MASS INDEX: 26.37 KG/M2 | WEIGHT: 174 LBS

## 2023-01-09 DIAGNOSIS — Z78.0 ENCOUNTER FOR OSTEOPOROSIS SCREENING IN ASYMPTOMATIC POSTMENOPAUSAL PATIENT: ICD-10-CM

## 2023-01-09 DIAGNOSIS — Z13.820 ENCOUNTER FOR OSTEOPOROSIS SCREENING IN ASYMPTOMATIC POSTMENOPAUSAL PATIENT: ICD-10-CM

## 2023-01-09 DIAGNOSIS — G25.81 RESTLESS LEG SYNDROME: ICD-10-CM

## 2023-01-09 DIAGNOSIS — J42 CHRONIC BRONCHITIS, UNSPECIFIED CHRONIC BRONCHITIS TYPE: Primary | ICD-10-CM

## 2023-01-09 DIAGNOSIS — E78.2 MIXED HYPERLIPIDEMIA: ICD-10-CM

## 2023-01-09 DIAGNOSIS — I10 ESSENTIAL HYPERTENSION: ICD-10-CM

## 2023-01-09 PROCEDURE — 99204 OFFICE O/P NEW MOD 45 MIN: CPT | Mod: S$GLB,,, | Performed by: NURSE PRACTITIONER

## 2023-01-09 PROCEDURE — 3288F PR FALLS RISK ASSESSMENT DOCUMENTED: ICD-10-PCS | Mod: CPTII,S$GLB,, | Performed by: NURSE PRACTITIONER

## 2023-01-09 PROCEDURE — 3078F PR MOST RECENT DIASTOLIC BLOOD PRESSURE < 80 MM HG: ICD-10-PCS | Mod: CPTII,S$GLB,, | Performed by: NURSE PRACTITIONER

## 2023-01-09 PROCEDURE — 1101F PT FALLS ASSESS-DOCD LE1/YR: CPT | Mod: CPTII,S$GLB,, | Performed by: NURSE PRACTITIONER

## 2023-01-09 PROCEDURE — 3008F BODY MASS INDEX DOCD: CPT | Mod: CPTII,S$GLB,, | Performed by: NURSE PRACTITIONER

## 2023-01-09 PROCEDURE — 99204 PR OFFICE/OUTPT VISIT, NEW, LEVL IV, 45-59 MIN: ICD-10-PCS | Mod: S$GLB,,, | Performed by: NURSE PRACTITIONER

## 2023-01-09 PROCEDURE — 3074F SYST BP LT 130 MM HG: CPT | Mod: CPTII,S$GLB,, | Performed by: NURSE PRACTITIONER

## 2023-01-09 PROCEDURE — 3074F PR MOST RECENT SYSTOLIC BLOOD PRESSURE < 130 MM HG: ICD-10-PCS | Mod: CPTII,S$GLB,, | Performed by: NURSE PRACTITIONER

## 2023-01-09 PROCEDURE — 3288F FALL RISK ASSESSMENT DOCD: CPT | Mod: CPTII,S$GLB,, | Performed by: NURSE PRACTITIONER

## 2023-01-09 PROCEDURE — 1101F PR PT FALLS ASSESS DOC 0-1 FALLS W/OUT INJ PAST YR: ICD-10-PCS | Mod: CPTII,S$GLB,, | Performed by: NURSE PRACTITIONER

## 2023-01-09 PROCEDURE — 3008F PR BODY MASS INDEX (BMI) DOCUMENTED: ICD-10-PCS | Mod: CPTII,S$GLB,, | Performed by: NURSE PRACTITIONER

## 2023-01-09 PROCEDURE — 3078F DIAST BP <80 MM HG: CPT | Mod: CPTII,S$GLB,, | Performed by: NURSE PRACTITIONER

## 2023-01-09 PROCEDURE — 1125F AMNT PAIN NOTED PAIN PRSNT: CPT | Mod: CPTII,S$GLB,, | Performed by: NURSE PRACTITIONER

## 2023-01-09 PROCEDURE — 1125F PR PAIN SEVERITY QUANTIFIED, PAIN PRESENT: ICD-10-PCS | Mod: CPTII,S$GLB,, | Performed by: NURSE PRACTITIONER

## 2023-01-09 RX ORDER — ROPINIROLE 1 MG/1
1 TABLET, FILM COATED ORAL 3 TIMES DAILY
Qty: 90 TABLET | Refills: 3 | Status: SHIPPED | OUTPATIENT
Start: 2023-01-09 | End: 2023-05-12

## 2023-01-09 RX ORDER — ALBUTEROL SULFATE 90 UG/1
2 AEROSOL, METERED RESPIRATORY (INHALATION) EVERY 6 HOURS PRN
Qty: 54 G | Status: SHIPPED | OUTPATIENT
Start: 2023-01-09 | End: 2024-01-17 | Stop reason: SDUPTHER

## 2023-01-12 ENCOUNTER — OFFICE VISIT (OUTPATIENT)
Dept: HOME HEALTH SERVICES | Facility: CLINIC | Age: 68
End: 2023-01-12
Payer: MEDICARE

## 2023-01-12 DIAGNOSIS — M21.372 LEFT FOOT DROP: ICD-10-CM

## 2023-01-12 DIAGNOSIS — Z00.00 ENCOUNTER FOR PREVENTIVE HEALTH EXAMINATION: Primary | ICD-10-CM

## 2023-01-12 DIAGNOSIS — I69.354 HEMIPARESIS AFFECTING LEFT SIDE AS LATE EFFECT OF CEREBROVASCULAR ACCIDENT: ICD-10-CM

## 2023-01-12 DIAGNOSIS — G25.81 RESTLESS LEG SYNDROME: ICD-10-CM

## 2023-01-12 DIAGNOSIS — I70.0 CALCIFICATION OF AORTA: ICD-10-CM

## 2023-01-12 DIAGNOSIS — I69.30 HISTORY OF CVA WITH RESIDUAL DEFICIT: Chronic | ICD-10-CM

## 2023-01-12 DIAGNOSIS — E78.2 MIXED HYPERLIPIDEMIA: ICD-10-CM

## 2023-01-12 DIAGNOSIS — F17.200 NICOTINE DEPENDENCE, UNCOMPLICATED, UNSPECIFIED NICOTINE PRODUCT TYPE: ICD-10-CM

## 2023-01-12 DIAGNOSIS — I10 ESSENTIAL HYPERTENSION: ICD-10-CM

## 2023-01-12 DIAGNOSIS — J42 CHRONIC BRONCHITIS, UNSPECIFIED CHRONIC BRONCHITIS TYPE: ICD-10-CM

## 2023-01-12 DIAGNOSIS — I71.40 ABDOMINAL AORTIC ANEURYSM (AAA) WITHOUT RUPTURE, UNSPECIFIED PART: ICD-10-CM

## 2023-01-12 PROCEDURE — 1101F PR PT FALLS ASSESS DOC 0-1 FALLS W/OUT INJ PAST YR: ICD-10-PCS | Mod: CPTII,S$GLB,, | Performed by: NURSE PRACTITIONER

## 2023-01-12 PROCEDURE — 1101F PT FALLS ASSESS-DOCD LE1/YR: CPT | Mod: CPTII,S$GLB,, | Performed by: NURSE PRACTITIONER

## 2023-01-12 PROCEDURE — 1159F PR MEDICATION LIST DOCUMENTED IN MEDICAL RECORD: ICD-10-PCS | Mod: CPTII,S$GLB,, | Performed by: NURSE PRACTITIONER

## 2023-01-12 PROCEDURE — 1159F MED LIST DOCD IN RCRD: CPT | Mod: CPTII,S$GLB,, | Performed by: NURSE PRACTITIONER

## 2023-01-12 PROCEDURE — 3008F BODY MASS INDEX DOCD: CPT | Mod: CPTII,S$GLB,, | Performed by: NURSE PRACTITIONER

## 2023-01-12 PROCEDURE — 3078F PR MOST RECENT DIASTOLIC BLOOD PRESSURE < 80 MM HG: ICD-10-PCS | Mod: CPTII,S$GLB,, | Performed by: NURSE PRACTITIONER

## 2023-01-12 PROCEDURE — 1126F AMNT PAIN NOTED NONE PRSNT: CPT | Mod: CPTII,S$GLB,, | Performed by: NURSE PRACTITIONER

## 2023-01-12 PROCEDURE — G0402 PR WELCOME MEDICARE PREVENTIVE VISIT NEW ENROLLEE: ICD-10-PCS | Mod: S$GLB,,, | Performed by: NURSE PRACTITIONER

## 2023-01-12 PROCEDURE — 1160F RVW MEDS BY RX/DR IN RCRD: CPT | Mod: CPTII,S$GLB,, | Performed by: NURSE PRACTITIONER

## 2023-01-12 PROCEDURE — 1160F PR REVIEW ALL MEDS BY PRESCRIBER/CLIN PHARMACIST DOCUMENTED: ICD-10-PCS | Mod: CPTII,S$GLB,, | Performed by: NURSE PRACTITIONER

## 2023-01-12 PROCEDURE — G0402 INITIAL PREVENTIVE EXAM: HCPCS | Mod: S$GLB,,, | Performed by: NURSE PRACTITIONER

## 2023-01-12 PROCEDURE — 3288F FALL RISK ASSESSMENT DOCD: CPT | Mod: CPTII,S$GLB,, | Performed by: NURSE PRACTITIONER

## 2023-01-12 PROCEDURE — 1126F PR PAIN SEVERITY QUANTIFIED, NO PAIN PRESENT: ICD-10-PCS | Mod: CPTII,S$GLB,, | Performed by: NURSE PRACTITIONER

## 2023-01-12 PROCEDURE — 3078F DIAST BP <80 MM HG: CPT | Mod: CPTII,S$GLB,, | Performed by: NURSE PRACTITIONER

## 2023-01-12 PROCEDURE — 3074F PR MOST RECENT SYSTOLIC BLOOD PRESSURE < 130 MM HG: ICD-10-PCS | Mod: CPTII,S$GLB,, | Performed by: NURSE PRACTITIONER

## 2023-01-12 PROCEDURE — 3008F PR BODY MASS INDEX (BMI) DOCUMENTED: ICD-10-PCS | Mod: CPTII,S$GLB,, | Performed by: NURSE PRACTITIONER

## 2023-01-12 PROCEDURE — 3288F PR FALLS RISK ASSESSMENT DOCUMENTED: ICD-10-PCS | Mod: CPTII,S$GLB,, | Performed by: NURSE PRACTITIONER

## 2023-01-12 PROCEDURE — 3074F SYST BP LT 130 MM HG: CPT | Mod: CPTII,S$GLB,, | Performed by: NURSE PRACTITIONER

## 2023-01-13 VITALS
HEART RATE: 91 BPM | HEIGHT: 66 IN | DIASTOLIC BLOOD PRESSURE: 66 MMHG | BODY MASS INDEX: 27.97 KG/M2 | OXYGEN SATURATION: 95 % | SYSTOLIC BLOOD PRESSURE: 105 MMHG | WEIGHT: 174 LBS

## 2023-01-13 PROBLEM — I70.0 CALCIFICATION OF AORTA: Status: ACTIVE | Noted: 2020-08-18

## 2023-01-13 NOTE — PROGRESS NOTES
Patient ID: Yadi Mccall is a 67 y.o. female.    Chief Complaint: Establish Care (m) and Medication Refill (Pt requesting refill of Requip and new rx for nebulizer. KM)    Ms. Mccall is a very pleasant 68 yo who presents today to establish care with me as her PCP. She states she was seen by provider at [facility]. She states she has an appointment for [facility] to do later this week.     We reviewed overdue health maintenance and she currently declines all cancer screening.    She is a smoker and not willing to quit. She has a history of follow up non compliance and medication non compliance but she is requesting refill on medication. She denies any other issues or complaints.     Past Medical History:   Diagnosis Date    AAA (abdominal aortic aneurysm)     Anticoagulant long-term use     Coronary artery disease     Foot drop, left foot     Hyperlipidemia     Hypertension     Stroke 2014    LEFT SIDED WEAKNESS     Past Surgical History:   Procedure Laterality Date    APPENDECTOMY      CORONARY ANGIOGRAPHY N/A 7/24/2020    Procedure: ANGIOGRAM, CORONARY ARTERY;  Surgeon: Norbert Vallecillo MD;  Location: [facility] CATH;  Service: Cardiology;  Laterality: N/A;    CORONARY ARTERY BYPASS GRAFT (CABG) N/A 8/21/2020    Procedure: CORONARY ARTERY BYPASS GRAFT (CABG) BILATERAL MAMMARY  x  4 VESSELS;  Surgeon: aPscual Gray MD;  Location: [facility] OR;  Service: Cardiovascular;  Laterality: N/A;    ENDOSCOPIC HARVEST OF VEIN N/A 8/21/2020    Procedure: HARVEST-VEIN-ENDOVASCULAR;  Surgeon: Pascual Gray MD;  Location: [facility] OR;  Service: Cardiovascular;  Laterality: N/A;    FOOT SURGERY      HERNIA REPAIR      HYSTERECTOMY      LEFT HEART CATHETERIZATION N/A 7/24/2020    Procedure: Left heart cath;  Surgeon: Norbert Vallecillo MD;  Location: [facility] CATH;  Service: Cardiology;  Laterality: N/A;    TONSILLECTOMY           Tobacco History:  reports that she has been smoking cigarettes. She has a 50.00 pack-year  smoking history. She has never used smokeless tobacco.      Review of patient's allergies indicates:   Allergen Reactions    Chlorthalidone     Codeine     Dyazide [triamterene-hydrochlorothiazid]     Penicillins        Current Outpatient Medications:     acetaminophen (TYLENOL) 500 MG tablet, Take 500 mg by mouth 2 (two) times daily as needed for Pain or Temperature greater than (mild pain or temp > 100.5)., Disp: , Rfl:     atorvastatin (LIPITOR) 40 MG tablet, Take 1 tablet (40 mg total) by mouth once daily., Disp: 90 tablet, Rfl: 3    baclofen (LIORESAL) 20 MG tablet, Take 1 tablet (20 mg total) by mouth 2 (two) times daily., Disp: 180 tablet, Rfl: 3    lisinopriL 10 MG tablet, Take 1 tablet (10 mg total) by mouth once daily., Disp: 90 tablet, Rfl: 3    oxymetazoline HCl (AFRIN, OXYMETAZOLINE, NASL), 1 spray by Nasal route as needed (stuffy nose)., Disp: , Rfl:     albuterol (PROVENTIL/VENTOLIN HFA) 90 mcg/actuation inhaler, Inhale 2 puffs into the lungs every 6 (six) hours as needed for Wheezing., Disp: 54 g, Rfl: PRN    albuterol-ipratropium (DUO-NEB) 2.5 mg-0.5 mg/3 mL nebulizer solution, Take 3 mLs by nebulization every 6 (six) hours as needed for Wheezing. Rescue, Disp: 120 each, Rfl: 11    aspirin 81 MG Chew, Take 1 tablet (81 mg total) by mouth once daily., Disp: 360 tablet, Rfl: 0    fluticasone propionate (FLONASE) 50 mcg/actuation nasal spray, 1 spray (50 mcg total) by Each Nostril route 2 (two) times a day. (Patient not taking: Reported on 1/9/2023), Disp: 16 g, Rfl: PRN    nitroGLYCERIN (NITROSTAT) 0.4 MG SL tablet, Place 1 tablet (0.4 mg total) under the tongue every 5 (five) minutes as needed for Chest pain., Disp: 20 tablet, Rfl: 0    OXYGEN-AIR DELIVERY SYSTEMS MISC, 3 L/min by local intranasal application route continuous. NC 3lpm continuous, Disp: , Rfl:     rOPINIRole (REQUIP) 1 MG tablet, Take 1 tablet (1 mg total) by mouth 3 (three) times daily., Disp: 90 tablet, Rfl:  "3    Review of Systems   Constitutional:  Positive for activity change. Negative for appetite change, fatigue, fever and unexpected weight change.   HENT:  Negative for congestion, mouth sores, nosebleeds, postnasal drip, rhinorrhea, sinus pressure, sinus pain, sneezing, sore throat and trouble swallowing.    Eyes:  Negative for pain, redness and itching.   Respiratory:  Negative for chest tightness and shortness of breath.    Cardiovascular:  Negative for chest pain and palpitations.   Gastrointestinal:  Negative for abdominal pain, blood in stool, constipation, diarrhea, nausea and vomiting.   Endocrine: Negative for cold intolerance, heat intolerance, polydipsia, polyphagia and polyuria.   Genitourinary:  Negative for difficulty urinating, dysuria, flank pain, frequency, genital sores, menstrual problem, urgency, vaginal bleeding and vaginal discharge.   Musculoskeletal:  Positive for arthralgias and gait problem. Negative for myalgias.   Skin:  Negative for rash and wound.   Allergic/Immunologic: Negative for environmental allergies and food allergies.   Neurological:  Negative for dizziness, light-headedness and headaches.   Hematological:  Negative for adenopathy. Does not bruise/bleed easily.   Psychiatric/Behavioral:  Negative for agitation, confusion, hallucinations, self-injury and sleep disturbance. The patient is nervous/anxious.         Objective:      Vitals:    01/09/23 1434   BP: 119/64   Pulse: 100   Resp: 20   Temp: 97.9 °F (36.6 °C)   TempSrc: Oral   Weight: 78.9 kg (174 lb)   Height: 5' 8" (1.727 m)     Physical Exam  Vitals reviewed.   Constitutional:       General: She is not in acute distress.     Appearance: Normal appearance. She is normal weight. She is ill-appearing. She is not toxic-appearing or diaphoretic.      Comments: Chronically ill appearing patient    HENT:      Head: Normocephalic and atraumatic.      Right Ear: Tympanic membrane and external ear normal. There is no impacted " cerumen.      Left Ear: Tympanic membrane and external ear normal. There is no impacted cerumen.      Nose: Nose normal. No congestion or rhinorrhea.      Mouth/Throat:      Mouth: Mucous membranes are moist.      Pharynx: Oropharynx is clear. No oropharyngeal exudate or posterior oropharyngeal erythema.   Eyes:      General: No scleral icterus.        Right eye: No discharge.         Left eye: No discharge.      Extraocular Movements: Extraocular movements intact.      Conjunctiva/sclera: Conjunctivae normal.      Pupils: Pupils are equal, round, and reactive to light.   Neck:      Vascular: No carotid bruit.   Cardiovascular:      Rate and Rhythm: Normal rate and regular rhythm.      Pulses: Normal pulses.      Heart sounds: Normal heart sounds. No murmur heard.    No friction rub. No gallop.   Pulmonary:      Effort: Pulmonary effort is normal. No respiratory distress.      Breath sounds: Normal breath sounds. No stridor. No rales.   Chest:      Chest wall: No tenderness.   Abdominal:      General: Abdomen is flat. Bowel sounds are normal.      Palpations: Abdomen is soft. There is no mass.      Tenderness: There is no abdominal tenderness. There is no guarding.   Musculoskeletal:         General: No swelling or signs of injury. Normal range of motion.      Cervical back: Normal range of motion and neck supple. No rigidity or tenderness.      Right lower leg: No edema.      Left lower leg: No edema.   Skin:     General: Skin is warm and dry.      Capillary Refill: Capillary refill takes less than 2 seconds.      Findings: No bruising, lesion or rash.   Neurological:      General: No focal deficit present.      Mental Status: She is alert and oriented to person, place, and time. Mental status is at baseline.      Motor: No weakness.      Coordination: Coordination normal.   Psychiatric:         Mood and Affect: Mood normal.         Behavior: Behavior normal.         Thought Content: Thought content normal.          Judgment: Judgment normal.         Assessment:       1. Chronic bronchitis, unspecified chronic bronchitis type    2. Restless leg syndrome    3. Essential hypertension    4. Encounter for osteoporosis screening in asymptomatic postmenopausal patient    5. Mixed hyperlipidemia           Plan:       Chronic bronchitis, unspecified chronic bronchitis type  -     CBC auto differential; Future; Expected date: 01/09/2023  -     Comprehensive Metabolic Panel; Future; Expected date: 01/09/2023  -     NEBULIZER KIT (SUPPLIES) FOR HOME USE  -     albuterol (PROVENTIL/VENTOLIN HFA) 90 mcg/actuation inhaler; Inhale 2 puffs into the lungs every 6 (six) hours as needed for Wheezing.  Dispense: 54 g; Refill: PRN    Restless leg syndrome  -     rOPINIRole (REQUIP) 1 MG tablet; Take 1 tablet (1 mg total) by mouth 3 (three) times daily.  Dispense: 90 tablet; Refill: 3    Essential hypertension  -     TSH; Future; Expected date: 01/09/2023    Encounter for osteoporosis screening in asymptomatic postmenopausal patient  -     DXA Bone Density Spine And Hip; Future; Expected date: 01/09/2023    Mixed hyperlipidemia  -     Lipid Panel; Future; Expected date: 01/09/2023      Follow up in about 3 months (around 4/9/2023), or if symptoms worsen or fail to improve, for HLD/COPD Medication refills.        1/13/2023 Keshia Chan NP

## 2023-01-13 NOTE — PATIENT INSTRUCTIONS
Counseling and Referral of Other Preventative  (Italic type indicates deductible and co-insurance are waived)    Patient Name: Yadi Mccall  Today's Date: 1/13/2023    Health Maintenance       Date Due Completion Date    DEXA Scan Never done ---    Shingles Vaccine (2 of 3) 03/20/2017 1/23/2017    COVID-19 Vaccine (3 - Booster for Pfizer series) 10/09/2021 8/14/2021    LDCT Lung Screen 01/09/2024 (Originally 9/3/2021) 9/3/2020    Mammogram 01/09/2024 (Originally 7/26/1973) ---    Lipid Panel 02/12/2023 2/12/2022    TETANUS VACCINE 02/15/2023 2/15/2013    High Dose Statin 01/09/2024 1/9/2023        No orders of the defined types were placed in this encounter.    The following information is provided to all patients.  This information is to help you find resources for any of the problems found today that may be affecting your health:                Living healthy guide: www.[facility].[place].gov      Understanding Diabetes: www.diabetes.org      Eating healthy: www.cdc.gov/healthyweight      CDC home safety checklist: www.cdc.gov/steadi/patient.html      Agency on Aging: www.goea.[place].gov      Alcoholics anonymous (AA): www.aa.org      Physical Activity: www.manjit.nih.gov/fa7ffhx      Tobacco use: www.quitwithusla.org     
none

## 2023-01-13 NOTE — PROGRESS NOTES
"  Yadi Mccall presented for a  Medicare AWV and comprehensive Health Risk Assessment today. The following components were reviewed and updated:    Medical history  Family History  Social history  Allergies and Current Medications  Health Risk Assessment  Health Maintenance  Care Team         ** See Completed Assessments for Annual Wellness Visit within the encounter summary.**         The following assessments were completed:  Living Situation  CAGE  Depression Screening  Timed Get Up and Go  Whisper Test  Cognitive Function Screening      Nutrition Screening  ADL Screening  PAQ Screening    Review for Opioid Screening: Patient does not have rx for Opioids.  Review for Substance Use Disorders: Patient does not use substance.      Vitals:    01/12/23 0814   BP: 105/66   Pulse: 91   SpO2: 95%   Weight: 78.9 kg (174 lb)   Height: 5' 6" (1.676 m)     Body mass index is 28.08 kg/m².  Physical Exam  Vitals reviewed.   Constitutional:       Appearance: She is overweight.   HENT:      Head: Normocephalic.   Eyes:      Pupils: Pupils are equal, round, and reactive to light.   Cardiovascular:      Rate and Rhythm: Normal rate and regular rhythm.      Heart sounds: Normal heart sounds.   Pulmonary:      Effort: Pulmonary effort is normal.      Breath sounds: Normal breath sounds.   Abdominal:      General: Bowel sounds are normal.      Palpations: Abdomen is soft.   Musculoskeletal:         General: Normal range of motion.      Cervical back: Normal range of motion.   Skin:     General: Skin is warm and dry.   Neurological:      Mental Status: She is alert and oriented to person, place, and time.      Motor: Weakness (left side) present.      Gait: Gait abnormal.      Comments: Left foot drop   Psychiatric:         Behavior: Behavior normal.             Diagnoses and health risks identified today and associated recommendations/orders:    1. Encounter for preventive health examination  - Above assessments completed. Preventive " measures and health maintenance reviewed with patient.  -declines cancer screenings    2. History of CVA with residual deficit  Stable, followed by PCP    3. Hemiparesis affecting left side as late effect of cerebrovascular accident  Chronic and stable, followed by PCP  -orthotic brace, uses walker  -no falls, discussed safety and fall precautions    4. Abdominal aortic aneurysm (AAA) without rupture, unspecified part  Stable, followed by PCP  -encouraged pt to schedule US recently ordered by PCP    5. Calcification of aorta  -Noted on imaging   -on statin    6. Chronic bronchitis, unspecified chronic bronchitis type  Stable, followed by PCP  -on albuterol and nebs    7. Left foot drop  Stable, followed by PCP  -orthotic brace, uses walker  -no falls, discussed safety and fall precautions    8. Mixed hyperlipidemia  Stable, followed by PCP  -on statin    9. Essential hypertension  Stable, followed by PCP  -on lisinopril    10. Restless leg syndrome  Stable, followed by PCP  -on ropinirole    11. Nicotine dependence, uncomplicated, unspecified nicotine product type  -encouraged cessation, declined, counseled      Provided Yadi with a 5-10 year written screening schedule and personal prevention plan. Recommendations were developed using the USPSTF age appropriate recommendations. Education, counseling, and referrals were provided as needed. After Visit Summary printed and given to patient which includes a list of additional screenings\tests needed.    Follow up in about 1 year (around 1/12/2024) for your next annual wellness visit.    Lauren Oconnor NP      I offered to discuss advanced care planning, including how to pick a person who would make decisions for you if you were unable to make them for yourself, called a health care power of , and what kind of decisions you might make such as use of life sustaining treatments such as ventilators and tube feeding when faced with a life limiting illness recorded  on a living will that they will need to know. (How you want to be cared for as you near the end of your natural life)     X  Patient has advanced directives on file, which we reviewed, and they do not wish to make changes.

## 2023-01-17 ENCOUNTER — HOSPITAL ENCOUNTER (OUTPATIENT)
Dept: RADIOLOGY | Facility: HOSPITAL | Age: 68
Discharge: HOME | End: 2023-01-17
Attending: NURSE PRACTITIONER
Payer: MEDICARE

## 2023-01-17 DIAGNOSIS — Z78.0 ENCOUNTER FOR OSTEOPOROSIS SCREENING IN ASYMPTOMATIC POSTMENOPAUSAL PATIENT: ICD-10-CM

## 2023-01-17 DIAGNOSIS — Z13.820 ENCOUNTER FOR OSTEOPOROSIS SCREENING IN ASYMPTOMATIC POSTMENOPAUSAL PATIENT: ICD-10-CM

## 2023-01-17 PROCEDURE — 77080 DXA BONE DENSITY AXIAL: CPT | Mod: TC,PO

## 2023-01-29 LAB
ALBUMIN SERPL-MCNC: 4.3 G/DL (ref 3.6–5.1)
ALBUMIN/GLOB SERPL: 2.3 (CALC) (ref 1–2.5)
ALP SERPL-CCNC: 93 U/L (ref 37–153)
ALT SERPL-CCNC: 19 U/L (ref 6–29)
AST SERPL-CCNC: 13 U/L (ref 10–35)
BASOPHILS # BLD AUTO: 80 CELLS/UL (ref 0–200)
BASOPHILS NFR BLD AUTO: 0.7 %
BILIRUB SERPL-MCNC: 0.5 MG/DL (ref 0.2–1.2)
BUN SERPL-MCNC: 18 MG/DL (ref 7–25)
BUN/CREAT SERPL: NORMAL (CALC) (ref 6–22)
CALCIUM SERPL-MCNC: 9.2 MG/DL (ref 8.6–10.4)
CHLORIDE SERPL-SCNC: 104 MMOL/L (ref 98–110)
CHOLEST SERPL-MCNC: 127 MG/DL
CHOLEST/HDLC SERPL: 2.8 (CALC)
CO2 SERPL-SCNC: 27 MMOL/L (ref 20–32)
CREAT SERPL-MCNC: 0.78 MG/DL (ref 0.5–1.05)
EGFR: 84 ML/MIN/1.73M2
EOSINOPHIL # BLD AUTO: 205 CELLS/UL (ref 15–500)
EOSINOPHIL NFR BLD AUTO: 1.8 %
ERYTHROCYTE [DISTWIDTH] IN BLOOD BY AUTOMATED COUNT: 13 % (ref 11–15)
GLOBULIN SER CALC-MCNC: 1.9 G/DL (CALC) (ref 1.9–3.7)
GLUCOSE SERPL-MCNC: 91 MG/DL (ref 65–99)
HCT VFR BLD AUTO: 51.4 % (ref 35–45)
HDLC SERPL-MCNC: 45 MG/DL
HGB BLD-MCNC: 17.6 G/DL (ref 11.7–15.5)
LDLC SERPL CALC-MCNC: 66 MG/DL (CALC)
LYMPHOCYTES # BLD AUTO: 2497 CELLS/UL (ref 850–3900)
LYMPHOCYTES NFR BLD AUTO: 21.9 %
MCH RBC QN AUTO: 31.6 PG (ref 27–33)
MCHC RBC AUTO-ENTMCNC: 34.2 G/DL (ref 32–36)
MCV RBC AUTO: 92.3 FL (ref 80–100)
MONOCYTES # BLD AUTO: 866 CELLS/UL (ref 200–950)
MONOCYTES NFR BLD AUTO: 7.6 %
NEUTROPHILS # BLD AUTO: 7752 CELLS/UL (ref 1500–7800)
NEUTROPHILS NFR BLD AUTO: 68 %
NONHDLC SERPL-MCNC: 82 MG/DL (CALC)
PLATELET # BLD AUTO: 262 THOUSAND/UL (ref 140–400)
PMV BLD REES-ECKER: 9.9 FL (ref 7.5–12.5)
POTASSIUM SERPL-SCNC: 4.9 MMOL/L (ref 3.5–5.3)
PROT SERPL-MCNC: 6.2 G/DL (ref 6.1–8.1)
RBC # BLD AUTO: 5.57 MILLION/UL (ref 3.8–5.1)
SODIUM SERPL-SCNC: 138 MMOL/L (ref 135–146)
TRIGL SERPL-MCNC: 76 MG/DL
TSH SERPL-ACNC: 0.89 MIU/L (ref 0.4–4.5)
WBC # BLD AUTO: 11.4 THOUSAND/UL (ref 3.8–10.8)

## 2023-02-01 DIAGNOSIS — I10 ESSENTIAL HYPERTENSION: ICD-10-CM

## 2023-02-01 RX ORDER — LISINOPRIL 10 MG/1
10 TABLET ORAL DAILY
Qty: 90 TABLET | Refills: 3 | Status: SHIPPED | OUTPATIENT
Start: 2023-02-01 | End: 2023-04-10 | Stop reason: SDUPTHER

## 2023-02-01 NOTE — TELEPHONE ENCOUNTER
Pt requesting rx Lisinopril 10mg to be sent to [facility] in [place]. Pt states she no longer have [employer] insurance and refill was not filled in 12/2022. KM

## 2023-04-10 ENCOUNTER — OFFICE VISIT (OUTPATIENT)
Dept: FAMILY MEDICINE | Facility: CLINIC | Age: 68
End: 2023-04-10
Payer: COMMERCIAL

## 2023-04-10 VITALS
RESPIRATION RATE: 20 BRPM | SYSTOLIC BLOOD PRESSURE: 152 MMHG | HEART RATE: 80 BPM | TEMPERATURE: 98 F | WEIGHT: 179.5 LBS | DIASTOLIC BLOOD PRESSURE: 82 MMHG | BODY MASS INDEX: 28.85 KG/M2 | HEIGHT: 66 IN

## 2023-04-10 DIAGNOSIS — E78.2 MIXED HYPERLIPIDEMIA: ICD-10-CM

## 2023-04-10 DIAGNOSIS — M85.852 OSTEOPENIA OF LEFT HIP: Primary | ICD-10-CM

## 2023-04-10 DIAGNOSIS — J44.1 COPD EXACERBATION: ICD-10-CM

## 2023-04-10 DIAGNOSIS — I10 ESSENTIAL HYPERTENSION: ICD-10-CM

## 2023-04-10 DIAGNOSIS — I71.40 ABDOMINAL AORTIC ANEURYSM (AAA) WITHOUT RUPTURE, UNSPECIFIED PART: ICD-10-CM

## 2023-04-10 PROCEDURE — 1101F PR PT FALLS ASSESS DOC 0-1 FALLS W/OUT INJ PAST YR: ICD-10-PCS | Mod: CPTII,S$GLB,, | Performed by: NURSE PRACTITIONER

## 2023-04-10 PROCEDURE — 3079F PR MOST RECENT DIASTOLIC BLOOD PRESSURE 80-89 MM HG: ICD-10-PCS | Mod: CPTII,S$GLB,, | Performed by: NURSE PRACTITIONER

## 2023-04-10 PROCEDURE — 1159F MED LIST DOCD IN RCRD: CPT | Mod: CPTII,S$GLB,, | Performed by: NURSE PRACTITIONER

## 2023-04-10 PROCEDURE — 99213 PR OFFICE/OUTPT VISIT, EST, LEVL III, 20-29 MIN: ICD-10-PCS | Mod: S$GLB,,, | Performed by: NURSE PRACTITIONER

## 2023-04-10 PROCEDURE — 3008F BODY MASS INDEX DOCD: CPT | Mod: CPTII,S$GLB,, | Performed by: NURSE PRACTITIONER

## 2023-04-10 PROCEDURE — 1126F AMNT PAIN NOTED NONE PRSNT: CPT | Mod: CPTII,S$GLB,, | Performed by: NURSE PRACTITIONER

## 2023-04-10 PROCEDURE — 3077F SYST BP >= 140 MM HG: CPT | Mod: CPTII,S$GLB,, | Performed by: NURSE PRACTITIONER

## 2023-04-10 PROCEDURE — 1101F PT FALLS ASSESS-DOCD LE1/YR: CPT | Mod: CPTII,S$GLB,, | Performed by: NURSE PRACTITIONER

## 2023-04-10 PROCEDURE — 3077F PR MOST RECENT SYSTOLIC BLOOD PRESSURE >= 140 MM HG: ICD-10-PCS | Mod: CPTII,S$GLB,, | Performed by: NURSE PRACTITIONER

## 2023-04-10 PROCEDURE — 3288F FALL RISK ASSESSMENT DOCD: CPT | Mod: CPTII,S$GLB,, | Performed by: NURSE PRACTITIONER

## 2023-04-10 PROCEDURE — 4010F ACE/ARB THERAPY RXD/TAKEN: CPT | Mod: CPTII,S$GLB,, | Performed by: NURSE PRACTITIONER

## 2023-04-10 PROCEDURE — 3079F DIAST BP 80-89 MM HG: CPT | Mod: CPTII,S$GLB,, | Performed by: NURSE PRACTITIONER

## 2023-04-10 PROCEDURE — 1126F PR PAIN SEVERITY QUANTIFIED, NO PAIN PRESENT: ICD-10-PCS | Mod: CPTII,S$GLB,, | Performed by: NURSE PRACTITIONER

## 2023-04-10 PROCEDURE — 4010F PR ACE/ARB THEARPY RXD/TAKEN: ICD-10-PCS | Mod: CPTII,S$GLB,, | Performed by: NURSE PRACTITIONER

## 2023-04-10 PROCEDURE — 1159F PR MEDICATION LIST DOCUMENTED IN MEDICAL RECORD: ICD-10-PCS | Mod: CPTII,S$GLB,, | Performed by: NURSE PRACTITIONER

## 2023-04-10 PROCEDURE — 99213 OFFICE O/P EST LOW 20 MIN: CPT | Mod: S$GLB,,, | Performed by: NURSE PRACTITIONER

## 2023-04-10 PROCEDURE — 3008F PR BODY MASS INDEX (BMI) DOCUMENTED: ICD-10-PCS | Mod: CPTII,S$GLB,, | Performed by: NURSE PRACTITIONER

## 2023-04-10 PROCEDURE — 3288F PR FALLS RISK ASSESSMENT DOCUMENTED: ICD-10-PCS | Mod: CPTII,S$GLB,, | Performed by: NURSE PRACTITIONER

## 2023-04-10 RX ORDER — LISINOPRIL 20 MG/1
20 TABLET ORAL DAILY
Qty: 90 TABLET | Refills: 3 | Status: SHIPPED | OUTPATIENT
Start: 2023-04-10 | End: 2023-04-10 | Stop reason: SDUPTHER

## 2023-04-10 RX ORDER — ATORVASTATIN CALCIUM 40 MG/1
40 TABLET, FILM COATED ORAL DAILY
Qty: 90 TABLET | Refills: 3 | Status: SHIPPED | OUTPATIENT
Start: 2023-04-10 | End: 2024-04-09

## 2023-04-10 RX ORDER — ALENDRONATE SODIUM 10 MG/1
10 TABLET ORAL DAILY
Qty: 30 TABLET | Refills: 11 | Status: SHIPPED | OUTPATIENT
Start: 2023-04-10 | End: 2023-04-10 | Stop reason: SDUPTHER

## 2023-04-10 RX ORDER — LISINOPRIL 20 MG/1
20 TABLET ORAL DAILY
Qty: 90 TABLET | Refills: 3 | Status: SHIPPED | OUTPATIENT
Start: 2023-04-10 | End: 2023-04-26 | Stop reason: SDUPTHER

## 2023-04-10 RX ORDER — ALENDRONATE SODIUM 10 MG/1
10 TABLET ORAL DAILY
Qty: 30 TABLET | Refills: 11 | Status: SHIPPED | OUTPATIENT
Start: 2023-04-10 | End: 2023-04-26 | Stop reason: SINTOL

## 2023-04-10 RX ORDER — ATORVASTATIN CALCIUM 40 MG/1
40 TABLET, FILM COATED ORAL DAILY
Qty: 90 TABLET | Refills: 3 | Status: SHIPPED | OUTPATIENT
Start: 2023-04-10 | End: 2023-04-10 | Stop reason: SDUPTHER

## 2023-04-10 NOTE — PROGRESS NOTES
Patient ID: Yadi Mccall is a 67 y.o. female.    Chief Complaint: Follow-up and Chest Pain (68 yo female here for recheck due to hx bronchitis. Pt states experiencing some discomfort in the center of her chest and left side flank area times 1 week. Pt states she had heart surgery times 2 years ago and discomfort located in the area of her incision. No report of fever or chills. KM)    Ms. Mccall presents today for 3 month follow up and lab review. She states she is doing well at this time and she denies any major interval changes.    Follow-up  Associated symptoms include arthralgias and chest pain. Pertinent negatives include no abdominal pain, congestion, fatigue, fever, headaches, myalgias, nausea, rash, sore throat or vomiting.   Chest Pain   Pertinent negatives include no abdominal pain, dizziness, fever, headaches, nausea, palpitations, shortness of breath or vomiting.         Past Medical History:   Diagnosis Date    AAA (abdominal aortic aneurysm)     Anticoagulant long-term use     Coronary artery disease     Foot drop, left foot     Hyperlipidemia     Hypertension     Stroke 2014    LEFT SIDED WEAKNESS     Past Surgical History:   Procedure Laterality Date    APPENDECTOMY      CORONARY ANGIOGRAPHY N/A 7/24/2020    Procedure: ANGIOGRAM, CORONARY ARTERY;  Surgeon: Norbert Vallecillo MD;  Location: [facility] CATH;  Service: Cardiology;  Laterality: N/A;    CORONARY ARTERY BYPASS GRAFT (CABG) N/A 8/21/2020    Procedure: CORONARY ARTERY BYPASS GRAFT (CABG) BILATERAL MAMMARY  x  4 VESSELS;  Surgeon: Pascual Gray MD;  Location: [facility] OR;  Service: Cardiovascular;  Laterality: N/A;    ENDOSCOPIC HARVEST OF VEIN N/A 8/21/2020    Procedure: HARVEST-VEIN-ENDOVASCULAR;  Surgeon: Pascual Gray MD;  Location: [facility] OR;  Service: Cardiovascular;  Laterality: N/A;    FOOT SURGERY      HERNIA REPAIR      HYSTERECTOMY      LEFT HEART CATHETERIZATION N/A 7/24/2020    Procedure: Left heart cath;  Surgeon: Norbert Vallecillo  MD;  Location: [facility];  Service: Cardiology;  Laterality: N/A;    TONSILLECTOMY           Tobacco History:  reports that she has been smoking cigarettes. She has a 50.00 pack-year smoking history. She has never used smokeless tobacco.      Review of patient's allergies indicates:   Allergen Reactions    Chlorthalidone     Codeine     Dyazide [triamterene-hydrochlorothiazid]     Penicillins        Current Outpatient Medications:     acetaminophen (TYLENOL) 500 MG tablet, Take 500 mg by mouth 2 (two) times daily as needed for Pain or Temperature greater than (mild pain or temp > 100.5)., Disp: , Rfl:     albuterol (PROVENTIL/VENTOLIN HFA) 90 mcg/actuation inhaler, Inhale 2 puffs into the lungs every 6 (six) hours as needed for Wheezing., Disp: 54 g, Rfl: PRN    baclofen (LIORESAL) 20 MG tablet, Take 1 tablet (20 mg total) by mouth 2 (two) times daily., Disp: 180 tablet, Rfl: 3    fluticasone propionate (FLONASE) 50 mcg/actuation nasal spray, 1 spray (50 mcg total) by Each Nostril route 2 (two) times a day., Disp: 16 g, Rfl: PRN    oxymetazoline HCl (AFRIN, OXYMETAZOLINE, NASL), 1 spray by Nasal route as needed (stuffy nose)., Disp: , Rfl:     rOPINIRole (REQUIP) 1 MG tablet, Take 1 tablet (1 mg total) by mouth 3 (three) times daily., Disp: 90 tablet, Rfl: 3    albuterol-ipratropium (DUO-NEB) 2.5 mg-0.5 mg/3 mL nebulizer solution, Take 3 mLs by nebulization every 6 (six) hours as needed for Wheezing. Rescue, Disp: 120 each, Rfl: 11    alendronate (FOSAMAX) 10 MG Tab, Take 1 tablet (10 mg total) by mouth once daily., Disp: 30 tablet, Rfl: 11    aspirin 81 MG Chew, Take 1 tablet (81 mg total) by mouth once daily., Disp: 360 tablet, Rfl: 0    atorvastatin (LIPITOR) 40 MG tablet, Take 1 tablet (40 mg total) by mouth once daily., Disp: 90 tablet, Rfl: 3    lisinopriL (PRINIVIL,ZESTRIL) 20 MG tablet, Take 1 tablet (20 mg total) by mouth once daily., Disp: 90 tablet, Rfl: 3    nitroGLYCERIN (NITROSTAT) 0.4 MG SL tablet,  "Place 1 tablet (0.4 mg total) under the tongue every 5 (five) minutes as needed for Chest pain., Disp: 20 tablet, Rfl: 0    OXYGEN-AIR DELIVERY SYSTEMS MISC, 3 L/min by local intranasal application route continuous. NC 3lpm continuous, Disp: , Rfl:     Review of Systems   Constitutional:  Positive for activity change. Negative for appetite change, fatigue, fever and unexpected weight change.   HENT:  Negative for congestion, mouth sores, nosebleeds, postnasal drip, rhinorrhea, sinus pressure, sinus pain, sneezing, sore throat and trouble swallowing.    Eyes:  Negative for pain, redness and itching.   Respiratory:  Negative for chest tightness and shortness of breath.    Cardiovascular:  Positive for chest pain. Negative for palpitations.   Gastrointestinal:  Negative for abdominal pain, blood in stool, constipation, diarrhea, nausea and vomiting.   Endocrine: Negative for cold intolerance, heat intolerance, polydipsia, polyphagia and polyuria.   Genitourinary:  Negative for difficulty urinating, dysuria, flank pain, frequency, genital sores, menstrual problem, urgency, vaginal bleeding and vaginal discharge.   Musculoskeletal:  Positive for arthralgias and gait problem. Negative for myalgias.   Skin:  Negative for rash and wound.   Allergic/Immunologic: Negative for environmental allergies and food allergies.   Neurological:  Negative for dizziness, light-headedness and headaches.   Hematological:  Negative for adenopathy. Does not bruise/bleed easily.   Psychiatric/Behavioral:  Negative for agitation, confusion, hallucinations, self-injury and sleep disturbance. The patient is nervous/anxious.         Objective:      Vitals:    04/10/23 1338 04/10/23 1416   BP: (!) 170/89 (!) 152/82   Pulse: 80    Resp: 20    Temp: 97.8 °F (36.6 °C)    TempSrc: Oral    Weight: 81.4 kg (179 lb 8 oz)    Height: 5' 6" (1.676 m)      Physical Exam  Vitals reviewed.   Constitutional:       General: She is not in acute distress.     " Appearance: Normal appearance. She is normal weight. She is ill-appearing. She is not toxic-appearing or diaphoretic.      Comments: Chronically ill appearing patient    HENT:      Head: Normocephalic and atraumatic.      Right Ear: Tympanic membrane and external ear normal. There is no impacted cerumen.      Left Ear: Tympanic membrane and external ear normal. There is no impacted cerumen.      Nose: Nose normal. No congestion or rhinorrhea.      Mouth/Throat:      Mouth: Mucous membranes are moist.      Pharynx: Oropharynx is clear. No oropharyngeal exudate or posterior oropharyngeal erythema.   Eyes:      General: No scleral icterus.        Right eye: No discharge.         Left eye: No discharge.      Extraocular Movements: Extraocular movements intact.      Conjunctiva/sclera: Conjunctivae normal.      Pupils: Pupils are equal, round, and reactive to light.   Neck:      Vascular: No carotid bruit.   Cardiovascular:      Rate and Rhythm: Normal rate and regular rhythm.      Pulses: Normal pulses.      Heart sounds: Normal heart sounds. No murmur heard.    No friction rub. No gallop.   Pulmonary:      Effort: Pulmonary effort is normal. No respiratory distress.      Breath sounds: Normal breath sounds. No stridor. No rales.   Chest:      Chest wall: No tenderness.   Abdominal:      General: Abdomen is flat. Bowel sounds are normal.      Palpations: Abdomen is soft. There is no mass.      Tenderness: There is no abdominal tenderness. There is no guarding.   Musculoskeletal:         General: No swelling or signs of injury. Normal range of motion.      Cervical back: Normal range of motion and neck supple. No rigidity or tenderness.      Right lower leg: No edema.      Left lower leg: No edema.   Skin:     General: Skin is warm and dry.      Capillary Refill: Capillary refill takes less than 2 seconds.      Findings: No bruising, lesion or rash.   Neurological:      General: No focal deficit present.      Mental  Status: She is alert and oriented to person, place, and time. Mental status is at baseline.      Motor: No weakness.      Coordination: Coordination normal.   Psychiatric:         Mood and Affect: Mood normal.         Behavior: Behavior normal.         Thought Content: Thought content normal.         Judgment: Judgment normal.         Assessment:       1. Osteopenia of left hip    2. Mixed hyperlipidemia    3. COPD exacerbation    4. Abdominal aortic aneurysm (AAA) without rupture, unspecified part    5. Essential hypertension           Plan:       Osteopenia of left hip  -     Discontinue: alendronate (FOSAMAX) 10 MG Tab; Take 1 tablet (10 mg total) by mouth once daily.  Dispense: 30 tablet; Refill: 11  -     alendronate (FOSAMAX) 10 MG Tab; Take 1 tablet (10 mg total) by mouth once daily.  Dispense: 30 tablet; Refill: 11    Mixed hyperlipidemia  -     Discontinue: atorvastatin (LIPITOR) 40 MG tablet; Take 1 tablet (40 mg total) by mouth once daily.  Dispense: 90 tablet; Refill: 3  -     atorvastatin (LIPITOR) 40 MG tablet; Take 1 tablet (40 mg total) by mouth once daily.  Dispense: 90 tablet; Refill: 3    COPD exacerbation  -     NEBULIZER FOR HOME USE    Abdominal aortic aneurysm (AAA) without rupture, unspecified part  -      Abdominal Aorta; Future; Expected date: 04/10/2023    Essential hypertension  -     Discontinue: lisinopriL (PRINIVIL,ZESTRIL) 20 MG tablet; Take 1 tablet (20 mg total) by mouth once daily.  Dispense: 90 tablet; Refill: 3  -     lisinopriL (PRINIVIL,ZESTRIL) 20 MG tablet; Take 1 tablet (20 mg total) by mouth once daily.  Dispense: 90 tablet; Refill: 3      No follow-ups on file.        4/11/2023 Keshia Chan NP

## 2023-04-19 ENCOUNTER — TELEPHONE (OUTPATIENT)
Dept: FAMILY MEDICINE | Facility: CLINIC | Age: 68
End: 2023-04-19

## 2023-04-19 VITALS — SYSTOLIC BLOOD PRESSURE: 136 MMHG | DIASTOLIC BLOOD PRESSURE: 78 MMHG

## 2023-04-26 ENCOUNTER — TELEPHONE (OUTPATIENT)
Dept: FAMILY MEDICINE | Facility: CLINIC | Age: 68
End: 2023-04-26

## 2023-04-26 DIAGNOSIS — I10 ESSENTIAL HYPERTENSION: ICD-10-CM

## 2023-04-26 RX ORDER — LISINOPRIL 10 MG/1
10 TABLET ORAL DAILY
Qty: 90 TABLET | Refills: 3 | Status: SHIPPED | OUTPATIENT
Start: 2023-04-26 | End: 2024-03-22

## 2023-04-26 NOTE — TELEPHONE ENCOUNTER
Pt called to adv she d/c Fosamax due to headaches and decreased Lisinopril back to 10mg  due to feeling light headed with increase to 20 mg.Pt states she is monitoring her b/p and WNL since decrease. Pt also requesting a new rx sent to [facility] for Lisinopril 10mg. Pt adv to break pills in half until new rx is due and she is refusing. VICENTA

## 2023-05-11 ENCOUNTER — HOSPITAL ENCOUNTER (OUTPATIENT)
Dept: RADIOLOGY | Facility: HOSPITAL | Age: 68
Discharge: HOME | End: 2023-05-11
Attending: NURSE PRACTITIONER
Payer: COMMERCIAL

## 2023-05-11 DIAGNOSIS — I71.40 ABDOMINAL AORTIC ANEURYSM (AAA) WITHOUT RUPTURE, UNSPECIFIED PART: Primary | ICD-10-CM

## 2023-05-11 DIAGNOSIS — I71.40 ABDOMINAL AORTIC ANEURYSM (AAA) WITHOUT RUPTURE, UNSPECIFIED PART: ICD-10-CM

## 2023-05-11 PROCEDURE — 76775 US EXAM ABDO BACK WALL LIM: CPT | Mod: TC,PO

## 2023-05-12 DIAGNOSIS — G25.81 RESTLESS LEG SYNDROME: ICD-10-CM

## 2023-05-12 RX ORDER — ROPINIROLE 1 MG/1
1 TABLET, FILM COATED ORAL 3 TIMES DAILY
Qty: 90 TABLET | Refills: 3 | Status: SHIPPED | OUTPATIENT
Start: 2023-05-12 | End: 2023-09-26

## 2023-06-07 NOTE — TELEPHONE ENCOUNTER
Appointment rescheduled    Isotretinoin Counseling: Patient should get monthly blood tests, not donate blood, not drive at night if vision affected, not share medication, and not undergo elective surgery for 6 months after tx completed. Side effects reviewed, pt to contact office should one occur.

## 2023-08-04 ENCOUNTER — PATIENT OUTREACH (OUTPATIENT)
Dept: ADMINISTRATIVE | Facility: HOSPITAL | Age: 68
End: 2023-08-04

## 2023-08-04 NOTE — PROGRESS NOTES
Population Health Chart Review & Patient Outreach Details:     Reason for Outreach Encounter:     [x]  Non-Compliant Report   []  Payor Report ([employer], PHN, BCBS, MSSP, MCIP, C, etc.)   []  Pre-Visit Chart Review     Updates Requested / Reviewed:     [x]  Care Everywhere    [x]     [x]  External Sources ([facility], [facility], DIS, etc.)   []  Care Team Updated    Patient Outreach Method:    [x]  Telephone Outreach Completed   [x] Successful   [] Left Voicemail   [] Unable to Contact (wrong number, no voicemail)  []  [facility] Portal Outreach Sent  []  Letter Outreach Mailed  []  Fax Sent for External Records  []  External Records Upload    Health Maintenance Topics Addressed and Outreach Outcomes / Actions Taken:        [x]      Breast Cancer Screening []  Mammo Scheduled      []  External Records Requested     []  Added Reminder to Complete to Upcoming Primary Care Appt Notes     [x]  Patient Declined     []  Patient Will Call Back to Schedule     []  Patient Will Schedule with External Provider / Order Routed if Applicable             []       Cervical Cancer Screening []  Pap Scheduled      []  External Records Requested     []  Added Reminder to Complete to Upcoming Primary Care Appt Notes     []  Patient Declined     []  Patient Will Call Back to Schedule     []  Patient Will Schedule with External Provider               []          Colorectal Cancer Screening []  Colonoscopy Case Request or Referral Placed     []  External Records Requested     []  Added Reminder to Complete to Upcoming Primary Care Appt Notes     []  Patient Declined     []  Patient Will Call Back to Schedule     []  Patient Will Schedule with External Provider     []  Fit Kit Mailed (add the SmartPhrase under additional notes)     []  Reminded Patient to Complete Home Test             []      Diabetic Eye Exam []  Eye Camera Scheduled or Optometry Referral Placed     []  External Records Requested     []  Added Reminder to Complete  to Upcoming Primary Care Appt Notes     []  Patient Declined     []  Patient Will Call Back to Schedule     []  Patient Will Schedule with External Provider             []      Blood Pressure Control []  Primary Care Follow Up Visit Scheduled     []  Remote Blood Pressure Reading Captured     []  Added Reminder to Complete to Upcoming Primary Care Appt Notes     []  Patient Declined     []  Patient Will Call Back / Patient Will Send Portal Message with Reading     []  Patient Will Call Back to Schedule Provider Visit             []       HbA1c & Other Labs []  Lab Appt Scheduled for Due Labs     []  Primary Care Follow Up Visit Scheduled      []  Reminded Patient to Complete Home Test     []  Added Reminder to Complete to Upcoming Primary Care Appt Notes     []  Patient Declined     []  Patient Will Call Back to Schedule     []  Patient Will Schedule with External Provider / Order Routed if Applicable           []    Schedule Primary Care Appt []  Primary Care Appt Scheduled     []  Patient Declined     []  Patient Will Call Back to Schedule     []  Pt Established with External Provider & Updated Care Team             []      Medication Adherence []  Primary Care Appointment Scheduled     []  Added Reminder to Upcoming Primary Care Appt Notes     []  Patient Reminded to  Prescription     []  Patient Declined, Provider Notified if Needed     []  Sent Provider Message to Review and/or Add Exclusion to Problem List             []      Osteoporosis Screening []  DXA Appointment Scheduled     []  External Records Requested     []  Added Reminder to Complete to Upcoming Primary Care Appt Notes     []  Patient Declined     []  Patient Will Call Back to Schedule     []  Patient Will Schedule with External Provider / Order Routed if Applicable     Additional Care Coordinator Notes:         Further Action Needed If Patient Returns Outreach:

## 2023-08-04 NOTE — PROGRESS NOTES
Population Health Chart Review & Patient Outreach Details:     Reason for Outreach Encounter:     [x]  Non-Compliant Report   []  Payor Report (Humana, PHN, BCBS, MSSP, MCIP, UHC, etc.)   []  Pre-Visit Chart Review     Updates Requested / Reviewed:     [x]  Care Everywhere      X    [x]  External Sources ([facility], [facility], DIS, etc.)   []  Care Team Updated    Patient Outreach Method:    [x]  Telephone Outreach Completed   [x] Successful   [] Left Voicemail   [] Unable to Contact (wrong number, no voicemail)  []  [facility] Portal Outreach Sent  []  Letter Outreach Mailed  []  Fax Sent for External Records  []  External Records Upload    Health Maintenance Topics Addressed and Outreach Outcomes / Actions Taken:        [x]      Breast Cancer Screening []  Mammo Scheduled      []  External Records Requested     []  Added Reminder to Complete to Upcoming Primary Care Appt Notes     [x]  Patient Declined     []  Patient Will Call Back to Schedule     []  Patient Will Schedule with External Provider / Order Routed if Applicable             []       Cervical Cancer Screening []  Pap Scheduled      []  External Records Requested     []  Added Reminder to Complete to Upcoming Primary Care Appt Notes     []  Patient Declined     []  Patient Will Call Back to Schedule     []  Patient Will Schedule with External Provider               []          Colorectal Cancer Screening []  Colonoscopy Case Request or Referral Placed     []  External Records Requested     []  Added Reminder to Complete to Upcoming Primary Care Appt Notes     []  Patient Declined     []  Patient Will Call Back to Schedule     []  Patient Will Schedule with External Provider     []  Fit Kit Mailed (add the SmartPhrase under additional notes)     []  Reminded Patient to Complete Home Test             []      Diabetic Eye Exam []  Eye Camera Scheduled or Optometry Referral Placed     []  External Records Requested     []  Added Reminder to Complete  to Upcoming Primary Care Appt Notes     []  Patient Declined     []  Patient Will Call Back to Schedule     []  Patient Will Schedule with External Provider             []      Blood Pressure Control []  Primary Care Follow Up Visit Scheduled     []  Remote Blood Pressure Reading Captured     []  Added Reminder to Complete to Upcoming Primary Care Appt Notes     []  Patient Declined     []  Patient Will Call Back / Patient Will Send Portal Message with Reading     []  Patient Will Call Back to Schedule Provider Visit             []       HbA1c & Other Labs []  Lab Appt Scheduled for Due Labs     []  Primary Care Follow Up Visit Scheduled      []  Reminded Patient to Complete Home Test     []  Added Reminder to Complete to Upcoming Primary Care Appt Notes     []  Patient Declined     []  Patient Will Call Back to Schedule     []  Patient Will Schedule with External Provider / Order Routed if Applicable           []    Schedule Primary Care Appt []  Primary Care Appt Scheduled     []  Patient Declined     []  Patient Will Call Back to Schedule     []  Pt Established with External Provider & Updated Care Team             []      Medication Adherence []  Primary Care Appointment Scheduled     []  Added Reminder to Upcoming Primary Care Appt Notes     []  Patient Reminded to  Prescription     []  Patient Declined, Provider Notified if Needed     []  Sent Provider Message to Review and/or Add Exclusion to Problem List             []      Osteoporosis Screening []  DXA Appointment Scheduled     []  External Records Requested     []  Added Reminder to Complete to Upcoming Primary Care Appt Notes     []  Patient Declined     []  Patient Will Call Back to Schedule     []  Patient Will Schedule with External Provider / Order Routed if Applicable     Additional Care Coordinator Notes:         Further Action Needed If Patient Returns Outreach:

## 2023-08-10 ENCOUNTER — TELEPHONE (OUTPATIENT)
Dept: FAMILY MEDICINE | Facility: CLINIC | Age: 68
End: 2023-08-10

## 2023-08-10 ENCOUNTER — OFFICE VISIT (OUTPATIENT)
Dept: FAMILY MEDICINE | Facility: CLINIC | Age: 68
End: 2023-08-10
Payer: COMMERCIAL

## 2023-08-10 VITALS
RESPIRATION RATE: 20 BRPM | DIASTOLIC BLOOD PRESSURE: 70 MMHG | BODY MASS INDEX: 28.53 KG/M2 | HEART RATE: 100 BPM | HEIGHT: 66 IN | SYSTOLIC BLOOD PRESSURE: 120 MMHG | TEMPERATURE: 98 F | WEIGHT: 177.5 LBS

## 2023-08-10 DIAGNOSIS — E78.2 MIXED HYPERLIPIDEMIA: ICD-10-CM

## 2023-08-10 DIAGNOSIS — R23.2 HOT FLASHES: ICD-10-CM

## 2023-08-10 DIAGNOSIS — I71.40 ABDOMINAL AORTIC ANEURYSM (AAA) WITHOUT RUPTURE, UNSPECIFIED PART: Primary | ICD-10-CM

## 2023-08-10 DIAGNOSIS — I10 ESSENTIAL HYPERTENSION: ICD-10-CM

## 2023-08-10 DIAGNOSIS — G25.81 RESTLESS LEGS: ICD-10-CM

## 2023-08-10 DIAGNOSIS — R79.9 ABNORMAL BLOOD CHEMISTRY: ICD-10-CM

## 2023-08-10 DIAGNOSIS — R60.0 LOWER EXTREMITY EDEMA: ICD-10-CM

## 2023-08-10 PROCEDURE — 3288F PR FALLS RISK ASSESSMENT DOCUMENTED: ICD-10-PCS | Mod: CPTII,S$GLB,, | Performed by: NURSE PRACTITIONER

## 2023-08-10 PROCEDURE — 99213 OFFICE O/P EST LOW 20 MIN: CPT | Mod: S$GLB,,, | Performed by: NURSE PRACTITIONER

## 2023-08-10 PROCEDURE — 1159F PR MEDICATION LIST DOCUMENTED IN MEDICAL RECORD: ICD-10-PCS | Mod: CPTII,S$GLB,, | Performed by: NURSE PRACTITIONER

## 2023-08-10 PROCEDURE — 4010F ACE/ARB THERAPY RXD/TAKEN: CPT | Mod: CPTII,S$GLB,, | Performed by: NURSE PRACTITIONER

## 2023-08-10 PROCEDURE — 3074F PR MOST RECENT SYSTOLIC BLOOD PRESSURE < 130 MM HG: ICD-10-PCS | Mod: CPTII,S$GLB,, | Performed by: NURSE PRACTITIONER

## 2023-08-10 PROCEDURE — 3074F SYST BP LT 130 MM HG: CPT | Mod: CPTII,S$GLB,, | Performed by: NURSE PRACTITIONER

## 2023-08-10 PROCEDURE — 3288F FALL RISK ASSESSMENT DOCD: CPT | Mod: CPTII,S$GLB,, | Performed by: NURSE PRACTITIONER

## 2023-08-10 PROCEDURE — 1125F PR PAIN SEVERITY QUANTIFIED, PAIN PRESENT: ICD-10-PCS | Mod: CPTII,S$GLB,, | Performed by: NURSE PRACTITIONER

## 2023-08-10 PROCEDURE — 3078F DIAST BP <80 MM HG: CPT | Mod: CPTII,S$GLB,, | Performed by: NURSE PRACTITIONER

## 2023-08-10 PROCEDURE — 3008F BODY MASS INDEX DOCD: CPT | Mod: CPTII,S$GLB,, | Performed by: NURSE PRACTITIONER

## 2023-08-10 PROCEDURE — 1125F AMNT PAIN NOTED PAIN PRSNT: CPT | Mod: CPTII,S$GLB,, | Performed by: NURSE PRACTITIONER

## 2023-08-10 PROCEDURE — 1101F PT FALLS ASSESS-DOCD LE1/YR: CPT | Mod: CPTII,S$GLB,, | Performed by: NURSE PRACTITIONER

## 2023-08-10 PROCEDURE — 3078F PR MOST RECENT DIASTOLIC BLOOD PRESSURE < 80 MM HG: ICD-10-PCS | Mod: CPTII,S$GLB,, | Performed by: NURSE PRACTITIONER

## 2023-08-10 PROCEDURE — 4010F PR ACE/ARB THEARPY RXD/TAKEN: ICD-10-PCS | Mod: CPTII,S$GLB,, | Performed by: NURSE PRACTITIONER

## 2023-08-10 PROCEDURE — 3008F PR BODY MASS INDEX (BMI) DOCUMENTED: ICD-10-PCS | Mod: CPTII,S$GLB,, | Performed by: NURSE PRACTITIONER

## 2023-08-10 PROCEDURE — 99213 PR OFFICE/OUTPT VISIT, EST, LEVL III, 20-29 MIN: ICD-10-PCS | Mod: S$GLB,,, | Performed by: NURSE PRACTITIONER

## 2023-08-10 PROCEDURE — 1159F MED LIST DOCD IN RCRD: CPT | Mod: CPTII,S$GLB,, | Performed by: NURSE PRACTITIONER

## 2023-08-10 PROCEDURE — 1101F PR PT FALLS ASSESS DOC 0-1 FALLS W/OUT INJ PAST YR: ICD-10-PCS | Mod: CPTII,S$GLB,, | Performed by: NURSE PRACTITIONER

## 2023-08-10 RX ORDER — BACLOFEN 20 MG/1
20 TABLET ORAL 2 TIMES DAILY
Qty: 180 TABLET | Refills: 3 | Status: SHIPPED | OUTPATIENT
Start: 2023-08-10 | End: 2024-08-04

## 2023-08-10 RX ORDER — VENLAFAXINE HYDROCHLORIDE 37.5 MG/1
37.5 CAPSULE, EXTENDED RELEASE ORAL DAILY
Qty: 30 CAPSULE | Refills: 11 | Status: SHIPPED | OUTPATIENT
Start: 2023-08-10 | End: 2024-08-09

## 2023-08-10 RX ORDER — FUROSEMIDE 20 MG/1
20 TABLET ORAL DAILY
Qty: 30 TABLET | Refills: 0 | Status: SHIPPED | OUTPATIENT
Start: 2023-08-10 | End: 2024-02-15

## 2023-08-10 NOTE — TELEPHONE ENCOUNTER
Vascular provider Dr. Keith is no longer with facility. A new referral need to be placed at the same facility but with a different provider/Dr. Elen Davila. I tried contacting pt regard provider change at 4:01 and number listed was out of service. KM

## 2023-08-10 NOTE — PROGRESS NOTES
Patient ID: Yadi Mccall is a 68 y.o. female.    Chief Complaint: Follow-up (69 yo female here for 4 month recheck. Km )    MS. Mccall presents today for 3 month follow up. She states she is doing well. She denies any major interval changes. She is requesting medication refills at this time.       Past Medical History:   Diagnosis Date    AAA (abdominal aortic aneurysm)     Anticoagulant long-term use     Coronary artery disease     Foot drop, left foot     Hyperlipidemia     Hypertension     Stroke 2014    LEFT SIDED WEAKNESS     Past Surgical History:   Procedure Laterality Date    APPENDECTOMY      CORONARY ANGIOGRAPHY N/A 7/24/2020    Procedure: ANGIOGRAM, CORONARY ARTERY;  Surgeon: Norbert Vallecillo MD;  Location: [facility] CATH;  Service: Cardiology;  Laterality: N/A;    CORONARY ARTERY BYPASS GRAFT (CABG) N/A 8/21/2020    Procedure: CORONARY ARTERY BYPASS GRAFT (CABG) BILATERAL MAMMARY  x  4 VESSELS;  Surgeon: Pascual Gray MD;  Location: [facility] OR;  Service: Cardiovascular;  Laterality: N/A;    ENDOSCOPIC HARVEST OF VEIN N/A 8/21/2020    Procedure: HARVEST-VEIN-ENDOVASCULAR;  Surgeon: Pascual Gray MD;  Location: [facility] OR;  Service: Cardiovascular;  Laterality: N/A;    FOOT SURGERY      HERNIA REPAIR      HYSTERECTOMY      LEFT HEART CATHETERIZATION N/A 7/24/2020    Procedure: Left heart cath;  Surgeon: Norbert Vallecillo MD;  Location: [facility] CATH;  Service: Cardiology;  Laterality: N/A;    TONSILLECTOMY           Tobacco History:  reports that she has been smoking cigarettes. She started smoking about 53 years ago. She has a 50.0 pack-year smoking history. She has never used smokeless tobacco.      Review of patient's allergies indicates:   Allergen Reactions    Chlorthalidone     Codeine     Dyazide [triamterene-hydrochlorothiazid]     Penicillins        Current Outpatient Medications:     acetaminophen (TYLENOL) 500 MG tablet, Take 500 mg by mouth 2 (two) times daily as needed for Pain or Temperature greater  than (mild pain or temp > 100.5)., Disp: , Rfl:     albuterol (PROVENTIL/VENTOLIN HFA) 90 mcg/actuation inhaler, Inhale 2 puffs into the lungs every 6 (six) hours as needed for Wheezing., Disp: 54 g, Rfl: PRN    atorvastatin (LIPITOR) 40 MG tablet, Take 1 tablet (40 mg total) by mouth once daily., Disp: 90 tablet, Rfl: 3    lisinopriL 10 MG tablet, Take 1 tablet (10 mg total) by mouth once daily., Disp: 90 tablet, Rfl: 3    oxymetazoline HCl (AFRIN, OXYMETAZOLINE, NASL), 1 spray by Nasal route as needed (stuffy nose)., Disp: , Rfl:     rOPINIRole (REQUIP) 1 MG tablet, Take 1 tablet (1 mg total) by mouth 3 (three) times daily., Disp: 90 tablet, Rfl: 3    albuterol-ipratropium (DUO-NEB) 2.5 mg-0.5 mg/3 mL nebulizer solution, Take 3 mLs by nebulization every 6 (six) hours as needed for Wheezing. Rescue, Disp: 120 each, Rfl: 11    aspirin 81 MG Chew, Take 1 tablet (81 mg total) by mouth once daily., Disp: 360 tablet, Rfl: 0    baclofen (LIORESAL) 20 MG tablet, Take 1 tablet (20 mg total) by mouth 2 (two) times daily., Disp: 180 tablet, Rfl: 3    furosemide (LASIX) 20 MG tablet, Take 1 tablet (20 mg total) by mouth once daily., Disp: 30 tablet, Rfl: 0    nitroGLYCERIN (NITROSTAT) 0.4 MG SL tablet, Place 1 tablet (0.4 mg total) under the tongue every 5 (five) minutes as needed for Chest pain., Disp: 20 tablet, Rfl: 0    OXYGEN-AIR DELIVERY SYSTEMS MISC, 3 L/min by local intranasal application route continuous. NC 3lpm continuous, Disp: , Rfl:     venlafaxine (EFFEXOR-XR) 37.5 MG 24 hr capsule, Take 1 capsule (37.5 mg total) by mouth once daily., Disp: 30 capsule, Rfl: 11    Review of Systems   Constitutional:  Positive for activity change. Negative for appetite change, fatigue, fever and unexpected weight change.   HENT:  Negative for congestion, mouth sores, nosebleeds, postnasal drip, rhinorrhea, sinus pressure, sinus pain, sneezing, sore throat and trouble swallowing.    Eyes:  Negative for pain, redness and itching.  "  Respiratory:  Negative for chest tightness and shortness of breath.    Cardiovascular:  Positive for chest pain. Negative for palpitations.   Gastrointestinal:  Negative for abdominal pain, blood in stool, constipation, diarrhea, nausea and vomiting.   Endocrine: Negative for cold intolerance, heat intolerance, polydipsia, polyphagia and polyuria.   Genitourinary:  Negative for difficulty urinating, dysuria, flank pain, frequency, genital sores, menstrual problem, urgency, vaginal bleeding and vaginal discharge.   Musculoskeletal:  Positive for arthralgias and gait problem. Negative for myalgias.   Skin:  Negative for rash and wound.   Allergic/Immunologic: Negative for environmental allergies and food allergies.   Neurological:  Negative for dizziness, light-headedness and headaches.   Hematological:  Negative for adenopathy. Does not bruise/bleed easily.   Psychiatric/Behavioral:  Negative for agitation, confusion, hallucinations, self-injury and sleep disturbance. The patient is nervous/anxious.           Objective:      Vitals:    08/10/23 1327   BP: 120/70   Pulse: 100   Resp: 20   Temp: 97.8 °F (36.6 °C)   TempSrc: Oral   Weight: 80.5 kg (177 lb 8 oz)   Height: 5' 6" (1.676 m)     Physical Exam  Vitals reviewed.   Constitutional:       General: She is not in acute distress.     Appearance: Normal appearance. She is normal weight. She is ill-appearing. She is not toxic-appearing or diaphoretic.      Comments: Chronically ill appearing patient    HENT:      Head: Normocephalic and atraumatic.      Right Ear: Tympanic membrane and external ear normal. There is no impacted cerumen.      Left Ear: Tympanic membrane and external ear normal. There is no impacted cerumen.      Nose: Nose normal. No congestion or rhinorrhea.      Mouth/Throat:      Mouth: Mucous membranes are moist.      Pharynx: Oropharynx is clear. No oropharyngeal exudate or posterior oropharyngeal erythema.   Eyes:      General: No scleral " icterus.        Right eye: No discharge.         Left eye: No discharge.      Extraocular Movements: Extraocular movements intact.      Conjunctiva/sclera: Conjunctivae normal.      Pupils: Pupils are equal, round, and reactive to light.   Neck:      Vascular: No carotid bruit.   Cardiovascular:      Rate and Rhythm: Normal rate and regular rhythm.      Pulses: Normal pulses.      Heart sounds: Normal heart sounds. No murmur heard.     No friction rub. No gallop.   Pulmonary:      Effort: Pulmonary effort is normal. No respiratory distress.      Breath sounds: Normal breath sounds. No stridor. No rales.   Chest:      Chest wall: No tenderness.   Abdominal:      General: Abdomen is flat. Bowel sounds are normal.      Palpations: Abdomen is soft. There is no mass.      Tenderness: There is no abdominal tenderness. There is no guarding.   Musculoskeletal:         General: No swelling or signs of injury. Normal range of motion.      Cervical back: Normal range of motion and neck supple. No rigidity or tenderness.      Right lower leg: No edema.      Left lower leg: No edema.   Skin:     General: Skin is warm and dry.      Capillary Refill: Capillary refill takes less than 2 seconds.      Findings: No bruising, lesion or rash.   Neurological:      General: No focal deficit present.      Mental Status: She is alert and oriented to person, place, and time. Mental status is at baseline.      Motor: No weakness.      Coordination: Coordination normal.   Psychiatric:         Mood and Affect: Mood normal.         Behavior: Behavior normal.         Thought Content: Thought content normal.         Judgment: Judgment normal.           Assessment:       1. Abdominal aortic aneurysm (AAA) without rupture, unspecified part    2. Restless legs    3. Lower extremity edema    4. Hot flashes    5. Essential hypertension    6. Mixed hyperlipidemia    7. Abnormal blood chemistry           Plan:       Abdominal aortic aneurysm (AAA)  without rupture, unspecified part  -     Ambulatory referral/consult to Vascular Surgery; Future; Expected date: 08/17/2023    Restless legs  -     baclofen (LIORESAL) 20 MG tablet; Take 1 tablet (20 mg total) by mouth 2 (two) times daily.  Dispense: 180 tablet; Refill: 3    Lower extremity edema  -     furosemide (LASIX) 20 MG tablet; Take 1 tablet (20 mg total) by mouth once daily.  Dispense: 30 tablet; Refill: 0    Hot flashes  -     venlafaxine (EFFEXOR-XR) 37.5 MG 24 hr capsule; Take 1 capsule (37.5 mg total) by mouth once daily.  Dispense: 30 capsule; Refill: 11  -     CBC auto differential; Future; Expected date: 08/10/2023    Essential hypertension  -     CBC auto differential; Future; Expected date: 08/10/2023  -     Comprehensive Metabolic Panel; Future; Expected date: 08/10/2023    Mixed hyperlipidemia  -     Lipid Panel; Future; Expected date: 08/10/2023    Abnormal blood chemistry  -     Hemoglobin A1C; Future; Expected date: 08/10/2023      Follow up in about 6 months (around 2/10/2024), or if symptoms worsen or fail to improve, for Lab review .        8/19/2023 Keshia Chan NP

## 2023-08-10 NOTE — TELEPHONE ENCOUNTER
----- Message from Sara Kirby sent at 8/10/2023  2:51 PM CDT -----  Contact: Vascular Access  Type: Needs Medical Advice  Who Called:  [facility]- Vascular Access   Symptoms (please be specific):  The referral was sent to Santi Keith MD. She wanted to inform the NP that the provder is no longer with the facility. IS the pt okay with seeing Dr Elen Davila? If they are okay, could they get another referral without the other D's name on it? Could you also send the medical records pertaining to the pt's AAA.  Fax: [phone]  Best Call Back Number: [phone]  Additional Information: Please call back to advise. Thanks!

## 2023-08-14 ENCOUNTER — TELEPHONE (OUTPATIENT)
Dept: FAMILY MEDICINE | Facility: CLINIC | Age: 68
End: 2023-08-14

## 2023-08-14 DIAGNOSIS — I71.40 ABDOMINAL AORTIC ANEURYSM (AAA) WITHOUT RUPTURE, UNSPECIFIED PART: Primary | ICD-10-CM

## 2023-08-14 NOTE — TELEPHONE ENCOUNTER
Pt need another referral to Vascular clinic with provider: DR. Bran Lehman. The original referred provider is no longer in office. KM

## 2023-09-19 ENCOUNTER — TELEPHONE (OUTPATIENT)
Dept: FAMILY MEDICINE | Facility: CLINIC | Age: 68
End: 2023-09-19

## 2023-09-19 NOTE — TELEPHONE ENCOUNTER
Pt notified she have a UTD referral on file for Dr. Sheldon and can contact clinic to schedule a ppt. Pt adv if additional referral needed after contacting clinic, Please adv office for completion. Pt satisfied with information given. KM

## 2023-09-19 NOTE — TELEPHONE ENCOUNTER
----- Message from Sheri Lomas sent at 9/19/2023 10:19 AM CDT -----  Regarding: Ref to vascular surgeon  Patient called stating she needs a referral sent to Dr Mazin Sheldon.  Please vero the patient to advise.

## 2023-09-26 DIAGNOSIS — G25.81 RESTLESS LEG SYNDROME: ICD-10-CM

## 2023-09-26 RX ORDER — ROPINIROLE 1 MG/1
1 TABLET, FILM COATED ORAL 3 TIMES DAILY
Qty: 90 TABLET | Refills: 3 | Status: SHIPPED | OUTPATIENT
Start: 2023-09-26 | End: 2024-01-17 | Stop reason: SDUPTHER

## 2023-10-02 ENCOUNTER — TELEPHONE (OUTPATIENT)
Dept: FAMILY MEDICINE | Facility: CLINIC | Age: 68
End: 2023-10-02

## 2023-10-02 NOTE — TELEPHONE ENCOUNTER
----- Message from Myrna Benavidez sent at 10/2/2023  3:33 PM CDT -----  Contact: Milagro  \Type:  Needs Medical Advice    Who Called: Milagro with [employer]     Would the patient rather a call back or a response via [facility]? Call     Best Call Back Number: [phone]    Additional Information: Milagro with [employer] would like to speak with the nurse in regards to a referral. The fax number is [phone].    Please call to advise

## 2023-10-02 NOTE — TELEPHONE ENCOUNTER
Called clinic regard message at 4:50. No answer, LVM for Janett to return call in the morning. VICENTA

## 2023-11-02 DIAGNOSIS — I71.43 INFRARENAL ABDOMINAL AORTIC ANEURYSM (AAA) WITHOUT RUPTURE: Primary | ICD-10-CM

## 2023-11-08 ENCOUNTER — HOSPITAL ENCOUNTER (OUTPATIENT)
Dept: RADIOLOGY | Facility: HOSPITAL | Age: 68
Discharge: HOME | End: 2023-11-08
Attending: SURGERY
Payer: COMMERCIAL

## 2023-11-08 DIAGNOSIS — I71.43 INFRARENAL ABDOMINAL AORTIC ANEURYSM (AAA) WITHOUT RUPTURE: ICD-10-CM

## 2023-11-08 LAB
CREAT SERPL-MCNC: 0.8 MG/DL (ref 0.5–1.4)
SAMPLE: NORMAL

## 2023-11-08 PROCEDURE — 75635 CT ANGIO ABDOMINAL ARTERIES: CPT | Mod: TC,PO

## 2023-11-08 PROCEDURE — 25500020 PHARM REV CODE 255: Mod: PO | Performed by: SURGERY

## 2023-11-08 PROCEDURE — 82565 ASSAY OF CREATININE: CPT | Mod: PO

## 2023-11-08 RX ADMIN — IOHEXOL 120 ML: 350 INJECTION, SOLUTION INTRAVENOUS at 02:11

## 2023-11-14 ENCOUNTER — TELEPHONE (OUTPATIENT)
Dept: FAMILY MEDICINE | Facility: CLINIC | Age: 68
End: 2023-11-14

## 2023-11-14 NOTE — TELEPHONE ENCOUNTER
Pt need a new referral to [facility]  with no provider listed just [facility] due to provider listed no longer working at clinic.

## 2023-11-15 DIAGNOSIS — I71.40 ABDOMINAL AORTIC ANEURYSM (AAA) WITHOUT RUPTURE, UNSPECIFIED PART: Primary | ICD-10-CM

## 2023-11-15 NOTE — TELEPHONE ENCOUNTER
I received a message from Janett yesterday, Stating referred provider no longer in clinic. New referral will give pt access to see another provider at the office. VICENTA

## 2024-01-17 DIAGNOSIS — G25.81 RESTLESS LEG SYNDROME: ICD-10-CM

## 2024-01-17 DIAGNOSIS — J42 CHRONIC BRONCHITIS, UNSPECIFIED CHRONIC BRONCHITIS TYPE: ICD-10-CM

## 2024-01-17 RX ORDER — ROPINIROLE 1 MG/1
1 TABLET, FILM COATED ORAL 3 TIMES DAILY
Qty: 90 TABLET | Refills: 3 | Status: SHIPPED | OUTPATIENT
Start: 2024-01-17 | End: 2024-04-26

## 2024-01-17 RX ORDER — ALBUTEROL SULFATE 90 UG/1
2 AEROSOL, METERED RESPIRATORY (INHALATION) EVERY 6 HOURS PRN
Qty: 54 G | Status: SHIPPED | OUTPATIENT
Start: 2024-01-17

## 2024-01-17 NOTE — TELEPHONE ENCOUNTER
----- Message from Sheri Lomas sent at 1/17/2024  9:00 AM CST -----  Regarding: Med [facility]  Patient called requesting refills on Ropinirole and Albuterol to be sent to [facility] in [place].

## 2024-02-05 ENCOUNTER — LAB VISIT (OUTPATIENT)
Dept: LAB | Facility: HOSPITAL | Age: 69
End: 2024-02-05
Attending: NURSE PRACTITIONER
Payer: MEDICARE

## 2024-02-05 DIAGNOSIS — R79.9 ABNORMAL BLOOD CHEMISTRY: ICD-10-CM

## 2024-02-05 DIAGNOSIS — E78.2 MIXED HYPERLIPIDEMIA: ICD-10-CM

## 2024-02-05 DIAGNOSIS — E78.2 MIXED HYPERLIPIDEMIA: Primary | ICD-10-CM

## 2024-02-05 DIAGNOSIS — R23.2 FLUSHING: ICD-10-CM

## 2024-02-05 DIAGNOSIS — I10 ESSENTIAL HYPERTENSION, MALIGNANT: ICD-10-CM

## 2024-02-05 LAB
ALBUMIN SERPL BCP-MCNC: 3.9 G/DL (ref 3.5–5.2)
ALP SERPL-CCNC: 91 U/L (ref 55–135)
ALT SERPL W/O P-5'-P-CCNC: 13 U/L (ref 10–44)
ANION GAP SERPL CALC-SCNC: 8 MMOL/L (ref 8–16)
AST SERPL-CCNC: 16 U/L (ref 10–40)
BASOPHILS # BLD AUTO: 0.08 K/UL (ref 0–0.2)
BASOPHILS NFR BLD: 0.6 % (ref 0–1.9)
BILIRUB SERPL-MCNC: 0.4 MG/DL (ref 0.1–1)
BUN SERPL-MCNC: 14 MG/DL (ref 8–23)
CALCIUM SERPL-MCNC: 8.8 MG/DL (ref 8.7–10.5)
CHLORIDE SERPL-SCNC: 103 MMOL/L (ref 95–110)
CHOLEST SERPL-MCNC: 115 MG/DL (ref 120–199)
CHOLEST/HDLC SERPL: 2.5 {RATIO} (ref 2–5)
CO2 SERPL-SCNC: 28 MMOL/L (ref 23–29)
CREAT SERPL-MCNC: 0.7 MG/DL (ref 0.5–1.4)
DIFFERENTIAL METHOD BLD: ABNORMAL
EOSINOPHIL # BLD AUTO: 0.2 K/UL (ref 0–0.5)
EOSINOPHIL NFR BLD: 1.5 % (ref 0–8)
ERYTHROCYTE [DISTWIDTH] IN BLOOD BY AUTOMATED COUNT: 14.8 % (ref 11.5–14.5)
EST. GFR  (NO RACE VARIABLE): >60 ML/MIN/1.73 M^2
GLUCOSE SERPL-MCNC: 104 MG/DL (ref 70–110)
HCT VFR BLD AUTO: 49.6 % (ref 37–48.5)
HDLC SERPL-MCNC: 46 MG/DL (ref 40–75)
HDLC SERPL: 40 % (ref 20–50)
HGB BLD-MCNC: 16 G/DL (ref 12–16)
IMM GRANULOCYTES # BLD AUTO: 0.05 K/UL (ref 0–0.04)
IMM GRANULOCYTES NFR BLD AUTO: 0.4 % (ref 0–0.5)
LDLC SERPL CALC-MCNC: 52.8 MG/DL (ref 63–159)
LYMPHOCYTES # BLD AUTO: 1.8 K/UL (ref 1–4.8)
LYMPHOCYTES NFR BLD: 14.3 % (ref 18–48)
MCH RBC QN AUTO: 30.6 PG (ref 27–31)
MCHC RBC AUTO-ENTMCNC: 32.3 G/DL (ref 32–36)
MCV RBC AUTO: 95 FL (ref 82–98)
MONOCYTES # BLD AUTO: 1 K/UL (ref 0.3–1)
MONOCYTES NFR BLD: 7.9 % (ref 4–15)
NEUTROPHILS # BLD AUTO: 9.4 K/UL (ref 1.8–7.7)
NEUTROPHILS NFR BLD: 75.3 % (ref 38–73)
NONHDLC SERPL-MCNC: 69 MG/DL
NRBC BLD-RTO: 0 /100 WBC
PLATELET # BLD AUTO: 272 K/UL (ref 150–450)
PMV BLD AUTO: 9.5 FL (ref 9.2–12.9)
POTASSIUM SERPL-SCNC: 4.2 MMOL/L (ref 3.5–5.1)
PROT SERPL-MCNC: 6.5 G/DL (ref 6–8.4)
RBC # BLD AUTO: 5.23 M/UL (ref 4–5.4)
SODIUM SERPL-SCNC: 139 MMOL/L (ref 136–145)
TRIGL SERPL-MCNC: 81 MG/DL (ref 30–150)
WBC # BLD AUTO: 12.43 K/UL (ref 3.9–12.7)

## 2024-02-05 PROCEDURE — 36415 COLL VENOUS BLD VENIPUNCTURE: CPT | Performed by: NURSE PRACTITIONER

## 2024-02-05 PROCEDURE — 85025 COMPLETE CBC W/AUTO DIFF WBC: CPT | Performed by: NURSE PRACTITIONER

## 2024-02-05 PROCEDURE — 80061 LIPID PANEL: CPT | Performed by: NURSE PRACTITIONER

## 2024-02-05 PROCEDURE — 83036 HEMOGLOBIN GLYCOSYLATED A1C: CPT | Performed by: NURSE PRACTITIONER

## 2024-02-05 PROCEDURE — 80053 COMPREHEN METABOLIC PANEL: CPT | Performed by: NURSE PRACTITIONER

## 2024-02-06 LAB
ESTIMATED AVG GLUCOSE: 131 MG/DL (ref 68–131)
HBA1C MFR BLD: 6.2 % (ref 4.5–6.2)

## 2024-02-08 RX ORDER — ALENDRONATE SODIUM 10 MG/1
10 TABLET ORAL
COMMUNITY
End: 2024-05-06 | Stop reason: ALTCHOICE

## 2024-02-15 ENCOUNTER — OFFICE VISIT (OUTPATIENT)
Dept: FAMILY MEDICINE | Facility: CLINIC | Age: 69
End: 2024-02-15
Payer: MEDICARE

## 2024-02-15 DIAGNOSIS — R10.31 RIGHT LOWER QUADRANT ABDOMINAL PAIN: ICD-10-CM

## 2024-02-15 DIAGNOSIS — J42 CHRONIC BRONCHITIS, UNSPECIFIED CHRONIC BRONCHITIS TYPE: ICD-10-CM

## 2024-02-15 DIAGNOSIS — G47.00 INSOMNIA, UNSPECIFIED TYPE: Primary | ICD-10-CM

## 2024-02-15 PROCEDURE — 3288F FALL RISK ASSESSMENT DOCD: CPT | Mod: HCNC,CPTII,S$GLB, | Performed by: NURSE PRACTITIONER

## 2024-02-15 PROCEDURE — 1101F PT FALLS ASSESS-DOCD LE1/YR: CPT | Mod: HCNC,CPTII,S$GLB, | Performed by: NURSE PRACTITIONER

## 2024-02-15 PROCEDURE — 3044F HG A1C LEVEL LT 7.0%: CPT | Mod: HCNC,CPTII,S$GLB, | Performed by: NURSE PRACTITIONER

## 2024-02-15 PROCEDURE — 4010F ACE/ARB THERAPY RXD/TAKEN: CPT | Mod: HCNC,CPTII,S$GLB, | Performed by: NURSE PRACTITIONER

## 2024-02-15 PROCEDURE — 3079F DIAST BP 80-89 MM HG: CPT | Mod: HCNC,CPTII,S$GLB, | Performed by: NURSE PRACTITIONER

## 2024-02-15 PROCEDURE — 99999 PR PBB SHADOW E&M-EST. PATIENT-LVL III: CPT | Mod: PBBFAC,HCNC,, | Performed by: NURSE PRACTITIONER

## 2024-02-15 PROCEDURE — 3075F SYST BP GE 130 - 139MM HG: CPT | Mod: HCNC,CPTII,S$GLB, | Performed by: NURSE PRACTITIONER

## 2024-02-15 PROCEDURE — 1126F AMNT PAIN NOTED NONE PRSNT: CPT | Mod: HCNC,CPTII,S$GLB, | Performed by: NURSE PRACTITIONER

## 2024-02-15 PROCEDURE — 3008F BODY MASS INDEX DOCD: CPT | Mod: HCNC,CPTII,S$GLB, | Performed by: NURSE PRACTITIONER

## 2024-02-15 PROCEDURE — 99213 OFFICE O/P EST LOW 20 MIN: CPT | Mod: HCNC,S$GLB,, | Performed by: NURSE PRACTITIONER

## 2024-02-15 RX ORDER — TEMAZEPAM 15 MG/1
15 CAPSULE ORAL NIGHTLY
Qty: 30 CAPSULE | Refills: 0 | Status: SHIPPED | OUTPATIENT
Start: 2024-02-15 | End: 2024-03-07 | Stop reason: SDUPTHER

## 2024-02-15 NOTE — PROGRESS NOTES
Patient ID: Yadi Mccall is a 68 y.o. female.    Chief Complaint: Follow-up, Hyperlipidemia, and Hypertension    Ms. Mccall presents 4 month follow up. She states she is doing well overall the time of this visit. She states she is having a difficult time sleeping. She has tried several over the counter medications as well as Trazodone with minimal relief. We discussed several medication options. She states she has also been experiencing abdominal pain as of late in the same area of her gall bladder surgery. She is requesting imaging at this time.     We reviewed overdue health maintenance.     We also reviewed blood work.    Hyperlipidemia  This is a chronic problem. The current episode started more than 1 year ago. The problem is controlled. Recent lipid tests were reviewed and are variable. There are no known factors aggravating her hyperlipidemia. Current antihyperlipidemic treatment includes diet change and statins. The current treatment provides moderate improvement of lipids. There are no compliance problems.  Risk factors for coronary artery disease include dyslipidemia, a sedentary lifestyle and post-menopausal.   Hypertension  This is a chronic problem. The current episode started more than 1 year ago. The problem has been waxing and waning since onset. The problem is controlled. Risk factors for coronary artery disease include dyslipidemia, family history, post-menopausal state, smoking/tobacco exposure and sedentary lifestyle. Compliance problems include diet and exercise.        Past Medical History:   Diagnosis Date    AAA (abdominal aortic aneurysm)     Anticoagulant long-term use     Coronary artery disease     Foot drop, left foot     Hyperlipidemia     Hypertension     Stroke 2014    LEFT SIDED WEAKNESS     Past Surgical History:   Procedure Laterality Date    APPENDECTOMY      CORONARY ANGIOGRAPHY N/A 7/24/2020    Procedure: ANGIOGRAM, CORONARY ARTERY;  Surgeon: Norbert Vallecillo MD;   Location: [facility] CATH;  Service: Cardiology;  Laterality: N/A;    CORONARY ARTERY BYPASS GRAFT (CABG) N/A 8/21/2020    Procedure: CORONARY ARTERY BYPASS GRAFT (CABG) BILATERAL MAMMARY  x  4 VESSELS;  Surgeon: Pascual Gray MD;  Location: [facility] OR;  Service: Cardiovascular;  Laterality: N/A;    ENDOSCOPIC HARVEST OF VEIN N/A 8/21/2020    Procedure: HARVEST-VEIN-ENDOVASCULAR;  Surgeon: Pascual Gray MD;  Location: [facility] OR;  Service: Cardiovascular;  Laterality: N/A;    FOOT SURGERY      HERNIA REPAIR      HYSTERECTOMY      LEFT HEART CATHETERIZATION N/A 7/24/2020    Procedure: Left heart cath;  Surgeon: Norbert Vallecillo MD;  Location: [facility] CATH;  Service: Cardiology;  Laterality: N/A;    TONSILLECTOMY           Tobacco History:  reports that she has been smoking cigarettes. She started smoking about 54 years ago. She has a 50.0 pack-year smoking history. She has never used smokeless tobacco.      Review of patient's allergies indicates:   Allergen Reactions    Chlorthalidone     Codeine     Dyazide [triamterene-hydrochlorothiazid]     Penicillins        Current Outpatient Medications:     acetaminophen (TYLENOL) 500 MG tablet, Take 500 mg by mouth 2 (two) times daily as needed for Pain or Temperature greater than (mild pain or temp > 100.5)., Disp: , Rfl:     albuterol (PROVENTIL/VENTOLIN HFA) 90 mcg/actuation inhaler, Inhale 2 puffs into the lungs every 6 (six) hours as needed for Wheezing., Disp: 54 g, Rfl: PRN    alendronate (FOSAMAX) 10 MG Tab, Take 10 mg by mouth., Disp: , Rfl:     aspirin 81 MG Chew, Take 1 tablet (81 mg total) by mouth once daily., Disp: 360 tablet, Rfl: 0    atorvastatin (LIPITOR) 40 MG tablet, Take 1 tablet (40 mg total) by mouth once daily., Disp: 90 tablet, Rfl: 3    baclofen (LIORESAL) 20 MG tablet, Take 1 tablet (20 mg total) by mouth 2 (two) times daily., Disp: 180 tablet, Rfl: 3    furosemide (LASIX) 20 MG tablet, Take 1 tablet (20 mg total) by mouth once daily., Disp: 30 tablet, Rfl:  "0    lisinopriL 10 MG tablet, Take 1 tablet (10 mg total) by mouth once daily., Disp: 90 tablet, Rfl: 3    nitroGLYCERIN (NITROSTAT) 0.4 MG SL tablet, Place 1 tablet (0.4 mg total) under the tongue every 5 (five) minutes as needed for Chest pain., Disp: 20 tablet, Rfl: 0    OXYGEN-AIR DELIVERY SYSTEMS MISC, 3 L/min by local intranasal application route continuous. NC 3lpm continuous, Disp: , Rfl:     oxymetazoline HCl (AFRIN, OXYMETAZOLINE, NASL), 1 spray by Nasal route as needed (stuffy nose)., Disp: , Rfl:     rOPINIRole (REQUIP) 1 MG tablet, Take 1 tablet (1 mg total) by mouth 3 (three) times daily., Disp: 90 tablet, Rfl: 3    venlafaxine (EFFEXOR-XR) 37.5 MG 24 hr capsule, Take 1 capsule (37.5 mg total) by mouth once daily., Disp: 30 capsule, Rfl: 11    albuterol-ipratropium (DUO-NEB) 2.5 mg-0.5 mg/3 mL nebulizer solution, Take 3 mLs by nebulization every 6 (six) hours as needed for Wheezing. Rescue, Disp: 120 each, Rfl: 11    temazepam (RESTORIL) 15 mg Cap, Take 1 capsule (15 mg total) by mouth every evening., Disp: 30 capsule, Rfl: 0    Review of Systems   Constitutional:  Positive for activity change and fatigue.   Musculoskeletal:  Positive for arthralgias.   Psychiatric/Behavioral:  Positive for sleep disturbance. The patient is nervous/anxious.           Objective:      Vitals:    02/15/24 1430 02/15/24 1445   BP: (!) 169/83 134/80   Pulse: (P) 97    Weight: 78 kg (172 lb)    Height: 5' 6" (1.676 m)      Physical Exam  Vitals reviewed.   Constitutional:       General: She is not in acute distress.     Appearance: Normal appearance. She is normal weight. She is ill-appearing. She is not toxic-appearing or diaphoretic.      Comments: Chronically ill appearing patient    HENT:      Head: Normocephalic and atraumatic.      Right Ear: Tympanic membrane and external ear normal. There is no impacted cerumen.      Left Ear: Tympanic membrane and external ear normal. There is no impacted cerumen.      Nose: Nose " normal. No congestion or rhinorrhea.      Mouth/Throat:      Mouth: Mucous membranes are moist.      Pharynx: Oropharynx is clear. No oropharyngeal exudate or posterior oropharyngeal erythema.   Eyes:      General: No scleral icterus.        Right eye: No discharge.         Left eye: No discharge.      Extraocular Movements: Extraocular movements intact.      Conjunctiva/sclera: Conjunctivae normal.      Pupils: Pupils are equal, round, and reactive to light.   Neck:      Vascular: No carotid bruit.   Cardiovascular:      Rate and Rhythm: Normal rate and regular rhythm.      Pulses: Normal pulses.      Heart sounds: Normal heart sounds. No murmur heard.     No friction rub. No gallop.   Pulmonary:      Effort: Pulmonary effort is normal. No respiratory distress.      Breath sounds: Normal breath sounds. No stridor. No rales.   Chest:      Chest wall: No tenderness.   Abdominal:      General: Abdomen is flat. Bowel sounds are normal.      Palpations: Abdomen is soft. There is no mass.      Tenderness: There is no abdominal tenderness. There is no guarding.   Musculoskeletal:         General: No swelling or signs of injury. Normal range of motion.      Cervical back: Normal range of motion and neck supple. No rigidity or tenderness.      Right lower leg: No edema.      Left lower leg: No edema.   Skin:     General: Skin is warm and dry.      Capillary Refill: Capillary refill takes less than 2 seconds.      Findings: No bruising, lesion or rash.   Neurological:      General: No focal deficit present.      Mental Status: She is alert and oriented to person, place, and time. Mental status is at baseline.      Motor: No weakness.      Coordination: Coordination normal.   Psychiatric:         Mood and Affect: Mood normal.         Behavior: Behavior normal.         Thought Content: Thought content normal.         Judgment: Judgment normal.           Assessment:       1. Insomnia, unspecified type    2. Chronic bronchitis,  unspecified chronic bronchitis type    3. Right lower quadrant abdominal pain           Plan:       Insomnia, unspecified type  -     temazepam (RESTORIL) 15 mg Cap; Take 1 capsule (15 mg total) by mouth every evening.  Dispense: 30 capsule; Refill: 0    Chronic bronchitis, unspecified chronic bronchitis type    Right lower quadrant abdominal pain  -     US Abdomen Complete; Future; Expected date: 02/15/2024      Follow up in about 4 months (around 6/15/2024), or if symptoms worsen or fail to improve.        2/18/2024 Keshia Chan, NP

## 2024-02-18 VITALS
DIASTOLIC BLOOD PRESSURE: 80 MMHG | SYSTOLIC BLOOD PRESSURE: 134 MMHG | BODY MASS INDEX: 27.64 KG/M2 | WEIGHT: 172 LBS | HEIGHT: 66 IN

## 2024-02-20 ENCOUNTER — HOSPITAL ENCOUNTER (OUTPATIENT)
Dept: RADIOLOGY | Facility: HOSPITAL | Age: 69
Discharge: HOME | End: 2024-02-20
Attending: NURSE PRACTITIONER
Payer: MEDICARE

## 2024-02-20 DIAGNOSIS — R10.31 RIGHT LOWER QUADRANT ABDOMINAL PAIN: ICD-10-CM

## 2024-02-20 PROCEDURE — 76700 US EXAM ABDOM COMPLETE: CPT | Mod: TC,PO

## 2024-02-27 DIAGNOSIS — Z00.00 ENCOUNTER FOR MEDICARE ANNUAL WELLNESS EXAM: ICD-10-CM

## 2024-03-07 ENCOUNTER — OFFICE VISIT (OUTPATIENT)
Dept: FAMILY MEDICINE | Facility: CLINIC | Age: 69
End: 2024-03-07
Payer: MEDICARE

## 2024-03-07 VITALS
WEIGHT: 175.5 LBS | SYSTOLIC BLOOD PRESSURE: 148 MMHG | HEART RATE: 80 BPM | BODY MASS INDEX: 28.21 KG/M2 | TEMPERATURE: 98 F | DIASTOLIC BLOOD PRESSURE: 68 MMHG | HEIGHT: 66 IN | RESPIRATION RATE: 20 BRPM

## 2024-03-07 DIAGNOSIS — R10.31 RIGHT LOWER QUADRANT ABDOMINAL PAIN: Primary | ICD-10-CM

## 2024-03-07 DIAGNOSIS — G47.00 INSOMNIA, UNSPECIFIED TYPE: ICD-10-CM

## 2024-03-07 PROCEDURE — 99999 PR PBB SHADOW E&M-EST. PATIENT-LVL IV: CPT | Mod: PBBFAC,HCNC,, | Performed by: NURSE PRACTITIONER

## 2024-03-07 PROCEDURE — 1101F PT FALLS ASSESS-DOCD LE1/YR: CPT | Mod: HCNC,CPTII,S$GLB, | Performed by: NURSE PRACTITIONER

## 2024-03-07 PROCEDURE — 4010F ACE/ARB THERAPY RXD/TAKEN: CPT | Mod: HCNC,CPTII,S$GLB, | Performed by: NURSE PRACTITIONER

## 2024-03-07 PROCEDURE — 3078F DIAST BP <80 MM HG: CPT | Mod: HCNC,CPTII,S$GLB, | Performed by: NURSE PRACTITIONER

## 2024-03-07 PROCEDURE — 1125F AMNT PAIN NOTED PAIN PRSNT: CPT | Mod: HCNC,CPTII,S$GLB, | Performed by: NURSE PRACTITIONER

## 2024-03-07 PROCEDURE — 3288F FALL RISK ASSESSMENT DOCD: CPT | Mod: HCNC,CPTII,S$GLB, | Performed by: NURSE PRACTITIONER

## 2024-03-07 PROCEDURE — 1159F MED LIST DOCD IN RCRD: CPT | Mod: HCNC,CPTII,S$GLB, | Performed by: NURSE PRACTITIONER

## 2024-03-07 PROCEDURE — 3077F SYST BP >= 140 MM HG: CPT | Mod: HCNC,CPTII,S$GLB, | Performed by: NURSE PRACTITIONER

## 2024-03-07 PROCEDURE — 3044F HG A1C LEVEL LT 7.0%: CPT | Mod: HCNC,CPTII,S$GLB, | Performed by: NURSE PRACTITIONER

## 2024-03-07 PROCEDURE — 3008F BODY MASS INDEX DOCD: CPT | Mod: HCNC,CPTII,S$GLB, | Performed by: NURSE PRACTITIONER

## 2024-03-07 PROCEDURE — 99213 OFFICE O/P EST LOW 20 MIN: CPT | Mod: HCNC,S$GLB,, | Performed by: NURSE PRACTITIONER

## 2024-03-07 RX ORDER — TEMAZEPAM 30 MG/1
30 CAPSULE ORAL NIGHTLY
Qty: 30 CAPSULE | Refills: 0 | Status: SHIPPED | OUTPATIENT
Start: 2024-03-07 | End: 2024-04-06

## 2024-03-07 NOTE — PROGRESS NOTES
Patient ID: Yadi Mccall is a 68 y.o. female.    Chief Complaint: Flank Pain (67 yo female here stating concern of continuous right side flank pain off/on times 6 years. KM)    Ms. Mccall presents today for evaluation of abdominal pain. She states it it getting worse and abdominal ultrasound did not show any issues outside of known areas of concern. She states the pain is intermittent but when it occurs it is very painful. She denies fever or chills. She also denies nausea or vomiting related to this. She denies any other issues or complaints at this time. She states she is getting sleep with new medication but she states she feels it can be better. She denies any other issues or complaints.     Abdominal Pain  This is a recurrent problem. The current episode started more than 1 month ago. The onset quality is undetermined. The problem occurs 2 to 4 times per day. The problem has been waxing and waning. The pain is located in the LLQ. The pain is at a severity of 7/10. The pain is moderate. The quality of the pain is aching. The abdominal pain does not radiate. Associated symptoms include arthralgias. The pain is aggravated by certain positions, movement and palpation. The pain is relieved by Being still. She has tried acetaminophen for the symptoms. The treatment provided mild relief. Prior diagnostic workup includes ultrasound.           Past Medical History:   Diagnosis Date    AAA (abdominal aortic aneurysm)     Anticoagulant long-term use     Coronary artery disease     Foot drop, left foot     Hyperlipidemia     Hypertension     Stroke 2014    LEFT SIDED WEAKNESS     Past Surgical History:   Procedure Laterality Date    APPENDECTOMY      CORONARY ANGIOGRAPHY N/A 7/24/2020    Procedure: ANGIOGRAM, CORONARY ARTERY;  Surgeon: Norbert Vallecillo MD;  Location: [facility] CATH;  Service: Cardiology;  Laterality: N/A;    CORONARY ARTERY BYPASS GRAFT (CABG) N/A 8/21/2020    Procedure: CORONARY ARTERY BYPASS GRAFT  (CABG) BILATERAL MAMMARY  x  4 VESSELS;  Surgeon: Pascual rGay MD;  Location: [facility] OR;  Service: Cardiovascular;  Laterality: N/A;    ENDOSCOPIC HARVEST OF VEIN N/A 8/21/2020    Procedure: HARVEST-VEIN-ENDOVASCULAR;  Surgeon: Pascual Gray MD;  Location: [facility] OR;  Service: Cardiovascular;  Laterality: N/A;    FOOT SURGERY      HERNIA REPAIR      HYSTERECTOMY      LEFT HEART CATHETERIZATION N/A 7/24/2020    Procedure: Left heart cath;  Surgeon: Norbert Vallecillo MD;  Location: [facility] CATH;  Service: Cardiology;  Laterality: N/A;    TONSILLECTOMY           Tobacco History:  reports that she has been smoking cigarettes. She started smoking about 54 years ago. She has a 50.0 pack-year smoking history. She has been exposed to tobacco smoke. She has never used smokeless tobacco.      Review of patient's allergies indicates:   Allergen Reactions    Chlorthalidone     Codeine     Dyazide [triamterene-hydrochlorothiazid]     Penicillins        Current Outpatient Medications:     acetaminophen (TYLENOL) 500 MG tablet, Take 500 mg by mouth 2 (two) times daily as needed for Pain or Temperature greater than (mild pain or temp > 100.5)., Disp: , Rfl:     albuterol (PROVENTIL/VENTOLIN HFA) 90 mcg/actuation inhaler, Inhale 2 puffs into the lungs every 6 (six) hours as needed for Wheezing., Disp: 54 g, Rfl: PRN    alendronate (FOSAMAX) 10 MG Tab, Take 10 mg by mouth., Disp: , Rfl:     atorvastatin (LIPITOR) 40 MG tablet, Take 1 tablet (40 mg total) by mouth once daily., Disp: 90 tablet, Rfl: 3    baclofen (LIORESAL) 20 MG tablet, Take 1 tablet (20 mg total) by mouth 2 (two) times daily., Disp: 180 tablet, Rfl: 3    lisinopriL 10 MG tablet, Take 1 tablet (10 mg total) by mouth once daily., Disp: 90 tablet, Rfl: 3    oxymetazoline HCl (AFRIN, OXYMETAZOLINE, NASL), 1 spray by Nasal route as needed (stuffy nose)., Disp: , Rfl:     rOPINIRole (REQUIP) 1 MG tablet, Take 1 tablet (1 mg total) by mouth 3 (three) times daily., Disp: 90  "tablet, Rfl: 3    venlafaxine (EFFEXOR-XR) 37.5 MG 24 hr capsule, Take 1 capsule (37.5 mg total) by mouth once daily., Disp: 30 capsule, Rfl: 11    albuterol-ipratropium (DUO-NEB) 2.5 mg-0.5 mg/3 mL nebulizer solution, Take 3 mLs by nebulization every 6 (six) hours as needed for Wheezing. Rescue, Disp: 120 each, Rfl: 11    aspirin 81 MG Chew, Take 1 tablet (81 mg total) by mouth once daily., Disp: 360 tablet, Rfl: 0    furosemide (LASIX) 20 MG tablet, Take 1 tablet (20 mg total) by mouth once daily., Disp: 30 tablet, Rfl: 0    nitroGLYCERIN (NITROSTAT) 0.4 MG SL tablet, Place 1 tablet (0.4 mg total) under the tongue every 5 (five) minutes as needed for Chest pain., Disp: 20 tablet, Rfl: 0    OXYGEN-AIR DELIVERY SYSTEMS MISC, 3 L/min by local intranasal application route continuous. NC 3lpm continuous, Disp: , Rfl:     temazepam (RESTORIL) 30 mg capsule, Take 1 capsule (30 mg total) by mouth every evening., Disp: 30 capsule, Rfl: 0    Review of Systems   Constitutional:  Positive for activity change and fatigue.   Gastrointestinal:  Positive for abdominal distention and abdominal pain.   Musculoskeletal:  Positive for arthralgias.   Psychiatric/Behavioral:  Positive for sleep disturbance. The patient is nervous/anxious.           Objective:      Vitals:    03/07/24 1048   BP: (!) 148/68   Pulse: 80   Resp: 20   Temp: 97.6 °F (36.4 °C)   TempSrc: Oral   Weight: 79.6 kg (175 lb 8 oz)   Height: 5' 6" (1.676 m)     Physical Exam  Vitals reviewed.   Constitutional:       General: She is not in acute distress.     Appearance: Normal appearance. She is normal weight. She is ill-appearing. She is not toxic-appearing or diaphoretic.      Comments: Chronically ill appearing patient    HENT:      Head: Normocephalic and atraumatic.      Right Ear: Tympanic membrane and external ear normal. There is no impacted cerumen.      Left Ear: Tympanic membrane and external ear normal. There is no impacted cerumen.      Nose: Nose " normal. No congestion or rhinorrhea.      Mouth/Throat:      Mouth: Mucous membranes are moist.      Pharynx: Oropharynx is clear. No oropharyngeal exudate or posterior oropharyngeal erythema.   Eyes:      General: No scleral icterus.        Right eye: No discharge.         Left eye: No discharge.      Extraocular Movements: Extraocular movements intact.      Conjunctiva/sclera: Conjunctivae normal.      Pupils: Pupils are equal, round, and reactive to light.   Neck:      Vascular: No carotid bruit.   Cardiovascular:      Rate and Rhythm: Normal rate and regular rhythm.      Pulses: Normal pulses.      Heart sounds: Normal heart sounds. No murmur heard.     No friction rub. No gallop.   Pulmonary:      Effort: Pulmonary effort is normal. No respiratory distress.      Breath sounds: Normal breath sounds. No stridor. No rales.   Chest:      Chest wall: No tenderness.   Abdominal:      General: Abdomen is flat. Bowel sounds are normal.      Palpations: Abdomen is soft. There is no mass.      Tenderness: There is no abdominal tenderness. There is no guarding.          Comments: Area of abdominal pain   Musculoskeletal:         General: No swelling or signs of injury. Normal range of motion.      Cervical back: Normal range of motion and neck supple. No rigidity or tenderness.      Right lower leg: No edema.      Left lower leg: No edema.   Skin:     General: Skin is warm and dry.      Capillary Refill: Capillary refill takes less than 2 seconds.      Findings: No bruising, lesion or rash.   Neurological:      General: No focal deficit present.      Mental Status: She is alert and oriented to person, place, and time. Mental status is at baseline.      Motor: No weakness.      Coordination: Coordination normal.   Psychiatric:         Mood and Affect: Mood normal.         Behavior: Behavior normal.         Thought Content: Thought content normal.         Judgment: Judgment normal.           Assessment:       1. Right lower  quadrant abdominal pain    2. Insomnia, unspecified type           Plan:       Right lower quadrant abdominal pain  -     CT Abdomen Without Contrast; Future; Expected date: 03/07/2024    Insomnia, unspecified type  -     temazepam (RESTORIL) 30 mg capsule; Take 1 capsule (30 mg total) by mouth every evening.  Dispense: 30 capsule; Refill: 0      Follow up if symptoms worsen or fail to improve.        3/7/2024 Keshia Chan, NP

## 2024-03-11 DIAGNOSIS — R10.31 RIGHT LOWER QUADRANT PAIN: Primary | ICD-10-CM

## 2024-03-12 ENCOUNTER — HOSPITAL ENCOUNTER (OUTPATIENT)
Dept: RADIOLOGY | Facility: HOSPITAL | Age: 69
Discharge: HOME | End: 2024-03-12
Attending: NURSE PRACTITIONER
Payer: MEDICARE

## 2024-03-12 DIAGNOSIS — R10.31 RIGHT LOWER QUADRANT PAIN: ICD-10-CM

## 2024-03-12 DIAGNOSIS — R10.31 RIGHT LOWER QUADRANT ABDOMINAL PAIN: ICD-10-CM

## 2024-03-12 PROCEDURE — 74176 CT ABD & PELVIS W/O CONTRAST: CPT | Mod: TC,PO

## 2024-03-22 DIAGNOSIS — I10 ESSENTIAL HYPERTENSION: ICD-10-CM

## 2024-03-22 RX ORDER — LISINOPRIL 10 MG/1
TABLET ORAL
Qty: 90 TABLET | Refills: 3 | Status: SHIPPED | OUTPATIENT
Start: 2024-03-22

## 2024-03-28 ENCOUNTER — PATIENT MESSAGE (OUTPATIENT)
Dept: ADMINISTRATIVE | Facility: HOSPITAL | Age: 69
End: 2024-03-28
Payer: MEDICARE

## 2024-04-19 ENCOUNTER — PATIENT MESSAGE (OUTPATIENT)
Dept: ADMINISTRATIVE | Facility: HOSPITAL | Age: 69
End: 2024-04-19
Payer: MEDICARE

## 2024-04-26 DIAGNOSIS — G25.81 RESTLESS LEG SYNDROME: ICD-10-CM

## 2024-04-26 RX ORDER — ROPINIROLE 1 MG/1
1 TABLET, FILM COATED ORAL 3 TIMES DAILY
Qty: 90 TABLET | Refills: 3 | Status: SHIPPED | OUTPATIENT
Start: 2024-04-26 | End: 2024-08-24

## 2024-05-06 ENCOUNTER — OFFICE VISIT (OUTPATIENT)
Dept: FAMILY MEDICINE | Facility: CLINIC | Age: 69
End: 2024-05-06
Payer: MEDICARE

## 2024-05-06 VITALS
HEIGHT: 66 IN | SYSTOLIC BLOOD PRESSURE: 140 MMHG | DIASTOLIC BLOOD PRESSURE: 64 MMHG | TEMPERATURE: 98 F | RESPIRATION RATE: 20 BRPM | WEIGHT: 175.38 LBS | HEART RATE: 96 BPM | BODY MASS INDEX: 28.19 KG/M2

## 2024-05-06 DIAGNOSIS — I48.92 ATRIAL FLUTTER, PAROXYSMAL: ICD-10-CM

## 2024-05-06 DIAGNOSIS — R10.31 RIGHT LOWER QUADRANT PAIN: ICD-10-CM

## 2024-05-06 DIAGNOSIS — I74.5 OCCLUSION OF RIGHT ILIAC ARTERY: ICD-10-CM

## 2024-05-06 DIAGNOSIS — I69.354 HEMIPARESIS AFFECTING LEFT SIDE AS LATE EFFECT OF CEREBROVASCULAR ACCIDENT: ICD-10-CM

## 2024-05-06 DIAGNOSIS — Z01.818 PREOPERATIVE CLEARANCE: Primary | ICD-10-CM

## 2024-05-06 PROCEDURE — 4010F ACE/ARB THERAPY RXD/TAKEN: CPT | Mod: HCNC,CPTII,S$GLB, | Performed by: NURSE PRACTITIONER

## 2024-05-06 PROCEDURE — 3077F SYST BP >= 140 MM HG: CPT | Mod: HCNC,CPTII,S$GLB, | Performed by: NURSE PRACTITIONER

## 2024-05-06 PROCEDURE — 3288F FALL RISK ASSESSMENT DOCD: CPT | Mod: HCNC,CPTII,S$GLB, | Performed by: NURSE PRACTITIONER

## 2024-05-06 PROCEDURE — 1101F PT FALLS ASSESS-DOCD LE1/YR: CPT | Mod: HCNC,CPTII,S$GLB, | Performed by: NURSE PRACTITIONER

## 2024-05-06 PROCEDURE — 3078F DIAST BP <80 MM HG: CPT | Mod: HCNC,CPTII,S$GLB, | Performed by: NURSE PRACTITIONER

## 2024-05-06 PROCEDURE — 3044F HG A1C LEVEL LT 7.0%: CPT | Mod: HCNC,CPTII,S$GLB, | Performed by: NURSE PRACTITIONER

## 2024-05-06 PROCEDURE — 3008F BODY MASS INDEX DOCD: CPT | Mod: HCNC,CPTII,S$GLB, | Performed by: NURSE PRACTITIONER

## 2024-05-06 PROCEDURE — 1125F AMNT PAIN NOTED PAIN PRSNT: CPT | Mod: HCNC,CPTII,S$GLB, | Performed by: NURSE PRACTITIONER

## 2024-05-06 PROCEDURE — 99999 PR PBB SHADOW E&M-EST. PATIENT-LVL IV: CPT | Mod: PBBFAC,HCNC,, | Performed by: NURSE PRACTITIONER

## 2024-05-06 PROCEDURE — 1159F MED LIST DOCD IN RCRD: CPT | Mod: HCNC,CPTII,S$GLB, | Performed by: NURSE PRACTITIONER

## 2024-05-06 PROCEDURE — 99213 OFFICE O/P EST LOW 20 MIN: CPT | Mod: HCNC,S$GLB,, | Performed by: NURSE PRACTITIONER

## 2024-05-06 RX ORDER — TRAMADOL HYDROCHLORIDE 50 MG/1
50 TABLET ORAL EVERY 6 HOURS PRN
Qty: 30 TABLET | Refills: 0 | Status: SHIPPED | OUTPATIENT
Start: 2024-05-06 | End: 2024-06-10

## 2024-05-06 NOTE — PROGRESS NOTES
Chief Complaint   Patient presents with    Pre-op Exam     67 yo female here for surgery clearance. Pt scheduled for left eye cataract surgery on 05/08/2024. KM        Subjective:     Yadi Mccall is a 68 y.o. female who presents to the office today for a preoperative consultation at the request of surgeon  who plans on performing Left Eye Cataract Removal on May 8. This consultation is requested for the specific conditions prompting preoperative evaluation (i.e. because of potential affect on operative risk): Low. Planned anesthesia: local. The patient has the following known anesthesia issues:  None . Patients bleeding risk:  low  . Patient does not have objections to receiving blood products if needed.    Procedure Risk: low  Recent Procedures: none  Recent infections: none    Heart Disease:  Angina history No  MI>6month No If yes then 5 points  MI<6 months No If yes then 10 points  Unstable Angina No If yes then 10 points  Class III No If yes then 10 points  Class IV No If yes then 20 points  Revasc <5years No and asymptomatic No  If symptomatic then needs restudy.  Pulmonary Edema history No If yes then 5 points  Pulmonary Edema <7days No  If yes then 10 points  Critical Aortic Stenosis No If yes then 20 points  Abnormal EKG No  PACs No If yes 5 points        General poor health yes If yes then 5 points  Age > 70 No If yes then 10 points  CVA No  HTN Yes  COPD No Consider PFTs prior to thoracotomy  Liver Failure/Alcohol withdrawal No  Anemia No    If any yes in categories greater than or equal to 10 points marked yes, procedure should be delayed, angiography and risk-factor modification should be considered. If the presence of other clinical predictors then stress test prior to procedure.   Points total 20 intermediate    0-15 = low, 15-30 = intermediate, 31+ = high    The following portions of the patient's history were reviewed and updated as appropriate: allergies, current medications, past family  "history, past medical history, past social history, past surgical history, and problem list.    Review of Systems  A comprehensive review of systems was negative.     Objective:       BP (!) 154/70 (BP Location: Right arm, Patient Position: Sitting)   Pulse 96   Temp 97.6 °F (36.4 °C) (Oral)   Resp 20   Ht 5' 6" (1.676 m)   Wt 79.6 kg (175 lb 6.4 oz)   LMP  (LMP Unknown)   BMI 28.31 kg/m²     General Appearance:    Alert, cooperative, no distress, appears stated age   Head:    Normocephalic, without obvious abnormality, atraumatic   Eyes:    PERRL, conjunctiva/corneas clear, EOM's intact, fundi     benign, both eyes   Ears:    Normal TM's and external ear canals, both ears   Nose:   Nares normal, septum midline, mucosa normal, no drainage    or sinus tenderness   Throat:   Lips, mucosa, and tongue normal; teeth and gums normal   Neck:   Supple, symmetrical, trachea midline, no adenopathy;     thyroid:  no enlargement/tenderness/nodules; no carotid    bruit or JVD   Back:     Symmetric, no curvature, ROM normal, no CVA tenderness   Lungs:     Clear to auscultation bilaterally, respirations unlabored   Chest Wall:    No tenderness or deformity    Heart:    Regular rate and rhythm, S1 and S2 normal, no murmur, rub   or gallop   Breast Exam:    No tenderness, masses, or nipple abnormality   Abdomen:     Soft, non-tender, bowel sounds active all four quadrants,     no masses, no organomegaly   Genitalia:    Normal female without lesion, discharge or tenderness   Rectal:    Normal tone, no masses or tenderness;    guaiac negative stool   Extremities:   Extremities normal, atraumatic, no cyanosis or edema   Pulses:   2+ and symmetric all extremities   Skin:   Skin color, texture, turgor normal, no rashes or lesions   Lymph nodes:   Cervical, supraclavicular, and axillary nodes normal   Neurologic:   CNII-XII intact, normal strength, sensation and reflexes     throughout     Cardiographics  ECG: no prior " ECG  Echocardiogram: not done    Imaging  Chest x-ray:  none      Lab Review   If large blood loss possible during surgery then CBC pre-op  not applicable     Assessment:       68 y.o. female with planned surgery as above.    Known risk factors for perioperative complications: None    Difficulty with intubation is not anticipated.    Cardiac Risk Estimation: low     Plan:     1. Preoperative workup as follows hemoglobin, hematocrit, electrolytes, creatinine, glucose, liver function studies.   2. Medications reviewed. Change in medication regimen before surgery: discontinue ASA 14 days before surgery.   3. Prophylaxis for cardiac events with perioperative beta-blockers: not indicated. Caution in patients with COPD.   4. Invasive hemodynamic monitoring perioperatively: not indicated.  5. Deep vein thrombosis prophylaxis postoperatively: none  .  6. Other measures:     7. Discuss with team  8. No follow-ups on file.      As with all procedures, there is inherent risk of multiple complications including bleeding,  infectious, cardiac, and pulmonary.  Please stop Aspirin and NSAIDS one week prior to surgery.    All smokers should quit smoking eight or more weeks prior to procedure to minimize complications associated with smoking. Reduction or cessation of smoking for less than four to eight weeks before surgery is of questionable benefit, and has actually been shown in some studies to result in higher complication rates.  Alcohol should be used in moderation.  Any pt with cardiopulmonary disease may warrant repeat examination prior to procedure along with directions for deep breathing exercises and incentive spirometry.    Patients at high risk for complications usually warrant cardiology consultation and possibly angiography. Cardiac stress testing should be performed in patients at intermediate risk and with poor functional capacity or who are undergoing high-risk procedures, such as vascular surgery. For patients  with minor clinical predictors, only patients who have poor functional capacity and are undergoing a high-risk procedure require stress testing. Patients with positive stress test results warrant cardiology consultation before proceeding with surgery.  Predisposing risk factors for pulmonary complications include cough, dyspnea, smoking, a history of lung disease, obesity and abdominal or thoracic surgery.    Any pts > 70 years old may be at risk for delirium or cardiac complications.  Furthermore, diabetic patients may be at risk for infection or prolonged healing.      Keshia ZARATE FNP-C  05/06/2024 11:18 AM

## 2024-05-06 NOTE — LETTER
May 6, 2024        Yadi Mccall  [address]  [facility] [address]             [facility] [address]  [address]  [address]  [place] [address]  Phone: [phone]  Fax: [phone]   Patient: Yadi Mccall   MR Number: [id number]   YOB: 1955   Date of Visit: 5/6/2024     Dear Dr. Varghese,     Ms. Mccall was seen in office in 5/6/2024 for preop clearance. She is medically cleared to proceed with Cataract surgery on 5/8/2024.    I will attach office notes to this note.     If you have any questions or concerns please contact our office.     Sincerely,           Keshia Chan, ALECP-C      CC  No Recipients    Enclosure

## 2024-05-16 ENCOUNTER — OFFICE VISIT (OUTPATIENT)
Dept: HOME HEALTH SERVICES | Facility: CLINIC | Age: 69
End: 2024-05-16
Payer: MEDICARE

## 2024-05-16 VITALS
SYSTOLIC BLOOD PRESSURE: 111 MMHG | WEIGHT: 170 LBS | OXYGEN SATURATION: 94 % | DIASTOLIC BLOOD PRESSURE: 78 MMHG | HEART RATE: 78 BPM | BODY MASS INDEX: 27.44 KG/M2

## 2024-05-16 DIAGNOSIS — Z00.00 ENCOUNTER FOR PREVENTIVE HEALTH EXAMINATION: ICD-10-CM

## 2024-05-16 DIAGNOSIS — I69.354 HEMIPARESIS AFFECTING LEFT SIDE AS LATE EFFECT OF CEREBROVASCULAR ACCIDENT: ICD-10-CM

## 2024-05-16 DIAGNOSIS — E78.2 MIXED HYPERLIPIDEMIA: ICD-10-CM

## 2024-05-16 DIAGNOSIS — I69.30 HISTORY OF CVA WITH RESIDUAL DEFICIT: Chronic | ICD-10-CM

## 2024-05-16 DIAGNOSIS — I10 ESSENTIAL HYPERTENSION: ICD-10-CM

## 2024-05-16 DIAGNOSIS — M85.80 OSTEOPENIA, UNSPECIFIED LOCATION: ICD-10-CM

## 2024-05-16 DIAGNOSIS — I70.0 CALCIFICATION OF AORTA: ICD-10-CM

## 2024-05-16 DIAGNOSIS — F17.200 NICOTINE DEPENDENCE, UNCOMPLICATED, UNSPECIFIED NICOTINE PRODUCT TYPE: ICD-10-CM

## 2024-05-16 DIAGNOSIS — I71.40 ABDOMINAL AORTIC ANEURYSM (AAA) WITHOUT RUPTURE, UNSPECIFIED PART: ICD-10-CM

## 2024-05-16 DIAGNOSIS — I48.92 ATRIAL FIBRILLATION/FLUTTER: ICD-10-CM

## 2024-05-16 DIAGNOSIS — Z99.89 DEPENDENCE ON OTHER ENABLING MACHINES AND DEVICES: ICD-10-CM

## 2024-05-16 DIAGNOSIS — G25.81 RESTLESS LEG SYNDROME: ICD-10-CM

## 2024-05-16 DIAGNOSIS — Z00.00 ENCOUNTER FOR MEDICARE ANNUAL WELLNESS EXAM: Primary | ICD-10-CM

## 2024-05-16 DIAGNOSIS — J42 CHRONIC BRONCHITIS, UNSPECIFIED CHRONIC BRONCHITIS TYPE: ICD-10-CM

## 2024-05-16 DIAGNOSIS — R73.03 PREDIABETES: ICD-10-CM

## 2024-05-16 DIAGNOSIS — I74.5 OCCLUSION OF RIGHT ILIAC ARTERY: ICD-10-CM

## 2024-05-16 DIAGNOSIS — I48.91 ATRIAL FIBRILLATION/FLUTTER: ICD-10-CM

## 2024-05-16 PROCEDURE — 3044F HG A1C LEVEL LT 7.0%: CPT | Mod: CPTII,S$GLB,, | Performed by: NURSE PRACTITIONER

## 2024-05-16 PROCEDURE — 1159F MED LIST DOCD IN RCRD: CPT | Mod: CPTII,S$GLB,, | Performed by: NURSE PRACTITIONER

## 2024-05-16 PROCEDURE — 4010F ACE/ARB THERAPY RXD/TAKEN: CPT | Mod: CPTII,S$GLB,, | Performed by: NURSE PRACTITIONER

## 2024-05-16 PROCEDURE — 3074F SYST BP LT 130 MM HG: CPT | Mod: CPTII,S$GLB,, | Performed by: NURSE PRACTITIONER

## 2024-05-16 PROCEDURE — 1101F PT FALLS ASSESS-DOCD LE1/YR: CPT | Mod: CPTII,S$GLB,, | Performed by: NURSE PRACTITIONER

## 2024-05-16 PROCEDURE — 3288F FALL RISK ASSESSMENT DOCD: CPT | Mod: CPTII,S$GLB,, | Performed by: NURSE PRACTITIONER

## 2024-05-16 PROCEDURE — 1160F RVW MEDS BY RX/DR IN RCRD: CPT | Mod: CPTII,S$GLB,, | Performed by: NURSE PRACTITIONER

## 2024-05-16 PROCEDURE — 1158F ADVNC CARE PLAN TLK DOCD: CPT | Mod: CPTII,S$GLB,, | Performed by: NURSE PRACTITIONER

## 2024-05-16 PROCEDURE — 3078F DIAST BP <80 MM HG: CPT | Mod: CPTII,S$GLB,, | Performed by: NURSE PRACTITIONER

## 2024-05-16 PROCEDURE — G9919 SCRN ND POS ND PROV OF REC: HCPCS | Mod: CPTII,S$GLB,, | Performed by: NURSE PRACTITIONER

## 2024-05-16 PROCEDURE — G0439 PPPS, SUBSEQ VISIT: HCPCS | Mod: S$GLB,,, | Performed by: NURSE PRACTITIONER

## 2024-05-16 PROCEDURE — 1170F FXNL STATUS ASSESSED: CPT | Mod: CPTII,S$GLB,, | Performed by: NURSE PRACTITIONER

## 2024-05-16 PROCEDURE — 1125F AMNT PAIN NOTED PAIN PRSNT: CPT | Mod: CPTII,S$GLB,, | Performed by: NURSE PRACTITIONER

## 2024-05-16 NOTE — PATIENT INSTRUCTIONS
Counseling and Referral of Other Preventative  (Italic type indicates deductible and co-insurance are waived)    Patient Name: Yadi Mccall  Today's Date: 5/16/2024    Health Maintenance       Date Due Completion Date    High Dose Statin Never done ---    Shingles Vaccine (2 of 3) 03/20/2017 1/23/2017    LDCT Lung Screen 09/03/2021 9/3/2020    TETANUS VACCINE 02/15/2023 2/15/2013    COVID-19 Vaccine (4 - 2023-24 season) 02/03/2024 10/3/2023    Mammogram 02/15/2025 (Originally 7/26/1973) ---    Lipid Panel 02/05/2025 2/5/2024    DEXA Scan 01/17/2026 1/17/2023    Hemoglobin A1c (Diabetic Prevention Screening) 02/05/2027 2/5/2024        No orders of the defined types were placed in this encounter.    The following information is provided to all patients.  This information is to help you find resources for any of the problems found today that may be affecting your health:                  Living healthy guide: www.[facility].[place].gov      Understanding Diabetes: www.diabetes.org      Eating healthy: www.cdc.gov/healthyweight      CDC home safety checklist: www.cdc.gov/steadi/patient.html      Agency on Aging: www.goea.[place].gov      Alcoholics anonymous (AA): www.aa.org      Physical Activity: www.manjit.nih.gov/om2mnxj      Tobacco use: www.quitwithusla.org

## 2024-05-16 NOTE — PROGRESS NOTES
Yadi Mccall presented for a follow-up Medicare AWV today. The following components were reviewed and updated:    Medical history  Family History  Social history  Allergies and Current Medications  Health Risk Assessment  Health Maintenance  Care Team    **See Completed Assessments for Annual Wellness visit with in the encounter summary    The following assessments were completed:  Depression Screening  Cognitive function Screening    Timed Get Up Test  Whisper Test      Opioid documentation:      Patient does have a current opioid prescription.      Patient accepted further discussion regarding opioid medication use.      Patient is currently taking tramadol narcotic for hip pain.        Pain level today is 8/10.       In addition to narcotic pain medications, patient is also using (no other oral, topical, or alternative treatments) for pain control.       Patient is followed by a specialist currently for their pain and will not be referred today.       Patient's opioid risk potential based on ORT-OUD tool:       Jose Cruz each box that applies   No   Yes     Family history of substance abuse   Alcohol [x] []   Illegal drugs [x] []   Rx drugs [x] []     Personal history of substance abuse   Alcohol [x] []   Illegal drugs [x] []   Rx drugs [x] []     Age between 16-45 years   [x]   []     Patient with ADD, OCD, Bipolar disorder, schizoprenia   [x]   []     Patient with depression   [x]   []                         Scoring total                                   9                     1         Non-opioid treatment options have been discussed today and added to the patient's after visit summary.          Vitals:    05/16/24 1028   BP: 111/78   Pulse: 78   SpO2: (!) 94%   Weight: 77.1 kg (170 lb)     Body mass index is 27.44 kg/m².       Physical Exam  HENT:      Head: Normocephalic.      Nose: Nose normal.   Eyes:      Pupils: Pupils are equal, round, and reactive to light.   Cardiovascular:      Pulses: Normal pulses.       Heart sounds: Normal heart sounds.   Pulmonary:      Effort: Pulmonary effort is normal.      Breath sounds: Normal breath sounds.   Abdominal:      General: Bowel sounds are normal.   Musculoskeletal:         General: Normal range of motion.      Cervical back: Normal range of motion.      Left lower leg: Edema present.   Skin:     General: Skin is warm and dry.   Neurological:      Mental Status: She is alert and oriented to person, place, and time.      Motor: Weakness present.      Gait: Gait abnormal (walker).           Diagnoses and health risks identified today and associated recommendations/orders:  1. Encounter for Medicare annual wellness exam  - Ambulatory Referral/Consult to Enhanced Annual Wellness Visit (eAWV)    2. Encounter for preventive health examination  - Above assessments completed. Preventive measures and health maintenance reviewed with patient.  -discussed overdue vaccines, can have done at any pharmacy  -discussed/encouraged lung screening  -declines mammogram    3. History of CVA with residual deficit  Stable, followed by PCP  -left sided weakness, uses walker  -on asa    4. Atrial fibrillation/flutter  Stable, followed by PCP  -on asa    5. Hemiparesis affecting left side as late effect of cerebrovascular accident  Stable, followed by PCP  -uses walker, no falls  -on asa    6. Occlusion of right iliac artery  Stable, followed by PCP  -on statin, asa    7. Calcification of aorta  Stable, followed by PCP  -on statin and asa    8. Abdominal aortic aneurysm (AAA) without rupture, unspecified part  Stable, followed by PCP  -encouraged tobacco cessation    9. Chronic bronchitis, unspecified chronic bronchitis type  Stable, followed by PCP  -on nebs, albuterol  -encouraged tobacco cessation    10. Mixed hyperlipidemia  Stable, followed by PCP  -on atorvastatin    11. Essential hypertension  Stable, followed by PCP  -on lisinopril    12. Restless leg syndrome  Stable, followed by PCP  -on  ropinirole    13. Nicotine dependence, uncomplicated, unspecified nicotine product type  Stable, followed by PCP  -encouraged cessation, defers, counseled  -needs LDCT, pt will discuss with PCP at next visit    14. Dependence on other enabling machines and devices  -uses walker, no falls    15. Osteopenia, unspecified location  Stable, followed by PCP  -encouraged vero/d, weight bearing exercises    16. Prediabetes  Stable, followed by PCP  -A1C 6.2, discussed dietary and lifestyle changes        Provided Yadi with a 5-10 year written screening schedule and personal prevention plan. Recommendations were developed using the USPSTF age appropriate recommendations. Education, counseling, and referrals were provided as needed.  After Visit Summary printed and given to patient which includes a list of additional screenings\tests needed.    Follow up in about 1 year (around 5/16/2025) for your next annual wellness visit.      Lauren Oconnor NP  I offered to discuss advanced care planning, including how to pick a person who would make decisions for you if you were unable to make them for yourself, called a health care power of , and what kind of decisions you might make such as use of life sustaining treatments such as ventilators and tube feeding when faced with a life limiting illness recorded on a living will that they will need to know. (How you want to be cared for as you near the end of your natural life)     X  Patient has advanced directives on file, which we reviewed, and they do not wish to make changes.

## 2024-05-21 ENCOUNTER — PATIENT MESSAGE (OUTPATIENT)
Dept: FAMILY MEDICINE | Facility: CLINIC | Age: 69
End: 2024-05-21
Payer: MEDICARE

## 2024-05-31 DIAGNOSIS — E78.2 MIXED HYPERLIPIDEMIA: ICD-10-CM

## 2024-06-03 RX ORDER — ATORVASTATIN CALCIUM 40 MG/1
40 TABLET, FILM COATED ORAL DAILY
Qty: 90 TABLET | Refills: 3 | Status: SHIPPED | OUTPATIENT
Start: 2024-06-03 | End: 2025-06-03

## 2024-06-07 ENCOUNTER — PATIENT OUTREACH (OUTPATIENT)
Dept: ADMINISTRATIVE | Facility: HOSPITAL | Age: 69
End: 2024-06-07
Payer: MEDICARE

## 2024-06-07 DIAGNOSIS — R10.31 RIGHT LOWER QUADRANT PAIN: ICD-10-CM

## 2024-06-07 NOTE — PROGRESS NOTES
Population Health Chart Review & Patient Outreach Details      Additional [facility] Health Notes:               Updates Requested / Reviewed:      Updated Care Coordination Note, Care Everywhere, , and Immunizations Reconciliation Completed or Queried: [facility] Topics Overdue:      [facility] Score: 1     LDCT Lung Screen    Tetanus Vaccine  Shingles/Zoster Vaccine                  Health Maintenance Topic(s) Outreach Outcomes & Actions Taken:    Breast Cancer Screening - Outreach Outcomes & Actions Taken  : Pt Declined Scheduling Mammogram

## 2024-06-10 RX ORDER — TRAMADOL HYDROCHLORIDE 50 MG/1
50 TABLET ORAL EVERY 6 HOURS PRN
Qty: 30 TABLET | Refills: 0 | Status: SHIPPED | OUTPATIENT
Start: 2024-06-10

## 2024-06-25 ENCOUNTER — OFFICE VISIT (OUTPATIENT)
Dept: FAMILY MEDICINE | Facility: CLINIC | Age: 69
End: 2024-06-25
Payer: MEDICARE

## 2024-06-25 VITALS
HEIGHT: 66 IN | TEMPERATURE: 98 F | RESPIRATION RATE: 18 BRPM | BODY MASS INDEX: 27.93 KG/M2 | WEIGHT: 173.81 LBS | SYSTOLIC BLOOD PRESSURE: 128 MMHG | DIASTOLIC BLOOD PRESSURE: 74 MMHG | HEART RATE: 62 BPM

## 2024-06-25 DIAGNOSIS — F51.01 PRIMARY INSOMNIA: Primary | ICD-10-CM

## 2024-06-25 DIAGNOSIS — G25.81 RESTLESS LEG SYNDROME: ICD-10-CM

## 2024-06-25 DIAGNOSIS — I10 ESSENTIAL HYPERTENSION: ICD-10-CM

## 2024-06-25 DIAGNOSIS — E78.2 MIXED HYPERLIPIDEMIA: ICD-10-CM

## 2024-06-25 PROCEDURE — 3008F BODY MASS INDEX DOCD: CPT | Mod: HCNC,CPTII,S$GLB, | Performed by: NURSE PRACTITIONER

## 2024-06-25 PROCEDURE — 3044F HG A1C LEVEL LT 7.0%: CPT | Mod: HCNC,CPTII,S$GLB, | Performed by: NURSE PRACTITIONER

## 2024-06-25 PROCEDURE — 4010F ACE/ARB THERAPY RXD/TAKEN: CPT | Mod: HCNC,CPTII,S$GLB, | Performed by: NURSE PRACTITIONER

## 2024-06-25 PROCEDURE — 99214 OFFICE O/P EST MOD 30 MIN: CPT | Mod: HCNC,S$GLB,, | Performed by: NURSE PRACTITIONER

## 2024-06-25 PROCEDURE — 3078F DIAST BP <80 MM HG: CPT | Mod: HCNC,CPTII,S$GLB, | Performed by: NURSE PRACTITIONER

## 2024-06-25 PROCEDURE — 1101F PT FALLS ASSESS-DOCD LE1/YR: CPT | Mod: HCNC,CPTII,S$GLB, | Performed by: NURSE PRACTITIONER

## 2024-06-25 PROCEDURE — 1126F AMNT PAIN NOTED NONE PRSNT: CPT | Mod: HCNC,CPTII,S$GLB, | Performed by: NURSE PRACTITIONER

## 2024-06-25 PROCEDURE — 3288F FALL RISK ASSESSMENT DOCD: CPT | Mod: HCNC,CPTII,S$GLB, | Performed by: NURSE PRACTITIONER

## 2024-06-25 PROCEDURE — 3074F SYST BP LT 130 MM HG: CPT | Mod: HCNC,CPTII,S$GLB, | Performed by: NURSE PRACTITIONER

## 2024-06-25 PROCEDURE — 1159F MED LIST DOCD IN RCRD: CPT | Mod: HCNC,CPTII,S$GLB, | Performed by: NURSE PRACTITIONER

## 2024-06-25 PROCEDURE — 99999 PR PBB SHADOW E&M-EST. PATIENT-LVL III: CPT | Mod: PBBFAC,HCNC,, | Performed by: NURSE PRACTITIONER

## 2024-06-25 RX ORDER — LISINOPRIL 10 MG/1
10 TABLET ORAL DAILY
Qty: 90 TABLET | Refills: 3 | Status: SHIPPED | OUTPATIENT
Start: 2024-06-25

## 2024-06-25 RX ORDER — BACLOFEN 20 MG/1
20 TABLET ORAL 2 TIMES DAILY
Qty: 180 TABLET | Refills: 3 | Status: SHIPPED | OUTPATIENT
Start: 2024-06-25 | End: 2025-06-20

## 2024-06-25 RX ORDER — ROPINIROLE 1 MG/1
1 TABLET, FILM COATED ORAL 3 TIMES DAILY
Qty: 90 TABLET | Refills: 3 | Status: SHIPPED | OUTPATIENT
Start: 2024-06-25 | End: 2024-10-23

## 2024-06-25 RX ORDER — TEMAZEPAM 15 MG/1
15 CAPSULE ORAL NIGHTLY
Qty: 30 CAPSULE | Refills: 0 | Status: SHIPPED | OUTPATIENT
Start: 2024-06-25 | End: 2024-07-25

## 2024-06-25 RX ORDER — ATORVASTATIN CALCIUM 40 MG/1
40 TABLET, FILM COATED ORAL DAILY
Qty: 90 TABLET | Refills: 3 | Status: SHIPPED | OUTPATIENT
Start: 2024-06-25 | End: 2025-06-25

## 2024-06-25 NOTE — PROGRESS NOTES
" Patient ID: Yadi Mccall is a 68 y.o. female.    Chief Complaint: Follow-up (67 yo female here for follow up and med refill. Pt states she completed cataract surgery times 1 month ago. KM)    Ms. Mccall presents today for follow up and medication refill. She states she is doing well in the interval and here for medication refill. She had a successful cataract surgery and she will be fitted for glasses in the coming days. She also states she is having a difficult time sleeping. Her insomnia is significant and she is requesting to be placed back on mirtazepine as it is the only thing that was helpful. We discussed different options and this was the better option.     We spent time discussing overdue health maintenance. She continue to decline screenings. She specifically states "I will not do any cancer treatment and therefore would rather not know."     She denies any acute issues or complaints at this time.    All chronic issues appear stable.       Past Medical History:   Diagnosis Date    AAA (abdominal aortic aneurysm)     Anticoagulant long-term use     Coronary artery disease     Foot drop, left foot     Hyperlipidemia     Hypertension     Stroke 2014    LEFT SIDED WEAKNESS     Past Surgical History:   Procedure Laterality Date    APPENDECTOMY      CATARACT EXTRACTION Bilateral 05/2024    CORONARY ANGIOGRAPHY N/A 07/24/2020    Procedure: ANGIOGRAM, CORONARY ARTERY;  Surgeon: Norbert Vallecillo MD;  Location: [facility] CATH;  Service: Cardiology;  Laterality: N/A;    CORONARY ARTERY BYPASS GRAFT (CABG) N/A 08/21/2020    Procedure: CORONARY ARTERY BYPASS GRAFT (CABG) BILATERAL MAMMARY  x  4 VESSELS;  Surgeon: Pascual Gray MD;  Location: [facility] OR;  Service: Cardiovascular;  Laterality: N/A;    ENDOSCOPIC HARVEST OF VEIN N/A 08/21/2020    Procedure: HARVEST-VEIN-ENDOVASCULAR;  Surgeon: Pascual Gray MD;  Location: [facility] OR;  Service: Cardiovascular;  Laterality: N/A;    FOOT SURGERY      HERNIA REPAIR      HYSTERECTOMY  "     LEFT HEART CATHETERIZATION N/A 07/24/2020    Procedure: Left heart cath;  Surgeon: Norbert Vallecillo MD;  Location: [facility] CATH;  Service: Cardiology;  Laterality: N/A;    TONSILLECTOMY           Tobacco History:  reports that she has been smoking cigarettes. She started smoking about 54 years ago. She has a 50 pack-year smoking history. She has been exposed to tobacco smoke. She has never used smokeless tobacco.      Review of patient's allergies indicates:   Allergen Reactions    Chlorthalidone     Codeine     Dyazide [triamterene-hydrochlorothiazid]     Penicillins        Current Outpatient Medications:     acetaminophen (TYLENOL) 500 MG tablet, Take 500 mg by mouth 2 (two) times daily as needed for Pain or Temperature greater than (mild pain or temp > 100.5)., Disp: , Rfl:     albuterol (PROVENTIL/VENTOLIN HFA) 90 mcg/actuation inhaler, Inhale 2 puffs into the lungs every 6 (six) hours as needed for Wheezing., Disp: 54 g, Rfl: PRN    oxymetazoline HCl (AFRIN, OXYMETAZOLINE, NASL), 1 spray by Nasal route as needed (stuffy nose)., Disp: , Rfl:     traMADoL (ULTRAM) 50 mg tablet, Take 1 tablet (50 mg total) by mouth every 6 (six) hours as needed for Pain., Disp: 30 tablet, Rfl: 0    albuterol-ipratropium (DUO-NEB) 2.5 mg-0.5 mg/3 mL nebulizer solution, Take 3 mLs by nebulization every 6 (six) hours as needed for Wheezing. Rescue, Disp: 120 each, Rfl: 11    aspirin 81 MG Chew, Take 1 tablet (81 mg total) by mouth once daily., Disp: 360 tablet, Rfl: 0    atorvastatin (LIPITOR) 40 MG tablet, Take 1 tablet (40 mg total) by mouth once daily., Disp: 90 tablet, Rfl: 3    baclofen (LIORESAL) 20 MG tablet, Take 1 tablet (20 mg total) by mouth 2 (two) times daily., Disp: 180 tablet, Rfl: 3    lisinopriL 10 MG tablet, Take 1 tablet (10 mg total) by mouth once daily., Disp: 90 tablet, Rfl: 3    nitroGLYCERIN (NITROSTAT) 0.4 MG SL tablet, Place 1 tablet (0.4 mg total) under the tongue every 5 (five) minutes as needed for  Chest pain., Disp: 20 tablet, Rfl: 0    OXYGEN-AIR DELIVERY SYSTEMS MISC, 3 L/min by local intranasal application route continuous. NC 3lpm continuous (Patient not taking: Reported on 5/6/2024), Disp: , Rfl:     rOPINIRole (REQUIP) 1 MG tablet, Take 1 tablet (1 mg total) by mouth 3 (three) times daily., Disp: 90 tablet, Rfl: 3    temazepam (RESTORIL) 15 mg Cap, Take 1 capsule (15 mg total) by mouth every evening., Disp: 30 capsule, Rfl: 0    venlafaxine (EFFEXOR-XR) 37.5 MG 24 hr capsule, Take 1 capsule (37.5 mg total) by mouth once daily. (Patient not taking: Reported on 6/25/2024), Disp: 30 capsule, Rfl: 11    Review of Systems   Constitutional:  Negative for activity change, appetite change, fatigue, fever and unexpected weight change.   HENT:  Negative for congestion, mouth sores, nosebleeds, postnasal drip, rhinorrhea, sinus pressure, sinus pain, sneezing, sore throat and trouble swallowing.    Eyes:  Negative for pain, redness and itching.   Respiratory:  Negative for chest tightness and shortness of breath.    Cardiovascular:  Negative for chest pain and palpitations.   Gastrointestinal:  Negative for abdominal pain, blood in stool, constipation, diarrhea, nausea and vomiting.   Endocrine: Negative for cold intolerance, heat intolerance, polydipsia, polyphagia and polyuria.   Genitourinary:  Negative for difficulty urinating, dysuria, flank pain, frequency, genital sores, menstrual problem, urgency, vaginal bleeding and vaginal discharge.   Musculoskeletal:  Negative for arthralgias and myalgias.   Skin:  Negative for rash and wound.   Allergic/Immunologic: Negative for environmental allergies and food allergies.   Neurological:  Negative for dizziness, light-headedness and headaches.   Hematological:  Negative for adenopathy. Does not bruise/bleed easily.   Psychiatric/Behavioral:  Negative for agitation, confusion, hallucinations, self-injury and sleep disturbance. The patient is not nervous/anxious.        "    Objective:      Vitals:    06/25/24 1419   BP: 128/74   Pulse: 62   Resp: 18   Temp: 98 °F (36.7 °C)   TempSrc: Oral   Weight: 78.8 kg (173 lb 12.8 oz)   Height: 5' 6" (1.676 m)     Physical Exam  Constitutional:       Appearance: Normal appearance. She is ill-appearing.   Cardiovascular:      Rate and Rhythm: Normal rate.      Heart sounds: Normal heart sounds.   Pulmonary:      Effort: Pulmonary effort is normal.      Breath sounds: Normal breath sounds.   Abdominal:      Palpations: Abdomen is soft.   Musculoskeletal:         General: Normal range of motion.   Skin:     General: Skin is warm.   Neurological:      Mental Status: She is alert and oriented to person, place, and time.   Psychiatric:         Mood and Affect: Mood normal.           Assessment:       1. Primary insomnia    2. Mixed hyperlipidemia    3. Essential hypertension    4. Restless leg syndrome           Plan:       Primary insomnia  -     temazepam (RESTORIL) 15 mg Cap; Take 1 capsule (15 mg total) by mouth every evening.  Dispense: 30 capsule; Refill: 0  -     CBC W/ AUTO DIFFERENTIAL; Future; Expected date: 06/25/2024    Mixed hyperlipidemia  -     atorvastatin (LIPITOR) 40 MG tablet; Take 1 tablet (40 mg total) by mouth once daily.  Dispense: 90 tablet; Refill: 3    Essential hypertension  -     lisinopriL 10 MG tablet; Take 1 tablet (10 mg total) by mouth once daily.  Dispense: 90 tablet; Refill: 3  -     COMPREHENSIVE METABOLIC PANEL; Future; Expected date: 06/25/2024    Restless leg syndrome  -     baclofen (LIORESAL) 20 MG tablet; Take 1 tablet (20 mg total) by mouth 2 (two) times daily.  Dispense: 180 tablet; Refill: 3  -     rOPINIRole (REQUIP) 1 MG tablet; Take 1 tablet (1 mg total) by mouth 3 (three) times daily.  Dispense: 90 tablet; Refill: 3      Follow up in about 4 months (around 10/25/2024), or if symptoms worsen or fail to improve, for Medication refill, HTN, Insomnia.        6/25/2024 Keshia Chan NP    Spent angela " 30 minutes with patient which involved review of pts medical conditions, labs, medications and with 50% of time face-to-face discussion about medical problems, management and any applicable changes.

## 2024-07-09 DIAGNOSIS — J42 CHRONIC BRONCHITIS, UNSPECIFIED CHRONIC BRONCHITIS TYPE: ICD-10-CM

## 2024-07-09 RX ORDER — ALBUTEROL SULFATE 90 UG/1
2 AEROSOL, METERED RESPIRATORY (INHALATION) EVERY 6 HOURS PRN
Qty: 54 G | Status: SHIPPED | OUTPATIENT
Start: 2024-07-09

## 2024-07-09 NOTE — TELEPHONE ENCOUNTER
LOV 6-25-24  NOV 10-28-24      ----- Message from Sheri Lomas sent at 7/9/2024  1:41 PM CDT -----  Regarding: Med refill  Patient is requesting refill on albuterol-ipratropium (DUO-NEB)  and albuterol (PROVENTIL/VENTOLIN HFA) to be sent to [facility] in [place].

## 2024-07-10 ENCOUNTER — TELEPHONE (OUTPATIENT)
Dept: FAMILY MEDICINE | Facility: CLINIC | Age: 69
End: 2024-07-10
Payer: MEDICARE

## 2024-07-10 DIAGNOSIS — J42 CHRONIC BRONCHITIS, UNSPECIFIED CHRONIC BRONCHITIS TYPE: ICD-10-CM

## 2024-07-10 RX ORDER — IPRATROPIUM BROMIDE AND ALBUTEROL SULFATE 2.5; .5 MG/3ML; MG/3ML
3 SOLUTION RESPIRATORY (INHALATION) EVERY 6 HOURS PRN
Qty: 120 EACH | Refills: 11 | Status: SHIPPED | OUTPATIENT
Start: 2024-07-10 | End: 2025-07-10

## 2024-07-10 NOTE — TELEPHONE ENCOUNTER
Pt called requesting albuterol nebulizer solution for her nebulizer machine not the albuterol inhaler.    Please advise

## 2024-07-17 DIAGNOSIS — G25.81 RESTLESS LEG SYNDROME: ICD-10-CM

## 2024-07-17 RX ORDER — BACLOFEN 20 MG/1
20 TABLET ORAL 2 TIMES DAILY
Qty: 180 TABLET | Refills: 3 | Status: SHIPPED | OUTPATIENT
Start: 2024-07-17

## 2024-08-05 ENCOUNTER — OFFICE VISIT (OUTPATIENT)
Dept: FAMILY MEDICINE | Facility: CLINIC | Age: 69
End: 2024-08-05
Payer: MEDICARE

## 2024-08-05 VITALS
RESPIRATION RATE: 20 BRPM | WEIGHT: 175.69 LBS | DIASTOLIC BLOOD PRESSURE: 70 MMHG | TEMPERATURE: 98 F | BODY MASS INDEX: 28.23 KG/M2 | HEIGHT: 66 IN | SYSTOLIC BLOOD PRESSURE: 140 MMHG | HEART RATE: 92 BPM

## 2024-08-05 DIAGNOSIS — J02.9 SORE THROAT: Primary | ICD-10-CM

## 2024-08-05 DIAGNOSIS — J39.9 UPPER RESPIRATORY DISEASE: ICD-10-CM

## 2024-08-05 LAB
CTP QC/QA: YES
MOLECULAR STREP A: NEGATIVE

## 2024-08-05 PROCEDURE — 3008F BODY MASS INDEX DOCD: CPT | Mod: HCNC,CPTII,S$GLB, | Performed by: NURSE PRACTITIONER

## 2024-08-05 PROCEDURE — 3078F DIAST BP <80 MM HG: CPT | Mod: HCNC,CPTII,S$GLB, | Performed by: NURSE PRACTITIONER

## 2024-08-05 PROCEDURE — 1101F PT FALLS ASSESS-DOCD LE1/YR: CPT | Mod: HCNC,CPTII,S$GLB, | Performed by: NURSE PRACTITIONER

## 2024-08-05 PROCEDURE — 3044F HG A1C LEVEL LT 7.0%: CPT | Mod: HCNC,CPTII,S$GLB, | Performed by: NURSE PRACTITIONER

## 2024-08-05 PROCEDURE — 4010F ACE/ARB THERAPY RXD/TAKEN: CPT | Mod: HCNC,CPTII,S$GLB, | Performed by: NURSE PRACTITIONER

## 2024-08-05 PROCEDURE — 1159F MED LIST DOCD IN RCRD: CPT | Mod: HCNC,CPTII,S$GLB, | Performed by: NURSE PRACTITIONER

## 2024-08-05 PROCEDURE — 1125F AMNT PAIN NOTED PAIN PRSNT: CPT | Mod: HCNC,CPTII,S$GLB, | Performed by: NURSE PRACTITIONER

## 2024-08-05 PROCEDURE — 3077F SYST BP >= 140 MM HG: CPT | Mod: HCNC,CPTII,S$GLB, | Performed by: NURSE PRACTITIONER

## 2024-08-05 PROCEDURE — 99999 PR PBB SHADOW E&M-EST. PATIENT-LVL III: CPT | Mod: PBBFAC,HCNC,, | Performed by: NURSE PRACTITIONER

## 2024-08-05 PROCEDURE — 99213 OFFICE O/P EST LOW 20 MIN: CPT | Mod: HCNC,S$GLB,, | Performed by: NURSE PRACTITIONER

## 2024-08-05 PROCEDURE — 87651 STREP A DNA AMP PROBE: CPT | Mod: QW,HCNC,S$GLB, | Performed by: NURSE PRACTITIONER

## 2024-08-05 PROCEDURE — 3288F FALL RISK ASSESSMENT DOCD: CPT | Mod: HCNC,CPTII,S$GLB, | Performed by: NURSE PRACTITIONER

## 2024-08-05 RX ORDER — AZITHROMYCIN 250 MG/1
TABLET, FILM COATED ORAL
Qty: 6 TABLET | Refills: 0 | Status: SHIPPED | OUTPATIENT
Start: 2024-08-05 | End: 2024-08-10

## 2024-08-05 RX ORDER — FLUTICASONE PROPIONATE 50 MCG
SPRAY, SUSPENSION (ML) NASAL
COMMUNITY
Start: 2024-07-08

## 2024-08-12 ENCOUNTER — TELEPHONE (OUTPATIENT)
Dept: FAMILY MEDICINE | Facility: CLINIC | Age: 69
End: 2024-08-12
Payer: MEDICARE

## 2024-08-12 ENCOUNTER — PATIENT MESSAGE (OUTPATIENT)
Dept: ADMINISTRATIVE | Facility: HOSPITAL | Age: 69
End: 2024-08-12
Payer: MEDICARE

## 2024-08-12 NOTE — TELEPHONE ENCOUNTER
----- Message from Concha Slater sent at 8/12/2024  1:35 PM CDT -----  Regarding: Not feeling any better  Patient has a sore throat and she's out of the antibiotics that you gave her.  Please call and advise.    Thank you

## 2024-08-12 NOTE — TELEPHONE ENCOUNTER
Spoke with pt regard refill of antibiotic. Per pt, she still experiencing a little sore throat. No report of fever or new abnormal signs or symptoms. Pt adv she just completed Z-pk on the 10 th and she will need to give med malik to work into her system. Pt also adv to drink plenty of fluids to keep throat moist, Tylenol for pain and throat lozenges. Pt adv if she develop any abnormal signs or symptoms, Contact clinic. Pt aware of information given and satisfied with information given. VICENTA

## 2024-09-03 ENCOUNTER — PATIENT MESSAGE (OUTPATIENT)
Dept: FAMILY MEDICINE | Facility: CLINIC | Age: 69
End: 2024-09-03
Payer: MEDICARE

## 2024-10-03 DIAGNOSIS — Z12.31 ENCOUNTER FOR SCREENING MAMMOGRAM FOR MALIGNANT NEOPLASM OF BREAST: Primary | ICD-10-CM

## 2024-10-21 ENCOUNTER — LAB VISIT (OUTPATIENT)
Dept: LAB | Facility: HOSPITAL | Age: 69
End: 2024-10-21
Attending: NURSE PRACTITIONER
Payer: MEDICARE

## 2024-10-21 DIAGNOSIS — I10 ESSENTIAL HYPERTENSION: ICD-10-CM

## 2024-10-21 DIAGNOSIS — F51.01 PRIMARY INSOMNIA: ICD-10-CM

## 2024-10-21 LAB
ALBUMIN SERPL BCP-MCNC: 3.8 G/DL (ref 3.5–5.2)
ALP SERPL-CCNC: 92 U/L (ref 40–150)
ALT SERPL W/O P-5'-P-CCNC: 15 U/L (ref 10–44)
ANION GAP SERPL CALC-SCNC: 11 MMOL/L (ref 8–16)
AST SERPL-CCNC: 18 U/L (ref 10–40)
BASOPHILS # BLD AUTO: 0.07 K/UL (ref 0–0.2)
BASOPHILS NFR BLD: 0.8 % (ref 0–1.9)
BILIRUB SERPL-MCNC: 0.4 MG/DL (ref 0.1–1)
BUN SERPL-MCNC: 14 MG/DL (ref 8–23)
CALCIUM SERPL-MCNC: 9.1 MG/DL (ref 8.7–10.5)
CHLORIDE SERPL-SCNC: 104 MMOL/L (ref 95–110)
CO2 SERPL-SCNC: 27 MMOL/L (ref 23–29)
CREAT SERPL-MCNC: 0.8 MG/DL (ref 0.5–1.4)
DIFFERENTIAL METHOD BLD: ABNORMAL
EOSINOPHIL # BLD AUTO: 0.2 K/UL (ref 0–0.5)
EOSINOPHIL NFR BLD: 2.5 % (ref 0–8)
ERYTHROCYTE [DISTWIDTH] IN BLOOD BY AUTOMATED COUNT: 15 % (ref 11.5–14.5)
EST. GFR  (NO RACE VARIABLE): >60 ML/MIN/1.73 M^2
GLUCOSE SERPL-MCNC: 95 MG/DL (ref 70–110)
HCT VFR BLD AUTO: 49.3 % (ref 37–48.5)
HGB BLD-MCNC: 16 G/DL (ref 12–16)
IMM GRANULOCYTES # BLD AUTO: 0.02 K/UL (ref 0–0.04)
IMM GRANULOCYTES NFR BLD AUTO: 0.2 % (ref 0–0.5)
LYMPHOCYTES # BLD AUTO: 2.1 K/UL (ref 1–4.8)
LYMPHOCYTES NFR BLD: 22.9 % (ref 18–48)
MCH RBC QN AUTO: 30.9 PG (ref 27–31)
MCHC RBC AUTO-ENTMCNC: 32.5 G/DL (ref 32–36)
MCV RBC AUTO: 95 FL (ref 82–98)
MONOCYTES # BLD AUTO: 0.8 K/UL (ref 0.3–1)
MONOCYTES NFR BLD: 9.1 % (ref 4–15)
NEUTROPHILS # BLD AUTO: 5.9 K/UL (ref 1.8–7.7)
NEUTROPHILS NFR BLD: 64.5 % (ref 38–73)
NRBC BLD-RTO: 0 /100 WBC
PLATELET # BLD AUTO: 256 K/UL (ref 150–450)
PMV BLD AUTO: 9.7 FL (ref 9.2–12.9)
POTASSIUM SERPL-SCNC: 4.9 MMOL/L (ref 3.5–5.1)
PROT SERPL-MCNC: 6.5 G/DL (ref 6–8.4)
RBC # BLD AUTO: 5.18 M/UL (ref 4–5.4)
SODIUM SERPL-SCNC: 142 MMOL/L (ref 136–145)
WBC # BLD AUTO: 9.19 K/UL (ref 3.9–12.7)

## 2024-10-21 PROCEDURE — 85025 COMPLETE CBC W/AUTO DIFF WBC: CPT | Mod: HCNC | Performed by: NURSE PRACTITIONER

## 2024-10-21 PROCEDURE — 36415 COLL VENOUS BLD VENIPUNCTURE: CPT | Mod: HCNC | Performed by: NURSE PRACTITIONER

## 2024-10-21 PROCEDURE — 80053 COMPREHEN METABOLIC PANEL: CPT | Mod: HCNC | Performed by: NURSE PRACTITIONER

## 2024-10-28 ENCOUNTER — OFFICE VISIT (OUTPATIENT)
Dept: FAMILY MEDICINE | Facility: CLINIC | Age: 69
End: 2024-10-28
Payer: MEDICARE

## 2024-10-28 VITALS
WEIGHT: 184.88 LBS | TEMPERATURE: 98 F | RESPIRATION RATE: 20 BRPM | HEART RATE: 92 BPM | HEIGHT: 66 IN | BODY MASS INDEX: 29.71 KG/M2 | DIASTOLIC BLOOD PRESSURE: 60 MMHG | SYSTOLIC BLOOD PRESSURE: 128 MMHG

## 2024-10-28 DIAGNOSIS — R79.9 ABNORMAL BLOOD CHEMISTRY: ICD-10-CM

## 2024-10-28 DIAGNOSIS — E78.2 MIXED HYPERLIPIDEMIA: Primary | ICD-10-CM

## 2024-10-28 DIAGNOSIS — G25.81 RESTLESS LEG SYNDROME: ICD-10-CM

## 2024-10-28 DIAGNOSIS — I10 ESSENTIAL HYPERTENSION: ICD-10-CM

## 2024-10-28 DIAGNOSIS — M62.838 MUSCLE SPASMS OF LOWER EXTREMITY, UNSPECIFIED LATERALITY: ICD-10-CM

## 2024-10-28 PROCEDURE — 99214 OFFICE O/P EST MOD 30 MIN: CPT | Mod: HCNC,S$GLB,, | Performed by: NURSE PRACTITIONER

## 2024-10-28 PROCEDURE — 99999 PR PBB SHADOW E&M-EST. PATIENT-LVL IV: CPT | Mod: PBBFAC,HCNC,, | Performed by: NURSE PRACTITIONER

## 2024-10-28 PROCEDURE — 1101F PT FALLS ASSESS-DOCD LE1/YR: CPT | Mod: HCNC,CPTII,S$GLB, | Performed by: NURSE PRACTITIONER

## 2024-10-28 PROCEDURE — 3078F DIAST BP <80 MM HG: CPT | Mod: HCNC,CPTII,S$GLB, | Performed by: NURSE PRACTITIONER

## 2024-10-28 PROCEDURE — 3074F SYST BP LT 130 MM HG: CPT | Mod: HCNC,CPTII,S$GLB, | Performed by: NURSE PRACTITIONER

## 2024-10-28 PROCEDURE — 1126F AMNT PAIN NOTED NONE PRSNT: CPT | Mod: HCNC,CPTII,S$GLB, | Performed by: NURSE PRACTITIONER

## 2024-10-28 PROCEDURE — 3288F FALL RISK ASSESSMENT DOCD: CPT | Mod: HCNC,CPTII,S$GLB, | Performed by: NURSE PRACTITIONER

## 2024-10-28 PROCEDURE — 3044F HG A1C LEVEL LT 7.0%: CPT | Mod: HCNC,CPTII,S$GLB, | Performed by: NURSE PRACTITIONER

## 2024-10-28 PROCEDURE — 3008F BODY MASS INDEX DOCD: CPT | Mod: HCNC,CPTII,S$GLB, | Performed by: NURSE PRACTITIONER

## 2024-10-28 PROCEDURE — 1159F MED LIST DOCD IN RCRD: CPT | Mod: HCNC,CPTII,S$GLB, | Performed by: NURSE PRACTITIONER

## 2024-10-28 PROCEDURE — 4010F ACE/ARB THERAPY RXD/TAKEN: CPT | Mod: HCNC,CPTII,S$GLB, | Performed by: NURSE PRACTITIONER

## 2024-10-28 RX ORDER — ROPINIROLE 1 MG/1
1 TABLET, FILM COATED ORAL 3 TIMES DAILY
Qty: 90 TABLET | Refills: 3 | Status: SHIPPED | OUTPATIENT
Start: 2024-10-28 | End: 2025-02-25

## 2024-10-28 RX ORDER — METHOCARBAMOL 500 MG/1
500 TABLET, FILM COATED ORAL 4 TIMES DAILY
Qty: 40 TABLET | Refills: 0 | Status: SHIPPED | OUTPATIENT
Start: 2024-10-28 | End: 2024-11-07

## 2024-12-02 ENCOUNTER — TELEPHONE (OUTPATIENT)
Dept: FAMILY MEDICINE | Facility: CLINIC | Age: 69
End: 2024-12-02
Payer: MEDICARE

## 2024-12-02 DIAGNOSIS — I69.354 HEMIPARESIS AFFECTING LEFT SIDE AS LATE EFFECT OF CEREBROVASCULAR ACCIDENT: Primary | ICD-10-CM

## 2024-12-02 RX ORDER — CALCIUM CARBONATE 160(400)MG
1 TABLET,CHEWABLE ORAL CONTINUOUS
Qty: 1 EACH | Refills: 0 | Status: SHIPPED | OUTPATIENT
Start: 2024-12-02

## 2024-12-02 NOTE — TELEPHONE ENCOUNTER
----- Message from Veronica sent at 12/2/2024 11:21 AM CST -----  Pt needs a new rotator walker. Her insur stated that K[employer] needs to fax in a request to the following fax number [phone]. Pt asked for a call once it is done.    [phone]

## 2024-12-03 ENCOUNTER — TELEPHONE (OUTPATIENT)
Dept: FAMILY MEDICINE | Facility: CLINIC | Age: 69
End: 2024-12-03
Payer: MEDICARE

## 2024-12-03 NOTE — TELEPHONE ENCOUNTER
----- Message from Deborah sent at 12/3/2024 10:28 AM CST -----  Pt is calling to get an update on the rollator order that she requested yesterday   [phone]

## 2024-12-09 DIAGNOSIS — I69.354 HEMIPARESIS AFFECTING LEFT SIDE AS LATE EFFECT OF CEREBROVASCULAR ACCIDENT: Primary | ICD-10-CM

## 2024-12-09 DIAGNOSIS — Z74.09 IMPAIRED MOBILITY: ICD-10-CM

## 2024-12-11 ENCOUNTER — TELEPHONE (OUTPATIENT)
Dept: FAMILY MEDICINE | Facility: CLINIC | Age: 69
End: 2024-12-11
Payer: MEDICARE

## 2024-12-11 NOTE — TELEPHONE ENCOUNTER
Pt notified of DME order faxed with confirmation to [facility]. Pt satisfied with information given. KM

## 2024-12-11 NOTE — TELEPHONE ENCOUNTER
----- Message from Deborah sent at 12/11/2024  8:50 AM CST -----  Pt has been trying to get a wheelchair and got the run around with her insurance. Can you please send the order, clinicals and demographics to [facility] so they can send all this to the insurance to get approved   [phone] fax   Pt phone [phone]

## 2024-12-16 ENCOUNTER — TELEPHONE (OUTPATIENT)
Dept: FAMILY MEDICINE | Facility: CLINIC | Age: 69
End: 2024-12-16
Payer: MEDICARE

## 2024-12-16 NOTE — TELEPHONE ENCOUNTER
----- Message from Areli sent at 12/13/2024  8:50 AM CST -----  Please fax over information for patient Rolator chair again. Due to the facility not receiving this info.   [phone]

## 2025-01-01 ENCOUNTER — TELEPHONE (OUTPATIENT)
Dept: FAMILY MEDICINE | Facility: CLINIC | Age: 70
End: 2025-01-01
Payer: MEDICARE

## 2025-01-01 ENCOUNTER — PATIENT MESSAGE (OUTPATIENT)
Dept: ADMINISTRATIVE | Facility: HOSPITAL | Age: 70
End: 2025-01-01
Payer: MEDICARE

## 2025-01-01 ENCOUNTER — HOSPITAL ENCOUNTER (INPATIENT)
Facility: HOSPITAL | Age: 70
LOS: 14 days | Discharge: NURSING, REHAB OR LONG-TERM CARE | DRG: 270 | End: 2025-08-27
Attending: SURGERY | Admitting: SURGERY
Payer: MEDICARE

## 2025-01-01 ENCOUNTER — PATIENT OUTREACH (OUTPATIENT)
Dept: ADMINISTRATIVE | Facility: HOSPITAL | Age: 70
End: 2025-01-01
Payer: MEDICARE

## 2025-01-01 VITALS
SYSTOLIC BLOOD PRESSURE: 108 MMHG | BODY MASS INDEX: 28.4 KG/M2 | HEIGHT: 68 IN | DIASTOLIC BLOOD PRESSURE: 49 MMHG | RESPIRATION RATE: 17 BRPM | OXYGEN SATURATION: 97 % | WEIGHT: 187.38 LBS | HEART RATE: 84 BPM | TEMPERATURE: 98 F

## 2025-01-01 DIAGNOSIS — I71.43 ANEURYSM OF INFRARENAL ABDOMINAL AORTA: ICD-10-CM

## 2025-01-01 DIAGNOSIS — R07.9 CHEST PAIN: ICD-10-CM

## 2025-01-01 DIAGNOSIS — F17.200 HEAVY SMOKER: ICD-10-CM

## 2025-01-01 DIAGNOSIS — I71.40 ABDOMINAL AORTIC ANEURYSM (AAA) WITHOUT RUPTURE, UNSPECIFIED PART: Primary | ICD-10-CM

## 2025-01-01 DIAGNOSIS — Z01.818 PREOP TESTING: ICD-10-CM

## 2025-01-01 DIAGNOSIS — I47.20 V-TACH: ICD-10-CM

## 2025-01-01 LAB
ABSOLUTE EOSINOPHIL (SMH): 0 K/UL
ABSOLUTE EOSINOPHIL (SMH): 0.02 K/UL
ABSOLUTE EOSINOPHIL (SMH): 0.05 K/UL
ABSOLUTE EOSINOPHIL (SMH): 0.07 K/UL
ABSOLUTE EOSINOPHIL (SMH): 0.1 K/UL
ABSOLUTE EOSINOPHIL (SMH): 0.11 K/UL
ABSOLUTE MONOCYTE (SMH): 1.28 K/UL (ref 0.3–1)
ABSOLUTE MONOCYTE (SMH): 1.31 K/UL (ref 0.3–1)
ABSOLUTE MONOCYTE (SMH): 1.79 K/UL (ref 0.3–1)
ABSOLUTE MONOCYTE (SMH): 1.91 K/UL (ref 0.3–1)
ABSOLUTE MONOCYTE (SMH): 2.38 K/UL (ref 0.3–1)
ABSOLUTE MONOCYTE (SMH): 2.64 K/UL (ref 0.3–1)
ABSOLUTE MONOCYTE (SMH): 2.66 K/UL (ref 0.3–1)
ABSOLUTE MONOCYTE (SMH): 2.66 K/UL (ref 0.3–1)
ABSOLUTE MONOCYTE (SMH): 2.85 K/UL (ref 0.3–1)
ABSOLUTE NEUTROPHIL COUNT (SMH): 10.3 K/UL (ref 1.8–7.7)
ABSOLUTE NEUTROPHIL COUNT (SMH): 10.5 K/UL (ref 1.8–7.7)
ABSOLUTE NEUTROPHIL COUNT (SMH): 12.4 K/UL (ref 1.8–7.7)
ABSOLUTE NEUTROPHIL COUNT (SMH): 13.1 K/UL (ref 1.8–7.7)
ABSOLUTE NEUTROPHIL COUNT (SMH): 18.5 K/UL (ref 1.8–7.7)
ABSOLUTE NEUTROPHIL COUNT (SMH): 21.5 K/UL (ref 1.8–7.7)
ABSOLUTE NEUTROPHIL COUNT (SMH): 22.1 K/UL (ref 1.8–7.7)
ABSOLUTE NEUTROPHIL COUNT (SMH): 22.5 K/UL (ref 1.8–7.7)
ABSOLUTE NEUTROPHIL COUNT (SMH): 4.8 K/UL (ref 1.8–7.7)
ADENOVIRUS (SMH): NOT DETECTED
ALBUMIN SERPL-MCNC: 2.5 G/DL (ref 3.5–5.2)
ALBUMIN SERPL-MCNC: 2.5 G/DL (ref 3.5–5.2)
ALLENS TEST: ABNORMAL
ALP SERPL-CCNC: 50 UNIT/L (ref 55–135)
ALP SERPL-CCNC: 50 UNIT/L (ref 55–135)
ALT SERPL-CCNC: 42 UNIT/L (ref 10–44)
ALT SERPL-CCNC: 56 UNIT/L (ref 10–44)
ANION GAP (SMH): 1 MMOL/L (ref 8–16)
ANION GAP (SMH): 2 MMOL/L (ref 8–16)
ANION GAP (SMH): 3 MMOL/L (ref 8–16)
ANION GAP (SMH): 3 MMOL/L (ref 8–16)
ANION GAP (SMH): 4 MMOL/L (ref 8–16)
ANION GAP (SMH): 5 MMOL/L (ref 8–16)
ANION GAP (SMH): 6 MMOL/L (ref 8–16)
ANISOCYTOSIS BLD QL SMEAR: SLIGHT
ANISOCYTOSIS BLD QL SMEAR: SLIGHT
APICAL FOUR CHAMBER EJECTION FRACTION: 51 %
AST SERPL-CCNC: 52 UNIT/L (ref 10–40)
AST SERPL-CCNC: 76 UNIT/L (ref 10–40)
BACTERIA #/AREA URNS AUTO: ABNORMAL /HPF
BACTERIA BLD CULT: NORMAL
BASO STIPL BLD QL SMEAR: ABNORMAL
BASOPHILS # BLD AUTO: 0.04 K/UL
BASOPHILS # BLD AUTO: 0.05 K/UL
BASOPHILS # BLD AUTO: 0.05 K/UL
BASOPHILS # BLD AUTO: 0.06 K/UL
BASOPHILS # BLD AUTO: 0.06 K/UL
BASOPHILS # BLD AUTO: 0.07 K/UL
BASOPHILS # BLD AUTO: 0.07 K/UL
BASOPHILS # BLD AUTO: 0.08 K/UL
BASOPHILS # BLD AUTO: 0.12 K/UL
BASOPHILS NFR BLD AUTO: 0.2 %
BASOPHILS NFR BLD AUTO: 0.2 %
BASOPHILS NFR BLD AUTO: 0.3 %
BASOPHILS NFR BLD AUTO: 0.3 %
BASOPHILS NFR BLD AUTO: 0.4 %
BASOPHILS NFR BLD AUTO: 0.5 %
BASOPHILS NFR BLD AUTO: 0.6 %
BASOPHILS NFR BLD AUTO: 0.6 %
BASOPHILS NFR BLD AUTO: 0.7 %
BILIRUB DIRECT SERPL-MCNC: 0.2 MG/DL (ref 0.1–0.3)
BILIRUB DIRECT SERPL-MCNC: 0.2 MG/DL (ref 0.1–0.3)
BILIRUB SERPL-MCNC: 0.8 MG/DL (ref 0.1–1)
BILIRUB SERPL-MCNC: 0.9 MG/DL (ref 0.1–1)
BILIRUB UR QL STRIP.AUTO: NEGATIVE
BORDETELLA PARAPERTUSSIS (IS1001) (SMH): NOT DETECTED
BORDETELLA PERTUSSIS (PTXP) (SMH): NOT DETECTED
BSA FOR ECHO PROCEDURE: 1.97 M2
BUN SERPL-MCNC: 12 MG/DL (ref 8–23)
BUN SERPL-MCNC: 13 MG/DL (ref 8–23)
BUN SERPL-MCNC: 13 MG/DL (ref 8–23)
BUN SERPL-MCNC: 15 MG/DL (ref 8–23)
BUN SERPL-MCNC: 17 MG/DL (ref 8–23)
BUN SERPL-MCNC: 18 MG/DL (ref 8–23)
BUN SERPL-MCNC: 21 MG/DL (ref 8–23)
BUN SERPL-MCNC: 22 MG/DL (ref 8–23)
BUN SERPL-MCNC: 22 MG/DL (ref 8–23)
BUN SERPL-MCNC: 23 MG/DL (ref 8–23)
BUN SERPL-MCNC: 26 MG/DL (ref 8–23)
BUN SERPL-MCNC: 27 MG/DL (ref 8–23)
BUN SERPL-MCNC: 29 MG/DL (ref 8–23)
BUN SERPL-MCNC: 31 MG/DL (ref 8–23)
BUN SERPL-MCNC: 33 MG/DL (ref 8–23)
BUN SERPL-MCNC: 34 MG/DL (ref 8–23)
BUN SERPL-MCNC: 37 MG/DL (ref 8–23)
BUN SERPL-MCNC: 42 MG/DL (ref 8–23)
BUN SERPL-MCNC: 44 MG/DL (ref 8–23)
BUN SERPL-MCNC: 45 MG/DL (ref 8–23)
BUN SERPL-MCNC: 46 MG/DL (ref 8–23)
BUN SERPL-MCNC: 47 MG/DL (ref 8–23)
BUN SERPL-MCNC: 49 MG/DL (ref 8–23)
BUN SERPL-MCNC: 54 MG/DL (ref 8–23)
BUN SERPL-MCNC: 60 MG/DL (ref 8–23)
BUN SERPL-MCNC: 62 MG/DL (ref 8–23)
BUN SERPL-MCNC: 62 MG/DL (ref 8–23)
BUN SERPL-MCNC: 67 MG/DL (ref 8–23)
CA-I BLD-SCNC: 1.03 MMOL/L (ref 1.06–1.42)
CALCIUM SERPL-MCNC: 6.8 MG/DL (ref 8.7–10.5)
CALCIUM SERPL-MCNC: 6.8 MG/DL (ref 8.7–10.5)
CALCIUM SERPL-MCNC: 6.9 MG/DL (ref 8.7–10.5)
CALCIUM SERPL-MCNC: 6.9 MG/DL (ref 8.7–10.5)
CALCIUM SERPL-MCNC: 7 MG/DL (ref 8.7–10.5)
CALCIUM SERPL-MCNC: 7 MG/DL (ref 8.7–10.5)
CALCIUM SERPL-MCNC: 7.1 MG/DL (ref 8.7–10.5)
CALCIUM SERPL-MCNC: 7.1 MG/DL (ref 8.7–10.5)
CALCIUM SERPL-MCNC: 7.2 MG/DL (ref 8.7–10.5)
CALCIUM SERPL-MCNC: 7.3 MG/DL (ref 8.7–10.5)
CALCIUM SERPL-MCNC: 7.4 MG/DL (ref 8.7–10.5)
CALCIUM SERPL-MCNC: 7.5 MG/DL (ref 8.7–10.5)
CALCIUM SERPL-MCNC: 7.5 MG/DL (ref 8.7–10.5)
CALCIUM SERPL-MCNC: 7.6 MG/DL (ref 8.7–10.5)
CALCIUM SERPL-MCNC: 7.6 MG/DL (ref 8.7–10.5)
CALCIUM SERPL-MCNC: 7.7 MG/DL (ref 8.7–10.5)
CHLAMYDIA PNEUMONIAE (SMH): NOT DETECTED
CHLORIDE SERPL-SCNC: 100 MMOL/L (ref 95–110)
CHLORIDE SERPL-SCNC: 101 MMOL/L (ref 95–110)
CHLORIDE SERPL-SCNC: 101 MMOL/L (ref 95–110)
CHLORIDE SERPL-SCNC: 102 MMOL/L (ref 95–110)
CHLORIDE SERPL-SCNC: 104 MMOL/L (ref 95–110)
CHLORIDE SERPL-SCNC: 106 MMOL/L (ref 95–110)
CHLORIDE SERPL-SCNC: 106 MMOL/L (ref 95–110)
CHLORIDE SERPL-SCNC: 107 MMOL/L (ref 95–110)
CHLORIDE SERPL-SCNC: 107 MMOL/L (ref 95–110)
CHLORIDE SERPL-SCNC: 108 MMOL/L (ref 95–110)
CHLORIDE SERPL-SCNC: 109 MMOL/L (ref 95–110)
CHLORIDE SERPL-SCNC: 110 MMOL/L (ref 95–110)
CHLORIDE SERPL-SCNC: 110 MMOL/L (ref 95–110)
CHLORIDE SERPL-SCNC: 111 MMOL/L (ref 95–110)
CHLORIDE SERPL-SCNC: 113 MMOL/L (ref 95–110)
CHLORIDE SERPL-SCNC: 89 MMOL/L (ref 95–110)
CHLORIDE SERPL-SCNC: 89 MMOL/L (ref 95–110)
CHLORIDE SERPL-SCNC: 91 MMOL/L (ref 95–110)
CHLORIDE SERPL-SCNC: 92 MMOL/L (ref 95–110)
CHLORIDE SERPL-SCNC: 95 MMOL/L (ref 95–110)
CHLORIDE SERPL-SCNC: 98 MMOL/L (ref 95–110)
CHLORIDE SERPL-SCNC: 98 MMOL/L (ref 95–110)
CHLORIDE SERPL-SCNC: 99 MMOL/L (ref 95–110)
CHLORIDE SERPL-SCNC: 99 MMOL/L (ref 95–110)
CLARITY UR: CLEAR
CO2 SERPL-SCNC: 20 MMOL/L (ref 23–29)
CO2 SERPL-SCNC: 21 MMOL/L (ref 23–29)
CO2 SERPL-SCNC: 22 MMOL/L (ref 23–29)
CO2 SERPL-SCNC: 22 MMOL/L (ref 23–29)
CO2 SERPL-SCNC: 23 MMOL/L (ref 23–29)
CO2 SERPL-SCNC: 24 MMOL/L (ref 23–29)
CO2 SERPL-SCNC: 24 MMOL/L (ref 23–29)
CO2 SERPL-SCNC: 25 MMOL/L (ref 23–29)
CO2 SERPL-SCNC: 26 MMOL/L (ref 23–29)
CO2 SERPL-SCNC: 26 MMOL/L (ref 23–29)
CO2 SERPL-SCNC: 27 MMOL/L (ref 23–29)
CO2 SERPL-SCNC: 28 MMOL/L (ref 23–29)
CO2 SERPL-SCNC: 32 MMOL/L (ref 23–29)
CO2 SERPL-SCNC: 34 MMOL/L (ref 23–29)
CO2 SERPL-SCNC: 35 MMOL/L (ref 23–29)
CO2 SERPL-SCNC: 37 MMOL/L (ref 23–29)
CO2 SERPL-SCNC: 38 MMOL/L (ref 23–29)
CO2 SERPL-SCNC: 41 MMOL/L (ref 23–29)
COLOR UR AUTO: YELLOW
CORONAVIRUS 229E, COMMON COLD VIRUS (SMH): NOT DETECTED
CORONAVIRUS HKU1, COMMON COLD VIRUS (SMH): NOT DETECTED
CORONAVIRUS NL63, COMMON COLD VIRUS (SMH): NOT DETECTED
CORONAVIRUS OC43, COMMON COLD VIRUS (SMH): NOT DETECTED
CREAT SERPL-MCNC: 0.5 MG/DL (ref 0.5–1.4)
CREAT SERPL-MCNC: 0.6 MG/DL (ref 0.5–1.4)
CREAT SERPL-MCNC: 0.6 MG/DL (ref 0.5–1.4)
CREAT SERPL-MCNC: 0.7 MG/DL (ref 0.5–1.4)
CREAT SERPL-MCNC: 0.8 MG/DL (ref 0.5–1.4)
CREAT SERPL-MCNC: 0.8 MG/DL (ref 0.5–1.4)
CREAT SERPL-MCNC: 0.9 MG/DL (ref 0.5–1.4)
CREAT SERPL-MCNC: 1 MG/DL (ref 0.5–1.4)
CREAT SERPL-MCNC: 1.3 MG/DL (ref 0.5–1.4)
CREAT SERPL-MCNC: 1.3 MG/DL (ref 0.5–1.4)
CREAT SERPL-MCNC: 1.6 MG/DL (ref 0.5–1.4)
CREAT SERPL-MCNC: 1.6 MG/DL (ref 0.5–1.4)
CREAT SERPL-MCNC: 1.7 MG/DL (ref 0.5–1.4)
CREAT SERPL-MCNC: 1.7 MG/DL (ref 0.5–1.4)
CREAT SERPL-MCNC: 1.8 MG/DL (ref 0.5–1.4)
CREAT SERPL-MCNC: 1.9 MG/DL (ref 0.5–1.4)
CREAT SERPL-MCNC: 2 MG/DL (ref 0.5–1.4)
CREAT SERPL-MCNC: 2 MG/DL (ref 0.5–1.4)
CV ECHO LV RWT: 0.4 CM
D DIMER PPP IA.FEU-MCNC: 3.18 MG/L FEU
DELSYS: ABNORMAL
DOP CALC LVOT AREA: 2.5 CM2
DOP CALC LVOT DIAMETER: 1.8 CM
EAG (SMH): 123 MG/DL (ref 68–131)
ECHO LV POSTERIOR WALL: 0.9 CM (ref 0.6–1.1)
EOSINOPHIL NFR BLD MANUAL: 1 % (ref 0–8)
EOSINOPHIL NFR BLD MANUAL: 1 % (ref 0–8)
EOSINOPHIL NFR BLD MANUAL: 2 % (ref 0–8)
EP: 6
ERYTHROCYTE [DISTWIDTH] IN BLOOD BY AUTOMATED COUNT: 13.8 % (ref 11.5–14.5)
ERYTHROCYTE [DISTWIDTH] IN BLOOD BY AUTOMATED COUNT: 13.9 % (ref 11.5–14.5)
ERYTHROCYTE [DISTWIDTH] IN BLOOD BY AUTOMATED COUNT: 14 % (ref 11.5–14.5)
ERYTHROCYTE [DISTWIDTH] IN BLOOD BY AUTOMATED COUNT: 14.1 % (ref 11.5–14.5)
ERYTHROCYTE [DISTWIDTH] IN BLOOD BY AUTOMATED COUNT: 14.4 % (ref 11.5–14.5)
ERYTHROCYTE [DISTWIDTH] IN BLOOD BY AUTOMATED COUNT: 14.7 % (ref 11.5–14.5)
ERYTHROCYTE [DISTWIDTH] IN BLOOD BY AUTOMATED COUNT: 15.3 % (ref 11.5–14.5)
ERYTHROCYTE [DISTWIDTH] IN BLOOD BY AUTOMATED COUNT: 15.4 % (ref 11.5–14.5)
ERYTHROCYTE [DISTWIDTH] IN BLOOD BY AUTOMATED COUNT: 15.7 % (ref 11.5–14.5)
ERYTHROCYTE [DISTWIDTH] IN BLOOD BY AUTOMATED COUNT: 15.8 % (ref 11.5–14.5)
ERYTHROCYTE [DISTWIDTH] IN BLOOD BY AUTOMATED COUNT: 15.9 % (ref 11.5–14.5)
ERYTHROCYTE [DISTWIDTH] IN BLOOD BY AUTOMATED COUNT: 16.6 % (ref 11.5–14.5)
ERYTHROCYTE [SEDIMENTATION RATE] IN BLOOD BY WESTERGREN METHOD: 20 MM/H
FERRITIN SERPL-MCNC: 360.3 NG/ML (ref 20–300)
FIO2: 100
FLUBV RNA NPH QL NAA+NON-PROBE: NOT DETECTED
FOLATE SERPL-MCNC: 4.7 NG/ML (ref 4–24)
FRACTIONAL SHORTENING: 28.9 % (ref 28–44)
GFR SERPLBLD CREATININE-BSD FMLA CKD-EPI: 27 ML/MIN/1.73/M2
GFR SERPLBLD CREATININE-BSD FMLA CKD-EPI: 27 ML/MIN/1.73/M2
GFR SERPLBLD CREATININE-BSD FMLA CKD-EPI: 28 ML/MIN/1.73/M2
GFR SERPLBLD CREATININE-BSD FMLA CKD-EPI: 30 ML/MIN/1.73/M2
GFR SERPLBLD CREATININE-BSD FMLA CKD-EPI: 33 ML/MIN/1.73/M2
GFR SERPLBLD CREATININE-BSD FMLA CKD-EPI: 33 ML/MIN/1.73/M2
GFR SERPLBLD CREATININE-BSD FMLA CKD-EPI: 35 ML/MIN/1.73/M2
GFR SERPLBLD CREATININE-BSD FMLA CKD-EPI: 35 ML/MIN/1.73/M2
GFR SERPLBLD CREATININE-BSD FMLA CKD-EPI: 45 ML/MIN/1.73/M2
GFR SERPLBLD CREATININE-BSD FMLA CKD-EPI: 45 ML/MIN/1.73/M2
GFR SERPLBLD CREATININE-BSD FMLA CKD-EPI: >60 ML/MIN/1.73/M2
GLUCOSE SERPL-MCNC: 102 MG/DL (ref 70–110)
GLUCOSE SERPL-MCNC: 104 MG/DL (ref 70–110)
GLUCOSE SERPL-MCNC: 107 MG/DL (ref 70–110)
GLUCOSE SERPL-MCNC: 108 MG/DL (ref 70–110)
GLUCOSE SERPL-MCNC: 109 MG/DL (ref 70–110)
GLUCOSE SERPL-MCNC: 111 MG/DL (ref 70–110)
GLUCOSE SERPL-MCNC: 114 MG/DL (ref 70–110)
GLUCOSE SERPL-MCNC: 114 MG/DL (ref 70–110)
GLUCOSE SERPL-MCNC: 117 MG/DL (ref 70–110)
GLUCOSE SERPL-MCNC: 118 MG/DL (ref 70–110)
GLUCOSE SERPL-MCNC: 119 MG/DL (ref 70–110)
GLUCOSE SERPL-MCNC: 119 MG/DL (ref 70–110)
GLUCOSE SERPL-MCNC: 120 MG/DL (ref 70–110)
GLUCOSE SERPL-MCNC: 123 MG/DL (ref 70–110)
GLUCOSE SERPL-MCNC: 123 MG/DL (ref 70–110)
GLUCOSE SERPL-MCNC: 129 MG/DL (ref 70–110)
GLUCOSE SERPL-MCNC: 130 MG/DL (ref 70–110)
GLUCOSE SERPL-MCNC: 131 MG/DL (ref 70–110)
GLUCOSE SERPL-MCNC: 137 MG/DL (ref 70–110)
GLUCOSE SERPL-MCNC: 141 MG/DL (ref 70–110)
GLUCOSE SERPL-MCNC: 142 MG/DL (ref 70–110)
GLUCOSE SERPL-MCNC: 142 MG/DL (ref 70–110)
GLUCOSE SERPL-MCNC: 143 MG/DL (ref 70–110)
GLUCOSE SERPL-MCNC: 147 MG/DL (ref 70–110)
GLUCOSE SERPL-MCNC: 157 MG/DL (ref 70–110)
GLUCOSE SERPL-MCNC: 222 MG/DL (ref 70–110)
GLUCOSE SERPL-MCNC: 293 MG/DL (ref 70–110)
GLUCOSE SERPL-MCNC: 93 MG/DL (ref 70–110)
GLUCOSE UR QL STRIP: NEGATIVE
HBA1C MFR BLD: 5.9 % (ref 4.5–6.2)
HCO3 UR-SCNC: 21.2 MMOL/L (ref 24–28)
HCT VFR BLD AUTO: 23.2 % (ref 37–48.5)
HCT VFR BLD AUTO: 23.5 % (ref 37–48.5)
HCT VFR BLD AUTO: 23.5 % (ref 37–48.5)
HCT VFR BLD AUTO: 24.2 % (ref 37–48.5)
HCT VFR BLD AUTO: 24.6 % (ref 37–48.5)
HCT VFR BLD AUTO: 25 % (ref 37–48.5)
HCT VFR BLD AUTO: 25.2 % (ref 37–48.5)
HCT VFR BLD AUTO: 25.5 % (ref 37–48.5)
HCT VFR BLD AUTO: 26 % (ref 37–48.5)
HCT VFR BLD AUTO: 28.2 % (ref 37–48.5)
HCT VFR BLD AUTO: 29.9 % (ref 37–48.5)
HCT VFR BLD AUTO: 36.2 % (ref 37–48.5)
HCT VFR BLD AUTO: 42.4 % (ref 37–48.5)
HCT VFR BLD AUTO: 42.7 % (ref 37–48.5)
HGB BLD-MCNC: 11.4 GM/DL (ref 12–16)
HGB BLD-MCNC: 13.5 GM/DL (ref 12–16)
HGB BLD-MCNC: 14.1 GM/DL (ref 12–16)
HGB BLD-MCNC: 7.6 GM/DL (ref 12–16)
HGB BLD-MCNC: 7.7 GM/DL (ref 12–16)
HGB BLD-MCNC: 7.9 GM/DL (ref 12–16)
HGB BLD-MCNC: 8 GM/DL (ref 12–16)
HGB BLD-MCNC: 8 GM/DL (ref 12–16)
HGB BLD-MCNC: 8.2 GM/DL (ref 12–16)
HGB BLD-MCNC: 8.4 GM/DL (ref 12–16)
HGB BLD-MCNC: 8.6 GM/DL (ref 12–16)
HGB BLD-MCNC: 8.8 GM/DL (ref 12–16)
HGB BLD-MCNC: 9.5 GM/DL (ref 12–16)
HGB BLD-MCNC: 9.9 GM/DL (ref 12–16)
HGB UR QL STRIP: ABNORMAL
HPIV1 RNA NPH QL NAA+NON-PROBE: NOT DETECTED
HPIV2 RNA NPH QL NAA+NON-PROBE: NOT DETECTED
HPIV3 RNA NPH QL NAA+NON-PROBE: NOT DETECTED
HPIV4 RNA NPH QL NAA+NON-PROBE: NOT DETECTED
HUMAN METAPNEUMOVIRUS (SMH): NOT DETECTED
HYPOCHROMIA BLD QL SMEAR: ABNORMAL
HYPOCHROMIA BLD QL SMEAR: NORMAL
IMM GRANULOCYTES # BLD AUTO: 0.08 K/UL (ref 0–0.04)
IMM GRANULOCYTES # BLD AUTO: 0.12 K/UL (ref 0–0.04)
IMM GRANULOCYTES # BLD AUTO: 0.13 K/UL (ref 0–0.04)
IMM GRANULOCYTES # BLD AUTO: 0.14 K/UL (ref 0–0.04)
IMM GRANULOCYTES # BLD AUTO: 0.21 K/UL (ref 0–0.04)
IMM GRANULOCYTES # BLD AUTO: 0.22 K/UL (ref 0–0.04)
IMM GRANULOCYTES # BLD AUTO: 0.27 K/UL (ref 0–0.04)
IMM GRANULOCYTES # BLD AUTO: 0.34 K/UL (ref 0–0.04)
IMM GRANULOCYTES # BLD AUTO: 0.47 K/UL (ref 0–0.04)
IMM GRANULOCYTES NFR BLD AUTO: 0.5 % (ref 0–0.5)
IMM GRANULOCYTES NFR BLD AUTO: 0.6 % (ref 0–0.5)
IMM GRANULOCYTES NFR BLD AUTO: 0.8 % (ref 0–0.5)
IMM GRANULOCYTES NFR BLD AUTO: 0.8 % (ref 0–0.5)
IMM GRANULOCYTES NFR BLD AUTO: 0.9 % (ref 0–0.5)
IMM GRANULOCYTES NFR BLD AUTO: 1.1 % (ref 0–0.5)
IMM GRANULOCYTES NFR BLD AUTO: 1.1 % (ref 0–0.5)
IMM GRANULOCYTES NFR BLD AUTO: 2.9 % (ref 0–0.5)
IMM GRANULOCYTES NFR BLD AUTO: 4.9 % (ref 0–0.5)
INDIRECT COOMBS: NORMAL
INFLUENZA A (SUBTYPES H1,H1-2009,H3) (SMH): NOT DETECTED
INTERVENTRICULAR SEPTUM: 1.1 CM (ref 0.6–1.1)
IP: 12
IRON SATN MFR SERPL: ABNORMAL %
IRON SERPL-MCNC: <10 UG/DL (ref 30–160)
KETONES UR QL STRIP: NEGATIVE
LACTATE SERPL-SCNC: 1.5 MMOL/L (ref 0.5–1.9)
LACTATE SERPL-SCNC: 2 MMOL/L (ref 0.5–1.9)
LACTATE SERPL-SCNC: 2.5 MMOL/L (ref 0.5–1.9)
LEFT INTERNAL DIMENSION IN SYSTOLE: 3.2 CM (ref 2.1–4)
LEFT VENTRICLE DIASTOLIC VOLUME INDEX: 46.73 ML/M2
LEFT VENTRICLE DIASTOLIC VOLUME: 93 ML
LEFT VENTRICLE END DIASTOLIC VOLUME APICAL 4 CHAMBER INDEX BSA: 41.11 ML/M2
LEFT VENTRICLE END DIASTOLIC VOLUME APICAL 4 CHAMBER: 81.8 ML
LEFT VENTRICLE MASS INDEX: 77 G/M2
LEFT VENTRICLE SYSTOLIC VOLUME INDEX: 20.6 ML/M2
LEFT VENTRICLE SYSTOLIC VOLUME: 41 ML
LEFT VENTRICULAR INTERNAL DIMENSION IN DIASTOLE: 4.5 CM (ref 3.5–6)
LEFT VENTRICULAR MASS: 153.3 G
LEUKOCYTE ESTERASE UR QL STRIP: NEGATIVE
LVED V (TEICH): 92.9 ML
LVES V (TEICH): 41 ML
LYMPHOCYTES # BLD AUTO: 0.49 K/UL (ref 1–4.8)
LYMPHOCYTES # BLD AUTO: 0.63 K/UL (ref 1–4.8)
LYMPHOCYTES # BLD AUTO: 0.68 K/UL (ref 1–4.8)
LYMPHOCYTES # BLD AUTO: 0.71 K/UL (ref 1–4.8)
LYMPHOCYTES # BLD AUTO: 0.73 K/UL (ref 1–4.8)
LYMPHOCYTES # BLD AUTO: 0.79 K/UL (ref 1–4.8)
LYMPHOCYTES # BLD AUTO: 0.88 K/UL (ref 1–4.8)
LYMPHOCYTES # BLD AUTO: 0.9 K/UL (ref 1–4.8)
LYMPHOCYTES # BLD AUTO: 0.93 K/UL (ref 1–4.8)
LYMPHOCYTES NFR BLD MANUAL: 10 % (ref 18–48)
LYMPHOCYTES NFR BLD MANUAL: 17 % (ref 18–48)
LYMPHOCYTES NFR BLD MANUAL: 3 % (ref 18–48)
LYMPHOCYTES NFR BLD MANUAL: 5 % (ref 18–48)
LYMPHOCYTES NFR BLD MANUAL: 9 % (ref 18–48)
MAGNESIUM SERPL-MCNC: 1.7 MG/DL (ref 1.6–2.6)
MAGNESIUM SERPL-MCNC: 1.7 MG/DL (ref 1.6–2.6)
MAGNESIUM SERPL-MCNC: 1.9 MG/DL (ref 1.6–2.6)
MAGNESIUM SERPL-MCNC: 1.9 MG/DL (ref 1.6–2.6)
MAGNESIUM SERPL-MCNC: 2.2 MG/DL (ref 1.6–2.6)
MAGNESIUM SERPL-MCNC: 2.4 MG/DL (ref 1.6–2.6)
MAGNESIUM SERPL-MCNC: 2.6 MG/DL (ref 1.6–2.6)
MCH RBC QN AUTO: 31 PG (ref 27–31)
MCH RBC QN AUTO: 31.3 PG (ref 27–31)
MCH RBC QN AUTO: 31.5 PG (ref 27–31)
MCH RBC QN AUTO: 31.6 PG (ref 27–31)
MCH RBC QN AUTO: 31.7 PG (ref 27–31)
MCH RBC QN AUTO: 31.8 PG (ref 27–31)
MCH RBC QN AUTO: 31.9 PG (ref 27–31)
MCH RBC QN AUTO: 32 PG (ref 27–31)
MCH RBC QN AUTO: 32.1 PG (ref 27–31)
MCH RBC QN AUTO: 32.1 PG (ref 27–31)
MCH RBC QN AUTO: 32.2 PG (ref 27–31)
MCH RBC QN AUTO: 32.4 PG (ref 27–31)
MCHC RBC AUTO-ENTMCNC: 31.5 G/DL (ref 32–36)
MCHC RBC AUTO-ENTMCNC: 31.7 G/DL (ref 32–36)
MCHC RBC AUTO-ENTMCNC: 31.8 G/DL (ref 32–36)
MCHC RBC AUTO-ENTMCNC: 32.3 G/DL (ref 32–36)
MCHC RBC AUTO-ENTMCNC: 32.5 G/DL (ref 32–36)
MCHC RBC AUTO-ENTMCNC: 32.5 G/DL (ref 32–36)
MCHC RBC AUTO-ENTMCNC: 32.8 G/DL (ref 32–36)
MCHC RBC AUTO-ENTMCNC: 32.8 G/DL (ref 32–36)
MCHC RBC AUTO-ENTMCNC: 33 G/DL (ref 32–36)
MCHC RBC AUTO-ENTMCNC: 33.1 G/DL (ref 32–36)
MCHC RBC AUTO-ENTMCNC: 33.1 G/DL (ref 32–36)
MCHC RBC AUTO-ENTMCNC: 33.3 G/DL (ref 32–36)
MCHC RBC AUTO-ENTMCNC: 33.3 G/DL (ref 32–36)
MCHC RBC AUTO-ENTMCNC: 33.7 G/DL (ref 32–36)
MCHC RBC AUTO-ENTMCNC: 33.7 G/DL (ref 32–36)
MCHC RBC AUTO-ENTMCNC: 33.8 G/DL (ref 32–36)
MCHC RBC AUTO-ENTMCNC: 34.1 G/DL (ref 32–36)
MCV RBC AUTO: 101 FL (ref 82–98)
MCV RBC AUTO: 93 FL (ref 82–98)
MCV RBC AUTO: 94 FL (ref 82–98)
MCV RBC AUTO: 95 FL (ref 82–98)
MCV RBC AUTO: 96 FL (ref 82–98)
MCV RBC AUTO: 96 FL (ref 82–98)
MCV RBC AUTO: 97 FL (ref 82–98)
MCV RBC AUTO: 97 FL (ref 82–98)
MCV RBC AUTO: 98 FL (ref 82–98)
MCV RBC AUTO: 99 FL (ref 82–98)
MCV RBC AUTO: 99 FL (ref 82–98)
METAMYELOCYTES NFR BLD MANUAL: 1 %
METAMYELOCYTES NFR BLD MANUAL: 2 %
METAMYELOCYTES NFR BLD MANUAL: 8 %
MICROSCOPIC COMMENT: ABNORMAL
MODE: ABNORMAL
MONOCYTES NFR BLD MANUAL: 10 % (ref 4–15)
MONOCYTES NFR BLD MANUAL: 10 % (ref 4–15)
MONOCYTES NFR BLD MANUAL: 11 % (ref 4–15)
MONOCYTES NFR BLD MANUAL: 14 % (ref 4–15)
MONOCYTES NFR BLD MANUAL: 6 % (ref 4–15)
MONOCYTES NFR BLD MANUAL: 6 % (ref 4–15)
MONOCYTES NFR BLD MANUAL: 7 % (ref 4–15)
MONOCYTES NFR BLD MANUAL: 8 % (ref 4–15)
MYCOPLASMA PNEUMONIAE (SMH): NOT DETECTED
MYELOCYTES NFR BLD MANUAL: 3 %
NEUTROPHILS NFR BLD MANUAL: 25 % (ref 38–73)
NEUTROPHILS NFR BLD MANUAL: 31 % (ref 38–73)
NEUTROPHILS NFR BLD MANUAL: 44 % (ref 38–73)
NEUTROPHILS NFR BLD MANUAL: 49 % (ref 38–73)
NEUTROPHILS NFR BLD MANUAL: 61 % (ref 38–73)
NEUTROPHILS NFR BLD MANUAL: 73 % (ref 38–73)
NEUTROPHILS NFR BLD MANUAL: 79 % (ref 38–73)
NEUTROPHILS NFR BLD MANUAL: 86 % (ref 38–73)
NEUTS BAND NFR BLD MANUAL: 15 %
NEUTS BAND NFR BLD MANUAL: 19 %
NEUTS BAND NFR BLD MANUAL: 29 %
NEUTS BAND NFR BLD MANUAL: 3 %
NEUTS BAND NFR BLD MANUAL: 33 %
NEUTS BAND NFR BLD MANUAL: 41 %
NEUTS BAND NFR BLD MANUAL: 57 %
NEUTS BAND NFR BLD MANUAL: 6 %
NITRITE UR QL STRIP: NEGATIVE
NUCLEATED RBC (/100WBC) (SMH): 0 /100 WBC
NUCLEATED RBC (/100WBC) (SMH): 1 /100 WBC
OHS CV CPX PATIENT HEIGHT IN: 68
OHS QRS DURATION: 120 MS
OHS QTC CALCULATION: 442 MS
PCO2 BLDA: 38 MMHG (ref 35–45)
PH SMN: 7.35 [PH] (ref 7.35–7.45)
PH UR STRIP: 6 [PH]
PHOSPHATE SERPL-MCNC: 1.8 MG/DL (ref 2.7–4.5)
PHOSPHATE SERPL-MCNC: 1.9 MG/DL (ref 2.7–4.5)
PHOSPHATE SERPL-MCNC: 2 MG/DL (ref 2.7–4.5)
PHOSPHATE SERPL-MCNC: 2.1 MG/DL (ref 2.7–4.5)
PHOSPHATE SERPL-MCNC: 2.1 MG/DL (ref 2.7–4.5)
PHOSPHATE SERPL-MCNC: 2.3 MG/DL (ref 2.7–4.5)
PHOSPHATE SERPL-MCNC: 2.7 MG/DL (ref 2.7–4.5)
PHOSPHATE SERPL-MCNC: 2.9 MG/DL (ref 2.7–4.5)
PHOSPHATE SERPL-MCNC: 3.8 MG/DL (ref 2.7–4.5)
PLATELET # BLD AUTO: 117 K/UL (ref 150–450)
PLATELET # BLD AUTO: 121 K/UL (ref 150–450)
PLATELET # BLD AUTO: 128 K/UL (ref 150–450)
PLATELET # BLD AUTO: 135 K/UL (ref 150–450)
PLATELET # BLD AUTO: 151 K/UL (ref 150–450)
PLATELET # BLD AUTO: 152 K/UL (ref 150–450)
PLATELET # BLD AUTO: 169 K/UL (ref 150–450)
PLATELET # BLD AUTO: 172 K/UL (ref 150–450)
PLATELET # BLD AUTO: 177 K/UL (ref 150–450)
PLATELET # BLD AUTO: 194 K/UL (ref 150–450)
PLATELET # BLD AUTO: 203 K/UL (ref 150–450)
PLATELET # BLD AUTO: 204 K/UL (ref 150–450)
PLATELET # BLD AUTO: 206 K/UL (ref 150–450)
PLATELET # BLD AUTO: 231 K/UL (ref 150–450)
PLATELET # BLD AUTO: 272 K/UL (ref 150–450)
PLATELET # BLD AUTO: 284 K/UL (ref 150–450)
PLATELET # BLD AUTO: 292 K/UL (ref 150–450)
PLATELET BLD QL SMEAR: ABNORMAL
PMV BLD AUTO: 10 FL (ref 9.2–12.9)
PMV BLD AUTO: 10 FL (ref 9.2–12.9)
PMV BLD AUTO: 10.1 FL (ref 9.2–12.9)
PMV BLD AUTO: 10.1 FL (ref 9.2–12.9)
PMV BLD AUTO: 8.5 FL (ref 9.2–12.9)
PMV BLD AUTO: 9.3 FL (ref 9.2–12.9)
PMV BLD AUTO: 9.4 FL (ref 9.2–12.9)
PMV BLD AUTO: 9.5 FL (ref 9.2–12.9)
PMV BLD AUTO: 9.6 FL (ref 9.2–12.9)
PMV BLD AUTO: 9.7 FL (ref 9.2–12.9)
PMV BLD AUTO: 9.8 FL (ref 9.2–12.9)
PMV BLD AUTO: 9.8 FL (ref 9.2–12.9)
PMV BLD AUTO: 9.9 FL (ref 9.2–12.9)
PO2 BLDA: 82 MMHG (ref 80–100)
POC ACTIVATED CLOTTING TIME K: 112 SEC (ref 74–137)
POC ACTIVATED CLOTTING TIME K: 250 SEC (ref 74–137)
POC ACTIVATED CLOTTING TIME K: 302 SEC (ref 74–137)
POC ACTIVATED CLOTTING TIME K: 302 SEC (ref 74–137)
POC ACTIVATED CLOTTING TIME K: 354 SEC (ref 74–137)
POC BE: -4 MMOL/L (ref -2–2)
POC SATURATED O2: 96 % (ref 95–100)
POC TCO2: 22 MMOL/L (ref 23–27)
POCT GLUCOSE: 102 MG/DL (ref 70–110)
POCT GLUCOSE: 104 MG/DL (ref 70–110)
POCT GLUCOSE: 124 MG/DL (ref 70–110)
POCT GLUCOSE: 137 MG/DL (ref 70–110)
POCT GLUCOSE: 142 MG/DL (ref 70–110)
POCT GLUCOSE: 196 MG/DL (ref 70–110)
POLYCHROMASIA BLD QL SMEAR: ABNORMAL
POTASSIUM SERPL-SCNC: 3.5 MMOL/L (ref 3.5–5.1)
POTASSIUM SERPL-SCNC: 3.6 MMOL/L (ref 3.5–5.1)
POTASSIUM SERPL-SCNC: 3.8 MMOL/L (ref 3.5–5.1)
POTASSIUM SERPL-SCNC: 4.1 MMOL/L (ref 3.5–5.1)
POTASSIUM SERPL-SCNC: 4.1 MMOL/L (ref 3.5–5.1)
POTASSIUM SERPL-SCNC: 4.2 MMOL/L (ref 3.5–5.1)
POTASSIUM SERPL-SCNC: 4.4 MMOL/L (ref 3.5–5.1)
POTASSIUM SERPL-SCNC: 4.5 MMOL/L (ref 3.5–5.1)
POTASSIUM SERPL-SCNC: 4.5 MMOL/L (ref 3.5–5.1)
POTASSIUM SERPL-SCNC: 4.6 MMOL/L (ref 3.5–5.1)
POTASSIUM SERPL-SCNC: 4.8 MMOL/L (ref 3.5–5.1)
POTASSIUM SERPL-SCNC: 5 MMOL/L (ref 3.5–5.1)
POTASSIUM SERPL-SCNC: 5.3 MMOL/L (ref 3.5–5.1)
POTASSIUM SERPL-SCNC: 5.5 MMOL/L (ref 3.5–5.1)
POTASSIUM SERPL-SCNC: 5.5 MMOL/L (ref 3.5–5.1)
POTASSIUM SERPL-SCNC: 5.6 MMOL/L (ref 3.5–5.1)
POTASSIUM SERPL-SCNC: 5.6 MMOL/L (ref 3.5–5.1)
POTASSIUM SERPL-SCNC: 5.7 MMOL/L (ref 3.5–5.1)
POTASSIUM SERPL-SCNC: 5.9 MMOL/L (ref 3.5–5.1)
POTASSIUM SERPL-SCNC: 6.1 MMOL/L (ref 3.5–5.1)
POTASSIUM SERPL-SCNC: 6.3 MMOL/L (ref 3.5–5.1)
PROCALCITONIN SERPL-MCNC: 0.43 NG/ML
PROCALCITONIN SERPL-MCNC: 1.14 NG/ML
PROT SERPL-MCNC: 4.3 GM/DL (ref 6–8.4)
PROT SERPL-MCNC: 4.5 GM/DL (ref 6–8.4)
PROT UR QL STRIP: ABNORMAL
RBC # BLD AUTO: 2.37 M/UL (ref 4–5.4)
RBC # BLD AUTO: 2.41 M/UL (ref 4–5.4)
RBC # BLD AUTO: 2.49 M/UL (ref 4–5.4)
RBC # BLD AUTO: 2.51 M/UL (ref 4–5.4)
RBC # BLD AUTO: 2.58 M/UL (ref 4–5.4)
RBC # BLD AUTO: 2.58 M/UL (ref 4–5.4)
RBC # BLD AUTO: 2.59 M/UL (ref 4–5.4)
RBC # BLD AUTO: 2.6 M/UL (ref 4–5.4)
RBC # BLD AUTO: 2.62 M/UL (ref 4–5.4)
RBC # BLD AUTO: 2.63 M/UL (ref 4–5.4)
RBC # BLD AUTO: 2.68 M/UL (ref 4–5.4)
RBC # BLD AUTO: 2.76 M/UL (ref 4–5.4)
RBC # BLD AUTO: 2.98 M/UL (ref 4–5.4)
RBC # BLD AUTO: 3.07 M/UL (ref 4–5.4)
RBC # BLD AUTO: 3.6 M/UL (ref 4–5.4)
RBC # BLD AUTO: 4.27 M/UL (ref 4–5.4)
RBC # BLD AUTO: 4.35 M/UL (ref 4–5.4)
RBC #/AREA URNS AUTO: 3 /HPF
RELATIVE EOSINOPHIL (SMH): 0 % (ref 0–8)
RELATIVE EOSINOPHIL (SMH): 0.2 % (ref 0–8)
RELATIVE EOSINOPHIL (SMH): 0.5 % (ref 0–8)
RELATIVE EOSINOPHIL (SMH): 0.6 % (ref 0–8)
RELATIVE EOSINOPHIL (SMH): 0.7 % (ref 0–8)
RELATIVE EOSINOPHIL (SMH): 0.7 % (ref 0–8)
RELATIVE LYMPHOCYTE (SMH): 2.8 % (ref 18–48)
RELATIVE LYMPHOCYTE (SMH): 2.9 % (ref 18–48)
RELATIVE LYMPHOCYTE (SMH): 3.5 % (ref 18–48)
RELATIVE LYMPHOCYTE (SMH): 3.6 % (ref 18–48)
RELATIVE LYMPHOCYTE (SMH): 4.3 % (ref 18–48)
RELATIVE LYMPHOCYTE (SMH): 4.8 % (ref 18–48)
RELATIVE LYMPHOCYTE (SMH): 5.6 % (ref 18–48)
RELATIVE LYMPHOCYTE (SMH): 6.8 % (ref 18–48)
RELATIVE LYMPHOCYTE (SMH): 7 % (ref 18–48)
RELATIVE MONOCYTE (SMH): 10 % (ref 4–15)
RELATIVE MONOCYTE (SMH): 10.2 % (ref 4–15)
RELATIVE MONOCYTE (SMH): 10.5 % (ref 4–15)
RELATIVE MONOCYTE (SMH): 10.9 % (ref 4–15)
RELATIVE MONOCYTE (SMH): 10.9 % (ref 4–15)
RELATIVE MONOCYTE (SMH): 12 % (ref 4–15)
RELATIVE MONOCYTE (SMH): 14.4 % (ref 4–15)
RELATIVE MONOCYTE (SMH): 15.1 % (ref 4–15)
RELATIVE MONOCYTE (SMH): 18.8 % (ref 4–15)
RELATIVE NEUTROPHIL (SMH): 68 % (ref 38–73)
RELATIVE NEUTROPHIL (SMH): 77.2 % (ref 38–73)
RELATIVE NEUTROPHIL (SMH): 79.1 % (ref 38–73)
RELATIVE NEUTROPHIL (SMH): 80 % (ref 38–73)
RELATIVE NEUTROPHIL (SMH): 82.2 % (ref 38–73)
RELATIVE NEUTROPHIL (SMH): 84.2 % (ref 38–73)
RELATIVE NEUTROPHIL (SMH): 84.5 % (ref 38–73)
RELATIVE NEUTROPHIL (SMH): 84.7 % (ref 38–73)
RELATIVE NEUTROPHIL (SMH): 85.9 % (ref 38–73)
RESPIRATORY INFECTION PANEL SOURCE (SMH): NORMAL
RH BLD: NORMAL
RSV RNA NPH QL NAA+NON-PROBE: NOT DETECTED
RV+EV RNA NPH QL NAA+NON-PROBE: NOT DETECTED
SAMPLE: ABNORMAL
SAMPLE: NORMAL
SARS-COV-2 RNA RESP QL NAA+PROBE: NOT DETECTED
SITE: ABNORMAL
SODIUM SERPL-SCNC: 130 MMOL/L (ref 136–145)
SODIUM SERPL-SCNC: 130 MMOL/L (ref 136–145)
SODIUM SERPL-SCNC: 131 MMOL/L (ref 136–145)
SODIUM SERPL-SCNC: 131 MMOL/L (ref 136–145)
SODIUM SERPL-SCNC: 132 MMOL/L (ref 136–145)
SODIUM SERPL-SCNC: 133 MMOL/L (ref 136–145)
SODIUM SERPL-SCNC: 134 MMOL/L (ref 136–145)
SODIUM SERPL-SCNC: 135 MMOL/L (ref 136–145)
SODIUM SERPL-SCNC: 135 MMOL/L (ref 136–145)
SODIUM SERPL-SCNC: 136 MMOL/L (ref 136–145)
SODIUM SERPL-SCNC: 137 MMOL/L (ref 136–145)
SODIUM SERPL-SCNC: 138 MMOL/L (ref 136–145)
SP GR UR STRIP: >=1.03
SPECIMEN OUTDATE: NORMAL
SQUAMOUS #/AREA URNS AUTO: <1 /HPF
TB INDURATION 48 - 72 HR READ: 0 MM
TB SKIN TEST 48 - 72 HR READ: NEGATIVE
TIBC SERPL-MCNC: 178 UG/DL (ref 250–450)
TOXIC GRANULES BLD QL SMEAR: PRESENT
TRANSFERRIN SERPL-MCNC: 127 MG/DL (ref 200–375)
TSH SERPL-ACNC: 1.31 UIU/ML (ref 0.34–5.6)
UROBILINOGEN UR STRIP-ACNC: NEGATIVE EU/DL
VIT B12 SERPL-MCNC: >1500 PG/ML (ref 210–950)
WBC # BLD AUTO: 12.75 K/UL (ref 3.9–12.7)
WBC # BLD AUTO: 13.26 K/UL (ref 3.9–12.7)
WBC # BLD AUTO: 14.33 K/UL (ref 3.9–12.7)
WBC # BLD AUTO: 15.71 K/UL (ref 3.9–12.7)
WBC # BLD AUTO: 16.39 K/UL (ref 3.9–12.7)
WBC # BLD AUTO: 16.42 K/UL (ref 3.9–12.7)
WBC # BLD AUTO: 16.95 K/UL (ref 3.9–12.7)
WBC # BLD AUTO: 17.59 K/UL (ref 3.9–12.7)
WBC # BLD AUTO: 22 K/UL (ref 3.9–12.7)
WBC # BLD AUTO: 25.37 K/UL (ref 3.9–12.7)
WBC # BLD AUTO: 26.17 K/UL (ref 3.9–12.7)
WBC # BLD AUTO: 26.18 K/UL (ref 3.9–12.7)
WBC # BLD AUTO: 32.49 K/UL (ref 3.9–12.7)
WBC # BLD AUTO: 6.98 K/UL (ref 3.9–12.7)
WBC # BLD AUTO: 8.58 K/UL (ref 3.9–12.7)
WBC # BLD AUTO: 8.76 K/UL (ref 3.9–12.7)
WBC # BLD AUTO: 9.77 K/UL (ref 3.9–12.7)
WBC #/AREA URNS AUTO: 6 /HPF
WBC TOXIC VACUOLES BLD QL SMEAR: PRESENT
Z-SCORE OF LEFT VENTRICULAR DIMENSION IN END DIASTOLE: -2.49
Z-SCORE OF LEFT VENTRICULAR DIMENSION IN END SYSTOLE: -0.81

## 2025-01-01 PROCEDURE — 99900031 HC PATIENT EDUCATION (STAT)

## 2025-01-01 PROCEDURE — 20000000 HC ICU ROOM

## 2025-01-01 PROCEDURE — 25000003 PHARM REV CODE 250: Performed by: FAMILY MEDICINE

## 2025-01-01 PROCEDURE — 63600175 PHARM REV CODE 636 W HCPCS: Performed by: SURGERY

## 2025-01-01 PROCEDURE — 25000003 PHARM REV CODE 250: Performed by: STUDENT IN AN ORGANIZED HEALTH CARE EDUCATION/TRAINING PROGRAM

## 2025-01-01 PROCEDURE — 25000242 PHARM REV CODE 250 ALT 637 W/ HCPCS: Performed by: SURGERY

## 2025-01-01 PROCEDURE — 36415 COLL VENOUS BLD VENIPUNCTURE: CPT | Performed by: INTERNAL MEDICINE

## 2025-01-01 PROCEDURE — 25000003 PHARM REV CODE 250: Performed by: HOSPITALIST

## 2025-01-01 PROCEDURE — 94799 UNLISTED PULMONARY SVC/PX: CPT

## 2025-01-01 PROCEDURE — 94660 CPAP INITIATION&MGMT: CPT

## 2025-01-01 PROCEDURE — 94640 AIRWAY INHALATION TREATMENT: CPT

## 2025-01-01 PROCEDURE — 82330 ASSAY OF CALCIUM: CPT | Performed by: FAMILY MEDICINE

## 2025-01-01 PROCEDURE — 25000242 PHARM REV CODE 250 ALT 637 W/ HCPCS: Performed by: HOSPITALIST

## 2025-01-01 PROCEDURE — 84100 ASSAY OF PHOSPHORUS: CPT | Performed by: INTERNAL MEDICINE

## 2025-01-01 PROCEDURE — 99900035 HC TECH TIME PER 15 MIN (STAT)

## 2025-01-01 PROCEDURE — 63600175 PHARM REV CODE 636 W HCPCS: Performed by: INTERNAL MEDICINE

## 2025-01-01 PROCEDURE — 25000242 PHARM REV CODE 250 ALT 637 W/ HCPCS: Performed by: FAMILY MEDICINE

## 2025-01-01 PROCEDURE — 63600175 PHARM REV CODE 636 W HCPCS: Performed by: STUDENT IN AN ORGANIZED HEALTH CARE EDUCATION/TRAINING PROGRAM

## 2025-01-01 PROCEDURE — 36000707: Performed by: SURGERY

## 2025-01-01 PROCEDURE — 25000003 PHARM REV CODE 250: Performed by: INTERNAL MEDICINE

## 2025-01-01 PROCEDURE — P9045 ALBUMIN (HUMAN), 5%, 250 ML: HCPCS | Mod: JZ | Performed by: FAMILY MEDICINE

## 2025-01-01 PROCEDURE — 94799 UNLISTED PULMONARY SVC/PX: CPT | Mod: XB

## 2025-01-01 PROCEDURE — 25000003 PHARM REV CODE 250: Performed by: SURGERY

## 2025-01-01 PROCEDURE — 85025 COMPLETE CBC W/AUTO DIFF WBC: CPT | Performed by: INTERNAL MEDICINE

## 2025-01-01 PROCEDURE — 82728 ASSAY OF FERRITIN: CPT | Performed by: FAMILY MEDICINE

## 2025-01-01 PROCEDURE — 27000221 HC OXYGEN, UP TO 24 HOURS

## 2025-01-01 PROCEDURE — S4991 NICOTINE PATCH NONLEGEND: HCPCS | Performed by: SURGERY

## 2025-01-01 PROCEDURE — 97530 THERAPEUTIC ACTIVITIES: CPT | Mod: CO

## 2025-01-01 PROCEDURE — 25000242 PHARM REV CODE 250 ALT 637 W/ HCPCS: Performed by: STUDENT IN AN ORGANIZED HEALTH CARE EDUCATION/TRAINING PROGRAM

## 2025-01-01 PROCEDURE — 83735 ASSAY OF MAGNESIUM: CPT | Performed by: INTERNAL MEDICINE

## 2025-01-01 PROCEDURE — 37000008 HC ANESTHESIA 1ST 15 MINUTES: Performed by: SURGERY

## 2025-01-01 PROCEDURE — S4991 NICOTINE PATCH NONLEGEND: HCPCS | Performed by: HOSPITALIST

## 2025-01-01 PROCEDURE — 97166 OT EVAL MOD COMPLEX 45 MIN: CPT

## 2025-01-01 PROCEDURE — 25000242 PHARM REV CODE 250 ALT 637 W/ HCPCS: Performed by: INTERNAL MEDICINE

## 2025-01-01 PROCEDURE — 63600175 PHARM REV CODE 636 W HCPCS: Performed by: FAMILY MEDICINE

## 2025-01-01 PROCEDURE — 93010 ELECTROCARDIOGRAM REPORT: CPT | Mod: ,,, | Performed by: INTERNAL MEDICINE

## 2025-01-01 PROCEDURE — 82947 ASSAY GLUCOSE BLOOD QUANT: CPT | Performed by: SURGERY

## 2025-01-01 PROCEDURE — 97530 THERAPEUTIC ACTIVITIES: CPT | Mod: CQ

## 2025-01-01 PROCEDURE — 36415 COLL VENOUS BLD VENIPUNCTURE: CPT | Performed by: HOSPITALIST

## 2025-01-01 PROCEDURE — 36000710: Performed by: SURGERY

## 2025-01-01 PROCEDURE — 94761 N-INVAS EAR/PLS OXIMETRY MLT: CPT

## 2025-01-01 PROCEDURE — 97110 THERAPEUTIC EXERCISES: CPT

## 2025-01-01 PROCEDURE — 11000001 HC ACUTE MED/SURG PRIVATE ROOM

## 2025-01-01 PROCEDURE — 80048 BASIC METABOLIC PNL TOTAL CA: CPT | Performed by: INTERNAL MEDICINE

## 2025-01-01 PROCEDURE — 93005 ELECTROCARDIOGRAM TRACING: CPT | Performed by: INTERNAL MEDICINE

## 2025-01-01 PROCEDURE — 80048 BASIC METABOLIC PNL TOTAL CA: CPT | Performed by: SURGERY

## 2025-01-01 PROCEDURE — 85025 COMPLETE CBC W/AUTO DIFF WBC: CPT | Performed by: SURGERY

## 2025-01-01 PROCEDURE — 97530 THERAPEUTIC ACTIVITIES: CPT

## 2025-01-01 PROCEDURE — A9540 TC99M MAA: HCPCS | Performed by: SURGERY

## 2025-01-01 PROCEDURE — 82310 ASSAY OF CALCIUM: CPT | Performed by: SURGERY

## 2025-01-01 PROCEDURE — 86900 BLOOD TYPING SEROLOGIC ABO: CPT | Performed by: INTERNAL MEDICINE

## 2025-01-01 PROCEDURE — 85007 BL SMEAR W/DIFF WBC COUNT: CPT | Performed by: SURGERY

## 2025-01-01 PROCEDURE — 82746 ASSAY OF FOLIC ACID SERUM: CPT | Performed by: FAMILY MEDICINE

## 2025-01-01 PROCEDURE — 36000706: Performed by: SURGERY

## 2025-01-01 PROCEDURE — 36415 COLL VENOUS BLD VENIPUNCTURE: CPT | Performed by: STUDENT IN AN ORGANIZED HEALTH CARE EDUCATION/TRAINING PROGRAM

## 2025-01-01 PROCEDURE — 27100171 HC OXYGEN HIGH FLOW UP TO 24 HOURS

## 2025-01-01 PROCEDURE — 80048 BASIC METABOLIC PNL TOTAL CA: CPT | Performed by: STUDENT IN AN ORGANIZED HEALTH CARE EDUCATION/TRAINING PROGRAM

## 2025-01-01 PROCEDURE — C9248 INJ, CLEVIDIPINE BUTYRATE: HCPCS | Mod: JZ,TB | Performed by: SURGERY

## 2025-01-01 PROCEDURE — 25500020 PHARM REV CODE 255: Performed by: SURGERY

## 2025-01-01 PROCEDURE — 87040 BLOOD CULTURE FOR BACTERIA: CPT | Performed by: FAMILY MEDICINE

## 2025-01-01 PROCEDURE — 83735 ASSAY OF MAGNESIUM: CPT | Performed by: SURGERY

## 2025-01-01 PROCEDURE — 99406 BEHAV CHNG SMOKING 3-10 MIN: CPT | Performed by: CLINIC/CENTER

## 2025-01-01 PROCEDURE — 94644 CONT INHLJ TX 1ST HOUR: CPT

## 2025-01-01 PROCEDURE — 71000039 HC RECOVERY, EACH ADD'L HOUR: Performed by: SURGERY

## 2025-01-01 PROCEDURE — 63600175 PHARM REV CODE 636 W HCPCS: Performed by: HOSPITALIST

## 2025-01-01 PROCEDURE — 82962 GLUCOSE BLOOD TEST: CPT

## 2025-01-01 PROCEDURE — 27201423 OPTIME MED/SURG SUP & DEVICES STERILE SUPPLY: Performed by: SURGERY

## 2025-01-01 PROCEDURE — 84145 PROCALCITONIN (PCT): CPT | Performed by: FAMILY MEDICINE

## 2025-01-01 PROCEDURE — C1768 GRAFT, VASCULAR: HCPCS | Performed by: SURGERY

## 2025-01-01 PROCEDURE — 82374 ASSAY BLOOD CARBON DIOXIDE: CPT | Performed by: INTERNAL MEDICINE

## 2025-01-01 PROCEDURE — 86580 TB INTRADERMAL TEST: CPT | Performed by: INTERNAL MEDICINE

## 2025-01-01 PROCEDURE — 82310 ASSAY OF CALCIUM: CPT | Performed by: INTERNAL MEDICINE

## 2025-01-01 PROCEDURE — 5A09357 ASSISTANCE WITH RESPIRATORY VENTILATION, LESS THAN 24 CONSECUTIVE HOURS, CONTINUOUS POSITIVE AIRWAY PRESSURE: ICD-10-PCS | Performed by: SURGERY

## 2025-01-01 PROCEDURE — 85025 COMPLETE CBC W/AUTO DIFF WBC: CPT | Performed by: FAMILY MEDICINE

## 2025-01-01 PROCEDURE — 97535 SELF CARE MNGMENT TRAINING: CPT

## 2025-01-01 PROCEDURE — 36000711: Performed by: SURGERY

## 2025-01-01 PROCEDURE — 82248 BILIRUBIN DIRECT: CPT | Performed by: SURGERY

## 2025-01-01 PROCEDURE — 82607 VITAMIN B-12: CPT | Performed by: FAMILY MEDICINE

## 2025-01-01 PROCEDURE — 0WQF0ZZ REPAIR ABDOMINAL WALL, OPEN APPROACH: ICD-10-PCS | Performed by: SURGERY

## 2025-01-01 PROCEDURE — 84443 ASSAY THYROID STIM HORMONE: CPT | Performed by: FAMILY MEDICINE

## 2025-01-01 PROCEDURE — 71000033 HC RECOVERY, INTIAL HOUR: Performed by: SURGERY

## 2025-01-01 PROCEDURE — 81003 URINALYSIS AUTO W/O SCOPE: CPT | Performed by: FAMILY MEDICINE

## 2025-01-01 PROCEDURE — 84100 ASSAY OF PHOSPHORUS: CPT | Performed by: SURGERY

## 2025-01-01 PROCEDURE — 97162 PT EVAL MOD COMPLEX 30 MIN: CPT

## 2025-01-01 PROCEDURE — 63600175 PHARM REV CODE 636 W HCPCS

## 2025-01-01 PROCEDURE — 87040 BLOOD CULTURE FOR BACTERIA: CPT | Performed by: HOSPITALIST

## 2025-01-01 PROCEDURE — 99222 1ST HOSP IP/OBS MODERATE 55: CPT | Mod: 25,,, | Performed by: INTERNAL MEDICINE

## 2025-01-01 PROCEDURE — 37000009 HC ANESTHESIA EA ADD 15 MINS: Performed by: SURGERY

## 2025-01-01 PROCEDURE — 83036 HEMOGLOBIN GLYCOSYLATED A1C: CPT | Performed by: FAMILY MEDICINE

## 2025-01-01 PROCEDURE — 63600175 PHARM REV CODE 636 W HCPCS: Mod: JZ,TB | Performed by: SURGERY

## 2025-01-01 PROCEDURE — 85018 HEMOGLOBIN: CPT | Performed by: SURGERY

## 2025-01-01 PROCEDURE — 82803 BLOOD GASES ANY COMBINATION: CPT

## 2025-01-01 PROCEDURE — 30200315 PPD INTRADERMAL TEST REV CODE 302: Performed by: INTERNAL MEDICINE

## 2025-01-01 PROCEDURE — 83540 ASSAY OF IRON: CPT | Performed by: FAMILY MEDICINE

## 2025-01-01 PROCEDURE — 84145 PROCALCITONIN (PCT): CPT | Performed by: HOSPITALIST

## 2025-01-01 PROCEDURE — 83605 ASSAY OF LACTIC ACID: CPT | Performed by: FAMILY MEDICINE

## 2025-01-01 PROCEDURE — 0D9670Z DRAINAGE OF STOMACH WITH DRAINAGE DEVICE, VIA NATURAL OR ARTIFICIAL OPENING: ICD-10-PCS | Performed by: FAMILY MEDICINE

## 2025-01-01 PROCEDURE — A9567 TECHNETIUM TC-99M AEROSOL: HCPCS | Performed by: SURGERY

## 2025-01-01 PROCEDURE — 85379 FIBRIN DEGRADATION QUANT: CPT | Performed by: FAMILY MEDICINE

## 2025-01-01 PROCEDURE — 51798 US URINE CAPACITY MEASURE: CPT

## 2025-01-01 PROCEDURE — 80053 COMPREHEN METABOLIC PANEL: CPT | Performed by: SURGERY

## 2025-01-01 PROCEDURE — 80076 HEPATIC FUNCTION PANEL: CPT | Performed by: INTERNAL MEDICINE

## 2025-01-01 PROCEDURE — 85007 BL SMEAR W/DIFF WBC COUNT: CPT | Performed by: INTERNAL MEDICINE

## 2025-01-01 PROCEDURE — 85027 COMPLETE CBC AUTOMATED: CPT | Performed by: SURGERY

## 2025-01-01 PROCEDURE — 04V00DZ RESTRICTION OF ABDOMINAL AORTA WITH INTRALUMINAL DEVICE, OPEN APPROACH: ICD-10-PCS | Performed by: SURGERY

## 2025-01-01 PROCEDURE — 36600 WITHDRAWAL OF ARTERIAL BLOOD: CPT

## 2025-01-01 PROCEDURE — 36415 COLL VENOUS BLD VENIPUNCTURE: CPT | Performed by: FAMILY MEDICINE

## 2025-01-01 PROCEDURE — 0202U NFCT DS 22 TRGT SARS-COV-2: CPT | Performed by: FAMILY MEDICINE

## 2025-01-01 DEVICE — IMPLANTABLE DEVICE: Type: IMPLANTABLE DEVICE | Site: AORTA | Status: FUNCTIONAL

## 2025-01-01 RX ORDER — ACETAMINOPHEN 10 MG/ML
1000 INJECTION, SOLUTION INTRAVENOUS EVERY 8 HOURS
Status: COMPLETED | OUTPATIENT
Start: 2025-01-01 | End: 2025-01-01

## 2025-01-01 RX ORDER — OXYCODONE AND ACETAMINOPHEN 7.5; 325 MG/1; MG/1
1 TABLET ORAL EVERY 4 HOURS PRN
Qty: 18 TABLET | Refills: 0 | Status: SHIPPED | OUTPATIENT
Start: 2025-01-01 | End: 2025-01-01

## 2025-01-01 RX ORDER — FUROSEMIDE 10 MG/ML
40 INJECTION INTRAMUSCULAR; INTRAVENOUS DAILY
Status: DISCONTINUED | OUTPATIENT
Start: 2025-01-01 | End: 2025-01-01

## 2025-01-01 RX ORDER — IPRATROPIUM BROMIDE AND ALBUTEROL SULFATE 2.5; .5 MG/3ML; MG/3ML
3 SOLUTION RESPIRATORY (INHALATION) EVERY 6 HOURS
Status: DISCONTINUED | OUTPATIENT
Start: 2025-01-01 | End: 2025-01-01 | Stop reason: HOSPADM

## 2025-01-01 RX ORDER — MUPIROCIN 20 MG/G
OINTMENT TOPICAL 2 TIMES DAILY
Status: DISCONTINUED | OUTPATIENT
Start: 2025-01-01 | End: 2025-01-01

## 2025-01-01 RX ORDER — HYDROCODONE BITARTRATE AND ACETAMINOPHEN 5; 325 MG/1; MG/1
1 TABLET ORAL EVERY 6 HOURS PRN
Refills: 0 | Status: DISCONTINUED | OUTPATIENT
Start: 2025-01-01 | End: 2025-01-01

## 2025-01-01 RX ORDER — CALCIUM GLUCONATE 20 MG/ML
1 INJECTION, SOLUTION INTRAVENOUS EVERY 10 MIN PRN
Status: COMPLETED | OUTPATIENT
Start: 2025-01-01 | End: 2025-01-01

## 2025-01-01 RX ORDER — IBUPROFEN 200 MG
1 TABLET ORAL DAILY
Qty: 30 PATCH | Refills: 0 | Status: ON HOLD | OUTPATIENT
Start: 2025-01-01 | End: 2025-01-01

## 2025-01-01 RX ORDER — POTASSIUM CHLORIDE 14.9 MG/ML
60 INJECTION INTRAVENOUS
Status: DISCONTINUED | OUTPATIENT
Start: 2025-01-01 | End: 2025-01-01

## 2025-01-01 RX ORDER — FUROSEMIDE 10 MG/ML
20 INJECTION INTRAMUSCULAR; INTRAVENOUS ONCE
Status: COMPLETED | OUTPATIENT
Start: 2025-01-01 | End: 2025-01-01

## 2025-01-01 RX ORDER — IPRATROPIUM BROMIDE AND ALBUTEROL SULFATE 2.5; .5 MG/3ML; MG/3ML
3 SOLUTION RESPIRATORY (INHALATION)
Status: DISCONTINUED | OUTPATIENT
Start: 2025-01-01 | End: 2025-01-01

## 2025-01-01 RX ORDER — CALCIUM CARBONATE 200(500)MG
1000 TABLET,CHEWABLE ORAL 3 TIMES DAILY
Status: DISCONTINUED | OUTPATIENT
Start: 2025-01-01 | End: 2025-01-01 | Stop reason: HOSPADM

## 2025-01-01 RX ORDER — POTASSIUM CHLORIDE 29.8 MG/ML
40 INJECTION INTRAVENOUS
Status: DISCONTINUED | OUTPATIENT
Start: 2025-01-01 | End: 2025-01-01

## 2025-01-01 RX ORDER — CEFTRIAXONE 1 G/1
1 INJECTION, POWDER, FOR SOLUTION INTRAMUSCULAR; INTRAVENOUS
Status: COMPLETED | OUTPATIENT
Start: 2025-01-01 | End: 2025-01-01

## 2025-01-01 RX ORDER — DOCUSATE SODIUM 50 MG/5ML
50 LIQUID ORAL 2 TIMES DAILY
Status: DISCONTINUED | OUTPATIENT
Start: 2025-01-01 | End: 2025-01-01

## 2025-01-01 RX ORDER — OXYCODONE HYDROCHLORIDE 5 MG/1
5 TABLET ORAL
Status: DISCONTINUED | OUTPATIENT
Start: 2025-01-01 | End: 2025-01-01 | Stop reason: HOSPADM

## 2025-01-01 RX ORDER — MUPIROCIN 20 MG/G
OINTMENT TOPICAL 2 TIMES DAILY
Status: DISPENSED | OUTPATIENT
Start: 2025-01-01 | End: 2025-01-01

## 2025-01-01 RX ORDER — FUROSEMIDE 10 MG/ML
60 INJECTION INTRAMUSCULAR; INTRAVENOUS ONCE
Status: COMPLETED | OUTPATIENT
Start: 2025-01-01 | End: 2025-01-01

## 2025-01-01 RX ORDER — METOPROLOL TARTRATE 1 MG/ML
10 INJECTION, SOLUTION INTRAVENOUS EVERY 6 HOURS
Status: DISCONTINUED | OUTPATIENT
Start: 2025-01-01 | End: 2025-01-01

## 2025-01-01 RX ORDER — OXYCODONE AND ACETAMINOPHEN 7.5; 325 MG/1; MG/1
1 TABLET ORAL EVERY 4 HOURS PRN
Status: DISCONTINUED | OUTPATIENT
Start: 2025-01-01 | End: 2025-01-01 | Stop reason: HOSPADM

## 2025-01-01 RX ORDER — ACETAMINOPHEN 10 MG/ML
1000 INJECTION, SOLUTION INTRAVENOUS EVERY 8 HOURS
Status: DISCONTINUED | OUTPATIENT
Start: 2025-01-01 | End: 2025-01-01

## 2025-01-01 RX ORDER — CEFAZOLIN 2 G/1
2 INJECTION, POWDER, FOR SOLUTION INTRAMUSCULAR; INTRAVENOUS
Status: COMPLETED | OUTPATIENT
Start: 2025-01-01 | End: 2025-01-01

## 2025-01-01 RX ORDER — CEFTRIAXONE 1 G/1
1 INJECTION, POWDER, FOR SOLUTION INTRAMUSCULAR; INTRAVENOUS
Status: DISCONTINUED | OUTPATIENT
Start: 2025-01-01 | End: 2025-01-01

## 2025-01-01 RX ORDER — HYDROCODONE BITARTRATE AND ACETAMINOPHEN 7.5; 325 MG/1; MG/1
1 TABLET ORAL EVERY 6 HOURS PRN
Refills: 0 | Status: DISCONTINUED | OUTPATIENT
Start: 2025-01-01 | End: 2025-01-01

## 2025-01-01 RX ORDER — FUROSEMIDE 40 MG/1
40 TABLET ORAL DAILY
Status: DISCONTINUED | OUTPATIENT
Start: 2025-01-01 | End: 2025-01-01 | Stop reason: HOSPADM

## 2025-01-01 RX ORDER — ROPINIROLE 0.5 MG/1
1 TABLET, FILM COATED ORAL ONCE
Status: COMPLETED | OUTPATIENT
Start: 2025-01-01 | End: 2025-01-01

## 2025-01-01 RX ORDER — CALCIUM GLUCONATE 20 MG/ML
1 INJECTION, SOLUTION INTRAVENOUS ONCE
Status: COMPLETED | OUTPATIENT
Start: 2025-01-01 | End: 2025-01-01

## 2025-01-01 RX ORDER — NALOXONE HCL 0.4 MG/ML
0.02 VIAL (ML) INJECTION
Status: DISCONTINUED | OUTPATIENT
Start: 2025-01-01 | End: 2025-01-01 | Stop reason: HOSPADM

## 2025-01-01 RX ORDER — HYDROMORPHONE HYDROCHLORIDE 1 MG/ML
1 INJECTION, SOLUTION INTRAMUSCULAR; INTRAVENOUS; SUBCUTANEOUS EVERY 6 HOURS PRN
Status: DISCONTINUED | OUTPATIENT
Start: 2025-01-01 | End: 2025-01-01

## 2025-01-01 RX ORDER — SODIUM CHLORIDE 9 MG/ML
INJECTION, SOLUTION INTRAVENOUS CONTINUOUS
Status: DISCONTINUED | OUTPATIENT
Start: 2025-01-01 | End: 2025-01-01

## 2025-01-01 RX ORDER — IPRATROPIUM BROMIDE AND ALBUTEROL SULFATE 2.5; .5 MG/3ML; MG/3ML
3 SOLUTION RESPIRATORY (INHALATION) EVERY 4 HOURS PRN
Status: DISCONTINUED | OUTPATIENT
Start: 2025-01-01 | End: 2025-01-01

## 2025-01-01 RX ORDER — FUROSEMIDE 40 MG/1
40 TABLET ORAL DAILY
Status: DISCONTINUED | OUTPATIENT
Start: 2025-01-01 | End: 2025-01-01

## 2025-01-01 RX ORDER — BACLOFEN 10 MG/1
20 TABLET ORAL 2 TIMES DAILY PRN
Status: DISCONTINUED | OUTPATIENT
Start: 2025-01-01 | End: 2025-01-01 | Stop reason: HOSPADM

## 2025-01-01 RX ORDER — IBUPROFEN 200 MG
1 TABLET ORAL DAILY
Status: DISCONTINUED | OUTPATIENT
Start: 2025-01-01 | End: 2025-01-01 | Stop reason: HOSPADM

## 2025-01-01 RX ORDER — BISACODYL 10 MG/1
10 SUPPOSITORY RECTAL DAILY PRN
Status: DISCONTINUED | OUTPATIENT
Start: 2025-01-01 | End: 2025-01-01 | Stop reason: HOSPADM

## 2025-01-01 RX ORDER — ENOXAPARIN SODIUM 100 MG/ML
40 INJECTION SUBCUTANEOUS EVERY 24 HOURS
Status: DISCONTINUED | OUTPATIENT
Start: 2025-01-01 | End: 2025-01-01 | Stop reason: HOSPADM

## 2025-01-01 RX ORDER — GUAIFENESIN 600 MG/1
600 TABLET, EXTENDED RELEASE ORAL 2 TIMES DAILY
Status: DISCONTINUED | OUTPATIENT
Start: 2025-01-01 | End: 2025-01-01 | Stop reason: HOSPADM

## 2025-01-01 RX ORDER — CALCIUM GLUCONATE 20 MG/ML
1 INJECTION, SOLUTION INTRAVENOUS
Status: DISPENSED | OUTPATIENT
Start: 2025-01-01 | End: 2025-01-01

## 2025-01-01 RX ORDER — LANOLIN ALCOHOL/MO/W.PET/CERES
400 CREAM (GRAM) TOPICAL 2 TIMES DAILY
Qty: 180 TABLET | Refills: 0 | Status: ON HOLD | OUTPATIENT
Start: 2025-01-01 | End: 2025-01-01

## 2025-01-01 RX ORDER — ONDANSETRON HYDROCHLORIDE 2 MG/ML
4 INJECTION, SOLUTION INTRAVENOUS EVERY 6 HOURS PRN
Status: DISCONTINUED | OUTPATIENT
Start: 2025-01-01 | End: 2025-01-01 | Stop reason: HOSPADM

## 2025-01-01 RX ORDER — LANOLIN ALCOHOL/MO/W.PET/CERES
400 CREAM (GRAM) TOPICAL 2 TIMES DAILY
Status: DISCONTINUED | OUTPATIENT
Start: 2025-01-01 | End: 2025-01-01 | Stop reason: HOSPADM

## 2025-01-01 RX ORDER — GLYCERIN 1 G/1
1 SUPPOSITORY RECTAL ONCE
Status: COMPLETED | OUTPATIENT
Start: 2025-01-01 | End: 2025-01-01

## 2025-01-01 RX ORDER — GABAPENTIN 100 MG/1
100 CAPSULE ORAL 2 TIMES DAILY
Status: DISCONTINUED | OUTPATIENT
Start: 2025-01-01 | End: 2025-01-01

## 2025-01-01 RX ORDER — NICOTINE 7MG/24HR
1 PATCH, TRANSDERMAL 24 HOURS TRANSDERMAL DAILY
Status: DISCONTINUED | OUTPATIENT
Start: 2025-01-01 | End: 2025-01-01

## 2025-01-01 RX ORDER — ENOXAPARIN SODIUM 100 MG/ML
40 INJECTION SUBCUTANEOUS EVERY 24 HOURS
Status: DISCONTINUED | OUTPATIENT
Start: 2025-01-01 | End: 2025-01-01

## 2025-01-01 RX ORDER — FUROSEMIDE 40 MG/1
40 TABLET ORAL DAILY
Qty: 90 TABLET | Refills: 0 | Status: ON HOLD | OUTPATIENT
Start: 2025-01-01 | End: 2025-01-01

## 2025-01-01 RX ORDER — FUROSEMIDE 40 MG/1
40 TABLET ORAL DAILY
Qty: 30 TABLET | Refills: 11 | Status: SHIPPED | OUTPATIENT
Start: 2025-01-01 | End: 2025-01-01

## 2025-01-01 RX ORDER — TALC
6 POWDER (GRAM) TOPICAL NIGHTLY PRN
Status: DISCONTINUED | OUTPATIENT
Start: 2025-01-01 | End: 2025-01-01 | Stop reason: HOSPADM

## 2025-01-01 RX ORDER — GUAIFENESIN 600 MG/1
600 TABLET, EXTENDED RELEASE ORAL 2 TIMES DAILY
Qty: 20 TABLET | Refills: 0 | Status: ON HOLD | OUTPATIENT
Start: 2025-01-01 | End: 2025-01-01

## 2025-01-01 RX ORDER — FERROUS SULFATE 325(65) MG
325 TABLET, DELAYED RELEASE (ENTERIC COATED) ORAL EVERY OTHER DAY
Qty: 45 TABLET | Refills: 0 | Status: SHIPPED | OUTPATIENT
Start: 2025-01-01 | End: 2025-01-01

## 2025-01-01 RX ORDER — FUROSEMIDE 20 MG/1
20 TABLET ORAL DAILY
Status: DISCONTINUED | OUTPATIENT
Start: 2025-01-01 | End: 2025-01-01

## 2025-01-01 RX ORDER — METOPROLOL TARTRATE 25 MG/1
12.5 TABLET ORAL 2 TIMES DAILY
Status: DISCONTINUED | OUTPATIENT
Start: 2025-01-01 | End: 2025-01-01 | Stop reason: HOSPADM

## 2025-01-01 RX ORDER — ACETAMINOPHEN 10 MG/ML
1000 INJECTION, SOLUTION INTRAVENOUS ONCE
Status: COMPLETED | OUTPATIENT
Start: 2025-01-01 | End: 2025-01-01

## 2025-01-01 RX ORDER — PANTOPRAZOLE SODIUM 40 MG/1
40 TABLET, DELAYED RELEASE ORAL
Qty: 180 TABLET | Refills: 0 | Status: ON HOLD | OUTPATIENT
Start: 2025-01-01 | End: 2025-01-01

## 2025-01-01 RX ORDER — IPRATROPIUM BROMIDE AND ALBUTEROL SULFATE 2.5; .5 MG/3ML; MG/3ML
3 SOLUTION RESPIRATORY (INHALATION) EVERY 6 HOURS
Status: DISCONTINUED | OUTPATIENT
Start: 2025-01-01 | End: 2025-01-01

## 2025-01-01 RX ORDER — HEPARIN SODIUM 10000 [USP'U]/ML
INJECTION, SOLUTION INTRAVENOUS; SUBCUTANEOUS
Status: DISCONTINUED | OUTPATIENT
Start: 2025-01-01 | End: 2025-01-01 | Stop reason: HOSPADM

## 2025-01-01 RX ORDER — GABAPENTIN 100 MG/1
100 CAPSULE ORAL 3 TIMES DAILY
Status: DISCONTINUED | OUTPATIENT
Start: 2025-01-01 | End: 2025-01-01

## 2025-01-01 RX ORDER — LANOLIN ALCOHOL/MO/W.PET/CERES
1 CREAM (GRAM) TOPICAL DAILY
Status: DISCONTINUED | OUTPATIENT
Start: 2025-01-01 | End: 2025-01-01 | Stop reason: HOSPADM

## 2025-01-01 RX ORDER — FUROSEMIDE 10 MG/ML
INJECTION INTRAMUSCULAR; INTRAVENOUS
Status: COMPLETED
Start: 2025-01-01 | End: 2025-01-01

## 2025-01-01 RX ORDER — MIDODRINE HYDROCHLORIDE 5 MG/1
5 TABLET ORAL 3 TIMES DAILY
Qty: 270 TABLET | Refills: 0 | Status: SHIPPED | OUTPATIENT
Start: 2025-01-01 | End: 2025-01-01

## 2025-01-01 RX ORDER — ALBUMIN HUMAN 50 G/1000ML
25 SOLUTION INTRAVENOUS ONCE
Status: COMPLETED | OUTPATIENT
Start: 2025-01-01 | End: 2025-01-01

## 2025-01-01 RX ORDER — CALCIUM CARBONATE 200(500)MG
1000 TABLET,CHEWABLE ORAL 3 TIMES DAILY
Qty: 60 TABLET | Refills: 0 | Status: ON HOLD | OUTPATIENT
Start: 2025-01-01 | End: 2025-01-01

## 2025-01-01 RX ORDER — HYDROMORPHONE HYDROCHLORIDE 1 MG/ML
1 INJECTION, SOLUTION INTRAMUSCULAR; INTRAVENOUS; SUBCUTANEOUS EVERY 4 HOURS PRN
Status: DISCONTINUED | OUTPATIENT
Start: 2025-01-01 | End: 2025-01-01

## 2025-01-01 RX ORDER — SODIUM FERRIC GLUCONATE COMPLEX IN SUCROSE 12.5 MG/ML
125 INJECTION INTRAVENOUS DAILY
Status: DISCONTINUED | OUTPATIENT
Start: 2025-01-01 | End: 2025-01-01

## 2025-01-01 RX ORDER — GUAIFENESIN 600 MG/1
600 TABLET, EXTENDED RELEASE ORAL 2 TIMES DAILY
Qty: 20 TABLET | Refills: 0 | Status: SHIPPED | OUTPATIENT
Start: 2025-01-01 | End: 2025-01-01

## 2025-01-01 RX ORDER — ALBUTEROL SULFATE 90 UG/1
2 INHALANT RESPIRATORY (INHALATION) EVERY 6 HOURS PRN
Status: DISCONTINUED | OUTPATIENT
Start: 2025-01-01 | End: 2025-01-01

## 2025-01-01 RX ORDER — FUROSEMIDE 10 MG/ML
40 INJECTION INTRAMUSCULAR; INTRAVENOUS ONCE
Status: COMPLETED | OUTPATIENT
Start: 2025-01-01 | End: 2025-01-01

## 2025-01-01 RX ORDER — IBUPROFEN 200 MG
1 TABLET ORAL DAILY
Status: DISCONTINUED | OUTPATIENT
Start: 2025-01-01 | End: 2025-01-01

## 2025-01-01 RX ORDER — DOCUSATE SODIUM 100 MG/1
100 CAPSULE, LIQUID FILLED ORAL DAILY
Status: DISCONTINUED | OUTPATIENT
Start: 2025-01-01 | End: 2025-01-01

## 2025-01-01 RX ORDER — IBUPROFEN 200 MG
1 TABLET ORAL DAILY
Qty: 30 PATCH | Refills: 0 | Status: SHIPPED | OUTPATIENT
Start: 2025-01-01 | End: 2025-01-01

## 2025-01-01 RX ORDER — GLUCAGON 1 MG
1 KIT INJECTION
Status: DISCONTINUED | OUTPATIENT
Start: 2025-01-01 | End: 2025-01-01 | Stop reason: HOSPADM

## 2025-01-01 RX ORDER — MEPERIDINE HYDROCHLORIDE 50 MG/ML
12.5 INJECTION INTRAMUSCULAR; INTRAVENOUS; SUBCUTANEOUS EVERY 10 MIN PRN
Status: DISCONTINUED | OUTPATIENT
Start: 2025-01-01 | End: 2025-01-01 | Stop reason: HOSPADM

## 2025-01-01 RX ORDER — HYDROMORPHONE HYDROCHLORIDE 1 MG/ML
0.2 INJECTION, SOLUTION INTRAMUSCULAR; INTRAVENOUS; SUBCUTANEOUS EVERY 5 MIN PRN
Status: COMPLETED | OUTPATIENT
Start: 2025-01-01 | End: 2025-01-01

## 2025-01-01 RX ORDER — BUPIVACAINE 13.3 MG/ML
INJECTION, SUSPENSION, LIPOSOMAL INFILTRATION
Status: DISCONTINUED | OUTPATIENT
Start: 2025-01-01 | End: 2025-01-01 | Stop reason: HOSPADM

## 2025-01-01 RX ORDER — MIDODRINE HYDROCHLORIDE 5 MG/1
5 TABLET ORAL 3 TIMES DAILY
Qty: 270 TABLET | Refills: 0 | Status: ON HOLD | OUTPATIENT
Start: 2025-01-01 | End: 2025-01-01

## 2025-01-01 RX ORDER — GABAPENTIN 100 MG/1
100 CAPSULE ORAL NIGHTLY
Status: DISCONTINUED | OUTPATIENT
Start: 2025-01-01 | End: 2025-01-01

## 2025-01-01 RX ORDER — IPRATROPIUM BROMIDE AND ALBUTEROL SULFATE 2.5; .5 MG/3ML; MG/3ML
3 SOLUTION RESPIRATORY (INHALATION) EVERY 4 HOURS
Status: DISCONTINUED | OUTPATIENT
Start: 2025-01-01 | End: 2025-01-01

## 2025-01-01 RX ORDER — KETOROLAC TROMETHAMINE 30 MG/ML
15 INJECTION, SOLUTION INTRAMUSCULAR; INTRAVENOUS EVERY 12 HOURS PRN
Status: DISCONTINUED | OUTPATIENT
Start: 2025-01-01 | End: 2025-01-01

## 2025-01-01 RX ORDER — METOPROLOL TARTRATE 25 MG/1
12.5 TABLET, FILM COATED ORAL 2 TIMES DAILY
Qty: 90 TABLET | Refills: 0 | Status: ON HOLD | OUTPATIENT
Start: 2025-01-01 | End: 2025-01-01

## 2025-01-01 RX ORDER — HYDROMORPHONE HYDROCHLORIDE 1 MG/ML
0.5 INJECTION, SOLUTION INTRAMUSCULAR; INTRAVENOUS; SUBCUTANEOUS EVERY 6 HOURS PRN
Status: DISCONTINUED | OUTPATIENT
Start: 2025-01-01 | End: 2025-01-01

## 2025-01-01 RX ORDER — POTASSIUM CHLORIDE 29.8 MG/ML
80 INJECTION INTRAVENOUS
Status: DISCONTINUED | OUTPATIENT
Start: 2025-01-01 | End: 2025-01-01

## 2025-01-01 RX ORDER — CEFAZOLIN 2 G/1
2 INJECTION, POWDER, FOR SOLUTION INTRAMUSCULAR; INTRAVENOUS ONCE
Status: COMPLETED | OUTPATIENT
Start: 2025-01-01 | End: 2025-01-01

## 2025-01-01 RX ORDER — ACETAZOLAMIDE 500 MG/5ML
500 INJECTION, POWDER, LYOPHILIZED, FOR SOLUTION INTRAVENOUS ONCE
Status: COMPLETED | OUTPATIENT
Start: 2025-01-01 | End: 2025-01-01

## 2025-01-01 RX ORDER — ALUMINUM HYDROXIDE, MAGNESIUM HYDROXIDE, AND SIMETHICONE 1200; 120; 1200 MG/30ML; MG/30ML; MG/30ML
30 SUSPENSION ORAL EVERY 6 HOURS PRN
Status: DISCONTINUED | OUTPATIENT
Start: 2025-01-01 | End: 2025-01-01 | Stop reason: HOSPADM

## 2025-01-01 RX ORDER — CYANOCOBALAMIN 1000 UG/ML
1000 INJECTION, SOLUTION INTRAMUSCULAR; SUBCUTANEOUS
Status: DISCONTINUED | OUTPATIENT
Start: 2025-01-01 | End: 2025-01-01 | Stop reason: HOSPADM

## 2025-01-01 RX ORDER — PANTOPRAZOLE SODIUM 40 MG/10ML
40 INJECTION, POWDER, LYOPHILIZED, FOR SOLUTION INTRAVENOUS 2 TIMES DAILY
Status: DISCONTINUED | OUTPATIENT
Start: 2025-01-01 | End: 2025-01-01

## 2025-01-01 RX ORDER — ONDANSETRON HYDROCHLORIDE 2 MG/ML
4 INJECTION, SOLUTION INTRAVENOUS DAILY PRN
Status: DISCONTINUED | OUTPATIENT
Start: 2025-01-01 | End: 2025-01-01 | Stop reason: HOSPADM

## 2025-01-01 RX ORDER — HEPARIN SOD,PORCINE/0.9 % NACL 30K/1000ML
INTRAVENOUS SOLUTION INTRAVENOUS CONTINUOUS PRN
Status: COMPLETED | OUTPATIENT
Start: 2025-01-01 | End: 2025-01-01

## 2025-01-01 RX ORDER — KETOROLAC TROMETHAMINE 30 MG/ML
15 INJECTION, SOLUTION INTRAMUSCULAR; INTRAVENOUS ONCE
Status: COMPLETED | OUTPATIENT
Start: 2025-01-01 | End: 2025-01-01

## 2025-01-01 RX ORDER — POLYETHYLENE GLYCOL 3350 17 G/17G
17 POWDER, FOR SOLUTION ORAL DAILY
Status: DISCONTINUED | OUTPATIENT
Start: 2025-01-01 | End: 2025-01-01 | Stop reason: ALTCHOICE

## 2025-01-01 RX ORDER — CALCIUM GLUCONATE 20 MG/ML
1 INJECTION, SOLUTION INTRAVENOUS
Status: COMPLETED | OUTPATIENT
Start: 2025-01-01 | End: 2025-01-01

## 2025-01-01 RX ORDER — PANTOPRAZOLE SODIUM 40 MG/1
40 TABLET, DELAYED RELEASE ORAL
Qty: 180 TABLET | Refills: 0 | Status: SHIPPED | OUTPATIENT
Start: 2025-01-01 | End: 2025-01-01

## 2025-01-01 RX ORDER — DIPHENHYDRAMINE HYDROCHLORIDE 50 MG/ML
12.5 INJECTION, SOLUTION INTRAMUSCULAR; INTRAVENOUS
Status: DISCONTINUED | OUTPATIENT
Start: 2025-01-01 | End: 2025-01-01 | Stop reason: HOSPADM

## 2025-01-01 RX ORDER — FERROUS SULFATE 325(65) MG
325 TABLET, DELAYED RELEASE (ENTERIC COATED) ORAL EVERY OTHER DAY
Qty: 45 TABLET | Refills: 0 | Status: ON HOLD | OUTPATIENT
Start: 2025-01-01 | End: 2025-01-01

## 2025-01-01 RX ORDER — IPRATROPIUM BROMIDE AND ALBUTEROL SULFATE 2.5; .5 MG/3ML; MG/3ML
3 SOLUTION RESPIRATORY (INHALATION) EVERY 6 HOURS PRN
Status: DISCONTINUED | OUTPATIENT
Start: 2025-01-01 | End: 2025-01-01

## 2025-01-01 RX ORDER — MIDODRINE HYDROCHLORIDE 2.5 MG/1
5 TABLET ORAL 3 TIMES DAILY
Status: DISCONTINUED | OUTPATIENT
Start: 2025-01-01 | End: 2025-01-01 | Stop reason: HOSPADM

## 2025-01-01 RX ORDER — ALBUTEROL SULFATE 0.83 MG/ML
10 SOLUTION RESPIRATORY (INHALATION) ONCE
Status: COMPLETED | OUTPATIENT
Start: 2025-01-01 | End: 2025-01-01

## 2025-01-01 RX ORDER — ROPINIROLE 0.25 MG/1
1 TABLET, FILM COATED ORAL 3 TIMES DAILY
Status: DISCONTINUED | OUTPATIENT
Start: 2025-01-01 | End: 2025-01-01 | Stop reason: HOSPADM

## 2025-01-01 RX ORDER — AMOXICILLIN 250 MG
1 CAPSULE ORAL DAILY
Status: DISCONTINUED | OUTPATIENT
Start: 2025-01-01 | End: 2025-01-01 | Stop reason: ALTCHOICE

## 2025-01-01 RX ORDER — PANTOPRAZOLE SODIUM 40 MG/10ML
40 INJECTION, POWDER, LYOPHILIZED, FOR SOLUTION INTRAVENOUS DAILY
Status: DISCONTINUED | OUTPATIENT
Start: 2025-01-01 | End: 2025-01-01

## 2025-01-01 RX ORDER — LANOLIN ALCOHOL/MO/W.PET/CERES
400 CREAM (GRAM) TOPICAL 2 TIMES DAILY
Qty: 180 TABLET | Refills: 0 | Status: SHIPPED | OUTPATIENT
Start: 2025-01-01 | End: 2025-01-01

## 2025-01-01 RX ORDER — CEFAZOLIN SODIUM 1 G/3ML
INJECTION, POWDER, FOR SOLUTION INTRAMUSCULAR; INTRAVENOUS
Status: DISCONTINUED | OUTPATIENT
Start: 2025-01-01 | End: 2025-01-01 | Stop reason: HOSPADM

## 2025-01-01 RX ORDER — METOPROLOL TARTRATE 25 MG/1
12.5 TABLET, FILM COATED ORAL 2 TIMES DAILY
Qty: 90 TABLET | Refills: 0 | Status: SHIPPED | OUTPATIENT
Start: 2025-01-01 | End: 2025-01-01

## 2025-01-01 RX ORDER — PANTOPRAZOLE SODIUM 40 MG/1
40 TABLET, DELAYED RELEASE ORAL
Status: DISCONTINUED | OUTPATIENT
Start: 2025-01-01 | End: 2025-01-01 | Stop reason: HOSPADM

## 2025-01-01 RX ADMIN — DOCUSATE SODIUM 100 MG: 100 CAPSULE, LIQUID FILLED ORAL at 10:08

## 2025-01-01 RX ADMIN — NICOTINE 1 PATCH: 21 PATCH, EXTENDED RELEASE TRANSDERMAL at 08:08

## 2025-01-01 RX ADMIN — IPRATROPIUM BROMIDE AND ALBUTEROL SULFATE 3 ML: 2.5; .5 SOLUTION RESPIRATORY (INHALATION) at 11:08

## 2025-01-01 RX ADMIN — MUPIROCIN 1 G: 20 OINTMENT TOPICAL at 08:08

## 2025-01-01 RX ADMIN — ROPINIROLE HYDROCHLORIDE 1 MG: 0.5 TABLET, FILM COATED ORAL at 08:08

## 2025-01-01 RX ADMIN — MIDODRINE HYDROCHLORIDE 5 MG: 2.5 TABLET ORAL at 04:08

## 2025-01-01 RX ADMIN — OXYCODONE HYDROCHLORIDE AND ACETAMINOPHEN 1 TABLET: 7.5; 325 TABLET ORAL at 05:08

## 2025-01-01 RX ADMIN — METOPROLOL TARTRATE 12.5 MG: 25 TABLET, FILM COATED ORAL at 08:08

## 2025-01-01 RX ADMIN — GUAIFENESIN 600 MG: 600 TABLET, EXTENDED RELEASE ORAL at 08:08

## 2025-01-01 RX ADMIN — IPRATROPIUM BROMIDE AND ALBUTEROL SULFATE 3 ML: 2.5; .5 SOLUTION RESPIRATORY (INHALATION) at 12:08

## 2025-01-01 RX ADMIN — CALCIUM GLUCONATE 1 G: 20 INJECTION, SOLUTION INTRAVENOUS at 02:08

## 2025-01-01 RX ADMIN — SODIUM ZIRCONIUM CYCLOSILICATE 5 G: 5 POWDER, FOR SUSPENSION ORAL at 08:08

## 2025-01-01 RX ADMIN — METOPROLOL TARTRATE 12.5 MG: 25 TABLET, FILM COATED ORAL at 09:08

## 2025-01-01 RX ADMIN — IPRATROPIUM BROMIDE AND ALBUTEROL SULFATE 3 ML: 2.5; .5 SOLUTION RESPIRATORY (INHALATION) at 06:08

## 2025-01-01 RX ADMIN — PANTOPRAZOLE SODIUM 40 MG: 40 INJECTION, POWDER, FOR SOLUTION INTRAVENOUS at 09:08

## 2025-01-01 RX ADMIN — NICOTINE 1 PATCH: 21 PATCH, EXTENDED RELEASE TRANSDERMAL at 10:08

## 2025-01-01 RX ADMIN — IPRATROPIUM BROMIDE AND ALBUTEROL SULFATE 3 ML: 2.5; .5 SOLUTION RESPIRATORY (INHALATION) at 07:08

## 2025-01-01 RX ADMIN — PANTOPRAZOLE SODIUM 40 MG: 40 INJECTION, POWDER, FOR SOLUTION INTRAVENOUS at 08:08

## 2025-01-01 RX ADMIN — PANTOPRAZOLE SODIUM 40 MG: 40 TABLET, DELAYED RELEASE ORAL at 05:08

## 2025-01-01 RX ADMIN — SODIUM FERRIC GLUCONATE COMPLEX IN SUCROSE 125 MG: 12.5 INJECTION INTRAVENOUS at 11:08

## 2025-01-01 RX ADMIN — GABAPENTIN 100 MG: 100 CAPSULE ORAL at 09:08

## 2025-01-01 RX ADMIN — HYDROMORPHONE HYDROCHLORIDE 1 MG: 1 INJECTION, SOLUTION INTRAMUSCULAR; INTRAVENOUS; SUBCUTANEOUS at 11:08

## 2025-01-01 RX ADMIN — HYDROMORPHONE HYDROCHLORIDE 0.2 MG: 1 INJECTION, SOLUTION INTRAMUSCULAR; INTRAVENOUS; SUBCUTANEOUS at 01:08

## 2025-01-01 RX ADMIN — FERROUS SULFATE TAB EC 325 MG (65 MG FE EQUIVALENT) 1 EACH: 325 (65 FE) TABLET DELAYED RESPONSE at 09:08

## 2025-01-01 RX ADMIN — IPRATROPIUM BROMIDE AND ALBUTEROL SULFATE 3 ML: 2.5; .5 SOLUTION RESPIRATORY (INHALATION) at 01:08

## 2025-01-01 RX ADMIN — NICOTINE 1 PATCH: 7 PATCH, EXTENDED RELEASE TRANSDERMAL at 08:08

## 2025-01-01 RX ADMIN — CEFAZOLIN 2 G: 2 INJECTION, POWDER, FOR SOLUTION INTRAMUSCULAR; INTRAVENOUS at 03:08

## 2025-01-01 RX ADMIN — METOPROLOL TARTRATE 12.5 MG: 25 TABLET, FILM COATED ORAL at 10:08

## 2025-01-01 RX ADMIN — ENOXAPARIN SODIUM 40 MG: 40 INJECTION SUBCUTANEOUS at 04:08

## 2025-01-01 RX ADMIN — SENNOSIDES, DOCUSATE SODIUM 1 TABLET: 50; 8.6 TABLET, FILM COATED ORAL at 12:08

## 2025-01-01 RX ADMIN — HYDROMORPHONE HYDROCHLORIDE 1 MG: 1 INJECTION, SOLUTION INTRAMUSCULAR; INTRAVENOUS; SUBCUTANEOUS at 07:08

## 2025-01-01 RX ADMIN — HYDROMORPHONE HYDROCHLORIDE 0.2 MG: 1 INJECTION, SOLUTION INTRAMUSCULAR; INTRAVENOUS; SUBCUTANEOUS at 12:08

## 2025-01-01 RX ADMIN — ALBUTEROL SULFATE 10 MG: 2.5 SOLUTION RESPIRATORY (INHALATION) at 03:08

## 2025-01-01 RX ADMIN — OXYCODONE HYDROCHLORIDE AND ACETAMINOPHEN 1 TABLET: 7.5; 325 TABLET ORAL at 09:08

## 2025-01-01 RX ADMIN — IPRATROPIUM BROMIDE AND ALBUTEROL SULFATE 3 ML: 2.5; .5 SOLUTION RESPIRATORY (INHALATION) at 04:08

## 2025-01-01 RX ADMIN — ROPINIROLE HYDROCHLORIDE 1 MG: 0.5 TABLET, FILM COATED ORAL at 09:08

## 2025-01-01 RX ADMIN — ACETAMINOPHEN 1000 MG: 10 INJECTION INTRAVENOUS at 10:08

## 2025-01-01 RX ADMIN — OXYCODONE HYDROCHLORIDE AND ACETAMINOPHEN 1 TABLET: 7.5; 325 TABLET ORAL at 11:08

## 2025-01-01 RX ADMIN — GLYCERIN 1 SUPPOSITORY: 2 SUPPOSITORY RECTAL at 08:08

## 2025-01-01 RX ADMIN — LEUCINE, PHENYLALANINE, LYSINE, METHIONINE, ISOLEUCINE, VALINE, HISTIDINE, THREONINE, TRYPTOPHAN, ALANINE, GLYCINE, ARGININE, PROLINE, SERINE, TYROSINE, SODIUM ACETATE, DIBASIC POTASSIUM PHOSPHATE, MAGNESIUM CHLORIDE, SODIUM CHLORIDE, CALCIUM CHLORIDE, DEXTROSE
880; 489; 33; 5; 438; 204; 255; 311; 247; 51; 170; 238; 261; 289; 213; 297; 77; 179; 77; 17; 247 INJECTION INTRAVENOUS at 05:08

## 2025-01-01 RX ADMIN — CLEVIPIDINE 2 MG/HR: 0.5 EMULSION INTRAVENOUS at 02:08

## 2025-01-01 RX ADMIN — MIDODRINE HYDROCHLORIDE 5 MG: 2.5 TABLET ORAL at 02:08

## 2025-01-01 RX ADMIN — MIDODRINE HYDROCHLORIDE 5 MG: 2.5 TABLET ORAL at 12:08

## 2025-01-01 RX ADMIN — POLYETHYLENE GLYCOL 3350 17 G: 17 POWDER, FOR SOLUTION ORAL at 12:08

## 2025-01-01 RX ADMIN — GABAPENTIN 100 MG: 100 CAPSULE ORAL at 08:08

## 2025-01-01 RX ADMIN — GABAPENTIN 100 MG: 100 CAPSULE ORAL at 10:08

## 2025-01-01 RX ADMIN — PANTOPRAZOLE SODIUM 40 MG: 40 TABLET, DELAYED RELEASE ORAL at 04:08

## 2025-01-01 RX ADMIN — KETOROLAC TROMETHAMINE 15 MG: 30 INJECTION, SOLUTION INTRAMUSCULAR; INTRAVENOUS at 05:08

## 2025-01-01 RX ADMIN — IPRATROPIUM BROMIDE AND ALBUTEROL SULFATE 3 ML: 2.5; .5 SOLUTION RESPIRATORY (INHALATION) at 02:08

## 2025-01-01 RX ADMIN — OXYCODONE HYDROCHLORIDE AND ACETAMINOPHEN 1 TABLET: 7.5; 325 TABLET ORAL at 12:08

## 2025-01-01 RX ADMIN — HYDROMORPHONE HYDROCHLORIDE 0.5 MG: 1 INJECTION, SOLUTION INTRAMUSCULAR; INTRAVENOUS; SUBCUTANEOUS at 08:08

## 2025-01-01 RX ADMIN — FUROSEMIDE 60 MG: 10 INJECTION, SOLUTION INTRAMUSCULAR; INTRAVENOUS at 02:08

## 2025-01-01 RX ADMIN — IPRATROPIUM BROMIDE AND ALBUTEROL SULFATE 3 ML: 2.5; .5 SOLUTION RESPIRATORY (INHALATION) at 03:08

## 2025-01-01 RX ADMIN — NICOTINE 1 PATCH: 21 PATCH, EXTENDED RELEASE TRANSDERMAL at 09:08

## 2025-01-01 RX ADMIN — ROPINIROLE HYDROCHLORIDE 1 MG: 0.5 TABLET, FILM COATED ORAL at 02:08

## 2025-01-01 RX ADMIN — ENOXAPARIN SODIUM 40 MG: 40 INJECTION SUBCUTANEOUS at 05:08

## 2025-01-01 RX ADMIN — Medication 400 MG: at 08:08

## 2025-01-01 RX ADMIN — GABAPENTIN 100 MG: 100 CAPSULE ORAL at 01:08

## 2025-01-01 RX ADMIN — GABAPENTIN 100 MG: 100 CAPSULE ORAL at 03:08

## 2025-01-01 RX ADMIN — FERROUS SULFATE TAB EC 325 MG (65 MG FE EQUIVALENT) 1 EACH: 325 (65 FE) TABLET DELAYED RESPONSE at 10:08

## 2025-01-01 RX ADMIN — GUAIFENESIN 600 MG: 600 TABLET, EXTENDED RELEASE ORAL at 09:08

## 2025-01-01 RX ADMIN — AZITHROMYCIN DIHYDRATE 500 MG: 500 INJECTION, POWDER, LYOPHILIZED, FOR SOLUTION INTRAVENOUS at 12:08

## 2025-01-01 RX ADMIN — CEFTRIAXONE SODIUM 1 G: 1 INJECTION, POWDER, FOR SOLUTION INTRAMUSCULAR; INTRAVENOUS at 11:08

## 2025-01-01 RX ADMIN — HYDROMORPHONE HYDROCHLORIDE 1 MG: 1 INJECTION, SOLUTION INTRAMUSCULAR; INTRAVENOUS; SUBCUTANEOUS at 09:08

## 2025-01-01 RX ADMIN — FERROUS SULFATE TAB EC 325 MG (65 MG FE EQUIVALENT) 1 EACH: 325 (65 FE) TABLET DELAYED RESPONSE at 08:08

## 2025-01-01 RX ADMIN — ACETAMINOPHEN 1000 MG: 10 INJECTION INTRAVENOUS at 01:08

## 2025-01-01 RX ADMIN — SODIUM PHOSPHATE, MONOBASIC, MONOHYDRATE AND SODIUM PHOSPHATE, DIBASIC, ANHYDROUS 30 MMOL: 142; 276 INJECTION, SOLUTION INTRAVENOUS at 10:08

## 2025-01-01 RX ADMIN — CALCIUM GLUCONATE 1 G: 20 INJECTION, SOLUTION INTRAVENOUS at 10:08

## 2025-01-01 RX ADMIN — METOROPROLOL TARTRATE 10 MG: 5 INJECTION, SOLUTION INTRAVENOUS at 01:08

## 2025-01-01 RX ADMIN — MIDODRINE HYDROCHLORIDE 5 MG: 2.5 TABLET ORAL at 11:08

## 2025-01-01 RX ADMIN — PANTOPRAZOLE SODIUM 40 MG: 40 INJECTION, POWDER, FOR SOLUTION INTRAVENOUS at 10:08

## 2025-01-01 RX ADMIN — ACETAMINOPHEN 1000 MG: 10 INJECTION INTRAVENOUS at 03:08

## 2025-01-01 RX ADMIN — KETOROLAC TROMETHAMINE 15 MG: 30 INJECTION, SOLUTION INTRAMUSCULAR; INTRAVENOUS at 10:08

## 2025-01-01 RX ADMIN — NICOTINE 1 PATCH: 7 PATCH, EXTENDED RELEASE TRANSDERMAL at 09:08

## 2025-01-01 RX ADMIN — ACETAMINOPHEN 1000 MG: 10 INJECTION INTRAVENOUS at 02:08

## 2025-01-01 RX ADMIN — KIT FOR THE PREPARATION OF TECHNETIUM TC 99M PENTETATE 30 MILLICURIE: 20 INJECTION, POWDER, LYOPHILIZED, FOR SOLUTION INTRAVENOUS; RESPIRATORY (INHALATION) at 05:08

## 2025-01-01 RX ADMIN — MIDODRINE HYDROCHLORIDE 5 MG: 2.5 TABLET ORAL at 03:08

## 2025-01-01 RX ADMIN — HYDROMORPHONE HYDROCHLORIDE 0.5 MG: 1 INJECTION, SOLUTION INTRAMUSCULAR; INTRAVENOUS; SUBCUTANEOUS at 05:08

## 2025-01-01 RX ADMIN — IPRATROPIUM BROMIDE AND ALBUTEROL SULFATE 3 ML: 2.5; .5 SOLUTION RESPIRATORY (INHALATION) at 08:08

## 2025-01-01 RX ADMIN — HYDROCODONE BITARTRATE AND ACETAMINOPHEN 1 TABLET: 7.5; 325 TABLET ORAL at 08:08

## 2025-01-01 RX ADMIN — SODIUM FERRIC GLUCONATE COMPLEX IN SUCROSE 125 MG: 12.5 INJECTION INTRAVENOUS at 10:08

## 2025-01-01 RX ADMIN — GUAIFENESIN 600 MG: 600 TABLET, EXTENDED RELEASE ORAL at 10:08

## 2025-01-01 RX ADMIN — HYDROMORPHONE HYDROCHLORIDE 0.5 MG: 1 INJECTION, SOLUTION INTRAMUSCULAR; INTRAVENOUS; SUBCUTANEOUS at 11:08

## 2025-01-01 RX ADMIN — HYDROMORPHONE HYDROCHLORIDE 0.5 MG: 1 INJECTION, SOLUTION INTRAMUSCULAR; INTRAVENOUS; SUBCUTANEOUS at 02:08

## 2025-01-01 RX ADMIN — SODIUM PHOSPHATE, MONOBASIC, MONOHYDRATE AND SODIUM PHOSPHATE, DIBASIC, ANHYDROUS 30 MMOL: 142; 276 INJECTION, SOLUTION INTRAVENOUS at 12:08

## 2025-01-01 RX ADMIN — PANTOPRAZOLE SODIUM 40 MG: 40 TABLET, DELAYED RELEASE ORAL at 06:08

## 2025-01-01 RX ADMIN — ROPINIROLE HYDROCHLORIDE 1 MG: 0.5 TABLET, FILM COATED ORAL at 06:08

## 2025-01-01 RX ADMIN — PANTOPRAZOLE SODIUM 40 MG: 40 TABLET, DELAYED RELEASE ORAL at 03:08

## 2025-01-01 RX ADMIN — IOHEXOL 100 ML: 350 INJECTION, SOLUTION INTRAVENOUS at 06:08

## 2025-01-01 RX ADMIN — HYDROMORPHONE HYDROCHLORIDE 0.5 MG: 1 INJECTION, SOLUTION INTRAMUSCULAR; INTRAVENOUS; SUBCUTANEOUS at 07:08

## 2025-01-01 RX ADMIN — HYDROCODONE BITARTRATE AND ACETAMINOPHEN 1 TABLET: 5; 325 TABLET ORAL at 03:08

## 2025-01-01 RX ADMIN — Medication 400 MG: at 09:08

## 2025-01-01 RX ADMIN — ROPINIROLE HYDROCHLORIDE 1 MG: 0.5 TABLET, FILM COATED ORAL at 03:08

## 2025-01-01 RX ADMIN — GABAPENTIN 100 MG: 100 CAPSULE ORAL at 04:08

## 2025-01-01 RX ADMIN — MIDODRINE HYDROCHLORIDE 5 MG: 2.5 TABLET ORAL at 05:08

## 2025-01-01 RX ADMIN — DOCUSATE SODIUM LIQUID 50 MG: 100 LIQUID ORAL at 09:08

## 2025-01-01 RX ADMIN — ACETAMINOPHEN 1000 MG: 10 INJECTION INTRAVENOUS at 06:08

## 2025-01-01 RX ADMIN — DOCUSATE SODIUM 100 MG: 100 CAPSULE, LIQUID FILLED ORAL at 03:08

## 2025-01-01 RX ADMIN — ROPINIROLE HYDROCHLORIDE 1 MG: 0.5 TABLET, FILM COATED ORAL at 04:08

## 2025-01-01 RX ADMIN — OXYCODONE HYDROCHLORIDE AND ACETAMINOPHEN 1 TABLET: 7.5; 325 TABLET ORAL at 07:08

## 2025-01-01 RX ADMIN — CEFAZOLIN 2 G: 2 INJECTION, POWDER, FOR SOLUTION INTRAMUSCULAR; INTRAVENOUS at 10:08

## 2025-01-01 RX ADMIN — LIDOCAINE HYDROCHLORIDE 10 ML: 20 SOLUTION ORAL; TOPICAL at 05:08

## 2025-01-01 RX ADMIN — PANTOPRAZOLE SODIUM 40 MG: 40 TABLET, DELAYED RELEASE ORAL at 02:08

## 2025-01-01 RX ADMIN — MIDODRINE HYDROCHLORIDE 5 MG: 2.5 TABLET ORAL at 09:08

## 2025-01-01 RX ADMIN — ROPINIROLE HYDROCHLORIDE 1 MG: 0.5 TABLET, FILM COATED ORAL at 05:08

## 2025-01-01 RX ADMIN — CALCIUM CARBONATE (ANTACID) CHEW TAB 500 MG 1000 MG: 500 CHEW TAB at 09:08

## 2025-01-01 RX ADMIN — SODIUM ZIRCONIUM CYCLOSILICATE 10 G: 5 POWDER, FOR SUSPENSION ORAL at 02:08

## 2025-01-01 RX ADMIN — FUROSEMIDE 60 MG: 10 INJECTION INTRAMUSCULAR; INTRAVENOUS at 10:08

## 2025-01-01 RX ADMIN — INSULIN HUMAN 5 UNITS: 100 INJECTION, SOLUTION PARENTERAL at 03:08

## 2025-01-01 RX ADMIN — SODIUM ZIRCONIUM CYCLOSILICATE 10 G: 5 POWDER, FOR SUSPENSION ORAL at 11:08

## 2025-01-01 RX ADMIN — SODIUM CHLORIDE: 9 INJECTION, SOLUTION INTRAVENOUS at 03:08

## 2025-01-01 RX ADMIN — OXYCODONE HYDROCHLORIDE 5 MG: 5 TABLET ORAL at 01:08

## 2025-01-01 RX ADMIN — ROPINIROLE HYDROCHLORIDE 1 MG: 0.5 TABLET, FILM COATED ORAL at 10:08

## 2025-01-01 RX ADMIN — CEFTRIAXONE SODIUM 1 G: 1 INJECTION, POWDER, FOR SOLUTION INTRAMUSCULAR; INTRAVENOUS at 12:08

## 2025-01-01 RX ADMIN — ACETAMINOPHEN 1000 MG: 10 INJECTION INTRAVENOUS at 07:08

## 2025-01-01 RX ADMIN — IPRATROPIUM BROMIDE AND ALBUTEROL SULFATE 3 ML: 2.5; .5 SOLUTION RESPIRATORY (INHALATION) at 05:08

## 2025-01-01 RX ADMIN — FUROSEMIDE 20 MG: 10 INJECTION, SOLUTION INTRAMUSCULAR; INTRAVENOUS at 01:08

## 2025-01-01 RX ADMIN — FUROSEMIDE 40 MG: 10 INJECTION, SOLUTION INTRAMUSCULAR; INTRAVENOUS at 10:08

## 2025-01-01 RX ADMIN — SODIUM ZIRCONIUM CYCLOSILICATE 10 G: 5 POWDER, FOR SUSPENSION ORAL at 10:08

## 2025-01-01 RX ADMIN — KIT FOR THE PREPARATION OF TECHNETIUM TC 99M ALBUMIN AGGREGATED 5.2 MILLICURIE: 2 INJECTION, POWDER, LYOPHILIZED, FOR SUSPENSION INTRAPERITONEAL; INTRAVENOUS at 05:08

## 2025-01-01 RX ADMIN — FUROSEMIDE 40 MG: 40 TABLET ORAL at 09:08

## 2025-01-01 RX ADMIN — Medication 400 MG: at 10:08

## 2025-01-01 RX ADMIN — AZITHROMYCIN DIHYDRATE 500 MG: 500 INJECTION, POWDER, LYOPHILIZED, FOR SOLUTION INTRAVENOUS at 11:08

## 2025-01-01 RX ADMIN — POLYETHYLENE GLYCOL 3350 17 G: 17 POWDER, FOR SOLUTION ORAL at 08:08

## 2025-01-01 RX ADMIN — MUPIROCIN 1 G: 20 OINTMENT TOPICAL at 10:08

## 2025-01-01 RX ADMIN — GUAIFENESIN 600 MG: 600 TABLET, EXTENDED RELEASE ORAL at 12:08

## 2025-01-01 RX ADMIN — CALCIUM GLUCONATE 1 G: 20 INJECTION, SOLUTION INTRAVENOUS at 12:08

## 2025-01-01 RX ADMIN — LIDOCAINE HYDROCHLORIDE 10 ML: 20 SOLUTION ORAL; TOPICAL at 12:08

## 2025-01-01 RX ADMIN — HYDROCODONE BITARTRATE AND ACETAMINOPHEN 1 TABLET: 5; 325 TABLET ORAL at 08:08

## 2025-01-01 RX ADMIN — ACETAZOLAMIDE 500 MG: 500 INJECTION, POWDER, LYOPHILIZED, FOR SOLUTION INTRAVENOUS at 12:08

## 2025-01-01 RX ADMIN — SODIUM FERRIC GLUCONATE COMPLEX IN SUCROSE 125 MG: 12.5 INJECTION INTRAVENOUS at 09:08

## 2025-01-01 RX ADMIN — FUROSEMIDE 60 MG: 10 INJECTION, SOLUTION INTRAMUSCULAR; INTRAVENOUS at 10:08

## 2025-01-01 RX ADMIN — NICOTINE 1 PATCH: 7 PATCH, EXTENDED RELEASE TRANSDERMAL at 11:08

## 2025-01-01 RX ADMIN — FUROSEMIDE 40 MG: 40 TABLET ORAL at 08:08

## 2025-01-01 RX ADMIN — SODIUM BICARBONATE: 84 INJECTION, SOLUTION INTRAVENOUS at 06:08

## 2025-01-01 RX ADMIN — CALCIUM GLUCONATE 1 G: 20 INJECTION, SOLUTION INTRAVENOUS at 11:08

## 2025-01-01 RX ADMIN — CYANOCOBALAMIN 1000 MCG: 1000 INJECTION INTRAMUSCULAR; SUBCUTANEOUS at 12:08

## 2025-01-01 RX ADMIN — HYDROCODONE BITARTRATE AND ACETAMINOPHEN 1 TABLET: 7.5; 325 TABLET ORAL at 01:08

## 2025-01-01 RX ADMIN — HYDROMORPHONE HYDROCHLORIDE 1 MG: 1 INJECTION, SOLUTION INTRAMUSCULAR; INTRAVENOUS; SUBCUTANEOUS at 10:08

## 2025-01-01 RX ADMIN — FUROSEMIDE 40 MG: 10 INJECTION, SOLUTION INTRAMUSCULAR; INTRAVENOUS at 07:08

## 2025-01-01 RX ADMIN — DEXTROSE MONOHYDRATE 50 G: 25 INJECTION, SOLUTION INTRAVENOUS at 03:08

## 2025-01-01 RX ADMIN — ACETAMINOPHEN 1000 MG: 10 INJECTION INTRAVENOUS at 05:08

## 2025-01-01 RX ADMIN — LIDOCAINE HYDROCHLORIDE 10 ML: 20 SOLUTION ORAL; TOPICAL at 01:08

## 2025-01-01 RX ADMIN — TUBERCULIN PURIFIED PROTEIN DERIVATIVE 5 UNITS: 5 INJECTION, SOLUTION INTRADERMAL at 11:08

## 2025-01-01 RX ADMIN — GABAPENTIN 100 MG: 100 CAPSULE ORAL at 02:08

## 2025-01-01 RX ADMIN — ALBUMIN (HUMAN) 25 G: 12.5 SOLUTION INTRAVENOUS at 08:08

## 2025-01-01 RX ADMIN — NICOTINE 1 PATCH: 14 PATCH, EXTENDED RELEASE TRANSDERMAL at 10:08

## 2025-01-01 RX ADMIN — CALCIUM GLUCONATE 1 G: 20 INJECTION, SOLUTION INTRAVENOUS at 03:08

## 2025-01-01 RX ADMIN — PANTOPRAZOLE SODIUM 40 MG: 40 INJECTION, POWDER, FOR SOLUTION INTRAVENOUS at 11:08

## 2025-01-01 RX ADMIN — OXYCODONE HYDROCHLORIDE AND ACETAMINOPHEN 1 TABLET: 7.5; 325 TABLET ORAL at 02:08

## 2025-01-01 RX ADMIN — FUROSEMIDE 40 MG: 10 INJECTION, SOLUTION INTRAMUSCULAR; INTRAVENOUS at 11:08

## 2025-01-01 RX ADMIN — HYDROMORPHONE HYDROCHLORIDE 1 MG: 1 INJECTION, SOLUTION INTRAMUSCULAR; INTRAVENOUS; SUBCUTANEOUS at 04:08

## 2025-01-01 RX ADMIN — SODIUM CHLORIDE: 9 INJECTION, SOLUTION INTRAVENOUS at 02:08

## 2025-01-01 RX ADMIN — KETOROLAC TROMETHAMINE 15 MG: 30 INJECTION, SOLUTION INTRAMUSCULAR; INTRAVENOUS at 04:08

## 2025-01-01 RX ADMIN — IPRATROPIUM BROMIDE AND ALBUTEROL SULFATE 3 ML: 2.5; .5 SOLUTION RESPIRATORY (INHALATION) at 09:08

## 2025-01-01 RX ADMIN — HYDROCODONE BITARTRATE AND ACETAMINOPHEN 1 TABLET: 7.5; 325 TABLET ORAL at 04:08

## 2025-01-01 RX ADMIN — MIDODRINE HYDROCHLORIDE 5 MG: 2.5 TABLET ORAL at 06:08

## 2025-01-01 RX ADMIN — SENNOSIDES, DOCUSATE SODIUM 1 TABLET: 50; 8.6 TABLET, FILM COATED ORAL at 08:08

## 2025-01-01 RX ADMIN — SODIUM CHLORIDE 1000 ML: 9 INJECTION, SOLUTION INTRAVENOUS at 03:08

## 2025-01-14 ENCOUNTER — PATIENT MESSAGE (OUTPATIENT)
Dept: ADMINISTRATIVE | Facility: HOSPITAL | Age: 70
End: 2025-01-14
Payer: MEDICARE

## 2025-02-10 ENCOUNTER — TELEPHONE (OUTPATIENT)
Dept: FAMILY MEDICINE | Facility: CLINIC | Age: 70
End: 2025-02-10
Payer: MEDICARE

## 2025-02-10 DIAGNOSIS — J42 CHRONIC BRONCHITIS, UNSPECIFIED CHRONIC BRONCHITIS TYPE: ICD-10-CM

## 2025-02-10 RX ORDER — ALBUTEROL SULFATE 90 UG/1
2 INHALANT RESPIRATORY (INHALATION) EVERY 6 HOURS PRN
Qty: 80 G | Status: SHIPPED | OUTPATIENT
Start: 2025-02-10

## 2025-02-10 NOTE — TELEPHONE ENCOUNTER
----- Message from Deborah sent at 2/10/2025  8:17 AM CST -----  Pt needs her albuterol inhaler increased to 4-5 for 3 months because she keeps running out. She says she is not taking more than she is supposed to. They are just not lasting 3 months.   [facility]   [phone]

## 2025-02-13 ENCOUNTER — TELEPHONE (OUTPATIENT)
Dept: FAMILY MEDICINE | Facility: CLINIC | Age: 70
End: 2025-02-13
Payer: MEDICARE

## 2025-02-21 DIAGNOSIS — Z00.00 ENCOUNTER FOR MEDICARE ANNUAL WELLNESS EXAM: ICD-10-CM

## 2025-03-06 ENCOUNTER — OFFICE VISIT (OUTPATIENT)
Dept: FAMILY MEDICINE | Facility: CLINIC | Age: 70
End: 2025-03-06
Payer: MEDICARE

## 2025-03-06 VITALS
WEIGHT: 175.5 LBS | OXYGEN SATURATION: 93 % | HEART RATE: 98 BPM | HEIGHT: 66 IN | BODY MASS INDEX: 28.21 KG/M2 | DIASTOLIC BLOOD PRESSURE: 82 MMHG | SYSTOLIC BLOOD PRESSURE: 140 MMHG

## 2025-03-06 DIAGNOSIS — E78.2 MIXED HYPERLIPIDEMIA: Primary | ICD-10-CM

## 2025-03-06 DIAGNOSIS — R79.9 ABNORMAL BLOOD CHEMISTRY: ICD-10-CM

## 2025-03-06 DIAGNOSIS — J42 CHRONIC BRONCHITIS, UNSPECIFIED CHRONIC BRONCHITIS TYPE: ICD-10-CM

## 2025-03-06 DIAGNOSIS — Z01.84 IMMUNITY STATUS TESTING: ICD-10-CM

## 2025-03-06 DIAGNOSIS — I10 ESSENTIAL HYPERTENSION: ICD-10-CM

## 2025-03-06 PROCEDURE — 99999 PR PBB SHADOW E&M-EST. PATIENT-LVL IV: CPT | Mod: PBBFAC,HCNC,, | Performed by: NURSE PRACTITIONER

## 2025-03-06 RX ORDER — ALBUTEROL SULFATE 90 UG/1
2 INHALANT RESPIRATORY (INHALATION) EVERY 6 HOURS PRN
Qty: 80 G | Refills: 1 | Status: SHIPPED | OUTPATIENT
Start: 2025-03-06

## 2025-03-06 RX ORDER — BACLOFEN 20 MG/1
20 TABLET ORAL 2 TIMES DAILY
COMMUNITY

## 2025-03-06 RX ORDER — TRAZODONE HYDROCHLORIDE 50 MG/1
TABLET ORAL
COMMUNITY

## 2025-03-06 NOTE — PROGRESS NOTES
Patient ID: Yadi Mccall is a 69 y.o. female.    Chief Complaint: Follow-up (4 month f/u )    History of Present Illness    CHIEF COMPLAINT:  Ms. Mccall presents for a follow-up visit to discuss medication refills, specifically inhalers, and to obtain a handicap license plate.    HPI:  Ms. Mccall reports requiring an increase in her inhaler prescription due to increased usage. She is away from home more frequently, limiting her access to her nebulizer, which has led to more frequent use of her inhalers. She reports exhausting her inhaler supply before the 3-month refill period, occasionally resorting to using her sister's inhaler. She requests an increase from 3-4 or 5 inhalers for the prescription period. She reports frequent fever blisters. She mentions recently receiving her second shingles shot, as well as cold, flu, and other vaccinations. She expresses concern about potentially needing a measles vaccination booster, noting she received a measles shot in childhood but is uncertain of its current efficacy.    MEDICATIONS:  Ms. Mccall is on an inhaler for shortness of breath and nebulizer medication for breathing issues.    MEDICAL HISTORY:  She has a history of asthma. Ms. Mccall recently received her second dose (booster) of the shingles vaccine. She has also recently received both flu and RSV vaccines.    FAMILY HISTORY:  Family history is significant for her grandmother who used Metamucil daily.      ROS:  General: -fever, -chills, -fatigue, -weight gain, -weight loss  Eyes: -vision changes, -redness, -discharge  ENT: -ear pain, -nasal congestion, -sore throat  Cardiovascular: -chest pain, -palpitations, -lower extremity edema  Respiratory: -cough, +shortness of breath  Gastrointestinal: -abdominal pain, -nausea, -vomiting, -diarrhea, -constipation, -blood in stool  Genitourinary: -dysuria, -hematuria, -frequency  Musculoskeletal: -joint pain, -muscle pain  Skin: -rash, -lesion  Neurological: -headache,  "-dizziness, -numbness, -tingling  Psychiatric: -anxiety, -depression, -sleep difficulty             Past Medical History:   Diagnosis Date    AAA (abdominal aortic aneurysm)     Anticoagulant long-term use     Coronary artery disease     Foot drop, left foot     Hyperlipidemia     Hypertension     Stroke 2014    LEFT SIDED WEAKNESS     Past Surgical History:   Procedure Laterality Date    APPENDECTOMY      CATARACT EXTRACTION Bilateral 05/2024    CORONARY ANGIOGRAPHY N/A 07/24/2020    Procedure: ANGIOGRAM, CORONARY ARTERY;  Surgeon: Norbert Vallecillo MD;  Location: [facility] CATH;  Service: Cardiology;  Laterality: N/A;    CORONARY ARTERY BYPASS GRAFT (CABG) N/A 08/21/2020    Procedure: CORONARY ARTERY BYPASS GRAFT (CABG) BILATERAL MAMMARY  x  4 VESSELS;  Surgeon: Pascual Gray MD;  Location: [facility] OR;  Service: Cardiovascular;  Laterality: N/A;    ENDOSCOPIC HARVEST OF VEIN N/A 08/21/2020    Procedure: HARVEST-VEIN-ENDOVASCULAR;  Surgeon: Pascual Gray MD;  Location: [facility] OR;  Service: Cardiovascular;  Laterality: N/A;    FOOT SURGERY      HERNIA REPAIR      HYSTERECTOMY      LEFT HEART CATHETERIZATION N/A 07/24/2020    Procedure: Left heart cath;  Surgeon: Norbert Vallecillo MD;  Location: [facility] CATH;  Service: Cardiology;  Laterality: N/A;    TONSILLECTOMY           Tobacco History:  reports that she has been smoking cigarettes. She started smoking about 55 years ago. She has a 50 pack-year smoking history. She has been exposed to tobacco smoke. She has never used smokeless tobacco.      Review of patient's allergies indicates:   Allergen Reactions    Chlorthalidone     Codeine     Dyazide [triamterene-hydrochlorothiazid]     Penicillins      Current Medications[1]           Objective:      Vitals:    03/06/25 1346   BP: (!) 148/79   Pulse: 98   SpO2: (!) 93%   Weight: 79.6 kg (175 lb 7.8 oz)   Height: 5' 6" (1.676 m)     Physical Exam  Vitals reviewed.   Constitutional:       General: She is not in acute " distress.     Appearance: Normal appearance. She is normal weight. She is ill-appearing. She is not toxic-appearing or diaphoretic.      Comments: Chronically ill appearing patient    HENT:      Head: Normocephalic and atraumatic.      Right Ear: Tympanic membrane and external ear normal. There is no impacted cerumen.      Left Ear: Tympanic membrane and external ear normal. There is no impacted cerumen.      Nose: Nose normal. No congestion or rhinorrhea.      Mouth/Throat:      Mouth: Mucous membranes are moist.      Pharynx: Oropharynx is clear. No oropharyngeal exudate or posterior oropharyngeal erythema.   Eyes:      General: No scleral icterus.        Right eye: No discharge.         Left eye: No discharge.      Extraocular Movements: Extraocular movements intact.      Conjunctiva/sclera: Conjunctivae normal.      Pupils: Pupils are equal, round, and reactive to light.   Neck:      Vascular: No carotid bruit.   Cardiovascular:      Rate and Rhythm: Normal rate and regular rhythm.      Pulses: Normal pulses.      Heart sounds: Normal heart sounds. No murmur heard.     No friction rub. No gallop.   Pulmonary:      Effort: Pulmonary effort is normal. No respiratory distress.      Breath sounds: Normal breath sounds. No stridor. No rales.   Chest:      Chest wall: No tenderness.   Abdominal:      General: Abdomen is flat. Bowel sounds are normal.      Palpations: Abdomen is soft. There is no mass.      Tenderness: There is no abdominal tenderness. There is no guarding.   Musculoskeletal:         General: Deformity present. No swelling or signs of injury. Normal range of motion.      Cervical back: Normal range of motion and neck supple. No rigidity or tenderness.      Right lower leg: No edema.      Left lower leg: No edema.      Comments: Ambulates with rollator   Skin:     General: Skin is warm and dry.      Capillary Refill: Capillary refill takes less than 2 seconds.      Findings: No bruising, lesion or rash.    Neurological:      General: No focal deficit present.      Mental Status: She is alert and oriented to person, place, and time. Mental status is at baseline.      Motor: No weakness.      Coordination: Coordination normal.   Psychiatric:         Mood and Affect: Mood normal.         Behavior: Behavior normal.         Thought Content: Thought content normal.         Judgment: Judgment normal.           Assessment:       1. Mixed hyperlipidemia    2. Chronic bronchitis, unspecified chronic bronchitis type    3. Essential hypertension    4. Abnormal blood chemistry    5. Immunity status testing           Plan:       Assessment & Plan    IMPRESSION:  - Recommend increasing inhaler prescription due to patient's increased outdoor activities and reduced use of nebulizer at home  - Ordered rubeola antibody titer to assess measles immunity status before considering vaccination  - Provided work excuse note for 1 week as requested by patient, understanding it was intended as a joke    HEMIPARESIS AFFECTING LEFT SIDE AS LATE EFFECT OF CEREBROVASCULAR ACCIDENT:  - Prescribed a handicap license plate for the patient's vehicle to address mobility issues resulting from the condition.    ASTHMA:  - Increased inhaler prescription from 3 to 4-5 inhalers per 3-month period due to patient's reported more frequent use when out and less use of nebulizer.  - Prescribed a total of 80 g, with each canister containing 18 grams.  - Sent a note to the pharmacy requesting additional inhalers to accommodate the patient's increased need.    FEVER BLISTERS:  - Ms. Mccall reports getting fever blisters frequently.    MEASLES IMMUNITY:  - Discussed importance of measles immunity and potential need for booster vaccination.  - Ordered a rubeola antibody titer to check immunity.  - Instructed patient to get labs done at the clinic when convenient and contact the office with results.  - Plan to administer a measles vaccine booster if titer results show  low immunity.    MEDICATIONS/SUPPLEMENTS:  - Recommend finding a fiber supplement safe for daily use.    FOLLOW UP:  - Follow up in 4 months.         Mixed hyperlipidemia  -     Lipid Panel; Future; Expected date: 03/06/2025    Chronic bronchitis, unspecified chronic bronchitis type  -     albuterol (PROVENTIL/VENTOLIN HFA) 90 mcg/actuation inhaler; Inhale 2 puffs into the lungs every 6 (six) hours as needed for Wheezing.  Dispense: 80 g; Refill: 1    Essential hypertension  -     Comprehensive Metabolic Panel; Future; Expected date: 03/06/2025    Abnormal blood chemistry  -     HEMOGLOBIN A1C; Future; Expected date: 03/06/2025    Immunity status testing  -     RUBEOLA ANTIBODY IGG; Future; Expected date: 03/06/2025      Follow up in about 4 months (around 7/6/2025), or if symptoms worsen or fail to improve.        3/6/2025 Keshia Chan NP    This note was generated with the assistance of ambient listening technology. Verbal consent was obtained by the patient and accompanying visitor(s) for the recording of patient appointment to facilitate this note. I attest to having reviewed and edited the generated note for accuracy, though some syntax or spelling errors may persist. Please contact the author of this note for any clarification.             [1]   Current Outpatient Medications:     albuterol-ipratropium (DUO-NEB) 2.5 mg-0.5 mg/3 mL nebulizer solution, Take 3 mLs by nebulization every 6 (six) hours as needed for Wheezing. Rescue, Disp: 120 each, Rfl: 11    atorvastatin (LIPITOR) 40 MG tablet, Take 1 tablet (40 mg total) by mouth once daily., Disp: 90 tablet, Rfl: 3    baclofen (LIORESAL) 20 MG tablet, Take 20 mg by mouth 2 (two) times daily., Disp: , Rfl:     lisinopriL 10 MG tablet, Take 1 tablet (10 mg total) by mouth once daily., Disp: 90 tablet, Rfl: 3    OXYGEN-AIR DELIVERY SYSTEMS MISC, 3 L/min by local intranasal application route continuous. NC 3lpm continuous, Disp: , Rfl:     oxymetazoline HCl  (AFRIN, OXYMETAZOLINE, NASL), 1 spray by Nasal route as needed (stuffy nose)., Disp: , Rfl:     traZODone (DESYREL) 50 MG tablet, 1-2 tablet at bedtime Oral QHS PRN insomnia, Disp: , Rfl:     walker (ULTRA-LIGHT ROLLATOR) Misc, 1 each by Misc.(Non-Drug; Combo Route) route continuous., Disp: 1 each, Rfl: 0    albuterol (PROVENTIL/VENTOLIN HFA) 90 mcg/actuation inhaler, Inhale 2 puffs into the lungs every 6 (six) hours as needed for Wheezing., Disp: 80 g, Rfl: 1    aspirin 81 MG Chew, Take 1 tablet (81 mg total) by mouth once daily., Disp: 360 tablet, Rfl: 0    nitroGLYCERIN (NITROSTAT) 0.4 MG SL tablet, Place 1 tablet (0.4 mg total) under the tongue every 5 (five) minutes as needed for Chest pain., Disp: 20 tablet, Rfl: 0    rOPINIRole (REQUIP) 1 MG tablet, Take 1 tablet (1 mg total) by mouth 3 (three) times daily., Disp: 90 tablet, Rfl: 3

## 2025-03-06 NOTE — LETTER
March 6, 2025      [facility] - [address]  [address]  [address]  [facility] [address]  Phone: [phone]  Fax: [phone]       Patient: Yadi Mccall  YOB: 1955  Date of Visit: 03/06/2025    To Whom It May Concern:    Yadi Mccall was at [facility] on 03/06/2025. She may return to work on 3/13/2025 with no restrictions. If you have any questions or concerns, or if I can be of further assistance, please do not hesitate to contact my office.    Sincerely,          BRIAN Rivera

## 2025-03-10 ENCOUNTER — PATIENT MESSAGE (OUTPATIENT)
Dept: ADMINISTRATIVE | Facility: HOSPITAL | Age: 70
End: 2025-03-10
Payer: MEDICARE

## 2025-03-11 ENCOUNTER — TELEPHONE (OUTPATIENT)
Dept: FAMILY MEDICINE | Facility: CLINIC | Age: 70
End: 2025-03-11
Payer: MEDICARE

## 2025-03-11 NOTE — TELEPHONE ENCOUNTER
I called the patient in regards to her question about hours of operation, no answer left voicemail with details about operation.

## 2025-03-11 NOTE — TELEPHONE ENCOUNTER
----- Message from Harley sent at 3/10/2025  4:26 PM CDT -----  -4:21PM-Pt called and wanted to know what your hours of operation are.[phone]

## 2025-03-12 ENCOUNTER — LAB VISIT (OUTPATIENT)
Dept: LAB | Facility: HOSPITAL | Age: 70
End: 2025-03-12
Attending: NURSE PRACTITIONER
Payer: MEDICARE

## 2025-03-12 DIAGNOSIS — I10 ESSENTIAL HYPERTENSION: ICD-10-CM

## 2025-03-12 DIAGNOSIS — Z01.84 IMMUNITY STATUS TESTING: ICD-10-CM

## 2025-03-12 DIAGNOSIS — R79.9 ABNORMAL BLOOD CHEMISTRY: ICD-10-CM

## 2025-03-12 DIAGNOSIS — E78.2 MIXED HYPERLIPIDEMIA: ICD-10-CM

## 2025-03-12 LAB
ALBUMIN SERPL BCP-MCNC: 3.7 G/DL (ref 3.5–5.2)
ALP SERPL-CCNC: 107 U/L (ref 40–150)
ALT SERPL W/O P-5'-P-CCNC: 20 U/L (ref 10–44)
ANION GAP SERPL CALC-SCNC: 10 MMOL/L (ref 8–16)
AST SERPL-CCNC: 17 U/L (ref 10–40)
BILIRUB SERPL-MCNC: 0.5 MG/DL (ref 0.1–1)
BUN SERPL-MCNC: 15 MG/DL (ref 8–23)
CALCIUM SERPL-MCNC: 8.9 MG/DL (ref 8.7–10.5)
CHLORIDE SERPL-SCNC: 106 MMOL/L (ref 95–110)
CHOLEST SERPL-MCNC: 114 MG/DL (ref 120–199)
CHOLEST/HDLC SERPL: 2.7 {RATIO} (ref 2–5)
CO2 SERPL-SCNC: 25 MMOL/L (ref 23–29)
CREAT SERPL-MCNC: 0.7 MG/DL (ref 0.5–1.4)
EST. GFR  (NO RACE VARIABLE): >60 ML/MIN/1.73 M^2
ESTIMATED AVG GLUCOSE: 111 MG/DL (ref 68–131)
GLUCOSE SERPL-MCNC: 100 MG/DL (ref 70–110)
HBA1C MFR BLD: 5.5 % (ref 4–5.6)
HDLC SERPL-MCNC: 42 MG/DL (ref 40–75)
HDLC SERPL: 36.8 % (ref 20–50)
LDLC SERPL CALC-MCNC: 58.4 MG/DL (ref 63–159)
NONHDLC SERPL-MCNC: 72 MG/DL
POTASSIUM SERPL-SCNC: 4.6 MMOL/L (ref 3.5–5.1)
PROT SERPL-MCNC: 6.5 G/DL (ref 6–8.4)
SODIUM SERPL-SCNC: 141 MMOL/L (ref 136–145)
TRIGL SERPL-MCNC: 68 MG/DL (ref 30–150)

## 2025-03-12 PROCEDURE — 86765 RUBEOLA ANTIBODY: CPT | Mod: HCNC | Performed by: NURSE PRACTITIONER

## 2025-03-12 PROCEDURE — 80061 LIPID PANEL: CPT | Mod: HCNC | Performed by: NURSE PRACTITIONER

## 2025-03-12 PROCEDURE — 83036 HEMOGLOBIN GLYCOSYLATED A1C: CPT | Mod: HCNC | Performed by: NURSE PRACTITIONER

## 2025-03-12 PROCEDURE — 80053 COMPREHEN METABOLIC PANEL: CPT | Mod: HCNC | Performed by: NURSE PRACTITIONER

## 2025-03-14 LAB
RUBEOLA IGG ANTIBODY: >300 AU/ML
RUBEOLA INTERPRETATION: POSITIVE

## 2025-03-17 ENCOUNTER — RESULTS FOLLOW-UP (OUTPATIENT)
Dept: FAMILY MEDICINE | Facility: CLINIC | Age: 70
End: 2025-03-17

## 2025-03-19 DIAGNOSIS — G25.81 RESTLESS LEG SYNDROME: ICD-10-CM

## 2025-03-19 RX ORDER — ROPINIROLE 1 MG/1
1 TABLET, FILM COATED ORAL 3 TIMES DAILY
Qty: 90 TABLET | Refills: 3 | Status: SHIPPED | OUTPATIENT
Start: 2025-03-19 | End: 2025-07-17

## 2025-03-19 NOTE — TELEPHONE ENCOUNTER
----- Message from Deborah sent at 3/19/2025  3:02 PM CDT -----  Vm- 1:49- pt needs refill on ropinirole [facility] [phone]

## 2025-04-28 ENCOUNTER — PATIENT MESSAGE (OUTPATIENT)
Dept: ADMINISTRATIVE | Facility: HOSPITAL | Age: 70
End: 2025-04-28
Payer: MEDICARE

## 2025-05-29 NOTE — H&P (VIEW-ONLY)
VASCULAR SURGERY CLINIC NOTE         Referring Provider:      No ref. provider found     Chief Complaint:     No chief complaint on file.       HPI:     Yadi Mccall is a 69 y.o. female  with a history of heart bypass.  Had an ultrasound in May of 2023 which demonstrated a 4.7 cm infrarenal abdominal aortic aneurysm.  No previous diagnosis.  Significant family history of aortic aneurysms in the past.  CTA reviewed by me.  The report measures the aneurysm at 3.9 cm but on my measurements is more like a 4.8 cm.  She does have a right iliac occlusion and diffuse left SFA stenotic disease.  Her right hip claudication is tolerable.    Follows up today and states that her claudication is gotten much worse in her right hip and is significantly lifestyle limiting.    Medication List:        Current Outpatient Medications:     albuterol (PROVENTIL HFA;VENTOLIN HFA) 90 mcg/actuation inhaler, Inhale 2 puffs into the lungs every 6 (six) hours as needed, Disp: , Rfl:     alendronate (FOSAMAX) 10 MG tablet, Take 1 tablet by mouth daily, Disp: , Rfl:     atorvastatin (LIPITOR) 40 MG tablet, Take 1 tablet by mouth daily, Disp: 30 tablet, Rfl: 11    baclofen (LIORESAL) 20 MG tablet, Take 1 tablet by mouth 2 (two) times daily, Disp: , Rfl:     furosemide (LASIX) 20 MG tablet, Take 1 tablet by mouth daily, Disp: , Rfl:     rOPINIRole (REQUIP) 1 MG tablet, Take 1 tablet by mouth 3 (three) times daily, Disp: , Rfl:     lisinopriL (PRINIVIL,ZESTRIL) 10 MG tablet, Take 1 tablet by mouth daily, Disp: 30 tablet, Rfl: 11    Past Medical History:     Past Medical History:   Diagnosis Date    AAA (abdominal aortic aneurysm) (CMS/[facility])     Anticoagulant long-term use     CAD (coronary artery disease)     Essential (primary) hypertension     HLD (hyperlipidemia)     Stroke (CMS/HCC)        Past Surgical History:     Past Surgical History:   Procedure Laterality Date    APPENDECTOMY      CORONARY ARTERY BYPASS GRAFT      ENDOSCOPIC  HARVEST OF VEIN      FOOT SURGERY      HERNIA REPAIR      HYSTERECTOMY      IMGHX CT ANGIOGRAPHY CORONARY      LEFT HEART CATHETERIZATION      TONSILLECTOMY         ROS:     Review of Systems   Constitutional: Negative for chills and fever.   Respiratory: Negative for shortness of breath.    Cardiovascular: Negative for chest pain.   Gastrointestinal: Negative for abdominal pain.        Neg for post prandial pain or weight loss   Musculoskeletal: Positive for arthralgias. Negative for back pain.   Right hip claudication.  Neurological: Negative for speech difficulty.        Neg for amaurosis or TIA          Allergies:      Allergies   Allergen Reactions    Chlorthalidone Other (See Comments)    Codeine Other (See Comments)    Penicillins Other (See Comments)    Triamterene-Hydrochlorothiazid Other (See Comments)       Social History:     Social History     Socioeconomic History    Marital status: Single     Spouse name: Not on file    Number of children: Not on file    Years of education: Not on file    Highest education level: Not on file   Occupational History    Not on file   Tobacco Use    Smoking status: Every Day     Types: Cigarettes    Smokeless tobacco: Never   Substance and Sexual Activity    Alcohol use: Not Currently    Drug use: Never    Sexual activity: Not on file   Other Topics Concern    Not on file   Social History Narrative    Not on file     Social Determinants of Health     Financial Resource Strain: Low Risk  (5/16/2024)    Received from [facility] and [facility] Subsidiaries and Affiliates    Overall Financial Resource Strain (CARDIA)     Difficulty of Paying Living Expenses: Not hard at all   Food Insecurity: No Food Insecurity (5/16/2024)    Received from [facility] Subsidiaries and Affiliates    Hunger Vital Sign     Worried About Running Out of Food in the Last Year: Never true     Ran Out of Food in the Last Year: Never true   Transportation  Needs: No Transportation Needs (5/16/2024)    Received from [facility] and Its Subsidiaries and Affiliates    PRAPARE - Transportation     Lack of Transportation (Medical): No     Lack of Transportation (Non-Medical): No   Physical Activity: Inactive (5/16/2024)    Received from [facility] and Its Subsidiaries and Affiliates    Exercise Vital Sign     Days of Exercise per Week: 0 days     Minutes of Exercise per Session: 0 min   Stress: No Stress Concern Present (5/16/2024)    Received from [facility] and Its Subsidiaries and Affiliates    [employer] [place] [employer] - Occupational Stress Questionnaire     Feeling of Stress : Not at all   Social Connections: Unknown (1/12/2023)    [facility]    Social Connection and Isolation Panel [NHANES]     Frequency of Communication with Friends and Family: More than three times a week     Frequency of Social Gatherings with Friends and Family: More than three times a week     Attends Gnosticism Services: Never     Active Member of Clubs or Organizations: No     Attends Club or Organization Meetings: Never     Marital Status: Not on file   Intimate Partner Violence: Not on file   Housing Stability: Unknown (5/16/2024)    [facility]    Housing Stability Vital Sign     Unable to Pay for Housing in the Last Year: No     Number of Times Moved in the Last Year: Not on file     Homeless in the Last Year: No       Family History:     Family History   Problem Relation Age of Onset    Hypertension Mother        Physical Exam:     Vitals:    05/29/25 1535   BP: (!) 154/81   Pulse: 108   SpO2: 93%   Weight: 80.8 kg (178 lb 3.2 oz)      Weight: 80.8 kg (178 lb 3.2 oz)   Body mass index is 27.1 kg/m².    Physical Exam  Musculoskeletal:      Comments: Unable to replicate discomfort with external rotation         Constitutional:       Appearance: Normal appearance.   Neck:      Vascular: No carotid bruit.   Cardiovascular:      Rate and Rhythm: Normal rate and regular rhythm.      Pulses:           Radial pulses are 2+ on the right side and 2+ on the left side.        Femoral pulses are 2+ on the right side and 2+ on the left side.       Posterior tibial pulses are detected w/ Doppler on the right side and detected w/ Doppler on the left side.      Comments: No palpable popliteal masses  Abdominal:      Palpations: Abdomen is soft. There is no mass.      Tenderness: There is no abdominal tenderness.   Neurological:      General: No focal deficit present.      Mental Status: She is alert and oriented to person, place, and time.      Sensory: No sensory deficit.      Motor: No weakness.     Laboratory   LABS    CBC  Lab Results   Component Value Date    WBC 9.19 10/21/2024    HGB 16 10/21/2024    HEMATOCRIT 49.3 (H) 10/21/2024    MCV 95 10/21/2024    MCH 30.9 10/21/2024    MCHC 32.5 10/21/2024    RDW 15 (H) 10/21/2024     10/21/2024    MPV 9.7 10/21/2024    MONOABS 0.8 10/21/2024    BASOPCT 0.8 10/21/2024       BMP/CMP  Lab Results   Component Value Date     03/12/2025    K 4.6 03/12/2025     03/12/2025    CO2 25 03/12/2025     03/12/2025     03/12/2025    BUN 15 03/12/2025    CREATININE 0.7 03/12/2025    CALCIUM 8.9 03/12/2025    PROT 6.5 03/12/2025    ALBUMIN 3.7 03/12/2025    BILIRUBIN 0.5 03/12/2025    AST 17 03/12/2025    ALKPHOS 107 03/12/2025    ALT 20 03/12/2025    GFRAA >60.0 02/12/2022         Problem List:     There are no problems to display for this patient.      Medications and Orders:       There are no diagnoses linked to this encounter.       Assessment and Plan:       Diagnoses and all orders for this visit:    Infrarenal abdominal aortic aneurysm (AAA) without rupture (CMS/HCC)    Claudication in peripheral vascular disease (CMS/HCC)    Occlusion of right iliac artery  (CMS/HCC)        Severe right hip claudication recommend angiogram with recanalization of the right iliac stenosis.          Mazin Sheldon MD  3:48 PM  5/29/2025

## 2025-06-23 ENCOUNTER — HOSPITAL ENCOUNTER (OUTPATIENT)
Dept: RADIOLOGY | Facility: HOSPITAL | Age: 70
Discharge: HOME | End: 2025-06-23
Attending: SURGERY
Payer: MEDICARE

## 2025-06-23 ENCOUNTER — HOSPITAL ENCOUNTER (OUTPATIENT)
Dept: PREADMISSION TESTING | Facility: HOSPITAL | Age: 70
Discharge: HOME | End: 2025-06-23
Attending: SURGERY
Payer: MEDICARE

## 2025-06-23 VITALS
OXYGEN SATURATION: 94 % | HEART RATE: 87 BPM | RESPIRATION RATE: 20 BRPM | SYSTOLIC BLOOD PRESSURE: 141 MMHG | TEMPERATURE: 98 F | WEIGHT: 176 LBS | HEIGHT: 66 IN | DIASTOLIC BLOOD PRESSURE: 84 MMHG | BODY MASS INDEX: 28.28 KG/M2

## 2025-06-23 DIAGNOSIS — Z01.818 PREOP TESTING: Primary | ICD-10-CM

## 2025-06-23 DIAGNOSIS — Z01.818 PREOP TESTING: ICD-10-CM

## 2025-06-23 PROCEDURE — 71046 X-RAY EXAM CHEST 2 VIEWS: CPT | Mod: 26,,, | Performed by: RADIOLOGY

## 2025-06-23 PROCEDURE — 71046 X-RAY EXAM CHEST 2 VIEWS: CPT | Mod: TC

## 2025-06-23 RX ORDER — DIPHENHYDRAMINE HCL 25 MG
25 CAPSULE ORAL NIGHTLY PRN
COMMUNITY

## 2025-06-23 NOTE — DISCHARGE INSTRUCTIONS
Your procedure is scheduled for: Wednesday 6/25/25          [facility] at: 0530    Nothing to eat  after midnight the night before your procedure. You may have clear liquids up to 2 hours before coming in for procedure. This includes water, Gatorade, clear juice  Do not take any medications the morning of your procedure  Bring all your medications with you in the original pill bottles from pharmacy. Please bring your ASPIRIN   If you take blood thinners, ask your doctor if you should stop taking them.  Do not take metformin 24 hours prior to your procedure.  Adjust your insulin or other diabetes medications if needed.   Do your chlorhexidine wash the night before and morning of your procedure.  If you use a CPAP or BiPAP at home, please bring it with you the day of your procedure.  Make arrangements for someone you know to drive you home after your procedure. Taxi and Uber are not acceptable.  Come dressed comfortably          Any questions call the [facility] at [phone]

## 2025-06-23 NOTE — PRE ADMISSION SCREENING
Preadmit assessment complete. Review of patient health history and home medications. Questions answered. Patient/sister  voiced understanding. Chlorhexidine scrub given to patient for preop shower Preop education per AVS . Left lower leg with brace noted, pt requires assist with everyday things

## 2025-06-24 DIAGNOSIS — G25.81 RESTLESS LEG SYNDROME: ICD-10-CM

## 2025-06-24 RX ORDER — ROPINIROLE 1 MG/1
1 TABLET, FILM COATED ORAL 3 TIMES DAILY
Qty: 90 TABLET | Refills: 3 | Status: SHIPPED | OUTPATIENT
Start: 2025-06-24 | End: 2025-10-22

## 2025-06-24 NOTE — TELEPHONE ENCOUNTER
----- Message from  sent at 6/24/2025  8:23 AM CDT -----  Refill for ropinirole. [facility]. Pt #[phone]

## 2025-06-25 ENCOUNTER — HOSPITAL ENCOUNTER (OUTPATIENT)
Facility: HOSPITAL | Age: 70
Discharge: HOME | End: 2025-06-25
Attending: SURGERY | Admitting: SURGERY
Payer: MEDICARE

## 2025-06-25 VITALS
HEART RATE: 81 BPM | RESPIRATION RATE: 18 BRPM | SYSTOLIC BLOOD PRESSURE: 147 MMHG | OXYGEN SATURATION: 96 % | DIASTOLIC BLOOD PRESSURE: 69 MMHG

## 2025-06-25 DIAGNOSIS — I74.5 OCCLUSION OF RIGHT ILIAC ARTERY: Primary | ICD-10-CM

## 2025-06-25 PROCEDURE — C1894 INTRO/SHEATH, NON-LASER: HCPCS | Performed by: SURGERY

## 2025-06-25 PROCEDURE — C1725 CATH, TRANSLUMIN NON-LASER: HCPCS | Performed by: SURGERY

## 2025-06-25 PROCEDURE — 25000003 PHARM REV CODE 250

## 2025-06-25 PROCEDURE — C1887 CATHETER, GUIDING: HCPCS | Performed by: SURGERY

## 2025-06-25 PROCEDURE — 27201423 OPTIME MED/SURG SUP & DEVICES STERILE SUPPLY: Performed by: SURGERY

## 2025-06-25 PROCEDURE — 75625 CONTRAST EXAM ABDOMINL AORTA: CPT | Performed by: SURGERY

## 2025-06-25 PROCEDURE — C1876 STENT, NON-COA/NON-COV W/DEL: HCPCS | Performed by: SURGERY

## 2025-06-25 PROCEDURE — 63600175 PHARM REV CODE 636 W HCPCS: Performed by: SURGERY

## 2025-06-25 PROCEDURE — 99153 MOD SED SAME PHYS/QHP EA: CPT | Performed by: SURGERY

## 2025-06-25 PROCEDURE — 75716 ARTERY X-RAYS ARMS/LEGS: CPT | Mod: XU | Performed by: SURGERY

## 2025-06-25 PROCEDURE — C1769 GUIDE WIRE: HCPCS | Performed by: SURGERY

## 2025-06-25 PROCEDURE — 25000003 PHARM REV CODE 250: Performed by: SURGERY

## 2025-06-25 PROCEDURE — 37221 HC ILIAC REVASC W/STENT: CPT | Mod: LT | Performed by: SURGERY

## 2025-06-25 PROCEDURE — C1760 CLOSURE DEV, VASC: HCPCS | Performed by: SURGERY

## 2025-06-25 PROCEDURE — 36200 PLACE CATHETER IN AORTA: CPT | Mod: XU | Performed by: SURGERY

## 2025-06-25 PROCEDURE — 99152 MOD SED SAME PHYS/QHP 5/>YRS: CPT | Performed by: SURGERY

## 2025-06-25 DEVICE — PXB35-08-27-080 STENT VISI PRO 035 V01
Type: IMPLANTABLE DEVICE | Site: AORTA | Status: FUNCTIONAL
Brand: VISI-PRO™

## 2025-06-25 RX ORDER — CLOPIDOGREL BISULFATE 75 MG/1
75 TABLET ORAL DAILY
Qty: 30 TABLET | Refills: 3 | Status: SHIPPED | OUTPATIENT
Start: 2025-06-25

## 2025-06-25 RX ORDER — ACETAMINOPHEN 325 MG/1
650 TABLET ORAL EVERY 4 HOURS PRN
Status: DISCONTINUED | OUTPATIENT
Start: 2025-06-25 | End: 2025-06-25 | Stop reason: HOSPADM

## 2025-06-25 RX ORDER — SODIUM CHLORIDE 9 MG/ML
INJECTION, SOLUTION INTRAVENOUS CONTINUOUS
Status: DISCONTINUED | OUTPATIENT
Start: 2025-06-25 | End: 2025-06-25 | Stop reason: HOSPADM

## 2025-06-25 RX ORDER — SODIUM CHLORIDE 9 MG/ML
INJECTION, SOLUTION INTRAVENOUS ONCE
Status: COMPLETED | OUTPATIENT
Start: 2025-06-25 | End: 2025-06-25

## 2025-06-25 RX ORDER — LIDOCAINE HYDROCHLORIDE 10 MG/ML
INJECTION, SOLUTION EPIDURAL; INFILTRATION; INTRACAUDAL; PERINEURAL
Status: DISCONTINUED | OUTPATIENT
Start: 2025-06-25 | End: 2025-06-25 | Stop reason: HOSPADM

## 2025-06-25 RX ORDER — MIDAZOLAM HYDROCHLORIDE 1 MG/ML
INJECTION INTRAMUSCULAR; INTRAVENOUS
Status: DISCONTINUED | OUTPATIENT
Start: 2025-06-25 | End: 2025-06-25 | Stop reason: HOSPADM

## 2025-06-25 RX ORDER — ACETAMINOPHEN 325 MG/1
TABLET ORAL
Status: DISCONTINUED
Start: 2025-06-25 | End: 2025-06-25 | Stop reason: HOSPADM

## 2025-06-25 RX ORDER — CLOPIDOGREL BISULFATE 75 MG/1
75 TABLET ORAL DAILY
Status: DISCONTINUED | OUTPATIENT
Start: 2025-06-25 | End: 2025-06-25 | Stop reason: HOSPADM

## 2025-06-25 RX ORDER — FENTANYL CITRATE 50 UG/ML
INJECTION, SOLUTION INTRAMUSCULAR; INTRAVENOUS
Status: DISCONTINUED | OUTPATIENT
Start: 2025-06-25 | End: 2025-06-25 | Stop reason: HOSPADM

## 2025-06-25 RX ORDER — CLOPIDOGREL BISULFATE 75 MG/1
TABLET ORAL
Status: COMPLETED
Start: 2025-06-25 | End: 2025-06-25

## 2025-06-25 RX ORDER — ONDANSETRON 4 MG/1
8 TABLET, ORALLY DISINTEGRATING ORAL EVERY 8 HOURS PRN
Status: DISCONTINUED | OUTPATIENT
Start: 2025-06-25 | End: 2025-06-25 | Stop reason: HOSPADM

## 2025-06-25 RX ADMIN — CLOPIDOGREL BISULFATE 75 MG: 75 TABLET ORAL at 12:06

## 2025-06-25 RX ADMIN — ACETAMINOPHEN 650 MG: 325 TABLET ORAL at 12:06

## 2025-06-25 RX ADMIN — SODIUM CHLORIDE: 9 INJECTION, SOLUTION INTRAVENOUS at 07:06

## 2025-06-25 RX ADMIN — CLOPIDOGREL 75 MG: 75 TABLET ORAL at 12:06

## 2025-06-25 NOTE — DISCHARGE SUMMARY
[facility]  Discharge Note  Short Stay    Procedure(s) (LRB):  AORTOGRAM, WITH EXTREMITY RUNOFF (N/A)      OUTCOME: Patient tolerated treatment/procedure well without complication and is now ready for discharge.    DISPOSITION: Home or Self Care    FINAL DIAGNOSIS:  Occlusion of right iliac artery    FOLLOWUP: In clinic    DISCHARGE INSTRUCTIONS:    Discharge Procedure Orders   Diet general     Lifting restrictions     Remove dressing in 48 hours        TIME SPENT ON DISCHARGE: 5 minutes

## 2025-06-25 NOTE — Clinical Note
Manual pressure was applied to the right femoral artery and left femoral artery sheath insertion site.

## 2025-06-25 NOTE — OP NOTE
[facility] hospital  Surgery Department  Operative Note    SUMMARY     Date of Procedure: 6/25/2025     Procedure: Procedure(s) (LRB):  AORTOGRAM, WITH EXTREMITY RUNOFF (N/A)  Stent, Iliac Artery     Surgeons and Role:     * Mazin Sheldon MD - Primary    Assisting Surgeon: None    Pre-Operative Diagnosis: Occlusion of right iliac artery [I74.5]    Post-Operative Diagnosis: Post-Op Diagnosis Codes:     * Occlusion of right iliac artery [I74.5]    Anesthesia: RN IV Sedation    Operative Findings (including complications, if any):  Infrarenal abdominal aortic aneurysm.  Right common iliac occlusion.  Left common iliac 80% stenosis.    Description of Technical Procedures:  Abdominal aortogram.  Bilateral lower extremity angiogram.  Bilateral femoral access with a attempted crossing of the right iliac occlusion.  Left common iliac stent angioplasty with 8 x 27 balloon expandable stent post dilated to 9 mm    From right femoral artery accessed with ultrasound guidance and a 6 [place] sheath placed angiogram demonstrated patent common femoral profunda and SFA origin.  The external iliac is patent.  The internal iliac is patent and just above the bifurcation the common iliac is occluded we attempted to cross the occlusion but could not get back into a true lumen proximally.  We then accessed the left femoral artery and a 6 [place] sheath was placed guidewire was advanced up into the abdominal aorta abdominal aortogram was performed with the Omni flush catheter.  We then pulled the catheter down to the bifurcation and angiogram of the iliac bifurcation vessels was performed.  The renal arteries are both patent there is a large accessory renal artery to the right there is a normal infrarenal neck and then the aorta becomes aneurysmal and tapers down the bifurcation of the common iliac is occluded and the left common iliac has a stenosis both internal and external iliac arteries are atherosclerotic but patent the left  common femoral profunda and SFA origins are patent.  We then treated the 80% origin stenosis of the left common iliac with a 8 mm balloon expandable stent and then post dilated with a 9 mm balloon.  Repeat angiogram showed excellent flow through the stent with rapid cross over filling of the right external iliac from the left internal iliac collaterals.  Bilateral femoral sheaths were pulled and pressure was held.    Significant Surgical Tasks Conducted by the Assistant(s), if Applicable:     Estimated Blood Loss (EBL): * No values recorded between 6/25/2025 12:00 AM and 6/25/2025  1:24 PM *           Implants:   Implant Name Type Inv. Item Serial No.  Lot No. LRB No. Used Action   STENT VISI-PRO [id number] - [id number] stent peripheral BMS- Balloon Exp STENT VISI-PRO [id number]  [id number] Left 1 Implanted       Specimens:   Specimen (24h ago, onward)      None           * No specimens in log *           Condition: Good    Disposition: PACU - hemodynamically stable.    Attestation: Op Note Attestation: I was physically present and scrubbed for the entire procedure.

## 2025-06-25 NOTE — Clinical Note
An angiography was performed of the proximal and distal suprarenal abdominal aorta  via hand injection with

## 2025-06-26 LAB
OHS QRS DURATION: 98 MS
OHS QTC CALCULATION: 478 MS

## 2025-07-01 ENCOUNTER — PATIENT MESSAGE (OUTPATIENT)
Dept: ADMINISTRATIVE | Facility: HOSPITAL | Age: 70
End: 2025-07-01
Payer: MEDICARE

## 2025-07-08 ENCOUNTER — OFFICE VISIT (OUTPATIENT)
Dept: FAMILY MEDICINE | Facility: CLINIC | Age: 70
End: 2025-07-08
Payer: MEDICARE

## 2025-07-08 VITALS
HEIGHT: 66 IN | RESPIRATION RATE: 20 BRPM | HEART RATE: 100 BPM | WEIGHT: 178.38 LBS | SYSTOLIC BLOOD PRESSURE: 104 MMHG | DIASTOLIC BLOOD PRESSURE: 60 MMHG | BODY MASS INDEX: 28.67 KG/M2 | TEMPERATURE: 98 F

## 2025-07-08 DIAGNOSIS — I10 ESSENTIAL HYPERTENSION: ICD-10-CM

## 2025-07-08 DIAGNOSIS — I48.91 ATRIAL FIBRILLATION/FLUTTER: ICD-10-CM

## 2025-07-08 DIAGNOSIS — I69.354 HEMIPARESIS AFFECTING LEFT SIDE AS LATE EFFECT OF CEREBROVASCULAR ACCIDENT: ICD-10-CM

## 2025-07-08 DIAGNOSIS — E78.2 MIXED HYPERLIPIDEMIA: ICD-10-CM

## 2025-07-08 DIAGNOSIS — I48.92 ATRIAL FIBRILLATION/FLUTTER: ICD-10-CM

## 2025-07-08 DIAGNOSIS — J42 CHRONIC BRONCHITIS, UNSPECIFIED CHRONIC BRONCHITIS TYPE: ICD-10-CM

## 2025-07-08 PROCEDURE — 3008F BODY MASS INDEX DOCD: CPT | Mod: CPTII,S$GLB,, | Performed by: NURSE PRACTITIONER

## 2025-07-08 PROCEDURE — 99999 PR PBB SHADOW E&M-EST. PATIENT-LVL IV: CPT | Mod: PBBFAC,,, | Performed by: NURSE PRACTITIONER

## 2025-07-08 PROCEDURE — 3078F DIAST BP <80 MM HG: CPT | Mod: CPTII,S$GLB,, | Performed by: NURSE PRACTITIONER

## 2025-07-08 PROCEDURE — 3288F FALL RISK ASSESSMENT DOCD: CPT | Mod: CPTII,S$GLB,, | Performed by: NURSE PRACTITIONER

## 2025-07-08 PROCEDURE — 99213 OFFICE O/P EST LOW 20 MIN: CPT | Mod: S$GLB,,, | Performed by: NURSE PRACTITIONER

## 2025-07-08 PROCEDURE — 1125F AMNT PAIN NOTED PAIN PRSNT: CPT | Mod: CPTII,S$GLB,, | Performed by: NURSE PRACTITIONER

## 2025-07-08 PROCEDURE — 1101F PT FALLS ASSESS-DOCD LE1/YR: CPT | Mod: CPTII,S$GLB,, | Performed by: NURSE PRACTITIONER

## 2025-07-08 PROCEDURE — 4010F ACE/ARB THERAPY RXD/TAKEN: CPT | Mod: CPTII,S$GLB,, | Performed by: NURSE PRACTITIONER

## 2025-07-08 PROCEDURE — 3044F HG A1C LEVEL LT 7.0%: CPT | Mod: CPTII,S$GLB,, | Performed by: NURSE PRACTITIONER

## 2025-07-08 PROCEDURE — 1159F MED LIST DOCD IN RCRD: CPT | Mod: CPTII,S$GLB,, | Performed by: NURSE PRACTITIONER

## 2025-07-08 PROCEDURE — 1170F FXNL STATUS ASSESSED: CPT | Mod: CPTII,S$GLB,, | Performed by: NURSE PRACTITIONER

## 2025-07-08 PROCEDURE — 3074F SYST BP LT 130 MM HG: CPT | Mod: CPTII,S$GLB,, | Performed by: NURSE PRACTITIONER

## 2025-07-08 RX ORDER — NAPROXEN SODIUM 220 MG/1
81 TABLET, FILM COATED ORAL DAILY
Qty: 360 TABLET | Refills: 0 | Status: SHIPPED | OUTPATIENT
Start: 2025-07-08 | End: 2026-07-08

## 2025-07-08 RX ORDER — ATORVASTATIN CALCIUM 40 MG/1
40 TABLET, FILM COATED ORAL DAILY
Qty: 90 TABLET | Refills: 3 | Status: SHIPPED | OUTPATIENT
Start: 2025-07-08 | End: 2026-07-08

## 2025-07-08 RX ORDER — IPRATROPIUM BROMIDE AND ALBUTEROL SULFATE 2.5; .5 MG/3ML; MG/3ML
3 SOLUTION RESPIRATORY (INHALATION) EVERY 6 HOURS PRN
Qty: 120 EACH | Refills: 11 | Status: SHIPPED | OUTPATIENT
Start: 2025-07-08 | End: 2026-07-08

## 2025-07-08 RX ORDER — LISINOPRIL 10 MG/1
10 TABLET ORAL DAILY
Qty: 90 TABLET | Refills: 3 | Status: SHIPPED | OUTPATIENT
Start: 2025-07-08

## 2025-07-08 NOTE — PROGRESS NOTES
Patient ID: Yadi Mccall is a 69 y.o. female.    Chief Complaint: Follow-up (68 yo female here for 4 month recheck. Pt states no c/o. KM)    History of Present Illness    CHIEF COMPLAINT:  Ms. Mccall presents for a follow-up visit after a recent cardiac procedure and to discuss ongoing medical management.    HPI:  Ms. Mccall underwent cardiac surgery approximately 2 weeks ago, including an aortogram and cardiac catheterization performed by Dr. Sheldon on 6/25. 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 Ms. Mccall was conscious during the entire procedure and was required to remain immobile for 6 hours post-surgery, which she found to be the most challenging aspect.    Ms. Mccall has an upcoming follow-up visit with Dr. Sheldon on Thursday the 10th to discuss the findings, including the occluded artery, and potential plans for aneurysm surgery and revascularization.    Ms. Mccall continues to use a walker and denies any recent falls. Her restless leg syndrome medication is effective when taken.    Ms. Mccall also mentions a small, painful lump on her finger that is tender to palpation. This lump is immobile and patient denies any history of gout.    Ms. Mccall denies any pain at the incision sites.    MEDICATIONS:  Ms. Mccall is on daily baby aspirin for atrial flutter and Lisinopril for blood pressure. She recently refilled her Plavix prescription. She takes restless leg medication as needed, which helps when taken. Albuterol dual nebs are used as needed. Ms. Mccall is also on cholesterol medication and daily Naproxen. She resumed Lisinopril after a change in prescription that did not work.    MEDICAL HISTORY:  Ms. Mccall has a history of atrial flutter, hypertension, and restless leg syndrome. Ms. Mccall received the shingles vaccine at [facility].    SURGICAL HISTORY:  On 6/25, the patient underwent an aortogram and cardiac catheterization performed by Dr. Aguayo. It was a one-day  surgery with incisions on both sides. She was awake during the procedure and had to remain still for 6 hours after. One artery was found to be occluded.    TEST RESULTS:  Ms. Mccall's A1C was 5.5 three months ago. Her last cholesterol test was good, and her last glucose test was 99. BNP was included in her last comprehensive metabolic panel. Ms. Mccall underwent an aortogram and cardiac catheterization on 6/25.      ROS:  General: -fever, -chills, -fatigue, -weight gain, -weight loss  Eyes: -vision changes, -redness, -discharge  ENT: -ear pain, -nasal congestion, -sore throat  Cardiovascular: -chest pain, -palpitations, -lower extremity edema  Respiratory: -cough, -shortness of breath  Gastrointestinal: -abdominal pain, -nausea, -vomiting, -diarrhea, +constipation, -blood in stool  Genitourinary: -dysuria, -hematuria, -frequency  Musculoskeletal: -joint pain, -muscle pain, +limb pain  Skin: -rash, -lesion  Neurological: -headache, -dizziness, -numbness, -tingling, +restless legs  Psychiatric: -anxiety, -depression, -sleep difficulty             Past Medical History:   Diagnosis Date    A-fib     AAA (abdominal aortic aneurysm)     Anticoagulant long-term use     COPD (chronic obstructive pulmonary disease)     Coronary artery disease     Foot drop, left foot     Hyperlipidemia     Hypertension     Restless leg syndrome     Stroke 2014    LEFT SIDED WEAKNESS     Past Surgical History:   Procedure Laterality Date    AORTOGRAPHY WITH EXTREMITY RUNOFF N/A 6/25/2025    Procedure: AORTOGRAM, WITH EXTREMITY RUNOFF;  Surgeon: Mazin Sheldon MD;  Location: [facility] CATH/EP LAB;  Service: General;  Laterality: N/A;    APPENDECTOMY      CATARACT EXTRACTION Bilateral 05/2024    CORONARY ANGIOGRAPHY N/A 07/24/2020    Procedure: ANGIOGRAM, CORONARY ARTERY;  Surgeon: Norbert Vallecillo MD;  Location: [facility] CATH;  Service: Cardiology;  Laterality: N/A;    CORONARY ARTERY BYPASS GRAFT (CABG) N/A 08/21/2020    Procedure: CORONARY  "ARTERY BYPASS GRAFT (CABG) BILATERAL MAMMARY  x  4 VESSELS;  Surgeon: Pascual Gray MD;  Location: [facility] OR;  Service: Cardiovascular;  Laterality: N/A;    DDD      disc disease      ENDOSCOPIC HARVEST OF VEIN N/A 08/21/2020    Procedure: HARVEST-VEIN-ENDOVASCULAR;  Surgeon: Pascual Gray MD;  Location: [facility] OR;  Service: Cardiovascular;  Laterality: N/A;    FOOT SURGERY      HERNIA REPAIR      HYSTERECTOMY      LEFT HEART CATHETERIZATION N/A 07/24/2020    Procedure: Left heart cath;  Surgeon: Norbert Vallecillo MD;  Location: [facility] CATH;  Service: Cardiology;  Laterality: N/A;    STENT, ILIAC ARTERY  6/25/2025    Procedure: Stent, Iliac Artery;  Surgeon: Mazin Sheldon MD;  Location: [facility] CATH/EP LAB;  Service: General;;    TONSILLECTOMY           Tobacco History:  reports that she has been smoking cigarettes. She started smoking about 55 years ago. She has a 83.4 pack-year smoking history. She has been exposed to tobacco smoke. She has never used smokeless tobacco.      Review of patient's allergies indicates:   Allergen Reactions    Chlorthalidone     Codeine Hallucinations    Darvocet a500 [propoxyphene n-acetaminophen] Itching    Penicillins Itching    Dyazide [triamterene-hydrochlorothiazid] Palpitations     Current Medications[1]           Objective:      Vitals:    07/08/25 1313   BP: 104/60   Pulse: 100   Resp: 20   Temp: 98 °F (36.7 °C)   TempSrc: Oral   Weight: 80.9 kg (178 lb 5.6 oz)   Height: 5' 6" (1.676 m)     Physical Exam  Constitutional:       Appearance: Normal appearance.   Neurological:      Mental Status: She is alert.           Assessment:       1. Atrial fibrillation/flutter    2. Hemiparesis affecting left side as late effect of cerebrovascular accident    3. Essential hypertension    4. Mixed hyperlipidemia    5. Chronic bronchitis, unspecified chronic bronchitis type           Plan:       Assessment & Plan    I48.92 Unspecified atrial flutter  I10 Essential (primary) hypertension  I77.1 " Stricture of artery  G25.81 Restless legs syndrome  M67.449 Ganglion, unspecified hand  Z95.5 Presence of coronary angioplasty implant and graft  Z99.89 Dependence on other enabling machines and devices    ATRIAL FLUTTER:  - Continued and refilled baby aspirin for atrial flutter management.    HYPERTENSION:  - Monitored blood pressure today at 140/104 over 60.  - Ms. Mars hypertension is controlled with lisinopril, which was continued and refilled.  - Discussed importance of medication adherence due to cardiac history and warned about risks of rebound hypertension if antihypertensive medication is discontinued.    ARTERY STRICTURE:  - Discussed clogged artery and planned for upcoming surgery to clear it.    RESTLESS LEGS SYNDROME:  - Noted the patient reports medicine helps with restless legs when taken.  - Continued and refilled medication for restless legs syndrome.    GANGLION CYST:  - Assessed small growth on finger, determining it to be likely a ganglion cyst based on consultation with colleagues.  - Ms. Mccall reports pain when the cyst is tapped.  - Evaluated and identified it as a bone cyst, not urgent for removal.  - Will monitor the cyst for changes in size or symptoms before pursuing further diagnostic measures.  - Discussed potential for x-ray if it worsens.  - Instructed patient to follow up if finger lesion gets bigger or more painful.    CORONARY ANGIOPLASTY FOLLOW-UP:  - Reviewed recent cardiac procedure with Dr. Sheldon, including aortogram and cardiac catheterization on 6/25.  - Assessed incision sites, noting good healing without drainage or pain.    DEPENDENCE ON ENABLING DEVICES:  - Ms. Mccall is still using walker, with no falls reported.    OTHER MEDICAL MANAGEMENT:  - Continued albuterol inhaler, Fern meds and Proriva.  - Evaluated A1C, cholesterol, and glucose levels, finding them within normal limits.  - Explained importance of continuing cholesterol medication despite good lab results.  -  Provided information on fiber supplements for regularity, recommending Fibercon as an option.    FOLLOW-UP:  - Instructed patient to follow up with Dr. Sheldon on July 10th regarding recent cardiac procedure and potential additional surgery for clogged artery.         Atrial fibrillation/flutter  -     aspirin 81 MG Chew; Take 1 tablet (81 mg total) by mouth once daily.  Dispense: 360 tablet; Refill: 0  -Stable. Keep all scheduled follow up    Hemiparesis affecting left side as late effect of cerebrovascular accident  -     aspirin 81 MG Chew; Take 1 tablet (81 mg total) by mouth once daily.  Dispense: 360 tablet; Refill: 0  -Stable keep all scheduled follow up    Essential hypertension  -     lisinopriL 10 MG tablet; Take 1 tablet (10 mg total) by mouth once daily.  Dispense: 90 tablet; Refill: 3    Mixed hyperlipidemia  -     atorvastatin (LIPITOR) 40 MG tablet; Take 1 tablet (40 mg total) by mouth once daily.  Dispense: 90 tablet; Refill: 3    Chronic bronchitis, unspecified chronic bronchitis type  -     albuterol-ipratropium (DUO-NEB) 2.5 mg-0.5 mg/3 mL nebulizer solution; Take 3 mLs by nebulization every 6 (six) hours as needed for Wheezing. Rescue  Dispense: 120 each; Refill: 11      Follow up in about 6 months (around 1/8/2026), or if symptoms worsen or fail to improve.        7/8/2025 Keshia Chan NP    This note was generated with the assistance of ambient listening technology. Verbal consent was obtained by the patient and accompanying visitor(s) for the recording of patient appointment to facilitate this note. I attest to having reviewed and edited the generated note for accuracy, though some syntax or spelling errors may persist. Please contact the author of this note for any clarification.             [1]   Current Outpatient Medications:     albuterol (PROVENTIL/VENTOLIN HFA) 90 mcg/actuation inhaler, Inhale 2 puffs into the lungs every 6 (six) hours as needed for Wheezing., Disp: 80 g, Rfl:  1    baclofen (LIORESAL) 20 MG tablet, Take 20 mg by mouth 2 (two) times daily as needed., Disp: , Rfl:     clopidogreL (PLAVIX) 75 mg tablet, Take 1 tablet (75 mg total) by mouth once daily., Disp: 30 tablet, Rfl: 3    diphenhydrAMINE (BENADRYL) 25 mg capsule, Take 25 mg by mouth nightly as needed for Itching., Disp: , Rfl:     naproxen sodium (ALEVE ORAL), Take 1 tablet by mouth as needed., Disp: , Rfl:     oxymetazoline HCl (AFRIN, OXYMETAZOLINE, NASL), 1 spray by Nasal route as needed (stuffy nose)., Disp: , Rfl:     rOPINIRole (REQUIP) 1 MG tablet, Take 1 tablet (1 mg total) by mouth 3 (three) times daily., Disp: 90 tablet, Rfl: 3    vit A/vit C/vit E/zinc/copper (PRESERVISION AREDS ORAL), Take 1 tablet by mouth once daily., Disp: , Rfl:     walker (ULTRA-LIGHT ROLLATOR) Misc, 1 each by Misc.(Non-Drug; Combo Route) route continuous., Disp: 1 each, Rfl: 0    albuterol-ipratropium (DUO-NEB) 2.5 mg-0.5 mg/3 mL nebulizer solution, Take 3 mLs by nebulization every 6 (six) hours as needed for Wheezing. Rescue, Disp: 120 each, Rfl: 11    aspirin 81 MG Chew, Take 1 tablet (81 mg total) by mouth once daily., Disp: 360 tablet, Rfl: 0    atorvastatin (LIPITOR) 40 MG tablet, Take 1 tablet (40 mg total) by mouth once daily., Disp: 90 tablet, Rfl: 3    lisinopriL 10 MG tablet, Take 1 tablet (10 mg total) by mouth once daily., Disp: 90 tablet, Rfl: 3

## 2025-08-07 ENCOUNTER — HOSPITAL ENCOUNTER (OUTPATIENT)
Dept: PREADMISSION TESTING | Facility: HOSPITAL | Age: 70
Discharge: HOME | End: 2025-08-07
Attending: SURGERY
Payer: MEDICARE

## 2025-08-07 VITALS
HEART RATE: 87 BPM | DIASTOLIC BLOOD PRESSURE: 73 MMHG | HEIGHT: 68 IN | TEMPERATURE: 98 F | RESPIRATION RATE: 16 BRPM | WEIGHT: 179.81 LBS | BODY MASS INDEX: 27.25 KG/M2 | OXYGEN SATURATION: 94 % | SYSTOLIC BLOOD PRESSURE: 117 MMHG

## 2025-08-07 DIAGNOSIS — Z79.4 TYPE 2 DIABETES MELLITUS WITH HYPERGLYCEMIA, WITH LONG-TERM CURRENT USE OF INSULIN: ICD-10-CM

## 2025-08-07 DIAGNOSIS — Z01.818 PREOP TESTING: Primary | ICD-10-CM

## 2025-08-07 DIAGNOSIS — E11.65 TYPE 2 DIABETES MELLITUS WITH HYPERGLYCEMIA, WITH LONG-TERM CURRENT USE OF INSULIN: ICD-10-CM

## 2025-08-07 DIAGNOSIS — E11.29 TYPE 2 DIABETES MELLITUS WITH OTHER DIABETIC KIDNEY COMPLICATION: ICD-10-CM

## 2025-08-07 LAB
ANION GAP (SMH): 6 MMOL/L (ref 8–16)
APTT PPP: 26.2 SECONDS (ref 21–32)
BUN SERPL-MCNC: 12 MG/DL (ref 8–23)
CALCIUM SERPL-MCNC: 9.7 MG/DL (ref 8.7–10.5)
CHLORIDE SERPL-SCNC: 103 MMOL/L (ref 95–110)
CO2 SERPL-SCNC: 32 MMOL/L (ref 23–29)
CREAT SERPL-MCNC: 0.7 MG/DL (ref 0.5–1.4)
ERYTHROCYTE [DISTWIDTH] IN BLOOD BY AUTOMATED COUNT: 14.2 % (ref 11.5–14.5)
GFR SERPLBLD CREATININE-BSD FMLA CKD-EPI: >60 ML/MIN/1.73/M2
GLUCOSE SERPL-MCNC: 107 MG/DL (ref 70–110)
HCT VFR BLD AUTO: 49.4 % (ref 37–48.5)
HGB BLD-MCNC: 16.8 GM/DL (ref 12–16)
INDIRECT COOMBS: NORMAL
INR PPP: 0.9 (ref 0.8–1.2)
MCH RBC QN AUTO: 31.8 PG (ref 27–31)
MCHC RBC AUTO-ENTMCNC: 34 G/DL (ref 32–36)
MCV RBC AUTO: 93 FL (ref 82–98)
PLATELET # BLD AUTO: 242 K/UL (ref 150–450)
PMV BLD AUTO: 8 FL (ref 9.2–12.9)
POTASSIUM SERPL-SCNC: 4.8 MMOL/L (ref 3.5–5.1)
PROTHROMBIN TIME: 10.5 SECONDS (ref 9–12.5)
RBC # BLD AUTO: 5.29 M/UL (ref 4–5.4)
RH BLD: NORMAL
SODIUM SERPL-SCNC: 141 MMOL/L (ref 136–145)
WBC # BLD AUTO: 10.03 K/UL (ref 3.9–12.7)

## 2025-08-07 PROCEDURE — 82310 ASSAY OF CALCIUM: CPT

## 2025-08-07 PROCEDURE — 36415 COLL VENOUS BLD VENIPUNCTURE: CPT

## 2025-08-07 PROCEDURE — 85610 PROTHROMBIN TIME: CPT

## 2025-08-07 PROCEDURE — 86901 BLOOD TYPING SEROLOGIC RH(D): CPT | Performed by: SURGERY

## 2025-08-07 PROCEDURE — 85027 COMPLETE CBC AUTOMATED: CPT

## 2025-08-07 PROCEDURE — 85730 THROMBOPLASTIN TIME PARTIAL: CPT

## 2025-08-07 RX ORDER — IBUPROFEN 800 MG/1
800 TABLET, FILM COATED ORAL EVERY 6 HOURS PRN
COMMUNITY

## 2025-08-07 RX ORDER — PNV NO.95/FERROUS FUM/FOLIC AC 28MG-0.8MG
100 TABLET ORAL DAILY
COMMUNITY

## 2025-08-07 RX ORDER — CEFAZOLIN 2 G/1
2 INJECTION, POWDER, FOR SOLUTION INTRAMUSCULAR; INTRAVENOUS ONCE
OUTPATIENT
Start: 2025-08-13

## 2025-08-07 NOTE — DISCHARGE INSTRUCTIONS
INSTRUCTIONS  To confirm your doctor has scheduled your surgery for:  Wednesday, August 13, 2025    Morning of surgery please check in [facility].     Preop nurses will call the afternoon prior to surgery between 4:00 and 6:00 PM with your final arrival time.  PLEASE NOTE:  The surgery schedule has many variables which may affect the time of your surgery case. Family members should be available if your surgery time changes. Plan to be here the day of your procedure between 4-6 hours.    TAKE ONLY THESE MEDICATIONS WITH A SMALL SIP OF WATER THE MORNING OF SURGERY: see list    DO NOT TAKE THESE MEDICATIONS 5-7 DAYS PRIOR to your procedure per your surgeon's request: ASPIRIN, ALEVE, BC powder, ALEKS SELTZER, IBUPROFEN, FISH OIL, VITAMIN E, OR HERBALS   (May take Tylenol)    If you are prescribed any types of blood thinners (Aspirin, Coumadin, Plavix, Pradaxa, Xarelto, Aggrenox, Effient, Eliquis, Savasya, Brilinta or any other), please ask your surgeon how many days before scheduled procedure should you stop taking them. You may also need to verify with prescribing physician if it is OK to stop your blood thinners.      INSTRUCTIONS IMPORTANT!!  Do not eat anything after midnight. OK to drink clear liquids until 2 hours before arrival time.  ONLY if you are diabetic, check your sugar in the morning before your procedure.  Please hold GLP-1 medications one week prior to surgery (Ozempic, Mounjaro, Wegovy, Zepbound)  Do not smoke, vape or drink alcoholic beverages 24 hours prior to your procedure.  Shower the night before AND the morning of your procedure with a Chlorhexidine wash such as hibiclens or Dial antibacterial soap from neck down. You may use your own shampoo and face wash. This helps your skin to be as bacteria free as possible.  If you wear contact lenses, dentures, hearing aids or glasses, bring a container to put them in and give to a family member.    Please leave all  jewelry, piercings and valuables at home.  OK to shave hair from surgical site with ELECTRIC RAZOR ONLY.  If your condition changes such as fever, cough, etc, please notify your surgeon.   ONLY for patients requiring bowel prep, written instructions will be given by your doctor's office.  Make arrangements in advance for transportation home by a responsible adult.  You must make arrangements for transportation, TAXI'S, UBER'S OR LYFTS ARE NOT ALLOWED.        If you have any questions about these instructions, call Pre-Op Admit  Nursing at [phone] or the Pre-Op Day Surgery Unit at [phone].

## 2025-08-12 ENCOUNTER — ANESTHESIA EVENT (OUTPATIENT)
Dept: SURGERY | Facility: HOSPITAL | Age: 70
End: 2025-08-12
Payer: MEDICARE

## 2025-08-13 ENCOUNTER — ANESTHESIA (OUTPATIENT)
Dept: SURGERY | Facility: HOSPITAL | Age: 70
End: 2025-08-13
Payer: MEDICARE

## 2025-08-13 PROBLEM — I71.43 ANEURYSM OF INFRARENAL ABDOMINAL AORTA: Status: ACTIVE | Noted: 2025-08-13

## 2025-08-13 PROCEDURE — C9248 INJ, CLEVIDIPINE BUTYRATE: HCPCS | Mod: JZ,TB

## 2025-08-13 PROCEDURE — 25000003 PHARM REV CODE 250

## 2025-08-13 PROCEDURE — 63600175 PHARM REV CODE 636 W HCPCS

## 2025-08-13 PROCEDURE — 63600175 PHARM REV CODE 636 W HCPCS: Performed by: SURGERY

## 2025-08-13 RX ORDER — PROTAMINE SULFATE 10 MG/ML
INJECTION, SOLUTION INTRAVENOUS
Status: DISCONTINUED | OUTPATIENT
Start: 2025-08-13 | End: 2025-08-13

## 2025-08-13 RX ORDER — ONDANSETRON HYDROCHLORIDE 2 MG/ML
INJECTION, SOLUTION INTRAVENOUS
Status: DISCONTINUED | OUTPATIENT
Start: 2025-08-13 | End: 2025-08-13

## 2025-08-13 RX ORDER — MIDAZOLAM HYDROCHLORIDE 1 MG/ML
INJECTION INTRAMUSCULAR; INTRAVENOUS
Status: DISCONTINUED | OUTPATIENT
Start: 2025-08-13 | End: 2025-08-13

## 2025-08-13 RX ORDER — FAMOTIDINE 10 MG/ML
INJECTION, SOLUTION INTRAVENOUS
Status: DISCONTINUED | OUTPATIENT
Start: 2025-08-13 | End: 2025-08-13

## 2025-08-13 RX ORDER — HEPARIN SODIUM 10000 [USP'U]/ML
INJECTION, SOLUTION INTRAVENOUS; SUBCUTANEOUS
Status: DISCONTINUED | OUTPATIENT
Start: 2025-08-13 | End: 2025-08-13

## 2025-08-13 RX ORDER — DEXMEDETOMIDINE HYDROCHLORIDE 100 UG/ML
INJECTION, SOLUTION INTRAVENOUS
Status: DISCONTINUED | OUTPATIENT
Start: 2025-08-13 | End: 2025-08-13

## 2025-08-13 RX ORDER — LIDOCAINE HYDROCHLORIDE 40 MG/ML
SOLUTION TOPICAL
Status: DISCONTINUED | OUTPATIENT
Start: 2025-08-13 | End: 2025-08-13

## 2025-08-13 RX ORDER — DEXAMETHASONE SODIUM PHOSPHATE 4 MG/ML
INJECTION, SOLUTION INTRA-ARTICULAR; INTRALESIONAL; INTRAMUSCULAR; INTRAVENOUS; SOFT TISSUE
Status: DISCONTINUED | OUTPATIENT
Start: 2025-08-13 | End: 2025-08-13

## 2025-08-13 RX ORDER — PHENYLEPHRINE HYDROCHLORIDE 10 MG/ML
INJECTION INTRAVENOUS
Status: DISCONTINUED | OUTPATIENT
Start: 2025-08-13 | End: 2025-08-13

## 2025-08-13 RX ORDER — ROCURONIUM BROMIDE 10 MG/ML
INJECTION, SOLUTION INTRAVENOUS
Status: DISCONTINUED | OUTPATIENT
Start: 2025-08-13 | End: 2025-08-13

## 2025-08-13 RX ORDER — FENTANYL CITRATE 50 UG/ML
INJECTION, SOLUTION INTRAMUSCULAR; INTRAVENOUS
Status: DISCONTINUED | OUTPATIENT
Start: 2025-08-13 | End: 2025-08-13

## 2025-08-13 RX ORDER — ACETAMINOPHEN 10 MG/ML
INJECTION, SOLUTION INTRAVENOUS
Status: DISCONTINUED | OUTPATIENT
Start: 2025-08-13 | End: 2025-08-13

## 2025-08-13 RX ORDER — KETAMINE HCL IN 0.9 % NACL 50 MG/5 ML
SYRINGE (ML) INTRAVENOUS
Status: DISCONTINUED | OUTPATIENT
Start: 2025-08-13 | End: 2025-08-13

## 2025-08-13 RX ORDER — PROPOFOL 10 MG/ML
VIAL (ML) INTRAVENOUS
Status: DISCONTINUED | OUTPATIENT
Start: 2025-08-13 | End: 2025-08-13

## 2025-08-13 RX ADMIN — PHENYLEPHRINE HYDROCHLORIDE 200 MCG: 10 INJECTION INTRAVENOUS at 10:08

## 2025-08-13 RX ADMIN — PROPOFOL 50 MG: 10 INJECTION, EMULSION INTRAVENOUS at 07:08

## 2025-08-13 RX ADMIN — DEXAMETHASONE SODIUM PHOSPHATE 8 MG: 4 INJECTION, SOLUTION INTRAMUSCULAR; INTRAVENOUS at 07:08

## 2025-08-13 RX ADMIN — FENTANYL CITRATE 50 MCG: 50 INJECTION, SOLUTION INTRAMUSCULAR; INTRAVENOUS at 07:08

## 2025-08-13 RX ADMIN — PHENYLEPHRINE HYDROCHLORIDE 100 MCG: 10 INJECTION INTRAVENOUS at 10:08

## 2025-08-13 RX ADMIN — FENTANYL CITRATE 50 MCG: 50 INJECTION, SOLUTION INTRAMUSCULAR; INTRAVENOUS at 08:08

## 2025-08-13 RX ADMIN — LIDOCAINE HYDROCHLORIDE 3 ML: 40 SOLUTION TOPICAL at 07:08

## 2025-08-13 RX ADMIN — CEFAZOLIN 2 G: 2 INJECTION, POWDER, FOR SOLUTION INTRAMUSCULAR; INTRAVENOUS at 07:08

## 2025-08-13 RX ADMIN — SUGAMMADEX 200 MG: 100 INJECTION, SOLUTION INTRAVENOUS at 11:08

## 2025-08-13 RX ADMIN — ONDANSETRON 4 MG: 2 INJECTION INTRAMUSCULAR; INTRAVENOUS at 07:08

## 2025-08-13 RX ADMIN — DEXMEDETOMIDINE HYDROCHLORIDE 8 MCG: 100 INJECTION, SOLUTION INTRAVENOUS at 08:08

## 2025-08-13 RX ADMIN — DEXMEDETOMIDINE HYDROCHLORIDE 8 MCG: 100 INJECTION, SOLUTION INTRAVENOUS at 10:08

## 2025-08-13 RX ADMIN — PROPOFOL 150 MG: 10 INJECTION, EMULSION INTRAVENOUS at 07:08

## 2025-08-13 RX ADMIN — DEXMEDETOMIDINE HYDROCHLORIDE 8 MCG: 100 INJECTION, SOLUTION INTRAVENOUS at 11:08

## 2025-08-13 RX ADMIN — MIDAZOLAM HYDROCHLORIDE 2 MG: 1 INJECTION, SOLUTION INTRAMUSCULAR; INTRAVENOUS at 07:08

## 2025-08-13 RX ADMIN — Medication 25 MG: at 07:08

## 2025-08-13 RX ADMIN — ACETAMINOPHEN 1000 MG: 10 INJECTION, SOLUTION INTRAVENOUS at 10:08

## 2025-08-13 RX ADMIN — DEXMEDETOMIDINE HYDROCHLORIDE 12 MCG: 100 INJECTION, SOLUTION INTRAVENOUS at 08:08

## 2025-08-13 RX ADMIN — PHENYLEPHRINE HYDROCHLORIDE 300 MCG: 10 INJECTION INTRAVENOUS at 10:08

## 2025-08-13 RX ADMIN — HEPARIN SODIUM 5000 UNITS: 10000 INJECTION, SOLUTION INTRAVENOUS; SUBCUTANEOUS at 09:08

## 2025-08-13 RX ADMIN — ROCURONIUM BROMIDE 50 MG: 10 INJECTION, SOLUTION INTRAVENOUS at 07:08

## 2025-08-13 RX ADMIN — SODIUM CHLORIDE, SODIUM LACTATE, POTASSIUM CHLORIDE, AND CALCIUM CHLORIDE: .6; .31; .03; .02 INJECTION, SOLUTION INTRAVENOUS at 08:08

## 2025-08-13 RX ADMIN — SODIUM CHLORIDE: 900 INJECTION INTRAVENOUS at 07:08

## 2025-08-13 RX ADMIN — FENTANYL CITRATE 100 MCG: 50 INJECTION, SOLUTION INTRAMUSCULAR; INTRAVENOUS at 07:08

## 2025-08-13 RX ADMIN — Medication 15 MG: at 09:08

## 2025-08-13 RX ADMIN — CLEVIPIDINE 1 MG/HR: 0.5 EMULSION INTRAVENOUS at 07:08

## 2025-08-13 RX ADMIN — FAMOTIDINE 20 MG: 10 INJECTION, SOLUTION INTRAVENOUS at 07:08

## 2025-08-13 RX ADMIN — PHENYLEPHRINE HYDROCHLORIDE 100 MCG: 10 INJECTION INTRAVENOUS at 09:08

## 2025-08-13 RX ADMIN — Medication 10 MG: at 10:08

## 2025-08-13 RX ADMIN — ROCURONIUM BROMIDE 10 MG: 10 INJECTION, SOLUTION INTRAVENOUS at 08:08

## 2025-08-13 RX ADMIN — HEPARIN SODIUM 10000 UNITS: 10000 INJECTION, SOLUTION INTRAVENOUS; SUBCUTANEOUS at 08:08

## 2025-08-13 RX ADMIN — PROTAMINE SULFATE 100 MG: 10 INJECTION, SOLUTION INTRAVENOUS at 10:08

## 2025-08-13 RX ADMIN — SODIUM CHLORIDE: 900 INJECTION INTRAVENOUS at 09:08

## 2025-08-13 RX ADMIN — SODIUM CHLORIDE, SODIUM LACTATE, POTASSIUM CHLORIDE, AND CALCIUM CHLORIDE: .6; .31; .03; .02 INJECTION, SOLUTION INTRAVENOUS at 07:08

## 2025-08-13 RX ADMIN — PHENYLEPHRINE HYDROCHLORIDE 100 MCG: 10 INJECTION INTRAVENOUS at 07:08

## 2025-08-13 RX ADMIN — ROCURONIUM BROMIDE 10 MG: 10 INJECTION, SOLUTION INTRAVENOUS at 10:08

## 2025-08-13 RX ADMIN — ROCURONIUM BROMIDE 10 MG: 10 INJECTION, SOLUTION INTRAVENOUS at 09:08

## 2025-08-14 ENCOUNTER — CLINICAL SUPPORT (OUTPATIENT)
Dept: CARDIOLOGY | Facility: HOSPITAL | Age: 70
End: 2025-08-14
Attending: FAMILY MEDICINE
Payer: MEDICARE

## 2025-08-14 PROBLEM — E87.5 HYPERKALEMIA: Status: ACTIVE | Noted: 2025-01-01

## 2025-08-14 PROBLEM — N17.9 AKI (ACUTE KIDNEY INJURY): Status: ACTIVE | Noted: 2025-01-01

## 2025-08-14 PROCEDURE — 93308 TTE F-UP OR LMTD: CPT | Mod: 26,,, | Performed by: INTERNAL MEDICINE

## 2025-08-14 PROCEDURE — 93308 TTE F-UP OR LMTD: CPT

## 2025-08-15 PROBLEM — J96.01 ACUTE HYPOXIC RESPIRATORY FAILURE: Status: ACTIVE | Noted: 2025-08-15

## 2025-08-16 PROBLEM — R79.89 POSITIVE D DIMER: Status: ACTIVE | Noted: 2025-08-16

## 2025-08-16 PROBLEM — K56.7 ILEUS, POSTOPERATIVE: Status: ACTIVE | Noted: 2025-08-16

## 2025-08-16 PROBLEM — K91.89 ILEUS, POSTOPERATIVE: Status: ACTIVE | Noted: 2025-08-16

## 2025-08-16 PROBLEM — I50.23 ACUTE ON CHRONIC SYSTOLIC HEART FAILURE: Status: ACTIVE | Noted: 2025-08-16

## 2025-08-17 PROBLEM — I95.9 HYPOTENSION: Status: ACTIVE | Noted: 2025-08-17

## 2025-08-17 PROBLEM — G25.81 RESTLESS LEGS SYNDROME: Status: ACTIVE | Noted: 2025-01-01

## 2025-08-18 ENCOUNTER — ANESTHESIA (OUTPATIENT)
Dept: SURGERY | Facility: HOSPITAL | Age: 70
End: 2025-08-18
Payer: MEDICARE

## 2025-08-18 ENCOUNTER — ANESTHESIA EVENT (OUTPATIENT)
Dept: SURGERY | Facility: HOSPITAL | Age: 70
End: 2025-08-18
Payer: MEDICARE

## 2025-08-18 PROCEDURE — 63600175 PHARM REV CODE 636 W HCPCS: Performed by: NURSE ANESTHETIST, CERTIFIED REGISTERED

## 2025-08-18 PROCEDURE — 25000003 PHARM REV CODE 250: Performed by: NURSE ANESTHETIST, CERTIFIED REGISTERED

## 2025-08-18 RX ORDER — LIDOCAINE HYDROCHLORIDE 20 MG/ML
INJECTION INTRAVENOUS
Status: DISCONTINUED | OUTPATIENT
Start: 2025-08-18 | End: 2025-08-18

## 2025-08-18 RX ORDER — LIDOCAINE HYDROCHLORIDE 20 MG/ML
JELLY TOPICAL
Status: DISCONTINUED | OUTPATIENT
Start: 2025-08-18 | End: 2025-08-18

## 2025-08-18 RX ORDER — ONDANSETRON HYDROCHLORIDE 2 MG/ML
INJECTION, SOLUTION INTRAVENOUS
Status: DISCONTINUED | OUTPATIENT
Start: 2025-08-18 | End: 2025-08-18

## 2025-08-18 RX ORDER — FENTANYL CITRATE 50 UG/ML
INJECTION, SOLUTION INTRAMUSCULAR; INTRAVENOUS
Status: DISCONTINUED | OUTPATIENT
Start: 2025-08-18 | End: 2025-08-18

## 2025-08-18 RX ORDER — ROCURONIUM BROMIDE 10 MG/ML
INJECTION, SOLUTION INTRAVENOUS
Status: DISCONTINUED | OUTPATIENT
Start: 2025-08-18 | End: 2025-08-18

## 2025-08-18 RX ORDER — PROPOFOL 10 MG/ML
VIAL (ML) INTRAVENOUS
Status: DISCONTINUED | OUTPATIENT
Start: 2025-08-18 | End: 2025-08-18

## 2025-08-18 RX ORDER — PHENYLEPHRINE HYDROCHLORIDE 10 MG/ML
INJECTION INTRAVENOUS
Status: DISCONTINUED | OUTPATIENT
Start: 2025-08-18 | End: 2025-08-18

## 2025-08-18 RX ORDER — FUROSEMIDE 10 MG/ML
INJECTION INTRAMUSCULAR; INTRAVENOUS
Status: DISCONTINUED | OUTPATIENT
Start: 2025-08-18 | End: 2025-08-18

## 2025-08-18 RX ADMIN — PHENYLEPHRINE HYDROCHLORIDE 200 MCG: 10 INJECTION INTRAVENOUS at 03:08

## 2025-08-18 RX ADMIN — FENTANYL CITRATE 100 MCG: 50 INJECTION, SOLUTION INTRAMUSCULAR; INTRAVENOUS at 03:08

## 2025-08-18 RX ADMIN — LIDOCAINE HYDROCHLORIDE 3 ML: 20 JELLY TOPICAL at 03:08

## 2025-08-18 RX ADMIN — PROPOFOL 80 MG: 10 INJECTION, EMULSION INTRAVENOUS at 03:08

## 2025-08-18 RX ADMIN — ONDANSETRON 4 MG: 2 INJECTION INTRAMUSCULAR; INTRAVENOUS at 04:08

## 2025-08-18 RX ADMIN — SODIUM CHLORIDE: 0.9 INJECTION, SOLUTION INTRAVENOUS at 03:08

## 2025-08-18 RX ADMIN — SUGAMMADEX 200 MG: 100 INJECTION, SOLUTION INTRAVENOUS at 04:08

## 2025-08-18 RX ADMIN — LIDOCAINE HYDROCHLORIDE 50 MG: 20 INJECTION, SOLUTION INTRAVENOUS at 03:08

## 2025-08-18 RX ADMIN — FUROSEMIDE 20 MG: 10 INJECTION, SOLUTION INTRAVENOUS at 04:08

## 2025-08-18 RX ADMIN — ROCURONIUM BROMIDE 50 MG: 10 INJECTION, SOLUTION INTRAVENOUS at 03:08

## 2025-08-20 PROBLEM — E87.5 HYPERKALEMIA: Status: RESOLVED | Noted: 2025-01-01 | Resolved: 2025-01-01

## 2025-08-20 PROBLEM — N17.9 AKI (ACUTE KIDNEY INJURY): Status: RESOLVED | Noted: 2025-08-14 | Resolved: 2025-08-20

## 2025-08-20 PROBLEM — F17.200 TOBACCO DEPENDENCY: Status: ACTIVE | Noted: 2025-08-20

## 2025-08-21 PROBLEM — K91.89 ILEUS, POSTOPERATIVE: Status: RESOLVED | Noted: 2025-08-16 | Resolved: 2025-08-21

## 2025-08-21 PROBLEM — I47.20 V-TACH: Status: ACTIVE | Noted: 2025-01-01

## 2025-08-21 PROBLEM — K56.7 ILEUS, POSTOPERATIVE: Status: RESOLVED | Noted: 2025-01-01 | Resolved: 2025-01-01

## 2025-08-22 ENCOUNTER — CLINICAL SUPPORT (OUTPATIENT)
Dept: SMOKING CESSATION | Facility: CLINIC | Age: 70
End: 2025-08-22
Payer: COMMERCIAL

## 2025-08-22 DIAGNOSIS — F17.200 NICOTINE DEPENDENCE: Primary | ICD-10-CM

## 2025-08-23 PROBLEM — N39.0 UTI (URINARY TRACT INFECTION): Status: ACTIVE | Noted: 2025-08-23

## 2025-08-27 ENCOUNTER — CLINICAL SUPPORT (OUTPATIENT)
Dept: SMOKING CESSATION | Facility: CLINIC | Age: 70
End: 2025-08-27
Payer: COMMERCIAL

## 2025-08-27 DIAGNOSIS — F17.200 NICOTINE DEPENDENCE: Primary | ICD-10-CM

## 2025-08-27 PROCEDURE — 99407 BEHAV CHNG SMOKING > 10 MIN: CPT | Mod: S$GLB,,, | Performed by: CLINIC/CENTER

## 2025-09-04 PROBLEM — I50.23 ACUTE ON CHRONIC SYSTOLIC (CONGESTIVE) HEART FAILURE: Status: ACTIVE | Noted: 2025-01-01

## 2025-09-04 PROBLEM — D64.9 CHRONIC ANEMIA: Status: ACTIVE | Noted: 2025-01-01

## 2025-09-04 PROBLEM — J44.1 COPD EXACERBATION: Status: ACTIVE | Noted: 2025-01-01

## 2025-09-04 PROBLEM — U07.1 COVID-19: Status: ACTIVE | Noted: 2025-01-01

## 2025-09-04 PROBLEM — Z72.0 TOBACCO ABUSE: Status: ACTIVE | Noted: 2025-01-01

## 2025-09-04 PROBLEM — R09.02 HYPOXIA: Status: ACTIVE | Noted: 2025-01-01

## 2025-09-04 PROBLEM — G25.81 RLS (RESTLESS LEGS SYNDROME): Status: ACTIVE | Noted: 2025-01-01

## 2025-09-05 PROBLEM — E83.51 HYPOCALCEMIA: Status: ACTIVE | Noted: 2025-01-01

## 2025-09-05 PROBLEM — J96.22 ACUTE ON CHRONIC RESPIRATORY FAILURE WITH HYPOXIA AND HYPERCAPNIA: Status: ACTIVE | Noted: 2025-01-01

## 2025-09-05 PROBLEM — J96.21 ACUTE ON CHRONIC RESPIRATORY FAILURE WITH HYPOXIA AND HYPERCAPNIA: Status: ACTIVE | Noted: 2025-01-01

## 2025-09-05 PROBLEM — R74.01 TRANSAMINITIS: Status: ACTIVE | Noted: 2025-01-01

## 2025-09-06 PROBLEM — Z51.5 COMFORT MEASURES ONLY STATUS: Status: ACTIVE | Noted: 2025-01-01

## (undated) DEVICE — APPLIER LIGACLIP MED 11IN

## (undated) DEVICE — TRAY CATH 1-LYR URIMTR 16FR

## (undated) DEVICE — CATH TRAILBLAZER .035 135CM

## (undated) DEVICE — TOWEL OR DISP STRL BLUE 4/PK

## (undated) DEVICE — DRAPE LAP TIBURON 77X122IN

## (undated) DEVICE — APPLICATOR CHLORAPREP ORN 26ML

## (undated) DEVICE — ELECTRODE BLD EXT 6.50 ST DISP

## (undated) DEVICE — SUT SILK 3-0 STRANDS 30IN

## (undated) DEVICE — RESERVOIR (FOR C A T S)

## (undated) DEVICE — SUT SILK 2-0 STRANDS 30IN

## (undated) DEVICE — SHEATH INTRODUCER 5FR 10CM

## (undated) DEVICE — TRAY GENERAL SURGERY SMH

## (undated) DEVICE — GUIDEWIRE STF .035X260CM ANG

## (undated) DEVICE — SOL POVIDONE PREP IODINE 4 OZ

## (undated) DEVICE — Device

## (undated) DEVICE — STAPLER SKIN PROXIMATE WIDE

## (undated) DEVICE — LOOP VESSEL BLUE MINI 2/CARD

## (undated) DEVICE — DRAPE THREE-QTR REINF 53X77IN

## (undated) DEVICE — YANKAUER OPEN TIP W/O VENT

## (undated) DEVICE — DURAPREP SURG SCRUB 26ML

## (undated) DEVICE — SPONGE GAUZE 16PLY 4X4

## (undated) DEVICE — SOL NACL IRR 1000ML BTL

## (undated) DEVICE — STRAP OR TABLE 5IN X 72IN

## (undated) DEVICE — SUT ETHILON 2 BLK MONO TP-1

## (undated) DEVICE — SEE MEDLINE ITEM 152680

## (undated) DEVICE — SUT MCRYL PLUS 4-0 PS2 27IN

## (undated) DEVICE — SHEATH INTRODUCER 6FR 11CM

## (undated) DEVICE — SUT SILK 0 STRANDS 30IN BLK

## (undated) DEVICE — SLEEVE SCD EXPRESS CALF MEDIUM

## (undated) DEVICE — TAPE UMBILICAL .32X76.2CM

## (undated) DEVICE — SHEATH PINNACLE 6FR 25CM

## (undated) DEVICE — SUT ETHILON 2-0 PSLX 30IN

## (undated) DEVICE — SOL NACL IRR 3000ML

## (undated) DEVICE — SUT 3/0 48IN PROLENE BL MO

## (undated) DEVICE — COVER LIGHT HANDLE 80/CA

## (undated) DEVICE — GLOVE SENSICARE PI SLT 7.5

## (undated) DEVICE — APPLIER CLIP LIAGCLIP 9.375IN

## (undated) DEVICE — PLEDGET TFLN FELT 9.5X4.8 ST

## (undated) DEVICE — CATH TRAILBLAZER .035X50X90

## (undated) DEVICE — SPONGE LAP 18X18 PREWASHED

## (undated) DEVICE — SUT PROLENE 5-0 BL C-1 4-24

## (undated) DEVICE — SEE MEDLINE ITEM 146292

## (undated) DEVICE — DRAPE INCISE IOBAN 2 23X33IN

## (undated) DEVICE — GLOVE SURG ULTRA TOUCH 8

## (undated) DEVICE — TUBING ANTICOAGULANT

## (undated) DEVICE — ELECTRODE MEGADYNE RETURN DUAL

## (undated) DEVICE — SUT PROLENE 5/0 RB-1 36 IN

## (undated) DEVICE — SUT ETHICON 3-0 BLK MONO PS

## (undated) DEVICE — DRESSING TRANS 4X4 TEGADERM

## (undated) DEVICE — INSERT SOFTJAW FOGARTY CLMP 33

## (undated) DEVICE — ADHESIVE DERMABOND MINI HV

## (undated) DEVICE — BANDAGE ADHESIVE

## (undated) DEVICE — GUIDEWIRE ANGLED .035 150CM

## (undated) DEVICE — SUT 0 36IN COATED VICRYL UN

## (undated) DEVICE — SET AT1 AUTOTRANSFUSION

## (undated) DEVICE — SEE MEDLINE ITEM 152622

## (undated) DEVICE — NDL SAFETY 25G X 1.5 ECLIPSE

## (undated) DEVICE — APPLIER LIGACLIP SM 9.38IN

## (undated) DEVICE — SUT PROLENE 3-0 SH DA 36 BL

## (undated) DEVICE — PENCIL SMK EVAC CONNECTOR 10FT

## (undated) DEVICE — CATH 5FR OMNIFLUSH 65CM .038

## (undated) DEVICE — SUT SILK SH 2-0 30IN MP BLK

## (undated) DEVICE — SUT SILK 3-0 SH DETACH 30IN

## (undated) DEVICE — SUCTION FRAZIER TUBE 10FR

## (undated) DEVICE — SOL POVIDONE SCRUB IODINE 4 OZ

## (undated) DEVICE — SYR SLIP TIP 20CC

## (undated) DEVICE — DRESSING MEPILEX 4X14IN

## (undated) DEVICE — SUT 3-0 VICRYL / SH (J416)

## (undated) DEVICE — GAUZE SPONGE BULKEE 6X6.75IN

## (undated) DEVICE — ELECTRODE REM PLYHSV RETURN 9

## (undated) DEVICE — SUT MONOCRYL 4-0 SH UND MON

## (undated) DEVICE — GAUZE SPONGE PEANUT STRL

## (undated) DEVICE — DRAPE CV SPLIT SHEET 110X137X8

## (undated) DEVICE — DECANTER FLUID TRNSF WHITE 9IN

## (undated) DEVICE — APPLIER LIGACLIP LG 13IN

## (undated) DEVICE — GLOVE SENSICARE PI MICRO 7.5

## (undated) DEVICE — CART STAPLE RELD 45MM WHT

## (undated) DEVICE — PACK CUSTOM UNIV BASIN SLI

## (undated) DEVICE — SUC YANK POOLE TIP RIGID

## (undated) DEVICE — SUT VICRYL PLUS 3-0 SH 18IN

## (undated) DEVICE — GOWN X-LG STERILE BACK

## (undated) DEVICE — TRAY SKIN SCRUB DRY PREMIUM

## (undated) DEVICE — CLOSURE SKIN STERI STRIP 1/2X4

## (undated) DEVICE — SYR 10CC LUER LOCK

## (undated) DEVICE — INFLATOR ADVANTAGE ENCORE 26

## (undated) DEVICE — LOOP VESSEL MAXI RED

## (undated) DEVICE — UNDERGLOVES BIOGEL PI SIZE 8

## (undated) DEVICE — DRESSING MEPILEX 4X6IN

## (undated) DEVICE — ELECTRODE BLADE INSULATED 1 IN

## (undated) DEVICE — COVER CAMERA OPERATING ROOM

## (undated) DEVICE — FOGERTY SOFT JAW DISP 2/PK

## (undated) DEVICE — SPONGE COTTON TRAY 4X4IN

## (undated) DEVICE — HEMOSTAT SURGICEL 4X8IN

## (undated) DEVICE — STAPLER INT LINEAR ARTC 3.5-45

## (undated) DEVICE — SUT PROLENE 4-0 SH BLU 36IN